# Patient Record
Sex: MALE | Race: WHITE | NOT HISPANIC OR LATINO | Employment: FULL TIME | URBAN - METROPOLITAN AREA
[De-identification: names, ages, dates, MRNs, and addresses within clinical notes are randomized per-mention and may not be internally consistent; named-entity substitution may affect disease eponyms.]

---

## 2017-01-11 RX ORDER — OMEPRAZOLE 40 MG/1
40 CAPSULE, DELAYED RELEASE ORAL DAILY
Qty: 90 CAP | Refills: 1 | Status: SHIPPED | OUTPATIENT
Start: 2017-01-11 | End: 2017-08-07

## 2017-01-15 ENCOUNTER — APPOINTMENT (OUTPATIENT)
Dept: RADIOLOGY | Facility: MEDICAL CENTER | Age: 47
DRG: 580 | End: 2017-01-15
Attending: EMERGENCY MEDICINE
Payer: MEDICAID

## 2017-01-15 ENCOUNTER — RESOLUTE PROFESSIONAL BILLING HOSPITAL PROF FEE (OUTPATIENT)
Dept: MEDSURG UNIT | Facility: MEDICAL CENTER | Age: 47
End: 2017-01-15
Payer: MEDICAID

## 2017-01-15 ENCOUNTER — HOSPITAL ENCOUNTER (INPATIENT)
Facility: MEDICAL CENTER | Age: 47
LOS: 5 days | DRG: 580 | End: 2017-01-20
Attending: EMERGENCY MEDICINE | Admitting: INTERNAL MEDICINE
Payer: MEDICAID

## 2017-01-15 DIAGNOSIS — L03.114 CELLULITIS OF HAND, LEFT: ICD-10-CM
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
## 2017-01-15 PROBLEM — S61.459A CAT BITE OF HAND: Status: ACTIVE | Noted: 2017-01-15

## 2017-01-15 LAB
ANION GAP SERPL CALC-SCNC: 7 MMOL/L (ref 0–11.9)
BASOPHILS # BLD AUTO: 0.8 % (ref 0–1.8)
BASOPHILS # BLD: 0.05 K/UL (ref 0–0.12)
BNP SERPL-MCNC: 13 PG/ML (ref 0–100)
BUN SERPL-MCNC: 21 MG/DL (ref 8–22)
CALCIUM SERPL-MCNC: 9.2 MG/DL (ref 8.5–10.5)
CHLORIDE SERPL-SCNC: 107 MMOL/L (ref 96–112)
CO2 SERPL-SCNC: 23 MMOL/L (ref 20–33)
CREAT SERPL-MCNC: 1.06 MG/DL (ref 0.5–1.4)
EOSINOPHIL # BLD AUTO: 0.22 K/UL (ref 0–0.51)
EOSINOPHIL NFR BLD: 3.5 % (ref 0–6.9)
ERYTHROCYTE [DISTWIDTH] IN BLOOD BY AUTOMATED COUNT: 38.7 FL (ref 35.9–50)
GFR SERPL CREATININE-BSD FRML MDRD: >60 ML/MIN/1.73 M 2
GLUCOSE SERPL-MCNC: 100 MG/DL (ref 65–99)
HCT VFR BLD AUTO: 41.1 % (ref 42–52)
HGB BLD-MCNC: 14.3 G/DL (ref 14–18)
IMM GRANULOCYTES # BLD AUTO: 0.02 K/UL (ref 0–0.11)
IMM GRANULOCYTES NFR BLD AUTO: 0.3 % (ref 0–0.9)
LACTATE BLD-SCNC: 1.1 MMOL/L (ref 0.5–2)
LYMPHOCYTES # BLD AUTO: 1.74 K/UL (ref 1–4.8)
LYMPHOCYTES NFR BLD: 27.4 % (ref 22–41)
MCH RBC QN AUTO: 30.4 PG (ref 27–33)
MCHC RBC AUTO-ENTMCNC: 34.8 G/DL (ref 33.7–35.3)
MCV RBC AUTO: 87.3 FL (ref 81.4–97.8)
MONOCYTES # BLD AUTO: 0.57 K/UL (ref 0–0.85)
MONOCYTES NFR BLD AUTO: 9 % (ref 0–13.4)
NEUTROPHILS # BLD AUTO: 3.75 K/UL (ref 1.82–7.42)
NEUTROPHILS NFR BLD: 59 % (ref 44–72)
NRBC # BLD AUTO: 0 K/UL
NRBC BLD AUTO-RTO: 0 /100 WBC
PLATELET # BLD AUTO: 216 K/UL (ref 164–446)
PMV BLD AUTO: 9.3 FL (ref 9–12.9)
POTASSIUM SERPL-SCNC: 4 MMOL/L (ref 3.6–5.5)
RBC # BLD AUTO: 4.71 M/UL (ref 4.7–6.1)
SODIUM SERPL-SCNC: 137 MMOL/L (ref 135–145)
TROPONIN I SERPL-MCNC: <0.01 NG/ML (ref 0–0.04)
WBC # BLD AUTO: 6.4 K/UL (ref 4.8–10.8)

## 2017-01-15 PROCEDURE — 73130 X-RAY EXAM OF HAND: CPT | Mod: LT

## 2017-01-15 PROCEDURE — 770006 HCHG ROOM/CARE - MED/SURG/GYN SEMI*

## 2017-01-15 PROCEDURE — 93010 ELECTROCARDIOGRAM REPORT: CPT | Performed by: INTERNAL MEDICINE

## 2017-01-15 PROCEDURE — 99285 EMERGENCY DEPT VISIT HI MDM: CPT

## 2017-01-15 PROCEDURE — 85025 COMPLETE CBC W/AUTO DIFF WBC: CPT

## 2017-01-15 PROCEDURE — 83605 ASSAY OF LACTIC ACID: CPT

## 2017-01-15 PROCEDURE — 80048 BASIC METABOLIC PNL TOTAL CA: CPT

## 2017-01-15 PROCEDURE — 96365 THER/PROPH/DIAG IV INF INIT: CPT

## 2017-01-15 PROCEDURE — 83880 ASSAY OF NATRIURETIC PEPTIDE: CPT

## 2017-01-15 PROCEDURE — 84484 ASSAY OF TROPONIN QUANT: CPT

## 2017-01-15 PROCEDURE — 700111 HCHG RX REV CODE 636 W/ 250 OVERRIDE (IP): Performed by: EMERGENCY MEDICINE

## 2017-01-15 PROCEDURE — 93005 ELECTROCARDIOGRAM TRACING: CPT | Performed by: STUDENT IN AN ORGANIZED HEALTH CARE EDUCATION/TRAINING PROGRAM

## 2017-01-15 RX ORDER — LEVOTHYROXINE SODIUM 0.12 MG/1
125 TABLET ORAL DAILY
Status: DISCONTINUED | OUTPATIENT
Start: 2017-01-16 | End: 2017-01-20 | Stop reason: HOSPADM

## 2017-01-15 RX ORDER — ACETAMINOPHEN 325 MG/1
650 TABLET ORAL EVERY 6 HOURS PRN
Status: DISCONTINUED | OUTPATIENT
Start: 2017-01-15 | End: 2017-01-20 | Stop reason: HOSPADM

## 2017-01-15 RX ORDER — OMEPRAZOLE 20 MG/1
40 CAPSULE, DELAYED RELEASE ORAL DAILY
Status: DISCONTINUED | OUTPATIENT
Start: 2017-01-16 | End: 2017-01-20 | Stop reason: HOSPADM

## 2017-01-15 RX ORDER — BISOPROLOL FUMARATE 5 MG/1
10 TABLET, FILM COATED ORAL 2 TIMES DAILY
Status: DISCONTINUED | OUTPATIENT
Start: 2017-01-16 | End: 2017-01-20 | Stop reason: HOSPADM

## 2017-01-15 RX ORDER — NICOTINE 21 MG/24HR
14 PATCH, TRANSDERMAL 24 HOURS TRANSDERMAL
Status: DISCONTINUED | OUTPATIENT
Start: 2017-01-16 | End: 2017-01-17 | Stop reason: SINTOL

## 2017-01-15 RX ORDER — VERAPAMIL HYDROCHLORIDE 240 MG/1
240 TABLET, FILM COATED, EXTENDED RELEASE ORAL DAILY
Status: DISCONTINUED | OUTPATIENT
Start: 2017-01-16 | End: 2017-01-20 | Stop reason: HOSPADM

## 2017-01-15 RX ORDER — ATORVASTATIN CALCIUM 20 MG/1
20 TABLET, FILM COATED ORAL DAILY
Status: DISCONTINUED | OUTPATIENT
Start: 2017-01-16 | End: 2017-01-20 | Stop reason: HOSPADM

## 2017-01-15 RX ORDER — GABAPENTIN 100 MG/1
100 CAPSULE ORAL 3 TIMES DAILY
Status: DISCONTINUED | OUTPATIENT
Start: 2017-01-16 | End: 2017-01-20 | Stop reason: HOSPADM

## 2017-01-15 RX ORDER — POLYETHYLENE GLYCOL 3350 17 G/17G
1 POWDER, FOR SOLUTION ORAL DAILY
Status: DISCONTINUED | OUTPATIENT
Start: 2017-01-16 | End: 2017-01-20 | Stop reason: HOSPADM

## 2017-01-15 RX ORDER — SODIUM CHLORIDE 9 MG/ML
INJECTION, SOLUTION INTRAVENOUS CONTINUOUS
Status: DISCONTINUED | OUTPATIENT
Start: 2017-01-16 | End: 2017-01-20

## 2017-01-15 RX ORDER — LABETALOL HYDROCHLORIDE 5 MG/ML
10 INJECTION, SOLUTION INTRAVENOUS EVERY 4 HOURS PRN
Status: DISCONTINUED | OUTPATIENT
Start: 2017-01-15 | End: 2017-01-20 | Stop reason: HOSPADM

## 2017-01-15 RX ORDER — AMPICILLIN AND SULBACTAM 2; 1 G/1; G/1
3 INJECTION, POWDER, FOR SOLUTION INTRAMUSCULAR; INTRAVENOUS ONCE
Status: COMPLETED | OUTPATIENT
Start: 2017-01-15 | End: 2017-01-15

## 2017-01-15 RX ADMIN — AMPICILLIN SODIUM AND SULBACTAM SODIUM 3 G: 2; 1 INJECTION, POWDER, FOR SOLUTION INTRAMUSCULAR; INTRAVENOUS at 20:11

## 2017-01-15 ASSESSMENT — PAIN SCALES - GENERAL
PAINLEVEL_OUTOF10: 2
PAINLEVEL_OUTOF10: 6

## 2017-01-15 ASSESSMENT — ENCOUNTER SYMPTOMS
ABDOMINAL PAIN: 0
FEVER: 0
SPUTUM PRODUCTION: 0
VOMITING: 0
PALPITATIONS: 0
BLURRED VISION: 0
CHILLS: 0
NAUSEA: 0
SHORTNESS OF BREATH: 0
FOCAL WEAKNESS: 0
DIARRHEA: 0
SORE THROAT: 0
CONSTIPATION: 1
HEADACHES: 0
COUGH: 0
WHEEZING: 0
SENSORY CHANGE: 0
MYALGIAS: 0

## 2017-01-15 ASSESSMENT — LIFESTYLE VARIABLES
EVER_SMOKED: YES
DO YOU DRINK ALCOHOL: NO

## 2017-01-15 NOTE — IP AVS SNAPSHOT
Shiny Ads Access Code: Activation code not generated  Current Shiny Ads Status: Patient Declined    Your email address is not on file at [facility].  Email Addresses are required for you to sign up for Shiny Ads, please contact [phone] to verify your personal information and to provide your email address prior to attempting to register for Shiny Ads.    Trevor Gillis Jr.  [address] [place] [address]    Shiny Ads  A secure, online tool to manage your health information     [facility]’s Shiny Ads® is a secure, online tool that connects you to your personalized health information from the privacy of your home -- day or night - making it very easy for you to manage your healthcare. Once the activation process is completed, you can even access your medical information using the Shiny Ads jarvis, which is available for free in the Apple Jarvis store or Google Play store.     To learn more about Shiny Ads, visit [email or web address]    There are two levels of access available (as shown below):   My Chart Features  [facility] Doctor [facility]  Specialists [facility]  Urgent  Care Non-[facility] Primary Care Doctor   Email your healthcare team securely and privately 24/7 X X X    Manage appointments: schedule your next appointment; view details of past/upcoming appointments X      Request prescription refills. X      View recent personal medical records, including lab and immunizations X X X X   View health record, including health history, allergies, medications X X X X   Read reports about your outpatient visits, procedures, consult and ER notes X X X X   See your discharge summary, which is a recap of your hospital and/or ER visit that includes your diagnosis, lab results, and care plan X X  X     How to register for [employer]:  Once your e-mail address has been verified, follow the following steps to sign up for [employer].     1. Go to  [email or web address]  2. Click on the Sign Up Now box, which takes you to the  New Member Sign Up page. You will need to provide the following information:  a. Enter your Medstory Access Code exactly as it appears at the top of this page. (You will not need to use this code after you’ve completed the sign-up process. If you do not sign up before the expiration date, you must request a new code.)   b. Enter your date of birth.   c. Enter your home email address.   d. Click Submit, and follow the next screen’s instructions.  3. Create a Medstory ID. This will be your Medstory login ID and cannot be changed, so think of one that is secure and easy to remember.  4. Create a Medstory password. You can change your password at any time.  5. Enter your Password Reset Question and Answer. This can be used at a later time if you forget your password.   6. Enter your e-mail address. This allows you to receive e-mail notifications when new information is available in Medstory.  7. Click Sign Up. You can now view your health information.    For assistance activating your Medstory account, call [phone]

## 2017-01-15 NOTE — IP AVS SNAPSHOT
" <p align=\"LEFT\"><IMG SRC=\"//[email or web address]$/[email or web address]\" alt=\"Image\" WIDTH=\"50%\" HEIGHT=\"200\" BORDER=\"\"></p>                   Name:Trevor Gillis Jr.  Medical Record Number:[id number]  CSN: [id number]    YOB: 1970   Age: 46 y.o.  Sex: male  HT:1.905 m (6' 3\") WT: 119.1 kg (262 lb 9.1 oz)          Admit Date: 1/15/2017     Discharge Date:   Today's Date: 1/20/2017  Attending Doctor:  [facility]                  Allergies:  Tomato          Your appointments     Feb 01, 2017 10:00 AM   FOLLOW UP with Jeffrey Gillis M.D.   [facility] (--)    [address] [place] [address] [address]  [facility] [address] [phone]            Feb 13, 2017  9:30 AM   Established Patient with Blake Oseguera M.D.   [facility] (--)    [address] [place] [address] [address]  [facility] [address] [phone]           You will be receiving a confirmation call a few days before your appointment from our automated call confirmation system.              Follow-up Information     1. Follow up with Blake Oseguera M.D. In 2 weeks.    Specialty:  Internal Medicine    Contact information    [address] [place] [address]  [facility] [address] [phone]          2. Follow up with Kia Aldridge M.D. In 10 days.    Specialty:  Plastic Surgery    Contact information    [address] [facility] [address]  [facility] [address] [phone]          3. Follow up with Blake Oseguera M.D. In 2 weeks.    Specialty:  Internal Medicine    Contact information    [address] [facility] [address]  [facility] [address] [phone]           Medication List      Take these Medications        Instructions    albuterol 108 (90 BASE) MCG/ACT Aers inhalation aerosol    Inhale 2 Puffs by mouth every 6 hours as needed for Shortness of Breath.   Dose:  2 Puff       atorvastatin 20 MG Tabs   Commonly known as:  LIPITOR    Doctor's comments:  PT NEEDS APPT   Take 1 Tab by mouth every day.   Dose:  20 mg      " bisoprolol 10 MG tablet   Commonly known as:  ZEBETA    Take 1 Tab by mouth 2 Times a Day.   Dose:  10 mg       gabapentin 100 MG Caps   Commonly known as:  NEURONTIN    Take 100 mg by mouth 3 times a day.   Dose:  100 mg       levofloxacin 750 MG tablet   Commonly known as:  LEVAQUIN    Take 1 Tab by mouth every day.   Dose:  750 mg       levothyroxine 125 MCG Tabs   Commonly known as:  SYNTHROID    Doctor's comments:  Have PCP refill and follow thyroid after this   Take 1 Tab by mouth every day.   Dose:  125 mcg       metronidazole 500 MG Tabs   Commonly known as:  FLAGYL    Take 1 Tab by mouth 3 times a day.   Dose:  500 mg       omeprazole 40 MG delayed-release capsule   Commonly known as:  PRILOSEC    Doctor's comments:  Take one capsule 30 minutes before breakfast   Take 1 Cap by mouth every day.   Dose:  40 mg       verapamil  MG Tbcr   Commonly known as:  CALAN-SR    Take 1 Tab by mouth every day.   Dose:  240 mg

## 2017-01-15 NOTE — IP AVS SNAPSHOT
" After Visit Summary                                                                                                                  Name:Trevor Gillis Jr.  Medical Record Number:[id number]  CSN: [id number]    YOB: 1970   Age: 46 y.o.  Sex: male  HT:1.905 m (6' 3\") WT: 119.1 kg (262 lb 9.1 oz)          Admit Date: 1/15/2017     Discharge Date:   Today's Date: 1/20/2017  Attending Doctor:  [facility] [employer] [facility]                  Allergies:  Tomato            Discharge Instructions       Discharge Instructions    Discharged to home by car with friend. Discharged via walking, hospital escort: Refused.  Special equipment needed: Not Applicable    Be sure to schedule a follow-up appointment with your primary care doctor or any specialists as instructed.     Discharge Plan:   Diet Plan: Discussed  Activity Level: Discussed  Confirmed Follow up Appointment: Appointment Scheduled  Confirmed Symptoms Management: Discussed  Medication Reconciliation Updated: Yes  Influenza Vaccine Indication: Not indicated: Previously immunized this influenza season and > 8 years of age    I understand that a diet low in cholesterol, fat, and sodium is recommended for good health. Unless I have been given specific instructions below for another diet, I accept this instruction as my diet prescription.   Other diet: Regular    Special Instructions: None    · Is patient discharged on Warfarin / Coumadin?   No     · Is patient Post Blood Transfusion?  No    Depression / Suicide Risk    As you are discharged from this [facility] facility, it is important to learn how to keep safe from harming yourself.    Recognize the warning signs:  · Abrupt changes in personality, positive or negative- including increase in energy   · Giving away possessions  · Change in eating patterns- significant weight changes-  positive or negative  · Change in sleeping patterns- unable to sleep or sleeping all the time   · Unwillingness or inability to " communicate  · Depression  · Unusual sadness, discouragement and loneliness  · Talk of wanting to die  · Neglect of personal appearance   · Rebelliousness- reckless behavior  · Withdrawal from people/activities they love  · Confusion- inability to concentrate     If you or a loved one observes any of these behaviors or has concerns about self-harm, here's what you can do:  · Talk about it- your feelings and reasons for harming yourself  · Remove any means that you might use to hurt yourself (examples: pills, rope, extension cords, firearm)  · Get professional help from the community (Mental Health, Substance Abuse, psychological counseling)  · Do not be alone:Call your Safe Contact- someone whom you trust who will be there for you.  · Call your local CRISIS HOTLINE 693-9391 or 831-591-4354  · Call your local Children's Mobile Crisis Response Team [place] (626) 004-0582 or www.Riskthinktank  · Call the toll free National Suicide Prevention Hotlines   · National Suicide Prevention Lifeline 748-449-PVIH (1074)  · [employer] Hope Line Network 800-SUICIDE (010-8732)    Cellulitis  Cellulitis is an infection of the skin and the tissue beneath it. The infected area is usually red and tender. Cellulitis occurs most often in the arms and lower legs.   CAUSES   Cellulitis is caused by bacteria that enter the skin through cracks or cuts in the skin. The most common types of bacteria that cause cellulitis are staphylococci and streptococci.  SIGNS AND SYMPTOMS   · Redness and warmth.  · Swelling.  · Tenderness or pain.  · Fever.  DIAGNOSIS   Your health care provider can usually determine what is wrong based on a physical exam. Blood tests may also be done.  TREATMENT   Treatment usually involves taking an antibiotic medicine.  HOME CARE INSTRUCTIONS   · Take your antibiotic medicine as directed by your health care provider. Finish the antibiotic even if you start to feel better.  · Keep the infected arm or leg elevated  to reduce swelling.  · Apply a warm cloth to the affected area up to 4 times per day to relieve pain.  · Take medicines only as directed by your health care provider.  · Keep all follow-up visits as directed by your health care provider.  SEEK MEDICAL CARE IF:   2. You notice red streaks coming from the infected area.  3. Your red area gets larger or turns dark in color.  4. Your bone or joint underneath the infected area becomes painful after the skin has healed.  5. Your infection returns in the same area or another area.  6. You notice a swollen bump in the infected area.  7. You develop new symptoms.  8. You have a fever.  SEEK IMMEDIATE MEDICAL CARE IF:   2. You feel very sleepy.  3. You develop vomiting or diarrhea.  4. You have a general ill feeling (malaise) with muscle aches and pains.  MAKE SURE YOU:   2. Understand these instructions.  3. Will watch your condition.  4. Will get help right away if you are not doing well or get worse.     This information is not intended to replace advice given to you by your health care provider. Make sure you discuss any questions you have with your health care provider.     Document Released: 09/27/2006 Document Revised: 01/08/2016 Document Reviewed: 03/04/2013  [employer] Interactive Patient Education ©2016 [employer]    Wound Packing  Wound packing involves placing a moistened packing material into your wound and then covering it with an outer bandage (dressing). This helps promote proper healing of deep tissue and tissue under the skin. It also helps prevent bleeding, infection, and further injury.   Wounds are packed until deep tissue has had time to heal. The time it takes for this to occur is different for everyone. Your health care provider will show you how to pack and dress your wound. Using gloves and a sterile technique is important in order to avoid spreading germs into your wound.  RISKS AND COMPLICATIONS  · Infection.  · Delayed or abnormal healing.  HOW  TO PACK YOUR WOUND  Follow your health care provider's instructions on how often you need to change dressings and pack your wound. You will likely be asked to change dressings 1-2 times a day.  Supplies Needed  · Gloves.  · Wetting solution.  · Clean bowl.  · Packing material (gauze or gauze sponges).  · Clean towels.  · Outer dressing.  · Tape.  · Cotton balls or cotton-tipped swabs.  · Small plastic bag.  Preparing Your New Packing Material  9. Make sure your work surface or countertop is clean and disinfected.  10. Wash your hands well with soap and water.  11. Put a clean towel on the counter.  12. Put a clean bowl on the towel. Be sure to only touch the outside of the bowl when handling it.  13. Pour wetting solution into the bowl.  14. Cut your packing material (gauze or sponges) to the right size for your wound. Drop it into the bowl.  15. Cut four tape strips that you will use to seal the outer dressing.  16. Put cotton balls or cotton-tipped swabs on the clean towel.  Removing the Old Packing and Dressing  5. Gently remove the old dressing and packing material.  6. Put the removed items into the plastic bag to throw away later.  7. Wash your hands well with soap and water again.  Applying New Packing Material and Dressing  5. Put on your gloves.  6. Squeeze the packing material in the bowl to release excess liquid. The packing material should be moist, but not dripping wet.  7. Gently place the packing material into the wound. Use a cotton ball or cotton-tipped swab to guide it into place, filling all of the space.  8. Dry your fingertips on the towel.  9. Open up your outer dressing supplies and put them on a dry part of the towel. Keep them from getting wet.  10. Place the dressing over the packed wound.  11. Tape the four outer edges of the dressing in place.  12. Remove your gloves.  13. Wash your hands again with soap and water.  General Tips  · Follow your health care provider's instructions on how  tightly to pack the wound. At first, the wound will be packed tightly to help stop bleeding. As the wound begins to heal inside, you will use less packing material and pack the wound loosely to allow tissue to heal slowly from the inside out.  · Keep the dressing clean and dry.  · Follow any other instructions given by your health care provider on how to aid healing. This may include applying warm or cold compresses, elevating the affected area, or wearing a compression dressing.  · Ask your health care provider about sun exposure and sunscreen when the dressings are no longer needed.  · Keep all follow-up visits with your health care provider. This is important.  SEEK MEDICAL CARE IF:  · You have drainage, redness, swelling, or pain at your wound site.  · You notice a bad smell coming from the wound site.  · Your pain is not controlled with pain medicine.  · Tissue inside your wound changes color from pink to white, yellow, or black.  · Your wound changes in size or depth.  · You have a fever.  · You have shaking chills.  · You are having trouble packing your wound.     This information is not intended to replace advice given to you by your health care provider. Make sure you discuss any questions you have with your health care provider.     Document Released: 07/15/2015 Document Reviewed: 07/15/2015  Wantr Interactive Patient Education ©2016 [employer] Inc.      Your appointments     Feb 01, 2017 10:00 AM   FOLLOW UP with Jeffrey Gillis M.D.   [facility] (--)    [address] [place] [address] [address]  [facility] [address]   [phone]            Feb 13, 2017  9:30 AM   Established Patient with Blake Oseguera M.D.   [facility] - Internal Medicine (--)    [address] [place] [address]  [address]  [facility] [address]   [phone]           You will be receiving a confirmation call a few days before your appointment from our automated call confirmation system.                 Follow-up Information     1. Follow up with Blake Oseguera M.D. In 2 weeks.    Specialty:  Internal Medicine    Contact information    [address]  [address]  [place] [address] [phone]          2. Follow up with Kia Aldridge M.D. In 10 days.    Specialty:  Plastic Surgery    Contact information    [address] [facility] [address]  [place] [address]  [phone]          3. Follow up with Blake Oseguera M.D. In 2 weeks.    Specialty:  Internal Medicine    Contact information    [address]  [address]  [place] [id number] [phone]           Discharge Medication Instructions:    Below are the medications your physician expects you to take upon discharge:    Review all your home medications and newly ordered medications with your doctor and/or pharmacist. Follow medication instructions as directed by your doctor and/or pharmacist.    Please keep your medication list with you and share with your physician.               Medication List      START taking these medications        Instructions    levofloxacin 750 MG tablet   Last time this was given:  750 mg on 1/20/2017  9:39 AM   Commonly known as:  LEVAQUIN    Take 1 Tab by mouth every day.   Dose:  750 mg       metronidazole 500 MG Tabs   Last time this was given:  500 mg on 1/20/2017  5:26 AM   Commonly known as:  FLAGYL    Take 1 Tab by mouth 3 times a day.   Dose:  500 mg         CONTINUE taking these medications        Instructions    albuterol 108 (90 BASE) MCG/ACT Aers inhalation aerosol    Inhale 2 Puffs by mouth every 6 hours as needed for Shortness of Breath.   Dose:  2 Puff       atorvastatin 20 MG Tabs   Last time this was given:  20 mg on 1/19/2017  8:56 PM   Commonly known as:  LIPITOR    Doctor's comments:  PT NEEDS APPT   Take 1 Tab by mouth every day.   Dose:  20 mg       bisoprolol 10 MG tablet   Last time this was given:  10 mg on 1/20/2017  9:39 AM   Commonly known as:  ZEBETA    Take 1 Tab by mouth 2 Times a Day.   Dose:  10 mg       gabapentin  100 MG Caps   Last time this was given:  100 mg on 1/20/2017  9:40 AM   Commonly known as:  NEURONTIN    Take 100 mg by mouth 3 times a day.   Dose:  100 mg       levothyroxine 125 MCG Tabs   Last time this was given:  125 mcg on 1/20/2017  5:26 AM   Commonly known as:  SYNTHROID    Doctor's comments:  Have PCP refill and follow thyroid after this   Take 1 Tab by mouth every day.   Dose:  125 mcg       omeprazole 40 MG delayed-release capsule   Last time this was given:  40 mg on 1/20/2017  9:40 AM   Commonly known as:  PRILOSEC    Doctor's comments:  Take one capsule 30 minutes before breakfast   Take 1 Cap by mouth every day.   Dose:  40 mg       verapamil  MG Tbcr   Last time this was given:  240 mg on 1/20/2017  9:39 AM   Commonly known as:  CALAN-SR    Take 1 Tab by mouth every day.   Dose:  240 mg               Instructions           Diet / Nutrition:    Follow any diet instructions given to you by your doctor or the dietician, including how much salt (sodium) you are allowed each day.    If you are overweight, talk to your doctor about a weight reduction plan.    Activity:    Remain physically active following your doctor's instructions about exercise and activity.    Rest often.     Any time you become even a little tired or short of breath, SIT DOWN and rest.    Worsening Symptoms:    Report any of the following signs and symptoms to the doctor's office immediately:    *Pain of jaw, arm, or neck  *Chest pain not relieved by medication                               *Dizziness or loss of consciousness  *Difficulty breathing even when at rest   *More tired than usual                                       *Bleeding drainage or swelling of surgical site  *Swelling of feet, ankles, legs or stomach                 *Fever (>100ºF)  *Pink or blood tinged sputum  *Weight gain (3lbs/day or 5lbs /week)           *Shock from internal defibrillator (if applicable)  *Palpitations or irregular heartbeats                 *Cool and/or numb extremities    Stroke Awareness    Common Risk Factors for Stroke include:    Age  Atrial Fibrillation  Carotid Artery Stenosis  Diabetes Mellitus  Excessive alcohol consumption  High blood pressure  Overweight   Physical inactivity  Smoking    Warning signs and symptoms of a stroke include:    *Sudden numbness or weakness of the face, arm or leg (especially on one side of the body).  *Sudden confusion, trouble speaking or understanding.  *Sudden trouble seeing in one or both eyes.  *Sudden trouble walking, dizziness, loss of balance or coordination.Sudden severe headache with no known cause.    It is very important to get treatment quickly when a stroke occurs. If you experience any of the above warning signs, call 911 immediately.                   Disclaimer         Quit Smoking / Tobacco Use:    I understand the use of any tobacco products increases my chance of suffering from future heart disease or stroke and could cause other illnesses which may shorten my life. Quitting the use of tobacco products is the single most important thing I can do to improve my health. For further information on smoking / tobacco cessation call a Toll Free Quit Line at 1-176.496.5997 (*National Cancer [place]) or 1-995.123.3132 (American Lung Association) or you can access the web based program at www.lungPoke'n Call.org.    [place] Tobacco Users Help Line:  (116) 546-9482       Toll Free: 1-505.822.9322  Quit Tobacco Program [facility] (402)338-4202    Crisis Hotline:    [place] Crisis Hotline:  4-956-UTDNYKP or 1-982.948.7457    [place] Crisis Hotline:    1-491.468.6982 or 625-881-8779    Discharge Survey:   Thank you for choosing [facility]. We hope we did everything we could to make your hospital stay a pleasant one. You may be receiving a phone survey and we would appreciate your time and participation in answering the questions. Your input is very valuable to us in our efforts to improve  our service to our patients and their families.        My signature on this form indicates that:    1. I have reviewed and understand the above information.  2. My questions regarding this information have been answered to my satisfaction.  3. I have formulated a plan with my discharge nurse to obtain my prescribed medications for home.                  Disclaimer         __________________________________                     __________       ________                       Patient Signature                                                 Date                    Time

## 2017-01-15 NOTE — IP AVS SNAPSHOT
1/20/2017          Trevor Gillis Jr.  [address] [place] [address]    Dear Trevor:    [facility] wants to ensure your discharge home is safe and you or your loved ones have had all your questions answered regarding your care after you leave the hospital.    You may receive a telephone call within two days of your discharge.  This call is to make certain you understand your discharge instructions as well as ensure we provided you with the best care possible during your stay with us.     The call will only last approximately 3-5 minutes and will be done by a nurse.    Once again, we want to ensure your discharge home is safe and that you have a clear understanding of any next steps in your care.  If you have any questions or concerns, please do not hesitate to contact us, we are here for you.  Thank you for choosing [facility] for your healthcare needs.    Sincerely,    Lee Campbell    [facility]

## 2017-01-16 ENCOUNTER — APPOINTMENT (OUTPATIENT)
Dept: RADIOLOGY | Facility: MEDICAL CENTER | Age: 47
DRG: 580 | End: 2017-01-16
Attending: STUDENT IN AN ORGANIZED HEALTH CARE EDUCATION/TRAINING PROGRAM
Payer: MEDICAID

## 2017-01-16 PROBLEM — I10 HYPERTENSION: Status: ACTIVE | Noted: 2017-01-16

## 2017-01-16 LAB
ALBUMIN SERPL BCP-MCNC: 3.8 G/DL (ref 3.2–4.9)
ALBUMIN/GLOB SERPL: 1.2 G/DL
ALP SERPL-CCNC: 89 U/L (ref 30–99)
ALT SERPL-CCNC: 21 U/L (ref 2–50)
ANION GAP SERPL CALC-SCNC: 9 MMOL/L (ref 0–11.9)
AST SERPL-CCNC: 17 U/L (ref 12–45)
BASOPHILS # BLD AUTO: 0.8 % (ref 0–1.8)
BASOPHILS # BLD: 0.05 K/UL (ref 0–0.12)
BILIRUB SERPL-MCNC: 1.2 MG/DL (ref 0.1–1.5)
BUN SERPL-MCNC: 18 MG/DL (ref 8–22)
CALCIUM SERPL-MCNC: 9.2 MG/DL (ref 8.5–10.5)
CHLORIDE SERPL-SCNC: 105 MMOL/L (ref 96–112)
CO2 SERPL-SCNC: 23 MMOL/L (ref 20–33)
CREAT SERPL-MCNC: 0.99 MG/DL (ref 0.5–1.4)
EKG IMPRESSION: NORMAL
EOSINOPHIL # BLD AUTO: 0.23 K/UL (ref 0–0.51)
EOSINOPHIL NFR BLD: 3.8 % (ref 0–6.9)
ERYTHROCYTE [DISTWIDTH] IN BLOOD BY AUTOMATED COUNT: 40.2 FL (ref 35.9–50)
GFR SERPL CREATININE-BSD FRML MDRD: >60 ML/MIN/1.73 M 2
GLOBULIN SER CALC-MCNC: 3.1 G/DL (ref 1.9–3.5)
GLUCOSE SERPL-MCNC: 95 MG/DL (ref 65–99)
HCT VFR BLD AUTO: 42.5 % (ref 42–52)
HGB BLD-MCNC: 15 G/DL (ref 14–18)
IMM GRANULOCYTES # BLD AUTO: 0.02 K/UL (ref 0–0.11)
IMM GRANULOCYTES NFR BLD AUTO: 0.3 % (ref 0–0.9)
LYMPHOCYTES # BLD AUTO: 1.83 K/UL (ref 1–4.8)
LYMPHOCYTES NFR BLD: 30.2 % (ref 22–41)
MAGNESIUM SERPL-MCNC: 1.6 MG/DL (ref 1.5–2.5)
MCH RBC QN AUTO: 31.2 PG (ref 27–33)
MCHC RBC AUTO-ENTMCNC: 35.3 G/DL (ref 33.7–35.3)
MCV RBC AUTO: 88.4 FL (ref 81.4–97.8)
MONOCYTES # BLD AUTO: 0.65 K/UL (ref 0–0.85)
MONOCYTES NFR BLD AUTO: 10.7 % (ref 0–13.4)
NEUTROPHILS # BLD AUTO: 3.28 K/UL (ref 1.82–7.42)
NEUTROPHILS NFR BLD: 54.2 % (ref 44–72)
NRBC # BLD AUTO: 0 K/UL
NRBC BLD AUTO-RTO: 0 /100 WBC
PLATELET # BLD AUTO: 211 K/UL (ref 164–446)
PMV BLD AUTO: 9.3 FL (ref 9–12.9)
POTASSIUM SERPL-SCNC: 3.8 MMOL/L (ref 3.6–5.5)
PROT SERPL-MCNC: 6.9 G/DL (ref 6–8.2)
RBC # BLD AUTO: 4.81 M/UL (ref 4.7–6.1)
SODIUM SERPL-SCNC: 137 MMOL/L (ref 135–145)
WBC # BLD AUTO: 6.1 K/UL (ref 4.8–10.8)

## 2017-01-16 PROCEDURE — 501330 HCHG SET, CYSTO IRRIG TUBING: Performed by: SURGERY

## 2017-01-16 PROCEDURE — 36415 COLL VENOUS BLD VENIPUNCTURE: CPT

## 2017-01-16 PROCEDURE — 501631 HCHG TUBE, NG FEEDING 8FR 15: Performed by: SURGERY

## 2017-01-16 PROCEDURE — A6407 PACKING STRIPS, NON-IMPREG: HCPCS | Performed by: SURGERY

## 2017-01-16 PROCEDURE — 0JBK0ZZ EXCISION OF LEFT HAND SUBCUTANEOUS TISSUE AND FASCIA, OPEN APPROACH: ICD-10-PCS | Performed by: SURGERY

## 2017-01-16 PROCEDURE — 76882 US LMTD JT/FCL EVL NVASC XTR: CPT | Mod: LT

## 2017-01-16 PROCEDURE — 110382 HCHG SHELL REV 271: Performed by: SURGERY

## 2017-01-16 PROCEDURE — 80053 COMPREHEN METABOLIC PANEL: CPT

## 2017-01-16 PROCEDURE — 700105 HCHG RX REV CODE 258: Performed by: STUDENT IN AN ORGANIZED HEALTH CARE EDUCATION/TRAINING PROGRAM

## 2017-01-16 PROCEDURE — 700102 HCHG RX REV CODE 250 W/ 637 OVERRIDE(OP): Performed by: SURGERY

## 2017-01-16 PROCEDURE — 99153 MOD SED SAME PHYS/QHP EA: CPT | Performed by: SURGERY

## 2017-01-16 PROCEDURE — 160036 HCHG PACU - EA ADDL 30 MINS PHASE I: Performed by: SURGERY

## 2017-01-16 PROCEDURE — 87077 CULTURE AEROBIC IDENTIFY: CPT

## 2017-01-16 PROCEDURE — 99223 1ST HOSP IP/OBS HIGH 75: CPT | Performed by: INTERNAL MEDICINE

## 2017-01-16 PROCEDURE — A4606 OXYGEN PROBE USED W OXIMETER: HCPCS | Performed by: SURGERY

## 2017-01-16 PROCEDURE — 160021 HCHG LOCAL: Performed by: SURGERY

## 2017-01-16 PROCEDURE — 87186 SC STD MICRODIL/AGAR DIL: CPT

## 2017-01-16 PROCEDURE — 87205 SMEAR GRAM STAIN: CPT

## 2017-01-16 PROCEDURE — 700111 HCHG RX REV CODE 636 W/ 250 OVERRIDE (IP)

## 2017-01-16 PROCEDURE — 160002 HCHG RECOVERY MINUTES (STAT): Performed by: SURGERY

## 2017-01-16 PROCEDURE — 700111 HCHG RX REV CODE 636 W/ 250 OVERRIDE (IP): Performed by: STUDENT IN AN ORGANIZED HEALTH CARE EDUCATION/TRAINING PROGRAM

## 2017-01-16 PROCEDURE — 700101 HCHG RX REV CODE 250

## 2017-01-16 PROCEDURE — 160035 HCHG PACU - 1ST 60 MINS PHASE I: Performed by: SURGERY

## 2017-01-16 PROCEDURE — 83735 ASSAY OF MAGNESIUM: CPT

## 2017-01-16 PROCEDURE — 500881 HCHG PACK, EXTREMITY: Performed by: SURGERY

## 2017-01-16 PROCEDURE — A9270 NON-COVERED ITEM OR SERVICE: HCPCS | Performed by: STUDENT IN AN ORGANIZED HEALTH CARE EDUCATION/TRAINING PROGRAM

## 2017-01-16 PROCEDURE — 87070 CULTURE OTHR SPECIMN AEROBIC: CPT

## 2017-01-16 PROCEDURE — 700111 HCHG RX REV CODE 636 W/ 250 OVERRIDE (IP): Performed by: SURGERY

## 2017-01-16 PROCEDURE — A9270 NON-COVERED ITEM OR SERVICE: HCPCS | Performed by: SURGERY

## 2017-01-16 PROCEDURE — 502240 HCHG MISC OR SUPPLY RC 0272: Performed by: SURGERY

## 2017-01-16 PROCEDURE — 85025 COMPLETE CBC W/AUTO DIFF WBC: CPT

## 2017-01-16 PROCEDURE — 160048 HCHG OR STATISTICAL LEVEL 1-5: Performed by: SURGERY

## 2017-01-16 PROCEDURE — 770006 HCHG ROOM/CARE - MED/SURG/GYN SEMI*

## 2017-01-16 PROCEDURE — 160028 HCHG SURGERY MINUTES - 1ST 30 MINS LEVEL 3: Performed by: SURGERY

## 2017-01-16 PROCEDURE — 500049 HCHG BANDAGE, ACE ELASTIC 2: Performed by: SURGERY

## 2017-01-16 PROCEDURE — 501838 HCHG SUTURE GENERAL: Performed by: SURGERY

## 2017-01-16 PROCEDURE — 500126 HCHG BOVIE, NEEDLE TIP: Performed by: SURGERY

## 2017-01-16 PROCEDURE — 99152 MOD SED SAME PHYS/QHP 5/>YRS: CPT | Performed by: SURGERY

## 2017-01-16 PROCEDURE — 700102 HCHG RX REV CODE 250 W/ 637 OVERRIDE(OP): Performed by: STUDENT IN AN ORGANIZED HEALTH CARE EDUCATION/TRAINING PROGRAM

## 2017-01-16 PROCEDURE — 90471 IMMUNIZATION ADMIN: CPT

## 2017-01-16 PROCEDURE — 160039 HCHG SURGERY MINUTES - EA ADDL 1 MIN LEVEL 3: Performed by: SURGERY

## 2017-01-16 PROCEDURE — 90715 TDAP VACCINE 7 YRS/> IM: CPT | Performed by: STUDENT IN AN ORGANIZED HEALTH CARE EDUCATION/TRAINING PROGRAM

## 2017-01-16 PROCEDURE — 87075 CULTR BACTERIA EXCEPT BLOOD: CPT

## 2017-01-16 RX ORDER — BACITRACIN 50000 [IU]/1
INJECTION, POWDER, FOR SOLUTION INTRAMUSCULAR
Status: DISCONTINUED | OUTPATIENT
Start: 2017-01-16 | End: 2017-01-16 | Stop reason: HOSPADM

## 2017-01-16 RX ORDER — MAGNESIUM SULFATE HEPTAHYDRATE 40 MG/ML
4 INJECTION, SOLUTION INTRAVENOUS ONCE
Status: COMPLETED | OUTPATIENT
Start: 2017-01-16 | End: 2017-01-16

## 2017-01-16 RX ORDER — MIDAZOLAM HYDROCHLORIDE 1 MG/ML
INJECTION INTRAMUSCULAR; INTRAVENOUS
Status: DISPENSED
Start: 2017-01-16 | End: 2017-01-17

## 2017-01-16 RX ORDER — OXYCODONE HYDROCHLORIDE AND ACETAMINOPHEN 5; 325 MG/1; MG/1
1-2 TABLET ORAL EVERY 4 HOURS PRN
Status: DISCONTINUED | OUTPATIENT
Start: 2017-01-16 | End: 2017-01-20 | Stop reason: HOSPADM

## 2017-01-16 RX ORDER — MORPHINE SULFATE 4 MG/ML
1 INJECTION, SOLUTION INTRAMUSCULAR; INTRAVENOUS
Status: DISCONTINUED | OUTPATIENT
Start: 2017-01-16 | End: 2017-01-20 | Stop reason: HOSPADM

## 2017-01-16 RX ORDER — LIDOCAINE HYDROCHLORIDE AND EPINEPHRINE 10; 10 MG/ML; UG/ML
INJECTION, SOLUTION INFILTRATION; PERINEURAL
Status: DISCONTINUED | OUTPATIENT
Start: 2017-01-16 | End: 2017-01-16 | Stop reason: HOSPADM

## 2017-01-16 RX ADMIN — POLYETHYLENE GLYCOL 3350 1 PACKET: 17 POWDER, FOR SOLUTION ORAL at 08:38

## 2017-01-16 RX ADMIN — OMEPRAZOLE 40 MG: 20 CAPSULE, DELAYED RELEASE ORAL at 08:38

## 2017-01-16 RX ADMIN — AMPICILLIN SODIUM AND SULBACTAM SODIUM 3 G: 2; 1 INJECTION, POWDER, FOR SOLUTION INTRAMUSCULAR; INTRAVENOUS at 18:14

## 2017-01-16 RX ADMIN — OXYCODONE HYDROCHLORIDE AND ACETAMINOPHEN 2 TABLET: 5; 325 TABLET ORAL at 22:11

## 2017-01-16 RX ADMIN — MORPHINE SULFATE 1 MG: 4 INJECTION INTRAVENOUS at 22:21

## 2017-01-16 RX ADMIN — VERAPAMIL HYDROCHLORIDE 240 MG: 240 TABLET, FILM COATED, EXTENDED RELEASE ORAL at 08:38

## 2017-01-16 RX ADMIN — LEVOTHYROXINE SODIUM 125 MCG: 125 TABLET ORAL at 05:47

## 2017-01-16 RX ADMIN — GABAPENTIN 100 MG: 100 CAPSULE ORAL at 22:11

## 2017-01-16 RX ADMIN — GABAPENTIN 100 MG: 100 CAPSULE ORAL at 08:38

## 2017-01-16 RX ADMIN — ATORVASTATIN CALCIUM 20 MG: 20 TABLET, FILM COATED ORAL at 02:05

## 2017-01-16 RX ADMIN — SODIUM CHLORIDE: 9 INJECTION, SOLUTION INTRAVENOUS at 22:25

## 2017-01-16 RX ADMIN — MAGNESIUM SULFATE IN WATER 4 G: 40 INJECTION, SOLUTION INTRAVENOUS at 03:01

## 2017-01-16 RX ADMIN — BISOPROLOL FUMARATE 10 MG: 5 TABLET, COATED ORAL at 12:21

## 2017-01-16 RX ADMIN — GABAPENTIN 100 MG: 100 CAPSULE ORAL at 15:04

## 2017-01-16 RX ADMIN — CLOSTRIDIUM TETANI TOXOID ANTIGEN (FORMALDEHYDE INACTIVATED), CORYNEBACTERIUM DIPHTHERIAE TOXOID ANTIGEN (FORMALDEHYDE INACTIVATED), BORDETELLA PERTUSSIS TOXOID ANTIGEN (GLUTARALDEHYDE INACTIVATED), BORDETELLA PERTUSSIS FILAMENTOUS HEMAGGLUTININ ANTIGEN (FORMALDEHYDE INACTIVATED), BORDETELLA PERTUSSIS PERTACTIN ANTIGEN, AND BORDETELLA PERTUSSIS FIMBRIAE 2/3 ANTIGEN 0.5 ML: 5; 2; 2.5; 5; 3; 5 INJECTION, SUSPENSION INTRAMUSCULAR at 00:55

## 2017-01-16 RX ADMIN — AMPICILLIN SODIUM AND SULBACTAM SODIUM 3 G: 2; 1 INJECTION, POWDER, FOR SOLUTION INTRAMUSCULAR; INTRAVENOUS at 00:54

## 2017-01-16 RX ADMIN — AMPICILLIN SODIUM AND SULBACTAM SODIUM 3 G: 2; 1 INJECTION, POWDER, FOR SOLUTION INTRAMUSCULAR; INTRAVENOUS at 12:15

## 2017-01-16 RX ADMIN — ATORVASTATIN CALCIUM 20 MG: 20 TABLET, FILM COATED ORAL at 22:11

## 2017-01-16 RX ADMIN — SODIUM CHLORIDE: 9 INJECTION, SOLUTION INTRAVENOUS at 00:04

## 2017-01-16 RX ADMIN — AMPICILLIN SODIUM AND SULBACTAM SODIUM 3 G: 2; 1 INJECTION, POWDER, FOR SOLUTION INTRAMUSCULAR; INTRAVENOUS at 08:39

## 2017-01-16 RX ADMIN — ACETAMINOPHEN 650 MG: 325 TABLET, FILM COATED ORAL at 18:13

## 2017-01-16 ASSESSMENT — PAIN SCALES - GENERAL
PAINLEVEL_OUTOF10: 0
PAINLEVEL_OUTOF10: 10
PAINLEVEL_OUTOF10: 7
PAINLEVEL_OUTOF10: 0

## 2017-01-16 NOTE — PROGRESS NOTES
Patient alert/orientedx4,room air,ambulatory,left hand pinkish red,tender and swollen,on iv antibiotic,call light and personal belongings within reach and bed in low position,hourly rounding in placed,dpt shot given with information and consent signed,magnesium 1x dose given,head to toe assessment checked with another RN,skin intact except 2 bite marks left hand and 2 scratch right hand cdi.

## 2017-01-16 NOTE — ED PROVIDER NOTES
CHIEF COMPLAINT  Chief Complaint   Patient presents with   • Cat Bite       HPI  Trevor Gillis Jr. is a 46 y.o. male who presents to the emergency room today with complaints of redness and swelling to his left hand after a Bite. Patient states he was bitten by a cat 2 days ago at his friend's house. The cat is up-to-date on its shots. He has a puncture wound over the left hand which is redness and swelling has developed her last 24 hours. He has a history of cardiomyopathy and has a ACID device in. Swelling is increased with redness is some slight pain to the area no nausea vomiting some chills. Pain is worse with movement or palpation.    REVIEW OF SYSTEMS  See HPI for further details. All other systems are negative.     PAST MEDICAL HISTORY  Past Medical History   Diagnosis Date   • Hernia    • Syncope 11/19/2012   • Pacemaker 2014   • Fracture of left clavicle    • HTN (hypertension)    • Hypertrophic cardiomyopathy (CMS-HCC)    • Dyslipidemia    • JEREMIAH (obstructive sleep apnea)        FAMILY HISTORY  [unfilled]    SOCIAL HISTORY  Social History     Social History   • Marital Status: Single     Spouse Name: N/A   • Number of Children: N/A   • Years of Education: N/A     Social History Main Topics   • Smoking status: Current Some Day Smoker -- 1.00 packs/day for 25 years     Types: Cigarettes   • Smokeless tobacco: Never Used      Comment: Now smoke one cigarettes 2-3 times / week.    • Alcohol Use: 0.0 oz/week     0 Standard drinks or equivalent per week      Comment: used to be heavy alcoholism (18 years ago), now rarely drinks   • Drug Use: No   • Sexual Activity: Not on file     Other Topics Concern   • Not on file     Social History Narrative       SURGICAL HISTORY  Past Surgical History   Procedure Laterality Date   • Tonsillectomy     • Other cardiac surgery  7/25/14     AICD   • Recovery  7/25/2014     Performed by Cath-Recovery Surgery at SURGERY SAME DAY [facility] ORS   • Recovery  7/30/2014      "Performed by Cath-Recovery Surgery at SURGERY SAME DAY [facility] ORS   • Inguinal hernia repair  9/10/2014     Performed by Christie Ott M.D. at SURGERY SAME DAY [facility] ORS   • Appendectomy         CURRENT MEDICATIONS  Home Medications     Reviewed by Emy Landis R.N. (Registered Nurse) on 01/15/17 at 2326  Med List Status: Complete    Medication Last Dose Status    albuterol 108 (90 BASE) MCG/ACT Aero Soln inhalation aerosol ~1 month Active    atorvastatin (LIPITOR) 20 MG Tab 1/14/2017 Active    bisoprolol (ZEBETA) 10 MG tablet 1/14/2017 Active    gabapentin (NEURONTIN) 100 MG Cap 1/14/2017 Active    levothyroxine (SYNTHROID) 125 MCG Tab 1/14/2017 Active    omeprazole (PRILOSEC) 40 MG delayed-release capsule 1/14/2017 Active    verapamil ER (CALAN-SR) 240 MG Tab CR 1/14/2017 Active                ALLERGIES  Allergies   Allergen Reactions   • Tomato Hives       PHYSICAL EXAM  VITAL SIGNS: /83 mmHg  Pulse 96  Temp(Src) 36.7 °C (98 °F) (Oral)  Resp 18  Ht 1.905 m (6' 3\")  Wt 119.1 kg (262 lb 9.1 oz)  BMI 32.82 kg/m2  SpO2 97%      Constitutional: Well developed, Well nourished, No acute distress, Non-toxic appearance.   HENT: Normocephalic, Atraumatic, Bilateral external ears normal, Oropharynx moist, No oral exudates, Nose normal.   Eyes: PERRLA, EOMI, Conjunctiva normal, No discharge.   Neck: Normal range of motion, No tenderness, Supple, No stridor.   Lymphatic: No lymphadenopathy noted.   Cardiovascular: Normal heart rate, Normal rhythm, No murmurs, No rubs, No gallops.   Thorax & Lungs: Normal breath sounds, No respiratory distress, No wheezing, No chest tenderness.   Abdomen: Bowel sounds normal, Soft, No tenderness, No masses, No pulsatile masses.   Skin: Warm, Dry, left hand erythema 2nd MCP extending to the dorsum, No rash. Edema is noted there is some abrasions also noted hot to the touch secondary consistent with cellulitis.  Back: No tenderness, No CVA tenderness.  Extremities: Intact " distal pulses, No edema, No tenderness, No cyanosis, No clubbing.   Musculoskeletal: No evidence of tenosynovitis at this time   Neurologic: Alert & oriented x 3, Normal motor function, Normal sensory function, No focal deficits noted.   Psychiatric: Affect normal, Judgment normal, Mood normal.     RADIOLOGY/PROCEDURES  DX-HAND 3+ LEFT   Final Result      1.  Ossific fragment dorsal to the tuft of the fourth distal phalanx, possibly related to old injury.      2.  Soft tissue edema in the dorsum of the hand.      US-EXTREMITY NON VASCULAR UNILATERAL LEFT    (Results Pending)         COURSE & MEDICAL DECISION MAKING  Pertinent Labs & Imaging studies reviewed. (See chart for details)  Patient has evidence of cellulitis will be admitted to the hospitalist given IV antibiotics of Unasyn here in the emergency room. I am concerned because his advanced degree of cardiomyopathy and that is already developed cellulitis to his hand that he needs to be admitted to the hospital for observation and continued IV and back treatment discussed case with hospitalist.    FINAL IMPRESSION  1. Acute cellulitis left hand secondary to Light  2. History of cardiomyopathy  3.         Electronically signed by: Trevor Pierce, 1/16/2017 2:02 AM

## 2017-01-16 NOTE — PROGRESS NOTES
[facility] [place] [facility] [place]    Name Trevor Gillis     1970   Age/Sex 46 y.o. male   MRN [id number]   Code Status Full     After 5PM or if no immediate response to page, please call for cross-coverage  Attending/Team: Lori/Raffy Call [phone] to page   1st Call - Day Intern (R1):   Clare 2nd Call - Day Sr. Resident (R2/R3):   Maryuri         Chief complaint/ reason for interval visit (Primary Diagnosis)   Cat bite left hand    Interval Problem Daily Status Update    Active Hospital Problems    Diagnosis   • Cellulitis of left hand [L03.114]   • Cat bite of hand [S61.459A, W55.01XA]   • Dyslipidemia [E78.5]   • Hypothyroidism [E03.9]   • Hypertension [I10]   • Tobacco abuse [Z72.0]   • Hypertrophic cardiomyopathy (CMS-HCC) [I42.2]     ID: 46 year old male with history of HCM, NSVT s/p dual ICD , intermittent near-syncope (unclear etio), HTN, DLD, GERD who presented with swelling and painful left hand following cat bite 2 days prior to admission, on Unasyn.    Patient was seen and examined today. No new complaints. Cat bite clearly infected. Patient had limited range of motion with fingers due to pain and edema-> possible MCP involvement. Denied chest pain, fever/chills, nausea, vomiting, diarrhea.     Flexertenosynovitis: on Unasyn, Dr. Rivas (Hand Surgery) consulted-> will perform I&D later in the evening, patient made NPO and Lovenox held for the procedure.    HOCM with AICD/Pacemaker: continue bisoprolol and verapamil, will continue to avoid preload reducing agents, OR called and instructed to make sure magnet devices are available for I&D    ROS    Constitutional: Negative for fever and chills.   HENT: Negative for sore throat and tinnitus.    Eyes: Negative for blurred vision.   Respiratory: Negative for cough, sputum production, shortness of breath and wheezing.    Cardiovascular: Positive for chest pain. Negative for palpitations.          Gastrointestinal: Positive for constipation. Negative for nausea, vomiting, abdominal pain and diarrhea.   Genitourinary: Positive for frequency. Negative for dysuria.   Musculoskeletal: Negative for myalgias.   Skin: Negative for itching and rash.   Neurological: Negative for sensory change, focal weakness and headaches.    Reports intermittent lightheadedness or dizziness and sense of near fall but no LOC     Consultants/Specialty  Hand Surgery    Disposition  Inpatient    Quality Measures  Medications reviewed, Labs reviewed, Radiology images reviewed and EKG reviewed  Calvin catheter: No Calvin        DVT prophylaxis - mechanical: Contra-indicated                Physical Exam       Filed Vitals:    01/15/17 2133 01/15/17 2300 01/16/17 0400 01/16/17 0804   BP: 138/87 148/83 130/75 137/81   Pulse: 90 96 99 90   Temp:  36.7 °C (98 °F) 36.9 °C (98.4 °F) 36.4 °C (97.5 °F)   TempSrc:       Resp: 18 18 18 20   Height:       Weight:  119.1 kg (262 lb 9.1 oz)     SpO2: 100% 97% 92% 95%     Body mass index is 32.82 kg/(m^2). Weight: 119.1 kg (262 lb 9.1 oz)  Oxygen Therapy:  Pulse Oximetry: 95 %    Physical Exam  Constitutional: He is well-developed, well-nourished, and in no distress.   HENT:    Head: Normocephalic and atraumatic.   Eyes: EOM are normal. Pupils are equal, round, and reactive to light.   Cardiovascular: Normal rate and regular rhythm.  Exam reveals no gallop and no friction rub.     No murmur heard.  Pulmonary/Chest: Effort normal and breath sounds normal.   Abdominal: Soft. Bowel sounds are normal. He exhibits no distension. There is no tenderness.   Musculoskeletal:   Left hand - erythematous, swollen up to wrist, 2 clean wound noted close to thumb and index finger on dorsal surface. Restricted ROM of left index and middle fingers, heat felt from hand hovering over injury   Nursing note and vitals reviewed.    Lab Data Review:      1/16/2017  3:27 PM    Recent Labs      01/15/17   1901  01/16/17   0032    SODIUM  137  137   POTASSIUM  4.0  3.8   CHLORIDE  107  105   CO2  23  23   BUN  21  18   CREATININE  1.06  0.99   MAGNESIUM   --   1.6   CALCIUM  9.2  9.2       Recent Labs      01/15/17   1901  01/16/17   0032   ALTSGPT   --   21   ASTSGOT   --   17   ALKPHOSPHAT   --   89   TBILIRUBIN   --   1.2   GLUCOSE  100*  95       Recent Labs      01/15/17   1901  01/16/17   0032   RBC  4.71  4.81   HEMOGLOBIN  14.3  15.0   HEMATOCRIT  41.1*  42.5   PLATELETCT  216  211       Recent Labs      01/15/17   1901  01/16/17   0032   WBC  6.4  6.1   NEUTSPOLYS  59.00  54.20   LYMPHOCYTES  27.40  30.20   MONOCYTES  9.00  10.70   EOSINOPHILS  3.50  3.80   BASOPHILS  0.80  0.80   ASTSGOT   --   17   ALTSGPT   --   21   ALKPHOSPHAT   --   89   TBILIRUBIN   --   1.2           Assessment/Plan     Cellulitis of left hand  Assessment & Plan  - secondary to cat bite and self needle-punctured of the bite wound.    - swollen erythematous left hand, no signs of infection, restricted ROM of left index and middle finger.   - blood culture obtained and unasyn one dose given in ED.  - vitals stable, white cells normal, SIRS 0/4.  - XR left hand - no fracture, soft tissue swelling of dorsal hand.   - US negative for abscess  - Consulted with hand surgery (Dr. Rivas)-> will perform I&D this pm, patient made NPO, Lovenox discontinued for procedure, called OR to make sure protective magnets for pacemaker are in stock   - Occupational Therapy to evaluate patient  PLAN  - continue unasyn for now.   - Tdap given (unknown vaccination against Tdap).   - maintenance IVF at low rate.       Cat bite of hand  Assessment & Plan  - please refer to cellulitis section.     Hypertrophic cardiomyopathy (CMS-HCC)  Assessment & Plan  - initially HCM without obstruction s/p dual ICD placement (2014). Now ? HOCM.    - reports intermittent near syncope and chest pain. [ trop negative ]   - frequent hospitalization for unexplained intermittent exertional dyspnea and  near syncope; possible LVOT obstruction with exertion. Stress echo (10/2016) near mid cavity obstruction every other beat though no siginificant gradient. Repeat stress echo as outpatient - pending.   - home meds - Bisoprolol 10 mg bid, Verapamil 240 mg qd.   PLAN  - continue Bisoprolol and Verapamil.   - even though patient has no near syncope at this point, will give maintenance IVF at low rate to optimize preload.  - Avoid preload reducing agent.  Fall precaution.   - follow up with Dr. Gillis outpatient.     Tobacco abuse  Assessment & Plan  - 25 pack years. Now smokes 1 cigarette 2-3 times /week.  - nicotine patch ordered.     Hypertension  Assessment & Plan  - takes Bisoprolol and Verapamil at home.  - continue Bisoprolol 10 mg bid and Verapamil 240 mg qd.  - inj labetalol prn for SBP > 170 and/or DBP > 100.     Hypothyroidism  Assessment & Plan  - TSH 1.76 (10/2016).  - takes levothyroxine 125 mcg qam at home.  - continue same treatment.     Dyslipidemia  Assessment & Plan  - , (H), LDL 60, HDL 30 (10/2016).   - takes lipitor 20 mg qhs.  - continue same treatment.

## 2017-01-16 NOTE — ED NOTES
47 y/o male ambulatory to triage with c/o cat bite to the left hand. + hand swelling and redness, +cms.

## 2017-01-16 NOTE — ASSESSMENT & PLAN NOTE
- takes Bisoprolol and Verapamil at home.  - continue Bisoprolol 10 mg bid and Verapamil 240 mg qd.  - inj labetalol prn for SBP > 170 and/or DBP > 100.

## 2017-01-16 NOTE — H&P
[facility] Admitting History and Physical    Name Trevor Gillis     1970   Age/Sex 46 y.o. male   MRN [id number]   Code Status Full     After 5PM or if no immediate response to page, please call for cross-coverage  Attending/Team: Dr. Sandoval / Raffy Call [phone] to page   1st Call - Day Intern (R1):   Dr. Davis 2nd Call - Day Sr. Resident (R2/R3):   Dr. Wahl       Chief Complaint:  Cat bite left hand    HPI:  Mr. Gillis is a 46 year old male with history of HCM, NSVT s/p dual ICD , intermittent near-syncope (unclear etio), HTN, DLD, GERD who presented with swelling and painful left hand following cat bite 2 days ago.    Patient reports 2 days ago, he got bit 4 time in his left hand and scratched on right hand by his friend's cat. The left hand started to swollen gradually; so he used needles and punctured the bite area hoping to drain something out. He states he heated the needle first then puncture, but nothing came out of it. When he woke up this morning, the swelling got worsen and it was so painful that he cannot flex his left second and third fingers.    He also reports non-specified chest pain, all over his chest, no radiation. He states he has intermittent chest pain whenever he is stressed, has been admitted for a few times and work up has been negative for ACS or CAD so fur.     Of note, patient was admitted multiple times in 2016 for exertional dyspnea and near syncope of unclear etiology. He follows cardiologist Dr. Gillis. Recent stress echo (10/2016) showed near mid cavity obstruction every other beat though no siginificant gradient. PFT (2016) wnl. Recent work up - pending repeat stress echo (ordered by Dr. Gillis), pending sleep study for JEREMIAH, pending referral to neurology.        Review of Systems   Constitutional: Negative for fever and chills.   HENT: Negative for sore throat and tinnitus.    Eyes: Negative for blurred vision.   Respiratory:  Negative for cough, sputum production, shortness of breath and wheezing.    Cardiovascular: Positive for chest pain. Negative for palpitations.        Reports intermittent leg swelling.    Gastrointestinal: Positive for constipation. Negative for nausea, vomiting, abdominal pain and diarrhea.   Genitourinary: Positive for frequency. Negative for dysuria.   Musculoskeletal: Negative for myalgias.   Skin: Negative for itching and rash.   Neurological: Negative for sensory change, focal weakness and headaches.        Reports intermittent lightheadedness or dizziness and sense of near fall but never fall or loss conscious in actual.              Past Medical History:   Past Medical History   Diagnosis Date   • Hernia    • Syncope 11/19/2012   • Pacemaker 2014   • Fracture of left clavicle    • HTN (hypertension)    • Hypertrophic cardiomyopathy (CMS-HCC)    • Dyslipidemia    • JEREMIAH (obstructive sleep apnea)        Past Surgical History:  Past Surgical History   Procedure Laterality Date   • Tonsillectomy     • Other cardiac surgery  7/25/14     AICD   • Recovery  7/25/2014     Performed by Cath-Recovery Surgery at SURGERY [facility]   • Recovery  7/30/2014     Performed by Cath-Recovery Surgery at SURGERY SAME DAY [facility] ORS   • Inguinal hernia repair  9/10/2014     Performed by Christie Ott M.D. at SURGERY SAME DAY [facility] ORS   • Appendectomy         Current Outpatient Medications:  Home Medications     Reviewed by Emy Landis R.N. (Registered Nurse) on 01/15/17 at 2326  Med List Status: Complete    Medication Last Dose Status    albuterol 108 (90 BASE) MCG/ACT Aero Soln inhalation aerosol ~1 month Active    atorvastatin (LIPITOR) 20 MG Tab 1/14/2017 Active    bisoprolol (ZEBETA) 10 MG tablet 1/14/2017 Active    gabapentin (NEURONTIN) 100 MG Cap 1/14/2017 Active    levothyroxine (SYNTHROID) 125 MCG Tab 1/14/2017 Active    omeprazole (PRILOSEC) 40 MG delayed-release capsule 1/14/2017 Active     "verapamil ER (CALAN-SR) 240 MG Tab CR 1/14/2017 Active                Medication Allergy/Sensitivities:  Allergies   Allergen Reactions   • Tomato Hives         Family History:  Family History   Problem Relation Age of Onset   • Other Mother      Accident   • Stroke Father    • Heart Attack Father      MI at age 24?   • Heart Disease Paternal Grandfather    • Hypertension Paternal Grandmother    • Diabetes Paternal Grandmother        Social History:  Social History     Social History   • Marital Status: Single     Spouse Name: N/A   • Number of Children: N/A   • Years of Education: N/A     Occupational History   • Not on file.     Social History Main Topics   • Smoking status: Current Some Day Smoker -- 1.00 packs/day for 25 years     Types: Cigarettes   • Smokeless tobacco: Never Used      Comment: Now smoke one cigarettes 2-3 times / week.    • Alcohol Use: 0.0 oz/week     0 Standard drinks or equivalent per week      Comment: used to be heavy alcoholism (18 years ago), now rarely drinks   • Drug Use: No   • Sexual Activity: Not on file     Other Topics Concern   • Not on file     Social History Narrative     Living situation: lives in [place] on his own.  PCP : Blake Oseguera M.D.      Physical Exam     Filed Vitals:    01/15/17 1714 01/15/17 1718 01/15/17 2133 01/15/17 2300   BP: 141/96  138/87 148/83   Pulse: 94  90 96   Temp: 36.4 °C (97.6 °F)   36.7 °C (98 °F)   TempSrc: Oral      Resp: 18  18 18   Height: 1.905 m (6' 3\")      Weight:  122.4 kg (269 lb 13.5 oz)  119.1 kg (262 lb 9.1 oz)   SpO2: 98%  100% 97%     Body mass index is 32.82 kg/(m^2).  /83 mmHg  Pulse 96  Temp(Src) 36.7 °C (98 °F) (Oral)  Resp 18  Ht 1.905 m (6' 3\")  Wt 119.1 kg (262 lb 9.1 oz)  BMI 32.82 kg/m2  SpO2 97%  O2 therapy: Pulse Oximetry: 97 %    Physical Exam   Constitutional: He is well-developed, well-nourished, and in no distress.   HENT:   Head: Normocephalic and atraumatic.   Eyes: EOM are normal. Pupils are equal, " round, and reactive to light.   Cardiovascular: Normal rate and regular rhythm.  Exam reveals no gallop and no friction rub.    No murmur heard.  Pulmonary/Chest: Effort normal and breath sounds normal.   Abdominal: Soft. Bowel sounds are normal. He exhibits no distension. There is no tenderness.   Musculoskeletal:   Left hand - erythematous, swollen up to wrist, 2 clean wound noted close to thumb and index finger on dorsal surface. Restricted ROM of left index and middle fingers.    Nursing note and vitals reviewed.            Data Review       Old Records Request:   Completed  Current Records review and summary: Completed    Lab Data Review:  Recent Results (from the past 24 hour(s))   CBC WITH DIFFERENTIAL    Collection Time: 01/15/17  7:01 PM   Result Value Ref Range    WBC 6.4 4.8 - 10.8 K/uL    RBC 4.71 4.70 - 6.10 M/uL    Hemoglobin 14.3 14.0 - 18.0 g/dL    Hematocrit 41.1 (L) 42.0 - 52.0 %    MCV 87.3 81.4 - 97.8 fL    MCH 30.4 27.0 - 33.0 pg    MCHC 34.8 33.7 - 35.3 g/dL    RDW 38.7 35.9 - 50.0 fL    Platelet Count 216 164 - 446 K/uL    MPV 9.3 9.0 - 12.9 fL    Neutrophils-Polys 59.00 44.00 - 72.00 %    Lymphocytes 27.40 22.00 - 41.00 %    Monocytes 9.00 0.00 - 13.40 %    Eosinophils 3.50 0.00 - 6.90 %    Basophils 0.80 0.00 - 1.80 %    Immature Granulocytes 0.30 0.00 - 0.90 %    Nucleated RBC 0.00 /100 WBC    Neutrophils (Absolute) 3.75 1.82 - 7.42 K/uL    Lymphs (Absolute) 1.74 1.00 - 4.80 K/uL    Monos (Absolute) 0.57 0.00 - 0.85 K/uL    Eos (Absolute) 0.22 0.00 - 0.51 K/uL    Baso (Absolute) 0.05 0.00 - 0.12 K/uL    Immature Granulocytes (abs) 0.02 0.00 - 0.11 K/uL    NRBC (Absolute) 0.00 K/uL   BASIC METABOLIC PANEL    Collection Time: 01/15/17  7:01 PM   Result Value Ref Range    Sodium 137 135 - 145 mmol/L    Potassium 4.0 3.6 - 5.5 mmol/L    Chloride 107 96 - 112 mmol/L    Co2 23 20 - 33 mmol/L    Glucose 100 (H) 65 - 99 mg/dL    Bun 21 8 - 22 mg/dL    Creatinine 1.06 0.50 - 1.40 mg/dL    Calcium  9.2 8.5 - 10.5 mg/dL    Anion Gap 7.0 0.0 - 11.9   LACTIC ACID    Collection Time: 01/15/17  7:01 PM   Result Value Ref Range    Lactic Acid 1.1 0.5 - 2.0 mmol/L   BTYPE NATRIURETIC PEPTIDE    Collection Time: 01/15/17  7:01 PM   Result Value Ref Range    B Natriuretic Peptide 13 0 - 100 pg/mL   ESTIMATED GFR    Collection Time: 01/15/17  7:01 PM   Result Value Ref Range    GFR If African American >60 >60 mL/min/1.73 m 2    GFR If Non African American >60 >60 mL/min/1.73 m 2   TROPONIN    Collection Time: 01/15/17  9:19 PM   Result Value Ref Range    Troponin I <0.01 0.00 - 0.04 ng/mL   EKG    Collection Time: 01/15/17 11:44 PM   Result Value Ref Range    Report       [facility]    Test Date:  2017-01-15  Pt Name:    ROCK MELISSA                Department: ER  MRN:        [id number]                      Room:       [facility]  Gender:     M                            Technician: ISHMAEL  :        1970                   Requested By:JARAD CACERES  Order #:    [id number]                    Reading MD:    Measurements  Intervals                                Axis  Rate:       87                           P:          47  MN:         156                          QRS:        -83  QRSD:       112                          T:          97  QT:         424  QTc:        510    Interpretive Statements  ATRIAL-SENSED VENTRICULAR-PACED RHYTHM  NO FURTHER ANALYSIS ATTEMPTED DUE TO PACED RHYTHM  Compared to ECG 10/30/2016 11:31:08  No significant changes     CBC with Differential    Collection Time: 17 12:32 AM   Result Value Ref Range    WBC 6.1 4.8 - 10.8 K/uL    RBC 4.81 4.70 - 6.10 M/uL    Hemoglobin 15.0 14.0 - 18.0 g/dL    Hematocrit 42.5 42.0 - 52.0 %    MCV 88.4 81.4 - 97.8 fL    MCH 31.2 27.0 - 33.0 pg    MCHC 35.3 33.7 - 35.3 g/dL    RDW 40.2 35.9 - 50.0 fL    Platelet Count 211 164 - 446 K/uL    MPV 9.3 9.0 - 12.9 fL    Neutrophils-Polys 54.20 44.00 - 72.00 %    Lymphocytes 30.20 22.00 - 41.00 %    Monocytes 10.70  0.00 - 13.40 %    Eosinophils 3.80 0.00 - 6.90 %    Basophils 0.80 0.00 - 1.80 %    Immature Granulocytes 0.30 0.00 - 0.90 %    Nucleated RBC 0.00 /100 WBC    Neutrophils (Absolute) 3.28 1.82 - 7.42 K/uL    Lymphs (Absolute) 1.83 1.00 - 4.80 K/uL    Monos (Absolute) 0.65 0.00 - 0.85 K/uL    Eos (Absolute) 0.23 0.00 - 0.51 K/uL    Baso (Absolute) 0.05 0.00 - 0.12 K/uL    Immature Granulocytes (abs) 0.02 0.00 - 0.11 K/uL    NRBC (Absolute) 0.00 K/uL   Comp Metabolic Panel (CMP)    Collection Time: 01/16/17 12:32 AM   Result Value Ref Range    Sodium 137 135 - 145 mmol/L    Potassium 3.8 3.6 - 5.5 mmol/L    Chloride 105 96 - 112 mmol/L    Co2 23 20 - 33 mmol/L    Anion Gap 9.0 0.0 - 11.9    Glucose 95 65 - 99 mg/dL    Bun 18 8 - 22 mg/dL    Creatinine 0.99 0.50 - 1.40 mg/dL    Calcium 9.2 8.5 - 10.5 mg/dL    AST(SGOT) 17 12 - 45 U/L    ALT(SGPT) 21 2 - 50 U/L    Alkaline Phosphatase 89 30 - 99 U/L    Total Bilirubin 1.2 0.1 - 1.5 mg/dL    Albumin 3.8 3.2 - 4.9 g/dL    Total Protein 6.9 6.0 - 8.2 g/dL    Globulin 3.1 1.9 - 3.5 g/dL    A-G Ratio 1.2 g/dL   Magnesium    Collection Time: 01/16/17 12:32 AM   Result Value Ref Range    Magnesium 1.6 1.5 - 2.5 mg/dL   ESTIMATED GFR    Collection Time: 01/16/17 12:32 AM   Result Value Ref Range    GFR If African American >60 >60 mL/min/1.73 m 2    GFR If Non African American >60 >60 mL/min/1.73 m 2       Imaging/Procedures Review:    ndependant Imaging Review: Completed  DX-HAND 3+ LEFT   Final Result      1.  Ossific fragment dorsal to the tuft of the fourth distal phalanx, possibly related to old injury.      2.  Soft tissue edema in the dorsum of the hand.      US-EXTREMITY NON VASCULAR UNILATERAL LEFT    (Results Pending)        EKG:   EKG Independant Review: Completed  QTc:510, HR: 87, atrial-sensed ventricular paced rhythm.          Assessment/Plan     Cellulitis of left hand  Assessment & Plan  - secondary to cat bite and self needle-punctured of the bite wound.    -  swollen erythematous left hand, no signs of infection, restricted ROM of left index and middle finger.   - blood culture obtained and unasyn one dose given in ED.  - vitals stable, white cells normal, SIRS 0/4.  - XR left hand - no fracture, soft tissue swelling of dorsal hand.   PLAN  - continue unasyn for now.   - Tdap given (unknown vaccination against Tdap).   - maintenance IVF at low rate.   - OT for left hand movement.   - USG left hand to check any abscess.     Cat bite of hand  Assessment & Plan  - please refer to cellulitis section.     Hypertrophic cardiomyopathy (CMS-HCC)  Assessment & Plan  - initially HCM without obstruction s/p dual ICD placement (2014). Now ? HOCM.    - reports intermittent near syncope and chest pain. [ trop negative ]   - frequent hospitalization for unexplained intermittent exertional dyspnea and near syncope; possible LVOT obstruction with exertion. Stress echo (10/2016) near mid cavity obstruction every other beat though no siginificant gradient. Repeat stress echo as outpatient - pending.   - home meds - Bisoprolol 10 mg bid, Verapamil 240 mg qd.   PLAN  - continue Bisoprolol and Verapamil.   - even though patient has no near syncope at this point, will give maintenance IVF at low rate to optimize preload.  - Avoid preload reducing agent.  Fall precaution.   - follow up with Dr. Gillis outpatient.     Tobacco abuse  Assessment & Plan  - 25 pack years. Now smokes 1 cigarette 2-3 times /week.  - nicotine patch ordered.     Hypertension  Assessment & Plan  - takes Bisoprolol and Verapamil at home.  - continue Bisoprolol 10 mg bid and Verapamil 240 mg qd.  - inj labetalol prn for SBP > 170 and/or DBP > 100.     Hypothyroidism  Assessment & Plan  - TSH 1.76 (10/2016).  - takes levothyroxine 125 mcg qam at home.  - continue same treatment.     Dyslipidemia  Assessment & Plan  - , (H), LDL 60, HDL 30 (10/2016).   - takes lipitor 20 mg qhs.  - continue same treatment.          Anticipated Hospital stay:  >2 midnights        Quality Measures  EKG reviewed, Labs reviewed, Medications reviewed and Radiology images reviewed        DVT Prophylaxis: Enoxaparin (Lovenox)    Ulcer prophylaxis: Yes  Antibiotics: Treating active infection/contamination beyond 24 hours perioperative coverage

## 2017-01-16 NOTE — SENIOR ADMIT NOTE
"Senior Admit Note    HPI:    Trevor Gillis Jr. 46 y.o. Male with PMHx of HOCM s/p AICD, HTN and JEREMIAH presented to the ED with complaint of left hand cat bite that happened 2 days ago. Patient was at his friend house and he didn't realize that cat was in room and he had cat bite. Patient has been waititng to get it better for the last 2 days but it didn't get better yesterday he took warm shower and swelling got better little bit but this morning again his swelling increased. Patient has HOCM and it was diagnosed 3 years ago and he received AICD at that time his grandfather  at the age of 51 years. He expressed some chest pain that is associated with pain on his left hand. Patient also tried to put needle to drain the swelling but nothing came out. Patient has history of black out that he described that he black out but he does not loose his consciousness he often get these episod and for that he has an appointment with neurology. He denies shortness of breath, fever, nausea, vomiting and diarrhea. He does not remember when was the last time he got tetanus vaccine.       Physical Exam:    Filed Vitals:    01/15/17 1714 01/15/17 1718   BP: 141/96    Pulse: 94    Temp: 36.4 °C (97.6 °F)    TempSrc: Oral    Resp: 18    Height: 1.905 m (6' 3\")    Weight:  122.4 kg (269 lb 13.5 oz)   SpO2: 98%      General: Not in acute distress  HEENT: NCAT  Resp: Clear to auscultation B/L with no wheeze  CVS: Regular rate and rhythm   Abdomen: Soft non tender +BS  Neuro: No focal deficit, CN II-XII grossly intact  Ext: Left hand has erythema and wound bite patient can move her hand without any problems.    Assessment and plan:    Cellulitis on left hand:    Patient has history of HOCM s/P ACID placement and he didn't get any shock from the device. Patient has wound on his left arm and erythema around the bite area. Lactic acid, WBC, chem panel and troponin didn't show any acute abnormality. Will treat him with Unasyn 3g xQ6H. " Patient does not remember about his tetanus vaccination will provide him tetanus vaccine. Gentle IVF 83ml/hour and OT consult.  Will continue his home medication for chronic diseases.    Code: Full    DVT Prophylaxis: : Lovenox    For details please see intern's H&P note.

## 2017-01-16 NOTE — ASSESSMENT & PLAN NOTE
- secondary to cat bite and self needle-punctured of the bite wound.    - swollen erythematous left hand, no signs of infection, restricted ROM of left index and middle finger.   - blood culture obtained and unasyn one dose given in ED.  - vitals stable, white cells normal, SIRS 0/4.  - XR left hand - no fracture, soft tissue swelling of dorsal hand.   - US negative for abscess  - Consulted with hand surgery (Dr. Rivas)-> s/p I&D, tolerated procedure well  PLAN  - s/p I & D  - Tdap given (unknown vaccination against Tdap).   - maintenance IVF at low rate.   - Wound cultures positive for staph epidermidis, and proteus  - Daily packings with irrigation performed  - Unasyn has been discontinued in favor for oral cipro,metro  - likely discharge tomorrow after drain is pulled, patient's family needs to be properly educated on the proper technique to perform daily iodoform packing, nursing will educate patient prior to discharge

## 2017-01-16 NOTE — ED NOTES
Med rec updated and complete per patient  NKDA  Patient states his friend gave him something OTC for hand inflammation last night

## 2017-01-16 NOTE — PROGRESS NOTES
Attending Note:  I have personally evaluated and examined this patient and agree with the findings as documented in the resident note except as documented in this attending note.  I am actively involved in the patient's care.      Additional attending comments:   45 yo male with HOCM s/p ICD/pacer.  He had a domestic cat bite 2 days prior to admission on the left hand which progressed to increased pain, swelling and redness.      On my exam he has a few obvious penetrating cat bites over the dorsum of the hand just over the 2nd MCP.  The MCP and first and second fingers are swollen as well as the dorsum of the hand.  Passive ROM is limited but I can extend and flex the finger about 30 degrees.  He can only actively flex the finger a few degrees due to pain.  There is warmth and redness over the dorsum of the hand.  Heart exam reveals only a 1/6 systolic murmur over the aortic area.    -Cat bite cellulitis with possible tendon or MCP involvement.  - Given the possibility of tendon or joint involvement we have contacted a hand surgeon.  Agree with use of Unasyn which is the preferred antibiotic in this situation.  Follow closely.  Discussed with patient.  Blood cultures pending.

## 2017-01-16 NOTE — CARE PLAN
Problem: Safety  Goal: Will remain free from falls  Intervention: Implement fall precautions  Call light and personal belongings within reach. Pt instructed to call for assistance, pt verbalizes understanding. Non skid socks on. Bed in lowest position.       Problem: Infection  Goal: Will remain free from infection  Intervention: Assess signs and symptoms of infection  Patient is on antibiotic for cellulitis  Without adverse affect.      Problem: Venous Thromboembolism (VTW)/Deep Vein Thrombosis (DVT) Prevention:  Goal: Patient will participate in Venous Thrombosis (VTE)/Deep Vein Thrombosis (DVT)Prevention Measures  Intervention: Assess and monitor for anticoagulation complications  Patient is on lovenox for dvt prophylaxis.

## 2017-01-16 NOTE — ASSESSMENT & PLAN NOTE
- initially HCM without obstruction s/p dual ICD placement (2014). Now ? HOCM.    - reports intermittent near syncope and chest pain. [ trop negative ]   - frequent hospitalization for unexplained intermittent exertional dyspnea and near syncope; possible LVOT obstruction with exertion. Stress echo (10/2016) near mid cavity obstruction every other beat though no siginificant gradient. Repeat stress echo as outpatient - pending.   - home meds - Bisoprolol 10 mg bid, Verapamil 240 mg qd.   PLAN  - continue Bisoprolol and Verapamil.   - even though patient has no near syncope at this point, will give maintenance IVF at low rate to optimize preload.  - Avoid preload reducing agent.  Fall precaution.   - follow up with Dr. Gillis outpatient.

## 2017-01-17 LAB
ALBUMIN SERPL BCP-MCNC: 3.5 G/DL (ref 3.2–4.9)
ALBUMIN/GLOB SERPL: 1.3 G/DL
ALP SERPL-CCNC: 88 U/L (ref 30–99)
ALT SERPL-CCNC: 28 U/L (ref 2–50)
ANION GAP SERPL CALC-SCNC: 7 MMOL/L (ref 0–11.9)
AST SERPL-CCNC: 20 U/L (ref 12–45)
BASOPHILS # BLD AUTO: 0.7 % (ref 0–1.8)
BASOPHILS # BLD: 0.05 K/UL (ref 0–0.12)
BILIRUB SERPL-MCNC: 0.8 MG/DL (ref 0.1–1.5)
BUN SERPL-MCNC: 15 MG/DL (ref 8–22)
CALCIUM SERPL-MCNC: 8.7 MG/DL (ref 8.5–10.5)
CHLORIDE SERPL-SCNC: 108 MMOL/L (ref 96–112)
CO2 SERPL-SCNC: 23 MMOL/L (ref 20–33)
CREAT SERPL-MCNC: 1.15 MG/DL (ref 0.5–1.4)
EOSINOPHIL # BLD AUTO: 0.26 K/UL (ref 0–0.51)
EOSINOPHIL NFR BLD: 3.9 % (ref 0–6.9)
ERYTHROCYTE [DISTWIDTH] IN BLOOD BY AUTOMATED COUNT: 40 FL (ref 35.9–50)
GFR SERPL CREATININE-BSD FRML MDRD: >60 ML/MIN/1.73 M 2
GLOBULIN SER CALC-MCNC: 2.7 G/DL (ref 1.9–3.5)
GLUCOSE SERPL-MCNC: 112 MG/DL (ref 65–99)
GRAM STN SPEC: NORMAL
HCT VFR BLD AUTO: 38.9 % (ref 42–52)
HGB BLD-MCNC: 13.8 G/DL (ref 14–18)
IMM GRANULOCYTES # BLD AUTO: 0.03 K/UL (ref 0–0.11)
IMM GRANULOCYTES NFR BLD AUTO: 0.4 % (ref 0–0.9)
LYMPHOCYTES # BLD AUTO: 1.92 K/UL (ref 1–4.8)
LYMPHOCYTES NFR BLD: 28.8 % (ref 22–41)
MCH RBC QN AUTO: 31.2 PG (ref 27–33)
MCHC RBC AUTO-ENTMCNC: 35.5 G/DL (ref 33.7–35.3)
MCV RBC AUTO: 87.8 FL (ref 81.4–97.8)
MONOCYTES # BLD AUTO: 0.65 K/UL (ref 0–0.85)
MONOCYTES NFR BLD AUTO: 9.7 % (ref 0–13.4)
NEUTROPHILS # BLD AUTO: 3.76 K/UL (ref 1.82–7.42)
NEUTROPHILS NFR BLD: 56.5 % (ref 44–72)
NRBC # BLD AUTO: 0 K/UL
NRBC BLD AUTO-RTO: 0 /100 WBC
PLATELET # BLD AUTO: 216 K/UL (ref 164–446)
PMV BLD AUTO: 9.3 FL (ref 9–12.9)
POTASSIUM SERPL-SCNC: 4 MMOL/L (ref 3.6–5.5)
PROT SERPL-MCNC: 6.2 G/DL (ref 6–8.2)
RBC # BLD AUTO: 4.43 M/UL (ref 4.7–6.1)
SIGNIFICANT IND 70042: NORMAL
SITE SITE: NORMAL
SODIUM SERPL-SCNC: 138 MMOL/L (ref 135–145)
SOURCE SOURCE: NORMAL
WBC # BLD AUTO: 6.7 K/UL (ref 4.8–10.8)

## 2017-01-17 PROCEDURE — 85025 COMPLETE CBC W/AUTO DIFF WBC: CPT

## 2017-01-17 PROCEDURE — 36415 COLL VENOUS BLD VENIPUNCTURE: CPT

## 2017-01-17 PROCEDURE — 99232 SBSQ HOSP IP/OBS MODERATE 35: CPT | Mod: GC | Performed by: INTERNAL MEDICINE

## 2017-01-17 PROCEDURE — 700105 HCHG RX REV CODE 258: Performed by: STUDENT IN AN ORGANIZED HEALTH CARE EDUCATION/TRAINING PROGRAM

## 2017-01-17 PROCEDURE — 700111 HCHG RX REV CODE 636 W/ 250 OVERRIDE (IP): Performed by: STUDENT IN AN ORGANIZED HEALTH CARE EDUCATION/TRAINING PROGRAM

## 2017-01-17 PROCEDURE — G8988 SELF CARE GOAL STATUS: HCPCS | Mod: CI

## 2017-01-17 PROCEDURE — 80053 COMPREHEN METABOLIC PANEL: CPT

## 2017-01-17 PROCEDURE — 3E0234Z INTRODUCTION OF SERUM, TOXOID AND VACCINE INTO MUSCLE, PERCUTANEOUS APPROACH: ICD-10-PCS | Performed by: STUDENT IN AN ORGANIZED HEALTH CARE EDUCATION/TRAINING PROGRAM

## 2017-01-17 PROCEDURE — 97165 OT EVAL LOW COMPLEX 30 MIN: CPT

## 2017-01-17 PROCEDURE — A9270 NON-COVERED ITEM OR SERVICE: HCPCS | Performed by: STUDENT IN AN ORGANIZED HEALTH CARE EDUCATION/TRAINING PROGRAM

## 2017-01-17 PROCEDURE — G8989 SELF CARE D/C STATUS: HCPCS | Mod: CI

## 2017-01-17 PROCEDURE — 770006 HCHG ROOM/CARE - MED/SURG/GYN SEMI*

## 2017-01-17 PROCEDURE — G8987 SELF CARE CURRENT STATUS: HCPCS | Mod: CI

## 2017-01-17 PROCEDURE — A9270 NON-COVERED ITEM OR SERVICE: HCPCS | Performed by: SURGERY

## 2017-01-17 PROCEDURE — 700102 HCHG RX REV CODE 250 W/ 637 OVERRIDE(OP): Performed by: STUDENT IN AN ORGANIZED HEALTH CARE EDUCATION/TRAINING PROGRAM

## 2017-01-17 PROCEDURE — 700102 HCHG RX REV CODE 250 W/ 637 OVERRIDE(OP): Performed by: SURGERY

## 2017-01-17 RX ADMIN — GABAPENTIN 100 MG: 100 CAPSULE ORAL at 20:00

## 2017-01-17 RX ADMIN — GABAPENTIN 100 MG: 100 CAPSULE ORAL at 08:17

## 2017-01-17 RX ADMIN — AMPICILLIN SODIUM AND SULBACTAM SODIUM 3 G: 2; 1 INJECTION, POWDER, FOR SOLUTION INTRAMUSCULAR; INTRAVENOUS at 05:21

## 2017-01-17 RX ADMIN — AMPICILLIN SODIUM AND SULBACTAM SODIUM 3 G: 2; 1 INJECTION, POWDER, FOR SOLUTION INTRAMUSCULAR; INTRAVENOUS at 17:15

## 2017-01-17 RX ADMIN — OMEPRAZOLE 40 MG: 20 CAPSULE, DELAYED RELEASE ORAL at 08:17

## 2017-01-17 RX ADMIN — BISOPROLOL FUMARATE 10 MG: 5 TABLET, COATED ORAL at 08:17

## 2017-01-17 RX ADMIN — OXYCODONE HYDROCHLORIDE AND ACETAMINOPHEN 1 TABLET: 5; 325 TABLET ORAL at 13:48

## 2017-01-17 RX ADMIN — AMPICILLIN SODIUM AND SULBACTAM SODIUM 3 G: 2; 1 INJECTION, POWDER, FOR SOLUTION INTRAMUSCULAR; INTRAVENOUS at 00:20

## 2017-01-17 RX ADMIN — AMPICILLIN SODIUM AND SULBACTAM SODIUM 3 G: 2; 1 INJECTION, POWDER, FOR SOLUTION INTRAMUSCULAR; INTRAVENOUS at 13:55

## 2017-01-17 RX ADMIN — OXYCODONE HYDROCHLORIDE AND ACETAMINOPHEN 2 TABLET: 5; 325 TABLET ORAL at 13:49

## 2017-01-17 RX ADMIN — ATORVASTATIN CALCIUM 20 MG: 20 TABLET, FILM COATED ORAL at 20:00

## 2017-01-17 RX ADMIN — LEVOTHYROXINE SODIUM 125 MCG: 125 TABLET ORAL at 05:21

## 2017-01-17 RX ADMIN — GABAPENTIN 100 MG: 100 CAPSULE ORAL at 13:48

## 2017-01-17 RX ADMIN — OXYCODONE HYDROCHLORIDE AND ACETAMINOPHEN 2 TABLET: 5; 325 TABLET ORAL at 17:15

## 2017-01-17 RX ADMIN — BISOPROLOL FUMARATE 10 MG: 5 TABLET, COATED ORAL at 20:00

## 2017-01-17 RX ADMIN — VERAPAMIL HYDROCHLORIDE 240 MG: 240 TABLET, FILM COATED, EXTENDED RELEASE ORAL at 08:18

## 2017-01-17 RX ADMIN — OXYCODONE HYDROCHLORIDE AND ACETAMINOPHEN 2 TABLET: 5; 325 TABLET ORAL at 05:21

## 2017-01-17 RX ADMIN — BISOPROLOL FUMARATE 10 MG: 5 TABLET, COATED ORAL at 00:19

## 2017-01-17 ASSESSMENT — ACTIVITIES OF DAILY LIVING (ADL): TOILETING: INDEPENDENT

## 2017-01-17 ASSESSMENT — PAIN SCALES - GENERAL
PAINLEVEL_OUTOF10: 5
PAINLEVEL_OUTOF10: 0

## 2017-01-17 NOTE — OR SURGEON
Immediate Post-Operative Note      PreOp Diagnosis:   1.  Left index tenosynovitis  2.  Cat bite injury    PostOp Diagnosis: same    Procedure(s) (LRB):  IRRIGATION & DEBRIDEMENT GENERAL-INDEX FINGER (Left)    Surgeon(s):  Kia Aldridge M.D.    Anesthesiologist/Type of Anesthesia:  Anesthesiologist: Goran Cunningham M.D./General    Surgical Staff:  Circulator: Jacklyn Kinney R.N.  Scrub Person: Bekah White Jr.    Specimen:  Cultures sent    Estimated Blood Loss: min    Findings:      Complications: none        1/16/2017 8:41 PM Kia Aldridge  [id number]

## 2017-01-17 NOTE — CONSULTS
DATE OF SERVICE:  01/16/2017    REASON FOR CONSULTATION:  Cat bite injury, left hand.    CONSULTANT:  Kia Aldridge MD    BRIEF HISTORY OF PRESENT ILLNESS:  The patient is a 46-year-old gentleman with   a history of NSVT status post dual ICD 2014 with also a history of   hypertension who presented with swelling and pain of his left hand after a cat   bite injury he sustained approximately 3 days ago.  One day after the injury,   he developed significant swelling of the area along with pain and presented   to [facility] ER where he was admitted and placed on IV antibiotics.  With minimal   improvement over the past day, I have been consulted for evaluation and   management.    Patient is currently followed by a cardiologist, Dr. Gillis with the recent   stress echo in October of 2016.    PAST MEDICAL HISTORY:  1.  Hypertension.  2.  GERD.  3.  Arrhythmia with dual ICD.    MEDICATIONS:  (inpatient) Unasyn, Lipitor, Zebeta, Lovenox, Neurontin,   labetalol p.r.n., Synthroid, Nicoderm patch, Nicorette gum, Prilosec,   verapamil.    ALLERGIES:  No known drug allergies.    SOCIAL HISTORY:  He has a tobacco history.    PHYSICAL EXAMINATION:  There is fusiform swelling of the left hand with a cat   bite injury on the left index finger on the dorsal and lateral aspect of the   hand.  There is swelling around the MCP joint where the bite marks are along   with significant pain on passive range of motion of the left index finger.    There is very faint erythema, but he has significant pain on palpation of the   MCP joint.    X-ray only shows soft tissue edema; however, there is no foreign body on exam.    ASSESSMENT:  Cellulitis, left hand concerning for tenosynovitis.    PLAN:  1.  I have recommended operative drainage of the area which was discussed with   the patient.  All risks, benefits and alternatives were given.  He will   likely need wound care upon discharge as an outpatient as he will have open   wound after  debridement.  2.  Nicotine effects wound healing and would recommend decrease in his   nicotine patch amount if possible.  3.  We will hold Lovenox.  4.  Appreciate consult.       ____________________________________     TAJ BRAY MD LMT / TRU    DD:  01/16/2017 14:41:51  DT:  01/16/2017 19:38:28    D#:  [id number]  Job#:  [id number]

## 2017-01-17 NOTE — PROGRESS NOTES
Received report and assumed care of the patient. Patient is alert and oriented x4. Groggy from surgery at this time. Bed in low and locked position. Call light and belongings within reach. Hourly rounding in place.

## 2017-01-17 NOTE — PROGRESS NOTES
[facility] Interval Note    Name Trevor Gillis     1970   Age/Sex 46 y.o. male   MRN [id number]   Code Status Full     After 5PM or if no immediate response to page, please call for cross-coverage  Attending/Team: Lori/Raffy Call [phone] to page   1st Call - Day Intern (R1):   Clare 2nd Call - Day Sr. Resident (R2/R3):   Maryuri         Chief complaint/ reason for interval visit (Primary Diagnosis)   Cat bite left hand    Interval Problem Daily Status Update    Active Hospital Problems    Diagnosis   • Cellulitis of left hand [L03.114]   • Cat bite of hand [S61.459A, W55.01XA]   • Dyslipidemia [E78.5]   • Hypothyroidism [E03.9]   • Hypertension [I10]   • Tobacco abuse [Z72.0]   • Hypertrophic cardiomyopathy (CMS-HCC) [I42.2]     ID: 46 year old male with history of HCM, NSVT s/p dual ICD , intermittent near-syncope (unclear etio), HTN, DLD, GERD who presented with swelling and painful left hand following cat bite 2 days prior to admission, on Unasyn, S/P I&D.    Patient was seen and examined today. No new complaints. S/P I&D. Patient tolerated procedure well, drain placed with saline irrigation BID, Patient still has limited range of motion with fingers due to pain and edema, but it is improving. Denied chest pain, fever/chills, nausea, vomiting, diarrhea.     Flexertenosynovitis: on Unasyn, Dr. Rivas (Hand Surgery) following, S/P I&D, Patient tolerated procedure well, drain placed with saline irrigation BID, continue IV abx    HOCM with AICD/Pacemaker: continue bisoprolol and verapamil, will continue to avoid preload reducing agents    ROS    Constitutional: Negative for fever and chills.   HENT: Negative for sore throat and tinnitus.    Eyes: Negative for blurred vision.   Respiratory: Negative for cough, sputum production, shortness of breath and wheezing.    Cardiovascular: Negative for chest pain. Negative for palpitations.          Gastrointestinal: Positive for constipation. Negative for nausea, vomiting, abdominal pain and diarrhea.   Genitourinary: Negative for frequency. Negative for dysuria.   Musculoskeletal: Negative for myalgias.   Skin: Negative for itching and rash.   Neurological: Negative for sensory change, focal weakness and headaches.    Reports intermittent lightheadedness or dizziness and sense of near fall but no LOC     Consultants/Specialty  Hand Surgery    Disposition  Inpatient    Quality Measures  Medications reviewed, Labs reviewed, Radiology images reviewed and EKG reviewed  Calvin catheter: No Calvin        DVT prophylaxis - mechanical: Contra-indicated                Physical Exam       Filed Vitals:    01/17/17 0115 01/17/17 0400 01/17/17 0736 01/17/17 1551   BP: 119/75 116/63 110/70 104/61   Pulse: 70 71 68 72   Temp: 36.7 °C (98.1 °F) 36.3 °C (97.3 °F) 36.8 °C (98.3 °F) 37 °C (98.6 °F)   TempSrc:       Resp: 18 18 14 18   Height:       Weight:       SpO2: 99% 97% 92% 95%     Body mass index is 32.82 kg/(m^2).    Oxygen Therapy:  Pulse Oximetry: 95 %, O2 (LPM): 0, O2 Delivery: None (Room Air)    Physical Exam  Constitutional: He is well-developed, well-nourished, and in no distress.   HENT:    Head: Normocephalic and atraumatic.   Eyes: EOM are normal. Pupils are equal, round, and reactive to light.   Cardiovascular: Normal rate and regular rhythm.  Exam reveals no gallop and no friction rub.     No murmur heard.  Pulmonary/Chest: Effort normal and breath sounds normal.   Abdominal: Soft. Bowel sounds are normal. He exhibits no distension. There is no tenderness.   Musculoskeletal:   Left hand -bandaged with small drain in place, Range of motion limited due to pain, and swelling, wound on dorsum of hand looks clean under bandage  Nursing note and vitals reviewed.    Lab Data Review:      1/16/2017  3:27 PM    Recent Labs      01/15/17   1901  01/16/17   0032  01/17/17   0011   SODIUM  137  137  138   POTASSIUM  4.0   3.8  4.0   CHLORIDE  107  105  108   CO2  23  23  23   BUN  21  18  15   CREATININE  1.06  0.99  1.15   MAGNESIUM   --   1.6   --    CALCIUM  9.2  9.2  8.7       Recent Labs      01/15/17   1901  01/16/17   0032  01/17/17   0011   ALTSGPT   --   21  28   ASTSGOT   --   17  20   ALKPHOSPHAT   --   89  88   TBILIRUBIN   --   1.2  0.8   GLUCOSE  100*  95  112*       Recent Labs      01/15/17   1901 01/16/17   0032  01/17/17   0011   RBC  4.71  4.81  4.43*   HEMOGLOBIN  14.3  15.0  13.8*   HEMATOCRIT  41.1*  42.5  38.9*   PLATELETCT  216  211  216       Recent Labs      01/15/17   1901  01/16/17   0032  01/17/17   0011   WBC  6.4  6.1  6.7   NEUTSPOLYS  59.00  54.20  56.50   LYMPHOCYTES  27.40  30.20  28.80   MONOCYTES  9.00  10.70  9.70   EOSINOPHILS  3.50  3.80  3.90   BASOPHILS  0.80  0.80  0.70   ASTSGOT   --   17  20   ALTSGPT   --   21  28   ALKPHOSPHAT   --   89  88   TBILIRUBIN   --   1.2  0.8           Assessment/Plan     Cellulitis of left hand  Assessment & Plan  - secondary to cat bite and self needle-punctured of the bite wound.    - swollen erythematous left hand, no signs of infection, restricted ROM of left index and middle finger.   - blood culture obtained and unasyn one dose given in ED.  - vitals stable, white cells normal, SIRS 0/4.  - XR left hand - no fracture, soft tissue swelling of dorsal hand.   - US negative for abscess  - Consulted with hand surgery (Dr. Rivas)-> s/p I&D, tolerated procedure well  - Occupational Therapy to evaluate patient  PLAN  - continue unasyn for now.   - s/p I & D  - Tdap given (unknown vaccination against Tdap).   - maintenance IVF at low rate.   - cultures pending    Cat bite of hand  Assessment & Plan  - please refer to cellulitis section.     Hypertrophic cardiomyopathy (CMS-HCC)  Assessment & Plan  - initially HCM without obstruction s/p dual ICD placement (2014). Now ? HOCM.    - reports intermittent near syncope and chest pain. [ trop negative ]   - frequent  hospitalization for unexplained intermittent exertional dyspnea and near syncope; possible LVOT obstruction with exertion. Stress echo (10/2016) near mid cavity obstruction every other beat though no siginificant gradient. Repeat stress echo as outpatient - pending.   - home meds - Bisoprolol 10 mg bid, Verapamil 240 mg qd.   PLAN  - continue Bisoprolol and Verapamil.   - even though patient has no near syncope at this point, will give maintenance IVF at low rate to optimize preload.  - Avoid preload reducing agent.  Fall precaution.   - follow up with Dr. Gillis outpatient.     Tobacco abuse  Assessment & Plan  - 25 pack years. Now smokes 1 cigarette 2-3 times /week.  - nicotine patch ordered.     Hypertension  Assessment & Plan  - takes Bisoprolol and Verapamil at home.  - continue Bisoprolol 10 mg bid and Verapamil 240 mg qd.  - inj labetalol prn for SBP > 170 and/or DBP > 100.     Hypothyroidism  Assessment & Plan  - TSH 1.76 (10/2016).  - takes levothyroxine 125 mcg qam at home.  - continue same treatment.     Dyslipidemia  Assessment & Plan  - , (H), LDL 60, HDL 30 (10/2016).   - takes lipitor 20 mg qhs.  - continue same treatment.

## 2017-01-17 NOTE — CARE PLAN
Problem: Infection  Goal: Will remain free from infection  Outcome: PROGRESSING AS EXPECTED  No s/s of new or worsening infection. Vital signs stable. Abx administered as ordered. I&D scheduled for 2000 tonight.    Problem: Knowledge Deficit  Goal: Knowledge of disease process/condition, treatment plan, diagnostic tests, and medications will improve  Outcome: PROGRESSING AS EXPECTED  Pt encouraged to verbalize anxieties, fears, concerns. Cares and procedures explained. Pt questions addressed.

## 2017-01-17 NOTE — OP REPORT
DATE OF SERVICE:  01/16/2017    PREOPERATIVE DIAGNOSES:  1.  Left index tenosynovitis.  2.  Cat bite injury.    POSTOPERATIVE DIAGNOSES:    1.  Left index tenosynovitis.  2.  Cat bite injury.    PROCEDURES:  1.  Irrigation and debridement of the left index finger (2 areas).  2.  Placement of irrigation catheter (1).    SURGEON:  Kia Aldridge MD.    ASSISTANT:  None.    ANESTHESIA:  Local with sedation.    ANESTHESIOLOGIST:  Dr. Goran Cunningham.    SPECIMEN:  None.    CULTURES:  Anaerobic, aerobic, Gram stain of fluid within the left hand.    ESTIMATED BLOOD LOSS:  Minimal.    DRAINS:  A 9-Liberian irrigation catheter.    COMPLICATIONS:  None.    PROCEDURE IN DETAIL:  After satisfactory level of MAC anesthesia, the   patient's hand was first injected with 1% lidocaine with epinephrine in order   to obtain a digital block.  This was performed just proximal to the MCP joint.    After the area was localized, the hand was then prepped and draped in a   sterile manner.  Patient had been marked preoperatively for incision and   drainage of tenosynovitis of his left hand, which appeared to involve the   volar and the dorsal aspect of the index finger just at the level of the MCP   joint where the bite marks were.  An incision was made just along the bite   marks, which was mainly on the dorsal aspect of the left index finger at the   MCP joint and laterally on the radial aspect of the left index finger near the   level of the MCP joint.  Both areas were explored.  There was some purulent   fluid, which was immediately obtained and sent for culture using a swab.  As   both areas were explored, the lateral incision tracted into the palmar surface   in the deep flexor space.  This area was irrigated using antibiotic saline   solution via Cysto Tubing.  A 9-Liberian pediatric feeding tube was used as the   irrigation tube and placed deep within this pocket.  The drain was sutured to   the skin near the lateral incision using  3-0 nylon suture.  The dorsal   incision was explored and it was found to track up the extensor surface of the   index finger towards the proximal phalanx.  This was irrigated using   antibiotic saline solution via Cysto Tubing.  This area was then packed with   iodoform.  Both areas were covered with the dry dressing overlying Kerlix and   an Ace wrap.  Patient tolerated the procedure well.  He will be transported to   the recovery where he will be followed as an inpatient.  Cultures will need   to be followed and his irrigation tubes will need irrigation twice a day.  He   will likely need to be discharged with the outpatient wound care setup.       ____________________________________     TAJ BRAY MD LMT / TRU    DD:  01/16/2017 20:48:00  DT:  01/16/2017 22:45:29    D#:  [id number]  Job#:  [id number]

## 2017-01-17 NOTE — PROGRESS NOTES
Patient returned from surgery and is refusing a bed alarm. Educated regarding the use of an alarm for patient safety from falls and he is still refusing. All other safety precautions are in place.

## 2017-01-18 LAB
ANION GAP SERPL CALC-SCNC: 7 MMOL/L (ref 0–11.9)
BASOPHILS # BLD AUTO: 0.7 % (ref 0–1.8)
BASOPHILS # BLD: 0.05 K/UL (ref 0–0.12)
BUN SERPL-MCNC: 15 MG/DL (ref 8–22)
CALCIUM SERPL-MCNC: 8.6 MG/DL (ref 8.5–10.5)
CHLORIDE SERPL-SCNC: 108 MMOL/L (ref 96–112)
CO2 SERPL-SCNC: 22 MMOL/L (ref 20–33)
CREAT SERPL-MCNC: 1.14 MG/DL (ref 0.5–1.4)
EOSINOPHIL # BLD AUTO: 0.3 K/UL (ref 0–0.51)
EOSINOPHIL NFR BLD: 4.1 % (ref 0–6.9)
ERYTHROCYTE [DISTWIDTH] IN BLOOD BY AUTOMATED COUNT: 38.7 FL (ref 35.9–50)
GFR SERPL CREATININE-BSD FRML MDRD: >60 ML/MIN/1.73 M 2
GLUCOSE SERPL-MCNC: 97 MG/DL (ref 65–99)
HCT VFR BLD AUTO: 39 % (ref 42–52)
HGB BLD-MCNC: 13.2 G/DL (ref 14–18)
IMM GRANULOCYTES # BLD AUTO: 0.03 K/UL (ref 0–0.11)
IMM GRANULOCYTES NFR BLD AUTO: 0.4 % (ref 0–0.9)
LYMPHOCYTES # BLD AUTO: 1.67 K/UL (ref 1–4.8)
LYMPHOCYTES NFR BLD: 23.1 % (ref 22–41)
MCH RBC QN AUTO: 29.7 PG (ref 27–33)
MCHC RBC AUTO-ENTMCNC: 33.8 G/DL (ref 33.7–35.3)
MCV RBC AUTO: 87.8 FL (ref 81.4–97.8)
MONOCYTES # BLD AUTO: 0.74 K/UL (ref 0–0.85)
MONOCYTES NFR BLD AUTO: 10.2 % (ref 0–13.4)
NEUTROPHILS # BLD AUTO: 4.44 K/UL (ref 1.82–7.42)
NEUTROPHILS NFR BLD: 61.5 % (ref 44–72)
NRBC # BLD AUTO: 0 K/UL
NRBC BLD AUTO-RTO: 0 /100 WBC
PLATELET # BLD AUTO: 228 K/UL (ref 164–446)
PMV BLD AUTO: 9.6 FL (ref 9–12.9)
POTASSIUM SERPL-SCNC: 4.2 MMOL/L (ref 3.6–5.5)
RBC # BLD AUTO: 4.44 M/UL (ref 4.7–6.1)
SODIUM SERPL-SCNC: 137 MMOL/L (ref 135–145)
WBC # BLD AUTO: 7.2 K/UL (ref 4.8–10.8)

## 2017-01-18 PROCEDURE — 770006 HCHG ROOM/CARE - MED/SURG/GYN SEMI*

## 2017-01-18 PROCEDURE — A9270 NON-COVERED ITEM OR SERVICE: HCPCS | Performed by: SURGERY

## 2017-01-18 PROCEDURE — 700102 HCHG RX REV CODE 250 W/ 637 OVERRIDE(OP): Performed by: SURGERY

## 2017-01-18 PROCEDURE — 700111 HCHG RX REV CODE 636 W/ 250 OVERRIDE (IP): Performed by: SURGERY

## 2017-01-18 PROCEDURE — 700102 HCHG RX REV CODE 250 W/ 637 OVERRIDE(OP): Performed by: STUDENT IN AN ORGANIZED HEALTH CARE EDUCATION/TRAINING PROGRAM

## 2017-01-18 PROCEDURE — 85025 COMPLETE CBC W/AUTO DIFF WBC: CPT

## 2017-01-18 PROCEDURE — 700105 HCHG RX REV CODE 258: Performed by: STUDENT IN AN ORGANIZED HEALTH CARE EDUCATION/TRAINING PROGRAM

## 2017-01-18 PROCEDURE — 99232 SBSQ HOSP IP/OBS MODERATE 35: CPT | Mod: GC | Performed by: INTERNAL MEDICINE

## 2017-01-18 PROCEDURE — 700111 HCHG RX REV CODE 636 W/ 250 OVERRIDE (IP): Performed by: STUDENT IN AN ORGANIZED HEALTH CARE EDUCATION/TRAINING PROGRAM

## 2017-01-18 PROCEDURE — A9270 NON-COVERED ITEM OR SERVICE: HCPCS | Performed by: STUDENT IN AN ORGANIZED HEALTH CARE EDUCATION/TRAINING PROGRAM

## 2017-01-18 PROCEDURE — 36415 COLL VENOUS BLD VENIPUNCTURE: CPT

## 2017-01-18 PROCEDURE — 80048 BASIC METABOLIC PNL TOTAL CA: CPT

## 2017-01-18 RX ADMIN — AMPICILLIN SODIUM AND SULBACTAM SODIUM 3 G: 2; 1 INJECTION, POWDER, FOR SOLUTION INTRAMUSCULAR; INTRAVENOUS at 23:35

## 2017-01-18 RX ADMIN — AMPICILLIN SODIUM AND SULBACTAM SODIUM 3 G: 2; 1 INJECTION, POWDER, FOR SOLUTION INTRAMUSCULAR; INTRAVENOUS at 17:34

## 2017-01-18 RX ADMIN — GABAPENTIN 100 MG: 100 CAPSULE ORAL at 09:40

## 2017-01-18 RX ADMIN — AMPICILLIN SODIUM AND SULBACTAM SODIUM 3 G: 2; 1 INJECTION, POWDER, FOR SOLUTION INTRAMUSCULAR; INTRAVENOUS at 00:00

## 2017-01-18 RX ADMIN — OMEPRAZOLE 40 MG: 20 CAPSULE, DELAYED RELEASE ORAL at 09:41

## 2017-01-18 RX ADMIN — GABAPENTIN 100 MG: 100 CAPSULE ORAL at 13:19

## 2017-01-18 RX ADMIN — GABAPENTIN 100 MG: 100 CAPSULE ORAL at 19:47

## 2017-01-18 RX ADMIN — BISOPROLOL FUMARATE 10 MG: 5 TABLET, COATED ORAL at 09:39

## 2017-01-18 RX ADMIN — ATORVASTATIN CALCIUM 20 MG: 20 TABLET, FILM COATED ORAL at 19:47

## 2017-01-18 RX ADMIN — AMPICILLIN SODIUM AND SULBACTAM SODIUM 3 G: 2; 1 INJECTION, POWDER, FOR SOLUTION INTRAMUSCULAR; INTRAVENOUS at 11:03

## 2017-01-18 RX ADMIN — MORPHINE SULFATE 1 MG: 4 INJECTION INTRAVENOUS at 13:18

## 2017-01-18 RX ADMIN — AMPICILLIN SODIUM AND SULBACTAM SODIUM 3 G: 2; 1 INJECTION, POWDER, FOR SOLUTION INTRAMUSCULAR; INTRAVENOUS at 05:41

## 2017-01-18 RX ADMIN — VERAPAMIL HYDROCHLORIDE 240 MG: 240 TABLET, FILM COATED, EXTENDED RELEASE ORAL at 09:42

## 2017-01-18 RX ADMIN — LEVOTHYROXINE SODIUM 125 MCG: 125 TABLET ORAL at 05:40

## 2017-01-18 RX ADMIN — BISOPROLOL FUMARATE 10 MG: 5 TABLET, COATED ORAL at 19:48

## 2017-01-18 ASSESSMENT — PAIN SCALES - GENERAL
PAINLEVEL_OUTOF10: 0
PAINLEVEL_OUTOF10: 0

## 2017-01-18 NOTE — DISCHARGE PLANNING
Received return call, Dr. Araiza , they will place HH order. They are waiting on ID to change pt to oral antibiotics for cat bite.

## 2017-01-18 NOTE — CARE PLAN
Problem: Safety  Goal: Will remain free from falls  Outcome: PROGRESSING AS EXPECTED  Safety precautions in place. A&Ox4. Up self, steady. Refusing to wear treaded socks. Call light within reach, upper bed rails up, bed placed in low position. Hourly rounding done.     Problem: Infection  Goal: Will remain free from infection  Outcome: PROGRESSING AS EXPECTED  Receiving IV abx    Problem: Pain Management  Goal: Pain level will decrease to patient’s comfort goal  Outcome: PROGRESSING AS EXPECTED  Does not complain of pain at this time

## 2017-01-18 NOTE — PROGRESS NOTES
Progress Note               Author: Kia Aldridge Date & Time created: 2017  1:07 PM     Interval History:  Doing well.  Hand still swollen but tolerating irrigation.    Review of Systems:  ROS    Physical Exam:  Physical Exam   Hand swollen but clear drainage    Labs:        Invalid input(s): [id number]  Recent Labs      01/15/17   1901  01/15/17   2119   TROPONINI   --   <0.01   BNPBTYPENAT  13   --      Recent Labs      17   0032  17   0011  178   SODIUM  137  138  137   POTASSIUM  3.8  4.0  4.2   CHLORIDE  105  108  108   CO2  23  23  22   BUN  18  15  15   CREATININE  0.99  1.15  1.14   MAGNESIUM  1.6   --    --    CALCIUM  9.2  8.7  8.6     Recent Labs      17   0032  17   0011  17   ALTSGPT  21  28   --    ASTSGOT  17  20   --    ALKPHOSPHAT  89  88   --    TBILIRUBIN  1.2  0.8   --    GLUCOSE  95  112*  97     Recent Labs      17   0032  17   0011  17   RBC  4.81  4.43*  4.44*   HEMOGLOBIN  15.0  13.8*  13.2*   HEMATOCRIT  42.5  38.9*  39.0*   PLATELETCT  211  216  228     Recent Labs      17   0032  17   0011  17   WBC  6.1  6.7  7.2   NEUTSPOLYS  54.20  56.50  61.50   LYMPHOCYTES  30.20  28.80  23.10   MONOCYTES  10.70  9.70  10.20   EOSINOPHILS  3.80  3.90  4.10   BASOPHILS  0.80  0.70  0.70   ASTSGOT  17  20   --    ALTSGPT  21  28   --    ALKPHOSPHAT  89  88   --    TBILIRUBIN  1.2  0.8   --      Hemodynamics:  Temp (24hrs), Av.7 °C (98.1 °F), Min:36.3 °C (97.4 °F), Max:37 °C (98.6 °F)  Temperature: 37 °C (98.6 °F)  Pulse  Av.6  Min: 63  Max: 99   Blood Pressure: 114/60 mmHg     Respiratory:    Respiration: 17, Pulse Oximetry: 95 %           Fluids:    Intake/Output Summary (Last 24 hours) at 17 1307  Last data filed at 17 1230   Gross per 24 hour   Intake   2386 ml   Output      0 ml   Net   2386 ml        GI/Nutrition:  Orders Placed This Encounter   Procedures   • DIET  ORDER     Standing Status: Standing      Number of Occurrences: 1      Standing Expiration Date:      Order Specific Question:  Diet:     Answer:  Regular [1]     Medical Decision Making, by Problem:  Active Hospital Problems    Diagnosis   • Cellulitis of left hand [L03.114]   • Cat bite of hand [S61.459A, W55.01XA]   • Dyslipidemia [E78.5]   • Hypothyroidism [E03.9]   • Hypertension [I10]   • Tobacco abuse [Z72.0]   • Hypertrophic cardiomyopathy (CMS-HCC) [I42.2]       Plan:  Continue packing and irrigation.  Will start daily tomorrow.  Foolow cultures, cover for pasturella.    Core Measures

## 2017-01-18 NOTE — PROGRESS NOTES
[facility] Medicine Interval Note    Name Trevor Gillis     1970   Age/Sex 46 y.o. male   MRN [id number]   Code Status Full     After 5PM or if no immediate response to page, please call for cross-coverage  Attending/Team: Lori/Raffy Call [phone] to page   1st Call - Day Intern (R1):   Clare 2nd Call - Day Sr. Resident (R2/R3):   Maryuri         Chief complaint/ reason for interval visit (Primary Diagnosis)   Cat bite left hand    Interval Problem Daily Status Update    Active Hospital Problems    Diagnosis   • Cellulitis of left hand [L03.114]   • Cat bite of hand [S61.459A, W55.01XA]   • Dyslipidemia [E78.5]   • Hypothyroidism [E03.9]   • Hypertension [I10]   • Tobacco abuse [Z72.0]   • Hypertrophic cardiomyopathy (CMS-HCC) [I42.2]     ID: 46 year old male with history of HCM, NSVT s/p dual ICD , intermittent near-syncope (unclear etio), HTN, DLD, GERD who presented with swelling and painful left hand following cat bite 2 days prior to admission, on Unasyn, S/P I&D.    Patient was seen and examined today. No new complaints. S/P I&D. Patient tolerated procedure well, drain placed with saline irrigation BID, Patient still has limited range of motion with fingers due to pain and edema, but still is improving. Denied chest pain, fever/chills, nausea, vomiting, diarrhea. Wound cultures positive for staph epidermidis, and G - rods (likely pasteurella).    Flexertenosynovitis: on Unasyn, Dr. Rivas (Hand Surgery) following, S/P I&D, Patient tolerated procedure well, drain placed with saline irrigation BID, Wound cultures positive for staph epidermidis, and G - rods (likely pasteurella). Continue IV abx, we would like a little more mobility in the MCP joint prior to switching to oral abx.    HOCM with AICD/Pacemaker: continue bisoprolol and verapamil, will continue to avoid preload reducing agents    ROS    Constitutional: Negative for fever and chills.    HENT: Negative for sore throat and tinnitus.    Eyes: Negative for blurred vision.   Respiratory: Negative for cough, sputum production, shortness of breath and wheezing.    Cardiovascular: Negative for chest pain. Negative for palpitations.         Gastrointestinal: Positive for constipation. Negative for nausea, vomiting, abdominal pain and diarrhea.   Genitourinary: Negative for frequency. Negative for dysuria.   Musculoskeletal: Negative for myalgias.   Skin: Negative for itching and rash.   Neurological: Negative for sensory change, focal weakness and headaches.    Reports intermittent lightheadedness or dizziness and sense of near fall but no LOC     Consultants/Specialty  Hand Surgery    Disposition  Inpatient    Quality Measures  Medications reviewed, Labs reviewed, Radiology images reviewed and EKG reviewed  Calvin catheter: No Calvin        DVT prophylaxis - mechanical: Contra-indicated                Physical Exam       Filed Vitals:    01/17/17 1551 01/17/17 2000 01/18/17 0400 01/18/17 0734   BP: 104/61 114/68 109/70 114/60   Pulse: 72 68 72 65   Temp: 37 °C (98.6 °F) 36.3 °C (97.4 °F) 36.4 °C (97.6 °F) 37 °C (98.6 °F)   TempSrc:       Resp: 18 17 18 17   Height:       Weight:       SpO2: 95% 95% 96% 95%     Body mass index is 32.82 kg/(m^2).    Oxygen Therapy:  Pulse Oximetry: 95 %, O2 (LPM): 0, O2 Delivery: None (Room Air)    Physical Exam  Constitutional: He is well-developed, well-nourished, and in no distress.   HENT:    Head: Normocephalic and atraumatic.   Eyes: EOM are normal. Pupils are equal, round, and reactive to light.   Cardiovascular: Normal rate and regular rhythm.  Exam reveals no gallop and no friction rub.     No murmur heard.  Pulmonary/Chest: Effort normal and breath sounds normal.   Abdominal: Soft. Bowel sounds are normal. He exhibits no distension. There is no tenderness.   Musculoskeletal:   Left hand -bandaged with small drain in place, Range of motion limited due to pain, and  swelling, wound on dorsum of hand looks clean, now packed, under bandage  Nursing note and vitals reviewed.    Lab Data Review:      1/16/2017  3:27 PM    Recent Labs      01/16/17 0032 01/17/17 0011 01/18/17 0218   SODIUM  137  138  137   POTASSIUM  3.8  4.0  4.2   CHLORIDE  105  108  108   CO2  23  23  22   BUN  18  15  15   CREATININE  0.99  1.15  1.14   MAGNESIUM  1.6   --    --    CALCIUM  9.2  8.7  8.6       Recent Labs      01/16/17 0032 01/17/17 0011 01/18/17 0218   ALTSGPT  21  28   --    ASTSGOT  17  20   --    ALKPHOSPHAT  89  88   --    TBILIRUBIN  1.2  0.8   --    GLUCOSE  95  112*  97       Recent Labs      01/16/17 0032 01/17/17 0011 01/18/17 0218   RBC  4.81  4.43*  4.44*   HEMOGLOBIN  15.0  13.8*  13.2*   HEMATOCRIT  42.5  38.9*  39.0*   PLATELETCT  211  216  228       Recent Labs      01/16/17 0032 01/17/17 0011 01/18/17 0218   WBC  6.1  6.7  7.2   NEUTSPOLYS  54.20  56.50  61.50   LYMPHOCYTES  30.20  28.80  23.10   MONOCYTES  10.70  9.70  10.20   EOSINOPHILS  3.80  3.90  4.10   BASOPHILS  0.80  0.70  0.70   ASTSGOT  17  20   --    ALTSGPT  21  28   --    ALKPHOSPHAT  89  88   --    TBILIRUBIN  1.2  0.8   --            Assessment/Plan     Cellulitis of left hand  Assessment & Plan  - secondary to cat bite and self needle-punctured of the bite wound.    - swollen erythematous left hand, no signs of infection, restricted ROM of left index and middle finger.   - blood culture obtained and unasyn one dose given in ED.  - vitals stable, white cells normal, SIRS 0/4.  - XR left hand - no fracture, soft tissue swelling of dorsal hand.   - US negative for abscess  - Consulted with hand surgery (Dr. Rivas)-> s/p I&D, tolerated procedure well  - Occupational Therapy to evaluate patient  PLAN  - s/p I & D  - Tdap given (unknown vaccination against Tdap).   - maintenance IVF at low rate.   - Wound cultures positive for staph epidermidis, and G - rods (likely pasteurella)  -  Continue with Unasyn,  we would like a little more mobility in the MCP joint prior to switching to oral abx.    Cat bite of hand  Assessment & Plan  - please refer to cellulitis section.     Hypertrophic cardiomyopathy (CMS-HCC)  Assessment & Plan  - initially HCM without obstruction s/p dual ICD placement (2014). Now ? HOCM.    - reports intermittent near syncope and chest pain. [ trop negative ]   - frequent hospitalization for unexplained intermittent exertional dyspnea and near syncope; possible LVOT obstruction with exertion. Stress echo (10/2016) near mid cavity obstruction every other beat though no siginificant gradient. Repeat stress echo as outpatient - pending.   - home meds - Bisoprolol 10 mg bid, Verapamil 240 mg qd.   PLAN  - continue Bisoprolol and Verapamil.   - even though patient has no near syncope at this point, will give maintenance IVF at low rate to optimize preload.  - Avoid preload reducing agent.  Fall precaution.   - follow up with Dr. Gillis outpatient.     Tobacco abuse  Assessment & Plan  - 25 pack years. Now smokes 1 cigarette 2-3 times /week.  - nicotine patch ordered.     Hypertension  Assessment & Plan  - takes Bisoprolol and Verapamil at home.  - continue Bisoprolol 10 mg bid and Verapamil 240 mg qd.  - inj labetalol prn for SBP > 170 and/or DBP > 100.     Hypothyroidism  Assessment & Plan  - TSH 1.76 (10/2016).  - takes levothyroxine 125 mcg qam at home.  - continue same treatment.     Dyslipidemia  Assessment & Plan  - , (H), LDL 60, HDL 30 (10/2016).   - takes lipitor 20 mg qhs.  - continue same treatment.

## 2017-01-18 NOTE — PROGRESS NOTES
Received report from day shift RN. Discussed POC with pt., verbalized understanding, assumed care @1915. A&Ox4. On room air. Denies pain at this time. Has dressing on left hand. +2 swelling. Dressing intact, CDI. Receiving IV abx. Running NS 83ml/hr. Up self, steady. Refusing treaded socks. Provided pt education, continues to refuse. Safety precautions in place; treaded socks on, call light within reach, personal belongings within reach, upper bed rails up.

## 2017-01-19 LAB
BACTERIA SPEC ANAEROBE CULT: NORMAL
BACTERIA WND AEROBE CULT: ABNORMAL
BACTERIA WND AEROBE CULT: ABNORMAL
BASOPHILS # BLD AUTO: 0.9 % (ref 0–1.8)
BASOPHILS # BLD: 0.05 K/UL (ref 0–0.12)
EOSINOPHIL # BLD AUTO: 0.26 K/UL (ref 0–0.51)
EOSINOPHIL NFR BLD: 4.5 % (ref 0–6.9)
ERYTHROCYTE [DISTWIDTH] IN BLOOD BY AUTOMATED COUNT: 37.5 FL (ref 35.9–50)
GRAM STN SPEC: ABNORMAL
HCT VFR BLD AUTO: 40.4 % (ref 42–52)
HGB BLD-MCNC: 14 G/DL (ref 14–18)
IMM GRANULOCYTES # BLD AUTO: 0.03 K/UL (ref 0–0.11)
IMM GRANULOCYTES NFR BLD AUTO: 0.5 % (ref 0–0.9)
LYMPHOCYTES # BLD AUTO: 1.92 K/UL (ref 1–4.8)
LYMPHOCYTES NFR BLD: 32.9 % (ref 22–41)
MCH RBC QN AUTO: 29.9 PG (ref 27–33)
MCHC RBC AUTO-ENTMCNC: 34.7 G/DL (ref 33.7–35.3)
MCV RBC AUTO: 86.3 FL (ref 81.4–97.8)
MONOCYTES # BLD AUTO: 0.58 K/UL (ref 0–0.85)
MONOCYTES NFR BLD AUTO: 9.9 % (ref 0–13.4)
NEUTROPHILS # BLD AUTO: 2.99 K/UL (ref 1.82–7.42)
NEUTROPHILS NFR BLD: 51.3 % (ref 44–72)
NRBC # BLD AUTO: 0 K/UL
NRBC BLD AUTO-RTO: 0 /100 WBC
PLATELET # BLD AUTO: 248 K/UL (ref 164–446)
PMV BLD AUTO: 9.3 FL (ref 9–12.9)
RBC # BLD AUTO: 4.68 M/UL (ref 4.7–6.1)
SIGNIFICANT IND 70042: ABNORMAL
SIGNIFICANT IND 70042: NORMAL
SITE SITE: ABNORMAL
SITE SITE: NORMAL
SOURCE SOURCE: ABNORMAL
SOURCE SOURCE: NORMAL
WBC # BLD AUTO: 5.8 K/UL (ref 4.8–10.8)

## 2017-01-19 PROCEDURE — A9270 NON-COVERED ITEM OR SERVICE: HCPCS | Performed by: STUDENT IN AN ORGANIZED HEALTH CARE EDUCATION/TRAINING PROGRAM

## 2017-01-19 PROCEDURE — 700105 HCHG RX REV CODE 258: Performed by: STUDENT IN AN ORGANIZED HEALTH CARE EDUCATION/TRAINING PROGRAM

## 2017-01-19 PROCEDURE — 85025 COMPLETE CBC W/AUTO DIFF WBC: CPT

## 2017-01-19 PROCEDURE — 700102 HCHG RX REV CODE 250 W/ 637 OVERRIDE(OP): Performed by: STUDENT IN AN ORGANIZED HEALTH CARE EDUCATION/TRAINING PROGRAM

## 2017-01-19 PROCEDURE — 36415 COLL VENOUS BLD VENIPUNCTURE: CPT

## 2017-01-19 PROCEDURE — 770006 HCHG ROOM/CARE - MED/SURG/GYN SEMI*

## 2017-01-19 PROCEDURE — 700111 HCHG RX REV CODE 636 W/ 250 OVERRIDE (IP): Performed by: STUDENT IN AN ORGANIZED HEALTH CARE EDUCATION/TRAINING PROGRAM

## 2017-01-19 PROCEDURE — 99232 SBSQ HOSP IP/OBS MODERATE 35: CPT | Mod: GC | Performed by: INTERNAL MEDICINE

## 2017-01-19 RX ORDER — METRONIDAZOLE 500 MG/1
500 TABLET ORAL EVERY 8 HOURS
Status: DISCONTINUED | OUTPATIENT
Start: 2017-01-19 | End: 2017-01-20 | Stop reason: HOSPADM

## 2017-01-19 RX ORDER — LEVOFLOXACIN 750 MG/1
750 TABLET, FILM COATED ORAL DAILY
Status: DISCONTINUED | OUTPATIENT
Start: 2017-01-19 | End: 2017-01-20 | Stop reason: HOSPADM

## 2017-01-19 RX ADMIN — GABAPENTIN 100 MG: 100 CAPSULE ORAL at 09:21

## 2017-01-19 RX ADMIN — AMPICILLIN SODIUM AND SULBACTAM SODIUM 3 G: 2; 1 INJECTION, POWDER, FOR SOLUTION INTRAMUSCULAR; INTRAVENOUS at 05:04

## 2017-01-19 RX ADMIN — METRONIDAZOLE 500 MG: 500 TABLET ORAL at 15:42

## 2017-01-19 RX ADMIN — AMPICILLIN SODIUM AND SULBACTAM SODIUM 3 G: 2; 1 INJECTION, POWDER, FOR SOLUTION INTRAMUSCULAR; INTRAVENOUS at 12:00

## 2017-01-19 RX ADMIN — BISOPROLOL FUMARATE 10 MG: 5 TABLET, COATED ORAL at 20:55

## 2017-01-19 RX ADMIN — LEVOTHYROXINE SODIUM 125 MCG: 125 TABLET ORAL at 05:03

## 2017-01-19 RX ADMIN — GABAPENTIN 100 MG: 100 CAPSULE ORAL at 15:42

## 2017-01-19 RX ADMIN — BISOPROLOL FUMARATE 10 MG: 5 TABLET, COATED ORAL at 09:21

## 2017-01-19 RX ADMIN — LEVOFLOXACIN 750 MG: 750 TABLET, FILM COATED ORAL at 15:42

## 2017-01-19 RX ADMIN — GABAPENTIN 100 MG: 100 CAPSULE ORAL at 20:55

## 2017-01-19 RX ADMIN — ATORVASTATIN CALCIUM 20 MG: 20 TABLET, FILM COATED ORAL at 20:56

## 2017-01-19 RX ADMIN — OMEPRAZOLE 40 MG: 20 CAPSULE, DELAYED RELEASE ORAL at 09:21

## 2017-01-19 RX ADMIN — SODIUM CHLORIDE: 9 INJECTION, SOLUTION INTRAVENOUS at 05:04

## 2017-01-19 RX ADMIN — VERAPAMIL HYDROCHLORIDE 240 MG: 240 TABLET, FILM COATED, EXTENDED RELEASE ORAL at 09:22

## 2017-01-19 RX ADMIN — METRONIDAZOLE 500 MG: 500 TABLET ORAL at 20:55

## 2017-01-19 ASSESSMENT — PAIN SCALES - GENERAL
PAINLEVEL_OUTOF10: 0
PAINLEVEL_OUTOF10: 0

## 2017-01-19 NOTE — DISCHARGE SUMMARY
[facility] Internal Medicine Discharge Summary      Admit Date:  1/15/2017       Discharge Date:  1/20/2017    Service:   [facility] Internal Medicine Purple Team  Attending Physician(s):     Senior Resident(s):    Kyaw Resident(s):         Primary Diagnosis:   Cellulitis of Left hand due to Cat bite  Flexor Tenosynovitis S/P I&D    Secondary Diagnoses:            1. Hypertrophic Cardiomyopathy  2. Tobacco abuse  3. Hypertension  4. Hypothyroidism  5. Dyslipidemia    Hospital Summary (Brief Narrative):       46 year old male admitted with Cat-bite induced Left hand Cellulitis after being bitten by his janey's cat two days before admission. He has a PMH of HOCM, NSVT s/p dual ICD 2014, intermittent near-syncope (unclear etio), HTN, DLD, GERD . Hand Surgeon  on board  S/P I&D, Wound Cultures positive for Proteus and Staph Epidermidis. Initially treated with IV unasyn later switched to PO Levofloxacin and Metronidazole and discharged home on PO Abx. Pt and family were trained the dressing of the wound. He will be following  in 10 days as outpatient and also his PCP Dr.Mahboob [facility] in 2 weeks.    Patient /Hospital Summary (Details -- Problem Oriented) :          1. Cellulitis of left hand due to Cat bite -  swollen erythematous left hand, no signs of infection, restricted ROM of left index and middle finger.Wound Cx positive for  Proteus and Staph Epidermidis . USG was negative for abscess.  hand surgeon on board s/p I&D.He was treated with IV Unasyn from 1/16 - 1/19 and later transitioned to PO Levofloxacin and Flagyl and discharged on the same after discussing with . Pain well controlled after I&D . He did not require any pain medication in last 2 days.    2. Hypertrophic cardiomyopathy  s/p dual ICD placement (2014). Positive for Hx of Sx of  intermittent near syncope and chest pain. But asymptomatic this admission. On Bisoprolol 10 mg bid,  Verapamil 240 mg qd.    3. Tobacco abuse - 25 pack years. On Nicotine patch.    4. Hypertension - Well controlled on home Bisoprolol and Verapamil .    5. Hypothyroidism - TSH 1.76 (10/2016). On Levothyroxine 125 mcg qam at home.    6. Dyslipidemia - , (H), LDL 60, HDL 30. On Atorvastatin 20mg QD.      Consultants:     Hand Surgery Consult -     Procedures:        Incision and Drainage of Left Cellulitis of Hand.    Imaging/ Testing:      USG of left hand - Small irregular cystic area in the dorsal aspect of the left hand at the site of trauma measuring 15 x 7.8 x 3.5 mm.    Discharge Medications:         Medication Reconciliation: Completed     Medication List      START taking these medications       Instructions    levofloxacin 750 MG tablet   Last time this was given:  750 mg on 1/20/2017  9:39 AM   Commonly known as:  LEVAQUIN    Take 1 Tab by mouth every day.   Dose:  750 mg       metronidazole 500 MG Tabs   Last time this was given:  500 mg on 1/20/2017  5:26 AM   Commonly known as:  FLAGYL    Take 1 Tab by mouth 3 times a day.   Dose:  500 mg         CONTINUE taking these medications       Instructions    albuterol 108 (90 BASE) MCG/ACT Aers inhalation aerosol    Inhale 2 Puffs by mouth every 6 hours as needed for Shortness of Breath.   Dose:  2 Puff       atorvastatin 20 MG Tabs   Last time this was given:  20 mg on 1/19/2017  8:56 PM   Commonly known as:  LIPITOR    Doctor's comments:  PT NEEDS APPT   Take 1 Tab by mouth every day.   Dose:  20 mg       bisoprolol 10 MG tablet   Last time this was given:  10 mg on 1/20/2017  9:39 AM   Commonly known as:  ZEBETA    Take 1 Tab by mouth 2 Times a Day.   Dose:  10 mg       gabapentin 100 MG Caps   Last time this was given:  100 mg on 1/20/2017  9:40 AM   Commonly known as:  NEURONTIN    Take 100 mg by mouth 3 times a day.   Dose:  100 mg       levothyroxine 125 MCG Tabs   Last time this was given:  125 mcg on 1/20/2017  5:26 AM   Commonly  known as:  SYNTHROID    Doctor's comments:  Have PCP refill and follow thyroid after this   Take 1 Tab by mouth every day.   Dose:  125 mcg       omeprazole 40 MG delayed-release capsule   Last time this was given:  40 mg on 1/20/2017  9:40 AM   Commonly known as:  PRILOSEC    Doctor's comments:  Take one capsule 30 minutes before breakfast   Take 1 Cap by mouth every day.   Dose:  40 mg       verapamil  MG Tbcr   Last time this was given:  240 mg on 1/20/2017  9:39 AM   Commonly known as:  CALAN-SR    Take 1 Tab by mouth every day.   Dose:  240 mg               Disposition:   To Home    Diet:   Cardiac diet    Activity:   As tolerated    Instructions:      Please do the daily dressing of the hand as instructed by RN.  F/U with  as outpatient in 10 days.  F/U with PCP  / [facility] on 02/13/2017 at 9.30 AM  Take the Abx course of Levofloxacin and Flagyl prescribed for 1 week.    The patient was instructed to return to the ER in the event of worsening symptoms. I have counseled the patient on the importance of compliance and the patient has agreed to proceed with all medical recommendations and follow up plan indicated above.   The patient understands that all medications come with benefits and risks. Risks may include permanent injury or death and these risks can be minimized with close reassessment and monitoring.        Primary Care Provider:      Discharge summary faxed to primary care provider:  Completed  Copy of discharge summary given to the patient: Completed    Follow up appointment details :    .  F/U with PCP  / [facility] on 02/13/2017 at 9.30 AM  F/U with  as outpatient in 10 days    Pending Studies:        None    Time spent on discharge day patient visit, preparing discharge paperwork and arranging for patient follow up.    Discharge Time (Minutes) :    40 minutes      Condition on Discharge     ______________________________________________________________________    Interval history/exam for day of discharge:     Pt is stable and pain free. He did not require any pain medication.    Filed Vitals:    01/19/17 2000 01/19/17 2300 01/20/17 0400 01/20/17 0800   BP: 121/77 115/66 121/70 103/72   Pulse: 69 71 69 63   Temp: 36.3 °C (97.4 °F) 36.7 °C (98 °F) 36.3 °C (97.4 °F) 36.4 °C (97.6 °F)   TempSrc:       Resp: 18 18 18 18   Height:       Weight:       SpO2: 93% 95% 92% 96%     Weight/BMI: Body mass index is 32.82 kg/(m^2).  Pulse Oximetry: 96 %, O2 (LPM): 0, O2 Delivery: None (Room Air)    General: No acute distress  CVS: S1, S2 regular.No m/r/g   PULM: CTAB  Left hand , surgical wound healthy with dressing dry.    Most Recent Labs:    Lab Results   Component Value Date/Time    WBC 6.3 01/20/2017 02:03 AM    RBC 4.88 01/20/2017 02:03 AM    HEMOGLOBIN 15.1 01/20/2017 02:03 AM    HEMATOCRIT 41.8* 01/20/2017 02:03 AM    MCV 85.7 01/20/2017 02:03 AM    MCH 30.9 01/20/2017 02:03 AM    MCHC 36.1* 01/20/2017 02:03 AM    MPV 9.5 01/20/2017 02:03 AM    NEUTROPHILS-POLYS 53.50 01/20/2017 02:03 AM    LYMPHOCYTES 30.80 01/20/2017 02:03 AM    MONOCYTES 10.40 01/20/2017 02:03 AM    EOSINOPHILS 4.00 01/20/2017 02:03 AM    BASOPHILS 1.00 01/20/2017 02:03 AM      Lab Results   Component Value Date/Time    SODIUM 138 01/20/2017 02:03 AM    POTASSIUM 4.0 01/20/2017 02:03 AM    CHLORIDE 106 01/20/2017 02:03 AM    CO2 27 01/20/2017 02:03 AM    GLUCOSE 92 01/20/2017 02:03 AM    BUN 16 01/20/2017 02:03 AM    CREATININE 1.32 01/20/2017 02:03 AM      Lab Results   Component Value Date/Time    ALT(SGPT) 28 01/17/2017 12:11 AM    AST(SGOT) 20 01/17/2017 12:11 AM    ALKALINE PHOSPHATASE 88 01/17/2017 12:11 AM    TOTAL BILIRUBIN 0.8 01/17/2017 12:11 AM    DIRECT BILIRUBIN 0.2 11/17/2015 11:22 AM    LIPASE 30 10/30/2016 12:25 PM    ALBUMIN 3.5 01/17/2017 12:11 AM    GLOBULIN 2.7 01/17/2017 12:11 AM    INR 1.01 10/24/2016 08:27 AM      Lab Results   Component Value Date/Time    PT 13.6 10/24/2016 08:27 AM    INR 1.01 10/24/2016 08:27 AM

## 2017-01-19 NOTE — PROGRESS NOTES
Received report from day shift RN. Discussed POC with pt., verbalized understanding, assumed care @1915. A&Ox4. On room air at this time. Denies pain. Dressing in place on left hand, changed today per dayshift RN report, clean, dry and intact. Receiving IV abx. Running NS 83 ml/hr. Up self, steady. Refusing to wear treaded socks. Provided education regarding safety but pt continues to refuse.Safety precautions in place; call light within reach, personal belongings within reach, upper bed rails up.

## 2017-01-19 NOTE — DISCHARGE PLANNING
Pt NOT homebound, will not qualify for home health.     Reported pt did not d/c as pt is not on oral antibiotics yet.

## 2017-01-19 NOTE — CARE PLAN
Problem: Safety  Goal: Will remain free from falls  Outcome: PROGRESSING AS EXPECTED  Low fall risk. Safety precautions in place. Bed placed in low position, call light within reach. Hourly rounding done.     Problem: Infection  Goal: Will remain free from infection  Outcome: PROGRESSING AS EXPECTED  Receiving IV abx. Wound on left hand. Dressing in place, CDI.

## 2017-01-19 NOTE — DISCHARGE PLANNING
Per Dr. Sparks pt changed to oral antibiotics. Reportedly pts family will be coming in tomorrow morning to learn how to properly dress pts wound.     Placed call to [facility] [phone], ASIF regarding how their pts receive outpatient wound care when not home bound.

## 2017-01-20 VITALS
WEIGHT: 262.57 LBS | OXYGEN SATURATION: 96 % | HEART RATE: 63 BPM | RESPIRATION RATE: 18 BRPM | SYSTOLIC BLOOD PRESSURE: 103 MMHG | DIASTOLIC BLOOD PRESSURE: 72 MMHG | BODY MASS INDEX: 32.65 KG/M2 | TEMPERATURE: 97.6 F | HEIGHT: 75 IN

## 2017-01-20 LAB
ANION GAP SERPL CALC-SCNC: 5 MMOL/L (ref 0–11.9)
BASOPHILS # BLD AUTO: 1 % (ref 0–1.8)
BASOPHILS # BLD: 0.06 K/UL (ref 0–0.12)
BUN SERPL-MCNC: 16 MG/DL (ref 8–22)
CALCIUM SERPL-MCNC: 9.6 MG/DL (ref 8.5–10.5)
CHLORIDE SERPL-SCNC: 106 MMOL/L (ref 96–112)
CO2 SERPL-SCNC: 27 MMOL/L (ref 20–33)
CREAT SERPL-MCNC: 1.32 MG/DL (ref 0.5–1.4)
EOSINOPHIL # BLD AUTO: 0.25 K/UL (ref 0–0.51)
EOSINOPHIL NFR BLD: 4 % (ref 0–6.9)
ERYTHROCYTE [DISTWIDTH] IN BLOOD BY AUTOMATED COUNT: 37.8 FL (ref 35.9–50)
GFR SERPL CREATININE-BSD FRML MDRD: 59 ML/MIN/1.73 M 2
GLUCOSE BLD-MCNC: 139 MG/DL (ref 65–99)
GLUCOSE SERPL-MCNC: 92 MG/DL (ref 65–99)
HCT VFR BLD AUTO: 41.8 % (ref 42–52)
HGB BLD-MCNC: 15.1 G/DL (ref 14–18)
IMM GRANULOCYTES # BLD AUTO: 0.02 K/UL (ref 0–0.11)
IMM GRANULOCYTES NFR BLD AUTO: 0.3 % (ref 0–0.9)
LYMPHOCYTES # BLD AUTO: 1.93 K/UL (ref 1–4.8)
LYMPHOCYTES NFR BLD: 30.8 % (ref 22–41)
MCH RBC QN AUTO: 30.9 PG (ref 27–33)
MCHC RBC AUTO-ENTMCNC: 36.1 G/DL (ref 33.7–35.3)
MCV RBC AUTO: 85.7 FL (ref 81.4–97.8)
MONOCYTES # BLD AUTO: 0.65 K/UL (ref 0–0.85)
MONOCYTES NFR BLD AUTO: 10.4 % (ref 0–13.4)
NEUTROPHILS # BLD AUTO: 3.35 K/UL (ref 1.82–7.42)
NEUTROPHILS NFR BLD: 53.5 % (ref 44–72)
NRBC # BLD AUTO: 0 K/UL
NRBC BLD AUTO-RTO: 0 /100 WBC
PLATELET # BLD AUTO: 257 K/UL (ref 164–446)
PMV BLD AUTO: 9.5 FL (ref 9–12.9)
POTASSIUM SERPL-SCNC: 4 MMOL/L (ref 3.6–5.5)
RBC # BLD AUTO: 4.88 M/UL (ref 4.7–6.1)
SODIUM SERPL-SCNC: 138 MMOL/L (ref 135–145)
WBC # BLD AUTO: 6.3 K/UL (ref 4.8–10.8)

## 2017-01-20 PROCEDURE — 80048 BASIC METABOLIC PNL TOTAL CA: CPT

## 2017-01-20 PROCEDURE — 36415 COLL VENOUS BLD VENIPUNCTURE: CPT

## 2017-01-20 PROCEDURE — A9270 NON-COVERED ITEM OR SERVICE: HCPCS | Performed by: STUDENT IN AN ORGANIZED HEALTH CARE EDUCATION/TRAINING PROGRAM

## 2017-01-20 PROCEDURE — 99239 HOSP IP/OBS DSCHRG MGMT >30: CPT | Mod: GC | Performed by: INTERNAL MEDICINE

## 2017-01-20 PROCEDURE — 700102 HCHG RX REV CODE 250 W/ 637 OVERRIDE(OP): Performed by: STUDENT IN AN ORGANIZED HEALTH CARE EDUCATION/TRAINING PROGRAM

## 2017-01-20 PROCEDURE — 85025 COMPLETE CBC W/AUTO DIFF WBC: CPT

## 2017-01-20 PROCEDURE — 82962 GLUCOSE BLOOD TEST: CPT

## 2017-01-20 RX ORDER — METRONIDAZOLE 500 MG/1
500 TABLET ORAL 3 TIMES DAILY
Qty: 21 TAB | Refills: 0 | Status: SHIPPED | OUTPATIENT
Start: 2017-01-20 | End: 2017-02-13

## 2017-01-20 RX ORDER — LEVOFLOXACIN 750 MG/1
750 TABLET, FILM COATED ORAL DAILY
Qty: 7 TAB | Refills: 0 | Status: SHIPPED | OUTPATIENT
Start: 2017-01-20 | End: 2017-02-13

## 2017-01-20 RX ADMIN — LEVOTHYROXINE SODIUM 125 MCG: 125 TABLET ORAL at 05:26

## 2017-01-20 RX ADMIN — LEVOFLOXACIN 750 MG: 750 TABLET, FILM COATED ORAL at 09:39

## 2017-01-20 RX ADMIN — METRONIDAZOLE 500 MG: 500 TABLET ORAL at 05:26

## 2017-01-20 RX ADMIN — VERAPAMIL HYDROCHLORIDE 240 MG: 240 TABLET, FILM COATED, EXTENDED RELEASE ORAL at 09:39

## 2017-01-20 RX ADMIN — GABAPENTIN 100 MG: 100 CAPSULE ORAL at 09:40

## 2017-01-20 RX ADMIN — OMEPRAZOLE 40 MG: 20 CAPSULE, DELAYED RELEASE ORAL at 09:40

## 2017-01-20 RX ADMIN — BISOPROLOL FUMARATE 10 MG: 5 TABLET, COATED ORAL at 09:39

## 2017-01-20 ASSESSMENT — PAIN SCALES - GENERAL: PAINLEVEL_OUTOF10: 0

## 2017-01-20 NOTE — DISCHARGE INSTRUCTIONS
Discharge Instructions    Discharged to home by car with friend. Discharged via walking, hospital escort: Refused.  Special equipment needed: Not Applicable    Be sure to schedule a follow-up appointment with your primary care doctor or any specialists as instructed.     Discharge Plan:   Diet Plan: Discussed  Activity Level: Discussed  Confirmed Follow up Appointment: Appointment Scheduled  Confirmed Symptoms Management: Discussed  Medication Reconciliation Updated: Yes  Influenza Vaccine Indication: Not indicated: Previously immunized this influenza season and > 8 years of age    I understand that a diet low in cholesterol, fat, and sodium is recommended for good health. Unless I have been given specific instructions below for another diet, I accept this instruction as my diet prescription.   Other diet: Regular    Special Instructions: None    · Is patient discharged on Warfarin / Coumadin?   No     · Is patient Post Blood Transfusion?  No    Depression / Suicide Risk    As [facility] are [facility], it is important to learn how to keep safe from harming yourself.    Recognize the warning signs:  · Abrupt changes in personality, positive or negative- including increase in energy   · Giving away possessions  · Change in eating patterns- significant weight changes-  positive or negative  · Change in sleeping patterns- unable to sleep or sleeping all the time   · Unwillingness or inability to communicate  · Depression  · Unusual sadness, discouragement and loneliness  · Talk of wanting to die  · Neglect of personal appearance   · Rebelliousness- reckless behavior  · Withdrawal from people/activities they love  · Confusion- inability to concentrate     If you or a loved one observes any of these behaviors or has concerns about self-harm, here's what you can do:  · Talk about it- your feelings and reasons for harming yourself  · Remove any means that you might use to hurt yourself (examples:  pills, rope, extension cords, firearm)  · Get professional help from the community (Mental Health, Substance Abuse, psychological counseling)  · Do not be alone:Call your Safe Contact- someone whom you trust who will be there for you.  · Call your local CRISIS HOTLINE 137-9944 or 257-457-6480  · Call your local Children's Mobile Crisis Response Team [place] (184) 824-3085 or www.[employer]  · Call the toll free National Suicide Prevention Hotlines   · National Suicide Prevention Lifeline 571-175-JLHU (7708)  · [employer] 800-SUICIDE (408-1388)    Cellulitis  Cellulitis is an infection of the skin and the tissue beneath it. The infected area is usually red and tender. Cellulitis occurs most often in the arms and lower legs.   CAUSES   Cellulitis is caused by bacteria that enter the skin through cracks or cuts in the skin. The most common types of bacteria that cause cellulitis are staphylococci and streptococci.  SIGNS AND SYMPTOMS   · Redness and warmth.  · Swelling.  · Tenderness or pain.  · Fever.  DIAGNOSIS   Your health care provider can usually determine what is wrong based on a physical exam. Blood tests may also be done.  TREATMENT   Treatment usually involves taking an antibiotic medicine.  HOME CARE INSTRUCTIONS   · Take your antibiotic medicine as directed by your health care provider. Finish the antibiotic even if you start to feel better.  · Keep the infected arm or leg elevated to reduce swelling.  · Apply a warm cloth to the affected area up to 4 times per day to relieve pain.  · Take medicines only as directed by your health care provider.  · Keep all follow-up visits as directed by your health care provider.  SEEK MEDICAL CARE IF:   2. You notice red streaks coming from the infected area.  3. Your red area gets larger or turns dark in color.  4. Your bone or joint underneath the infected area becomes painful after the skin has healed.  5. Your infection returns in the same  area or another area.  6. You notice a swollen bump in the infected area.  7. You develop new symptoms.  8. You have a fever.  SEEK IMMEDIATE MEDICAL CARE IF:   2. You feel very sleepy.  3. You develop vomiting or diarrhea.  4. You have a general ill feeling (malaise) with muscle aches and pains.  MAKE SURE YOU:   2. Understand these instructions.  3. Will watch your condition.  4. Will get help right away if you are not doing well or get worse.     This information is not intended to replace advice given to you by your health care provider. Make sure you discuss any questions you have with your health care provider.     Document Released: 09/27/2006 Document Revised: 01/08/2016 Document Reviewed: 03/04/2013  [employer] Interactive Patient Education ©2016 [employer]    Wound Packing  Wound packing involves placing a moistened packing material into your wound and then covering it with an outer bandage (dressing). This helps promote proper healing of deep tissue and tissue under the skin. It also helps prevent bleeding, infection, and further injury.   Wounds are packed until deep tissue has had time to heal. The time it takes for this to occur is different for everyone. Your health care provider will show you how to pack and dress your wound. Using gloves and a sterile technique is important in order to avoid spreading germs into your wound.  RISKS AND COMPLICATIONS  · Infection.  · Delayed or abnormal healing.  HOW TO PACK YOUR WOUND  Follow your health care provider's instructions on how often you need to change dressings and pack your wound. You will likely be asked to change dressings 1-2 times a day.  Supplies Needed  · Gloves.  · Wetting solution.  · Clean bowl.  · Packing material (gauze or gauze sponges).  · Clean towels.  · Outer dressing.  · Tape.  · Cotton balls or cotton-tipped swabs.  · Small plastic bag.  Preparing Your New Packing Material  9. Make sure your work surface or countertop is clean and  disinfected.  10. Wash your hands well with soap and water.  11. Put a clean towel on the counter.  12. Put a clean bowl on the towel. Be sure to only touch the outside of the bowl when handling it.  13. Pour wetting solution into the bowl.  14. Cut your packing material (gauze or sponges) to the right size for your wound. Drop it into the bowl.  15. Cut four tape strips that you will use to seal the outer dressing.  16. Put cotton balls or cotton-tipped swabs on the clean towel.  Removing the Old Packing and Dressing  5. Gently remove the old dressing and packing material.  6. Put the removed items into the plastic bag to throw away later.  7. Wash your hands well with soap and water again.  Applying New Packing Material and Dressing  5. Put on your gloves.  6. Squeeze the packing material in the bowl to release excess liquid. The packing material should be moist, but not dripping wet.  7. Gently place the packing material into the wound. Use a cotton ball or cotton-tipped swab to guide it into place, filling all of the space.  8. Dry your fingertips on the towel.  9. Open up your outer dressing supplies and put them on a dry part of the towel. Keep them from getting wet.  10. Place the dressing over the packed wound.  11. Tape the four outer edges of the dressing in place.  12. Remove your gloves.  13. Wash your hands again with soap and water.  General Tips  · Follow your health care provider's instructions on how tightly to pack the wound. At first, the wound will be packed tightly to help stop bleeding. As the wound begins to heal inside, you will use less packing material and pack the wound loosely to allow tissue to heal slowly from the inside out.  · Keep the dressing clean and dry.  · Follow any other instructions given by your health care provider on how to aid healing. This may include applying warm or cold compresses, elevating the affected area, or wearing a compression dressing.  · Ask your health care  provider about sun exposure and sunscreen when the dressings are no longer needed.  · Keep all follow-up visits with your health care provider. This is important.  SEEK MEDICAL CARE IF:  · You have drainage, redness, swelling, or pain at your wound site.  · You notice a bad smell coming from the wound site.  · Your pain is not controlled with pain medicine.  · Tissue inside your wound changes color from pink to white, yellow, or black.  · Your wound changes in size or depth.  · You have a fever.  · You have shaking chills.  · You are having trouble packing your wound.     This information is not intended to replace advice given to you by your health care provider. Make sure you discuss any questions you have with your health care provider.     Document Released: 07/15/2015 Document Reviewed: 07/15/2015  Elsevier Interactive Patient Education ©2016 [employer]

## 2017-01-20 NOTE — PROGRESS NOTES
Received report from day shift RN, discussed POC with pt., pt. Verbalized understanding, assumed care.  Pt. A&Ox4, No complaints of pain or discomfort at this time.  Up self, tolerating well.  Pt. Had cat bite to L hand, has drsg in place with drain, receiving IV Abx.  Pt. Most likely to discharge home when medically clear. Safety precautions in place: call light and belongings in reach, IV pole on same side as bathroom, side rail closest to bathroom down, non-skid socks on, hourly rounding in place.

## 2017-01-20 NOTE — PROGRESS NOTES
Attending Note:  I have personally evaluated and examined this patient and agree with the findings as documented in the resident note except as documented in this attending note.  I am actively involved in the patient's care.      Additional attending comments:   45 yo male with cat bite induced cellulitis and tenosynovitis.  S/P debridement.  Doing well with daily improvement.    Intern discussed with Dr. Rivas who will remove catheter in AM.  Working on wound care with sister who can pack wound as one option.  Switched to PO Levofloxacin and flagyl per ID recommendation.  Plan to discharge in AM

## 2017-01-20 NOTE — CARE PLAN
Problem: Infection  Goal: Will remain free from infection  Intervention: Assess signs and symptoms of infection  Pt. Has cat bite to L hand, has drsg in place with drain, receiving IV Abx.       Problem: Discharge Barriers/Planning  Goal: Patient’s continuum of care needs will be met  Intervention: Assess potential discharge barriers on admission and throughout hospital stay  Pt. Most likely to be discharged home when medically clear.

## 2017-01-20 NOTE — PROGRESS NOTES
Discharge orders received. Pt discharged home, education provided. Pt understands to take all antibiotics, is aware of f/u appointments, and understands dressing changes. IV taken out. Pt dressed. All belongings gathered. Pt refused escort. Pt walked downstairs by self, friend driving pt home.

## 2017-01-20 NOTE — PROGRESS NOTES
Spoke with Dr. Aldridge via phone. Dr. Aldridge states an RN can remove the drain before discharge. Verbal order read back and RN Emelia notified.

## 2017-01-20 NOTE — PROGRESS NOTES
Pt. Refusing BA placement.  A&Ox4.  Educated regarding need for safety equipment in the prevention of unnecessary falls and injury.  Pt. Verbalizes understanding, continues to refuse.  Agreeable to call for assistance prior attempts at ambulating independently.  Demonstrates proper use of call light, verbalizes understanding of communication board and staff phone extensions.

## 2017-01-20 NOTE — CARE PLAN
Problem: Bowel/Gastric:  Goal: Will not experience complications related to bowel motility  Outcome: PROGRESSING AS EXPECTED  Pt having regular BMs. Refused stool softener this AM but understands importance of preventing constipation.     Problem: Pain Management  Goal: Pain level will decrease to patient’s comfort goal  Outcome: PROGRESSING AS EXPECTED  Pt pain well controlled. Pain medication offered, pt declines. Pt resting arm and elevating.

## 2017-01-20 NOTE — PROGRESS NOTES
Pt A&Ox4. Pt refusing bed alarm and treaded socks. Pt is up self with steady gait. Educated importance for safety and fall prevention. Pt verbalizes understanding but continues to refuse. No complaints of pain at this time.  Plan is to remove drainage tube from hand today and possible discharge to home with son. Per pt, son has been educated and understands how to pack the wound and provide dressing changes. Pt verbalizes understanding of plan of care.

## 2017-01-20 NOTE — PROGRESS NOTES
"Pt. Complained of chest pain, stated \"this happens due to my heart condition, I just need to drink milk and it will pass, I think my sugar may be high.\"  Pt. Has cardiomyopathy and pacemaker.  This RN notified charge RN and RRT RN Ani.  This RN gave milk, took vitals and BS.  Pt. VSS, .  Reassessed pt. Multiple times.  Pt. Stated \"I feel better, the pain has subsided.\"  Will continue to assess and intervene appropriately.   "

## 2017-01-20 NOTE — PROGRESS NOTES
[facility] [place] Internal Medicine Interval Note    Name Trevor Gillis     1970   Age/Sex 46 y.o. male   MRN [id number]   Code Status Full     After 5PM or if no immediate response to page, please call for cross-coverage  Attending/Team: Lori/Raffy Call [phone] to page   1st Call - Day Intern (R1):   Clare 2nd Call - Day Sr. Resident (R2/R3):   Maryuri         Chief complaint/ reason for interval visit (Primary Diagnosis)   Cat bite left hand    Interval Problem Daily Status Update    Active Hospital Problems    Diagnosis   • Cellulitis of left hand [L03.114]   • Cat bite of hand [S61.459A, W55.01XA]   • Dyslipidemia [E78.5]   • Hypothyroidism [E03.9]   • Hypertension [I10]   • Tobacco abuse [Z72.0]   • Hypertrophic cardiomyopathy (CMS-HCC) [I42.2]     ID: 46 year old male with history of HCM, NSVT s/p dual ICD , intermittent near-syncope (unclear etio), HTN, DLD, GERD who presented with swelling and painful left hand following cat bite 2 days prior to admission, S/P I&D, initially treated with IV unasyn but switched to PO metro, and cipro.    Patient was seen and examined today. No new complaints. S/P I&D. Patient tolerated procedure well, drain placed with saline irrigation BID, Patient still has limited range of motion with fingers due to pain and edema, but greatly improving. Denied chest pain, fever/chills, nausea, vomiting, diarrhea. Wound cultures positive for staph epidermidis, and proteus.    Flexertenosynovitis: Unasyn has been discontinued in favor of PO metro, and cipro, Dr. Rivas (Hand Surgery) following, S/P I&D, Patient tolerated procedure well, drain placed with saline irrigation BID, Wound cultures positive for staph epidermidis, and proteus. Patient's family will be educated on how to properly pack wound, and will perform daily packing's with iodoform prior to his follow up with Dr. Rivas in two weeks. Drain will be pulled prior to  discharge    HOCM with AICD/Pacemaker: continue bisoprolol and verapamil, will continue to avoid preload reducing agents    ROS    Constitutional: Negative for fever and chills.   HENT: Negative for sore throat and tinnitus.    Eyes: Negative for blurred vision.   Respiratory: Negative for cough, sputum production, shortness of breath and wheezing.    Cardiovascular: Negative for chest pain. Negative for palpitations.         Gastrointestinal: Positive for constipation. Negative for nausea, vomiting, abdominal pain and diarrhea.   Genitourinary: Negative for frequency. Negative for dysuria.   Musculoskeletal: Negative for myalgias.   Skin: Negative for itching and rash.   Neurological: Negative for sensory change, focal weakness and headaches.    Reports intermittent lightheadedness or dizziness and sense of near fall but no LOC     Consultants/Specialty  Hand Surgery    Disposition  Inpatient    Quality Measures  Medications reviewed, Labs reviewed, Radiology images reviewed and EKG reviewed  Calvin catheter: No Calvin        DVT prophylaxis - mechanical: Contra-indicated                Physical Exam       Filed Vitals:    01/18/17 2000 01/19/17 0400 01/19/17 0740 01/19/17 1608   BP: 118/62 116/70 115/71 114/68   Pulse: 70 62 65 72   Temp: 36.9 °C (98.4 °F) 36 °C (96.8 °F) 36.3 °C (97.4 °F) 36.6 °C (97.8 °F)   TempSrc:       Resp: 18 18 18 16   Height:       Weight:       SpO2: 92% 94% 96% 94%     Body mass index is 32.82 kg/(m^2).    Oxygen Therapy:  Pulse Oximetry: 94 %, O2 (LPM): 0, O2 Delivery: None (Room Air)    Physical Exam  Constitutional: He is well-developed, well-nourished, and in no distress.   HENT:    Head: Normocephalic and atraumatic.   Eyes: EOM are normal. Pupils are equal, round, and reactive to light.   Cardiovascular: Normal rate and regular rhythm.  Exam reveals no gallop and no friction rub.     No murmur heard.  Pulmonary/Chest: Effort normal and breath sounds normal.   Abdominal: Soft. Bowel  sounds are normal. He exhibits no distension. There is no tenderness.   Musculoskeletal:   Left hand -bandaged with small drain in place, Range of motion limited due to pain, and swelling, wound on dorsum of hand looks clean, now packed, under bandage  Nursing note and vitals reviewed.    Lab Data Review:      1/16/2017  3:27 PM    Recent Labs      01/17/17 0011 01/18/17 0218   SODIUM  138  137   POTASSIUM  4.0  4.2   CHLORIDE  108  108   CO2  23  22   BUN  15  15   CREATININE  1.15  1.14   CALCIUM  8.7  8.6       Recent Labs      01/17/17 0011 01/18/17 0218   ALTSGPT  28   --    ASTSGOT  20   --    ALKPHOSPHAT  88   --    TBILIRUBIN  0.8   --    GLUCOSE  112*  97       Recent Labs      01/17/17 0011 01/18/17 0218 01/19/17 0149   RBC  4.43*  4.44*  4.68*   HEMOGLOBIN  13.8*  13.2*  14.0   HEMATOCRIT  38.9*  39.0*  40.4*   PLATELETCT  216  228  248       Recent Labs      01/17/17 0011 01/18/17 0218 01/19/17 0149   WBC  6.7  7.2  5.8   NEUTSPOLYS  56.50  61.50  51.30   LYMPHOCYTES  28.80  23.10  32.90   MONOCYTES  9.70  10.20  9.90   EOSINOPHILS  3.90  4.10  4.50   BASOPHILS  0.70  0.70  0.90   ASTSGOT  20   --    --    ALTSGPT  28   --    --    ALKPHOSPHAT  88   --    --    TBILIRUBIN  0.8   --    --            Assessment/Plan     Cellulitis of left hand  Assessment & Plan  - secondary to cat bite and self needle-punctured of the bite wound.    - swollen erythematous left hand, no signs of infection, restricted ROM of left index and middle finger.   - blood culture obtained and unasyn one dose given in ED.  - vitals stable, white cells normal, SIRS 0/4.  - XR left hand - no fracture, soft tissue swelling of dorsal hand.   - US negative for abscess  - Consulted with hand surgery (Dr. Rivas)-> s/p I&D, tolerated procedure well  PLAN  - s/p I & D  - Tdap given (unknown vaccination against Tdap).   - maintenance IVF at low rate.   - Wound cultures positive for staph epidermidis, and proteus  -  Daily packings with irrigation performed  - Unasyn has been discontinued in favor for oral cipro,metro  - likely discharge tomorrow after drain is pulled, patient's family needs to be properly educated on the proper technique to perform daily iodoform packing, nursing will educate patient prior to discharge    Cat bite of hand  Assessment & Plan  - please refer to cellulitis section.     Hypertrophic cardiomyopathy (CMS-HCC)  Assessment & Plan  - initially HCM without obstruction s/p dual ICD placement (2014). Now ? HOCM.    - reports intermittent near syncope and chest pain. [ trop negative ]   - frequent hospitalization for unexplained intermittent exertional dyspnea and near syncope; possible LVOT obstruction with exertion. Stress echo (10/2016) near mid cavity obstruction every other beat though no siginificant gradient. Repeat stress echo as outpatient - pending.   - home meds - Bisoprolol 10 mg bid, Verapamil 240 mg qd.   PLAN  - continue Bisoprolol and Verapamil.   - even though patient has no near syncope at this point, will give maintenance IVF at low rate to optimize preload.  - Avoid preload reducing agent.  Fall precaution.   - follow up with Dr. Gillis outpatient.     Tobacco abuse  Assessment & Plan  - 25 pack years. Now smokes 1 cigarette 2-3 times /week.  - nicotine patch ordered.     Hypertension  Assessment & Plan  - takes Bisoprolol and Verapamil at home.  - continue Bisoprolol 10 mg bid and Verapamil 240 mg qd.  - inj labetalol prn for SBP > 170 and/or DBP > 100.     Hypothyroidism  Assessment & Plan  - TSH 1.76 (10/2016).  - takes levothyroxine 125 mcg qam at home.  - continue same treatment.     Dyslipidemia  Assessment & Plan  - , (H), LDL 60, HDL 30 (10/2016).   - takes lipitor 20 mg qhs.  - continue same treatment.

## 2017-02-01 ENCOUNTER — OFFICE VISIT (OUTPATIENT)
Dept: CARDIOLOGY | Facility: MEDICAL CENTER | Age: 47
End: 2017-02-01
Payer: MEDICAID

## 2017-02-01 VITALS
HEART RATE: 90 BPM | HEIGHT: 73 IN | WEIGHT: 265 LBS | BODY MASS INDEX: 35.12 KG/M2 | SYSTOLIC BLOOD PRESSURE: 128 MMHG | DIASTOLIC BLOOD PRESSURE: 74 MMHG | OXYGEN SATURATION: 96 %

## 2017-02-01 DIAGNOSIS — R06.02 SOB (SHORTNESS OF BREATH): ICD-10-CM

## 2017-02-01 DIAGNOSIS — Z72.0 TOBACCO ABUSE: ICD-10-CM

## 2017-02-01 DIAGNOSIS — I42.2 HYPERTROPHIC CARDIOMYOPATHY (HCC): ICD-10-CM

## 2017-02-01 DIAGNOSIS — I47.29 NSVT (NONSUSTAINED VENTRICULAR TACHYCARDIA) (HCC): ICD-10-CM

## 2017-02-01 PROCEDURE — 93283 PRGRMG EVAL IMPLANTABLE DFB: CPT | Performed by: INTERNAL MEDICINE

## 2017-02-01 PROCEDURE — 99214 OFFICE O/P EST MOD 30 MIN: CPT | Mod: 25 | Performed by: INTERNAL MEDICINE

## 2017-02-01 NOTE — MR AVS SNAPSHOT
"        Trevor Kendall Carlin Lares   2017 10:00 AM   Office Visit   MRN: [id number]    Department:  [facility]   Dept Phone:  [phone]    Description:  Male : 1970   Provider:  Jeffrey Gillis M.D.           Reason for Visit     Follow-Up           Allergies as of 2017     Allergen Noted Reactions    Tomato 09/10/2014   Hives      Vital Signs     Blood Pressure Pulse Height Weight Body Mass Index Oxygen Saturation    128/74 mmHg 90 1.854 m (6' 0.99\") 120.203 kg (265 lb) 34.97 kg/m2 96%    Smoking Status                   Current Some Day Smoker           Basic Information     Date Of Birth Sex Race Ethnicity Preferred Language    1970 Male White Non- English      Your appointments     2017  9:30 AM   Established Patient with Blake Oseguera M.D.   [facility] - Internal Medicine (--)    [address] [place] [id number] [phone]           You will be receiving a confirmation call a few days before your appointment from our automated call confirmation system.              Problem List              ICD-10-CM Priority Class Noted - Resolved    Syncope R55   2012 - Present    FH: sudden cardiac death (SCD) Z82.41   2014 - Present    NSVT (nonsustained ventricular tachycardia) (CMS-[facility]) I47.2   2014 - Present    Dizzy spells R42   2014 - Present    Dual ICD (implantable cardioverter-defibrillator) in place Z95.810   2014 - Present    Inguinal hernia K40.90   9/10/2014 - Present    Shoulder pain M25.519   2015 - Present    Numbness in both hands R20.0   2015 - Present    Near syncope R55 Medium  2015 - Present    JEREMIAH (obstructive sleep apnea) G47.33   2016 - Present    Hypothyroidism E03.9 Low  2016 - Present    Anemia D64.9   2016 - Present    Hyperglycemia R73.9   2016 - Present    Numbness of left hand R20.8   2016 - Present    Prediabetes R73.03   2016 - Present    SOB " (shortness of breath) R06.02 High  10/24/2016 - Present    Bilateral hand numbness R20.0 Medium  10/24/2016 - Present    Dyslipidemia E78.5 Low  10/24/2016 - Present    Hypertrophic cardiomyopathy (CMS-HCC) I42.2   10/30/2016 - Present    Tobacco abuse Z72.0   11/3/2016 - Present    H/O wheezing Z87.898   11/3/2016 - Present    Cellulitis of left hand L03.114 High  1/15/2017 - Present    Cat bite of hand S61.459A, W55.01XA High  1/15/2017 - Present    Hypertension I10   1/16/2017 - Present      Health Maintenance        Date Due Completion Dates    IMM DTaP/Tdap/Td Vaccine (2 - Td) 1/16/2027 1/16/2017            Current Immunizations     Influenza Vaccine Quad Inj (Pf) 10/24/2016  3:41 PM    Pneumococcal polysaccharide vaccine (PPSV-23) 7/25/2011    Tdap Vaccine 1/16/2017 12:55 AM      Below and/or attached are the medications your provider expects you to take. Review all of your home medications and newly ordered medications with your provider and/or pharmacist. Follow medication instructions as directed by your provider and/or pharmacist. Please keep your medication list with you and share with your provider. Update the information when medications are discontinued, doses are changed, or new medications (including over-the-counter products) are added; and carry medication information at all times in the event of emergency situations     Allergies:  TOMATO - Hives               Medications  Valid as of: February 01, 2017 - 10:12 AM    Generic Name Brand Name Tablet Size Instructions for use    Albuterol Sulfate (Aero Soln) albuterol 108 (90 BASE) MCG/ACT Inhale 2 Puffs by mouth every 6 hours as needed for Shortness of Breath.        Atorvastatin Calcium (Tab) LIPITOR 20 MG Take 1 Tab by mouth every day.        Bisoprolol Fumarate (Tab) ZEBETA 10 MG Take 1 Tab by mouth 2 Times a Day.        Gabapentin (Cap) NEURONTIN 100 MG Take 100 mg by mouth 3 times a day.        LevoFLOXacin (Tab) LEVAQUIN 750 MG Take 1 Tab by  mouth every day.        Levothyroxine Sodium (Tab) SYNTHROID 125 MCG Take 1 Tab by mouth every day.        MetroNIDAZOLE (Tab) FLAGYL 500 MG Take 1 Tab by mouth 3 times a day.        Omeprazole (CAPSULE DELAYED RELEASE) PRILOSEC 40 MG Take 1 Cap by mouth every day.        Verapamil HCl (Tab CR) CALAN- MG Take 1 Tab by mouth every day.        .                 Medicines prescribed today were sent to:     [facility] #[id number] - [facility] [place] - [address]    [address] [address]    Phone: [phone] Fax: [phone]    Open 24 Hours?: No      Medication refill instructions:       If your prescription bottle indicates you have medication refills left, it is not necessary to call your provider’s office. Please contact your pharmacy and they will refill your medication.    If your prescription bottle indicates you do not have any refills left, you may request refills at any time through one of the following ways: The online [facility] system (except Urgent Care), by calling your provider’s office, or by asking your pharmacy to contact your provider’s office with a refill request. Medication refills are processed only during regular business hours and may not be available until the next business day. Your provider may request additional information or to have a follow-up visit with you prior to refilling your medication.   *Please Note: Medication refills are assigned a new Rx number when refilled electronically. Your pharmacy may indicate that no refills were authorized even though a new prescription for the same medication is available at the pharmacy. Please request the medicine by name with the pharmacy before contacting your provider for a refill.           Conference [place] Status: Patient Declined

## 2017-02-01 NOTE — Clinical Note
[facility]   [address]  [place] [address]  Phone: [phone]  Fax: [phone]              Trevor Gillis Jr.  1970    Encounter Date: 2/1/2017    Jeffrey Gillis M.D.          PROGRESS NOTE:  Subjective:   Trevor Gillis Jr. is a 46 y.o. male who presents today with HCM and AICD. Recent hand infection post cat bite. Infection improved. Not septic. AICD unaffected.  Feels better on verapamil.    Past Medical History   Diagnosis Date   • Hernia    • Syncope 11/19/2012   • Pacemaker 2014   • Fracture of left clavicle    • HTN (hypertension)    • Hypertrophic cardiomyopathy (CMS-HCC)    • Dyslipidemia    • JEREMIAH (obstructive sleep apnea)      Past Surgical History   Procedure Laterality Date   • Tonsillectomy     • Other cardiac surgery  7/25/14     AICD   • Recovery  7/25/2014     Performed by Cath-Recovery Surgery at SURGERY SAME DAY [facility] ORS   • Recovery  7/30/2014     Performed by Cath-Recovery Surgery at SURGERY SAME DAY [facility] ORS   • Inguinal hernia repair  9/10/2014     Performed by Christie Ott M.D. at SURGERY SAME DAY [facility] ORS   • Appendectomy     • Irrigation & debridement general Left 1/16/2017     Procedure: IRRIGATION & DEBRIDEMENT GENERAL-INDEX FINGER;  Surgeon: Kia Aldridge M.D.;  Location: SURGERY [facility];  Service:      Family History   Problem Relation Age of Onset   • Other Mother      Accident   • Stroke Father    • Heart Attack Father      MI at age 24?   • Heart Disease Paternal Grandfather    • Hypertension Paternal Grandmother    • Diabetes Paternal Grandmother      History   Smoking status   • Current Some Day Smoker -- 1.00 packs/day for 25 years   • Types: Cigarettes   Smokeless tobacco   • Never Used     Comment: Now smoke one cigarettes 2-3 times / week.      Allergies   Allergen Reactions   • Tomato Hives     Outpatient Encounter Prescriptions as of 2/1/2017   Medication Sig Dispense  "Refill   • metronidazole (FLAGYL) 500 MG Tab Take 1 Tab by mouth 3 times a day. 21 Tab 0   • levofloxacin (LEVAQUIN) 750 MG tablet Take 1 Tab by mouth every day. 7 Tab 0   • omeprazole (PRILOSEC) 40 MG delayed-release capsule Take 1 Cap by mouth every day. 90 Cap 1   • verapamil ER (CALAN-SR) 240 MG Tab CR Take 1 Tab by mouth every day. 90 Tab 3   • bisoprolol (ZEBETA) 10 MG tablet Take 1 Tab by mouth 2 Times a Day. 30 Tab 3   • gabapentin (NEURONTIN) 100 MG Cap Take 100 mg by mouth 3 times a day.     • atorvastatin (LIPITOR) 20 MG Tab Take 1 Tab by mouth every day. 90 Tab 4   • levothyroxine (SYNTHROID) 125 MCG Tab Take 1 Tab by mouth every day. 90 Tab 5   • albuterol 108 (90 BASE) MCG/ACT Aero Soln inhalation aerosol Inhale 2 Puffs by mouth every 6 hours as needed for Shortness of Breath. 1 Inhaler 3     No facility-administered encounter medications on file as of 2/1/2017.     ROS     Objective:   /74 mmHg  Pulse 90  Ht 1.854 m (6' 0.99\")  Wt 120.203 kg (265 lb)  BMI 34.97 kg/m2  SpO2 96%    Physical Exam   Constitutional: He is oriented to person, place, and time. He appears well-developed and well-nourished.   HENT:   Mouth/Throat: Oropharynx is clear and moist.   Eyes: Conjunctivae and EOM are normal.   Neck: No JVD present. No thyroid mass present.   Cardiovascular: Normal rate, regular rhythm, S1 normal, S2 normal and normal pulses.  PMI is not displaced.  Exam reveals no gallop.    No murmur heard.  Pulses:       Carotid pulses are 2+ on the right side, and 2+ on the left side.       Radial pulses are 2+ on the right side, and 2+ on the left side.        Femoral pulses are 2+ on the right side, and 2+ on the left side.       Dorsalis pedis pulses are 2+ on the right side, and 2+ on the left side.   No peripheral edema.   Pulmonary/Chest: Effort normal and breath sounds normal.   Abdominal: Soft. Normal appearance. He exhibits no abdominal bruit. There is no hepatosplenomegaly. There is no " tenderness.   Musculoskeletal: Normal range of motion. He exhibits no edema.        Thoracic back: He exhibits no tenderness and no spasm.   Neurological: He is alert and oriented to person, place, and time.   Skin: No rash noted. No cyanosis. Nails show no clubbing.   Psychiatric: He has a normal mood and affect.       Assessment:     1. Tobacco abuse     2. NSVT (nonsustained ventricular tachycardia) (CMS-HCC)     3. Hypertrophic cardiomyopathy (CMS-HCC)     4. SOB (shortness of breath)         Medical Decision Making:  Today's Assessment / Status / Plan:     1. AICD nl function.  2. NSVT one.  3. Genetic testing encourage him to get.  4. Hand infection improved.  5. F/U in 6 months.      Blake Oseguera M.D.  [address] [address]  VIA In Basket

## 2017-02-01 NOTE — PROGRESS NOTES
Subjective:   Trevor Gillis Jr. is a 46 y.o. male who presents today with HCM and AICD. Recent hand infection post cat bite. Infection improved. Not septic. AICD unaffected.  Feels better on verapamil.    Past Medical History   Diagnosis Date   • Hernia    • Syncope 11/19/2012   • Pacemaker 2014   • Fracture of left clavicle    • HTN (hypertension)    • Hypertrophic cardiomyopathy (CMS-HCC)    • Dyslipidemia    • JEREMIAH (obstructive sleep apnea)      Past Surgical History   Procedure Laterality Date   • Tonsillectomy     • Other cardiac surgery  7/25/14     AICD   • Recovery  7/25/2014     Performed by Cath-Recovery Surgery at SURGERY SAME DAY [facility] ORS   • Recovery  7/30/2014     Performed by Cath-Recovery Surgery at SURGERY SAME DAY [facility] ORS   • Inguinal hernia repair  9/10/2014     Performed by Christie Ott M.D. at SURGERY SAME DAY [facility] ORS   • Appendectomy     • Irrigation & debridement general Left 1/16/2017     Procedure: IRRIGATION & DEBRIDEMENT GENERAL-INDEX FINGER;  Surgeon: Kia Aldridge M.D.;  Location: SURGERY [facility];  Service:      Family History   Problem Relation Age of Onset   • Other Mother      Accident   • Stroke Father    • Heart Attack Father      MI at age 24?   • Heart Disease Paternal Grandfather    • Hypertension Paternal Grandmother    • Diabetes Paternal Grandmother      History   Smoking status   • Current Some Day Smoker -- 1.00 packs/day for 25 years   • Types: Cigarettes   Smokeless tobacco   • Never Used     Comment: Now smoke one cigarettes 2-3 times / week.      Allergies   Allergen Reactions   • Tomato Hives     Outpatient Encounter Prescriptions as of 2/1/2017   Medication Sig Dispense Refill   • metronidazole (FLAGYL) 500 MG Tab Take 1 Tab by mouth 3 times a day. 21 Tab 0   • levofloxacin (LEVAQUIN) 750 MG tablet Take 1 Tab by mouth every day. 7 Tab 0   • omeprazole (PRILOSEC) 40 MG delayed-release capsule Take 1 Cap by mouth every day. 90 Cap 1   •  "verapamil ER (CALAN-SR) 240 MG Tab CR Take 1 Tab by mouth every day. 90 Tab 3   • bisoprolol (ZEBETA) 10 MG tablet Take 1 Tab by mouth 2 Times a Day. 30 Tab 3   • gabapentin (NEURONTIN) 100 MG Cap Take 100 mg by mouth 3 times a day.     • atorvastatin (LIPITOR) 20 MG Tab Take 1 Tab by mouth every day. 90 Tab 4   • levothyroxine (SYNTHROID) 125 MCG Tab Take 1 Tab by mouth every day. 90 Tab 5   • albuterol 108 (90 BASE) MCG/ACT Aero Soln inhalation aerosol Inhale 2 Puffs by mouth every 6 hours as needed for Shortness of Breath. 1 Inhaler 3     No facility-administered encounter medications on file as of 2/1/2017.     ROS     Objective:   /74 mmHg  Pulse 90  Ht 1.854 m (6' 0.99\")  Wt 120.203 kg (265 lb)  BMI 34.97 kg/m2  SpO2 96%    Physical Exam   Constitutional: He is oriented to person, place, and time. He appears well-developed and well-nourished.   HENT:   Mouth/Throat: Oropharynx is clear and moist.   Eyes: Conjunctivae and EOM are normal.   Neck: No JVD present. No thyroid mass present.   Cardiovascular: Normal rate, regular rhythm, S1 normal, S2 normal and normal pulses.  PMI is not displaced.  Exam reveals no gallop.    No murmur heard.  Pulses:       Carotid pulses are 2+ on the right side, and 2+ on the left side.       Radial pulses are 2+ on the right side, and 2+ on the left side.        Femoral pulses are 2+ on the right side, and 2+ on the left side.       Dorsalis pedis pulses are 2+ on the right side, and 2+ on the left side.   No peripheral edema.   Pulmonary/Chest: Effort normal and breath sounds normal.   Abdominal: Soft. Normal appearance. He exhibits no abdominal bruit. There is no hepatosplenomegaly. There is no tenderness.   Musculoskeletal: Normal range of motion. He exhibits no edema.        Thoracic back: He exhibits no tenderness and no spasm.   Neurological: He is alert and oriented to person, place, and time.   Skin: No rash noted. No cyanosis. Nails show no clubbing. "   Psychiatric: He has a normal mood and affect.       Assessment:     1. Tobacco abuse     2. NSVT (nonsustained ventricular tachycardia) (CMS-HCC)     3. Hypertrophic cardiomyopathy (CMS-HCC)     4. SOB (shortness of breath)         Medical Decision Making:  Today's Assessment / Status / Plan:     1. AICD nl function.  2. NSVT one.  3. Genetic testing encourage him to get.  4. Hand infection improved.  5. F/U in 6 months.

## 2017-02-13 ENCOUNTER — OFFICE VISIT (OUTPATIENT)
Dept: INTERNAL MEDICINE | Facility: MEDICAL CENTER | Age: 47
End: 2017-02-13
Payer: MEDICAID

## 2017-02-13 VITALS
HEIGHT: 72 IN | BODY MASS INDEX: 37.87 KG/M2 | WEIGHT: 279.6 LBS | OXYGEN SATURATION: 94 % | TEMPERATURE: 97.9 F | HEART RATE: 92 BPM | DIASTOLIC BLOOD PRESSURE: 92 MMHG | SYSTOLIC BLOOD PRESSURE: 132 MMHG

## 2017-02-13 DIAGNOSIS — I47.29 NSVT (NONSUSTAINED VENTRICULAR TACHYCARDIA) (HCC): ICD-10-CM

## 2017-02-13 DIAGNOSIS — Z82.41 FH: SUDDEN CARDIAC DEATH (SCD): ICD-10-CM

## 2017-02-13 DIAGNOSIS — I10 ESSENTIAL HYPERTENSION: ICD-10-CM

## 2017-02-13 DIAGNOSIS — E66.9 NON MORBID OBESITY, UNSPECIFIED OBESITY TYPE: ICD-10-CM

## 2017-02-13 DIAGNOSIS — R42 DIZZY SPELLS: ICD-10-CM

## 2017-02-13 DIAGNOSIS — L03.114 CELLULITIS OF LEFT HAND: ICD-10-CM

## 2017-02-13 DIAGNOSIS — R55 NEAR SYNCOPE: ICD-10-CM

## 2017-02-13 DIAGNOSIS — Z95.810 DUAL ICD (IMPLANTABLE CARDIOVERTER-DEFIBRILLATOR) IN PLACE: ICD-10-CM

## 2017-02-13 DIAGNOSIS — E03.9 HYPOTHYROIDISM, UNSPECIFIED TYPE: ICD-10-CM

## 2017-02-13 DIAGNOSIS — S61.452D CAT BITE OF HAND, LEFT, SUBSEQUENT ENCOUNTER: ICD-10-CM

## 2017-02-13 DIAGNOSIS — G47.33 OSA (OBSTRUCTIVE SLEEP APNEA): ICD-10-CM
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
## 2017-02-13 DIAGNOSIS — I42.2 HYPERTROPHIC CARDIOMYOPATHY (HCC): ICD-10-CM

## 2017-02-13 DIAGNOSIS — E78.5 DYSLIPIDEMIA: ICD-10-CM

## 2017-02-13 DIAGNOSIS — M65.9 FLEXOR TENOSYNOVITIS OF THUMB: ICD-10-CM

## 2017-02-13 PROCEDURE — 99214 OFFICE O/P EST MOD 30 MIN: CPT | Mod: GC | Performed by: INTERNAL MEDICINE

## 2017-02-13 RX ORDER — DILTIAZEM HYDROCHLORIDE 120 MG/1
120 CAPSULE, COATED, EXTENDED RELEASE ORAL
Refills: 5 | COMMUNITY
Start: 2017-01-09 | End: 2017-04-07

## 2017-02-13 NOTE — MR AVS SNAPSHOT
Trevor Gillis Jr.   2017 9:30 AM   Office Visit   MRN: [id number]    Department:  [facility] Med - Internal Med   Dept Phone:  [phone]    Description:  Male : 1970   Provider:  Blake Oseguera M.D.           Reason for Visit     Follow-Up follow up on hand bite      Allergies as of 2017     Allergen Noted Reactions    Tomato 09/10/2014   Hives      You were diagnosed with     Dyslipidemia   [585307]       Hypothyroidism, unspecified type   [3460310]       Essential hypertension   [4946269]       JEREMIAH (obstructive sleep apnea)   [953618]       Dual ICD (implantable cardioverter-defibrillator) in place   [0054829]       Cellulitis of left hand   [075876]       NSVT (nonsustained ventricular tachycardia) (CMS-HCC)   [502296]       Hypertrophic cardiomyopathy (CMS-HCC)   [574435]       Cat bite of hand, left, subsequent encounter   [333000]       Syncope, unspecified syncope type   [2436023]       FH: sudden cardiac death (SCD)   [793777]       Dizzy spells   [557037]         Vital Signs     Blood Pressure Pulse Temperature Height Weight Body Mass Index    132/92 mmHg 92 36.6 °C (97.9 °F) 1.829 m (6') 126.826 kg (279 lb 9.6 oz) 37.91 kg/m2    Oxygen Saturation Smoking Status                94% Light Tobacco Smoker          Basic Information     Date Of Birth Sex Race Ethnicity Preferred Language    1970 Male White Non- English      Your appointments     May 30, 2017  9:30 AM   Established Patient with Blake Oseguera M.D.   [facility] - Internal Medicine (--)    [address] [place] [address]  [facility] [address] [phone]           You will be receiving a confirmation call a few days before your appointment from our automated call confirmation system.            2017 11:00 AM   FOLLOW UP with Jeffrey Gillis M.D.   [facility] (--)    [address] [place] [address]  [facility] [address] [phone]              Problem  List              ICD-10-CM Priority Class Noted - Resolved    Syncope R55   11/19/2012 - Present    FH: sudden cardiac death (SCD) Z82.41   7/16/2014 - Present    NSVT (nonsustained ventricular tachycardia) (CMS-HCC) I47.2   7/16/2014 - Present    Dizzy spells R42   7/16/2014 - Present    Dual ICD (implantable cardioverter-defibrillator) in place Z95.810   7/26/2014 - Present    Inguinal hernia K40.90   9/10/2014 - Present    Shoulder pain M25.519   6/23/2015 - Present    Numbness in both hands R20.0   6/23/2015 - Present    Near syncope R55 Medium  6/23/2015 - Present    JEREMIAH (obstructive sleep apnea) G47.33   5/5/2016 - Present    Hypothyroidism E03.9 Low  5/5/2016 - Present    Anemia D64.9   5/6/2016 - Present    Hyperglycemia R73.9   5/6/2016 - Present    Numbness of left hand R20.8   7/12/2016 - Present    Prediabetes R73.03   7/12/2016 - Present    SOB (shortness of breath) R06.02 High  10/24/2016 - Present    Bilateral hand numbness R20.0 Medium  10/24/2016 - Present    Dyslipidemia E78.5 Low  10/24/2016 - Present    Hypertrophic cardiomyopathy (CMS-HCC) I42.2   10/30/2016 - Present    Tobacco abuse Z72.0   11/3/2016 - Present    H/O wheezing Z87.898   11/3/2016 - Present    Cellulitis of left hand L03.114 High  1/15/2017 - Present    Cat bite of hand S61.459A, W55.01XA High  1/15/2017 - Present    Hypertension I10   1/16/2017 - Present      Health Maintenance        Date Due Completion Dates    IMM DTaP/Tdap/Td Vaccine (2 - Td) 1/16/2027 1/16/2017            Current Immunizations     Influenza Vaccine Quad Inj (Pf) 10/24/2016  3:41 PM    Pneumococcal polysaccharide vaccine (PPSV-23) 7/25/2011    Tdap Vaccine 1/16/2017 12:55 AM      Below and/or attached are the medications your provider expects you to take. Review all of your home medications and newly ordered medications with your provider and/or pharmacist. Follow medication instructions as directed by your provider and/or pharmacist. Please keep your  medication list with you and share with your provider. Update the information when medications are discontinued, doses are changed, or new medications (including over-the-counter products) are added; and carry medication information at all times in the event of emergency situations     Allergies:  TOMATO - Hives               Medications  Valid as of: February 13, 2017 - 10:34 AM    Generic Name Brand Name Tablet Size Instructions for use    Albuterol Sulfate (Aero Soln) albuterol 108 (90 BASE) MCG/ACT Inhale 2 Puffs by mouth every 6 hours as needed for Shortness of Breath.        Atorvastatin Calcium (Tab) LIPITOR 20 MG Take 1 Tab by mouth every day.        Bisoprolol Fumarate (Tab) ZEBETA 10 MG Take 1 Tab by mouth 2 Times a Day.        DiltiaZEM HCl Coated Beads (CAPSULE SR 24 HR) CARDIZEM  MG Take 120 mg by mouth every day.        Gabapentin (Cap) NEURONTIN 100 MG Take 100 mg by mouth 3 times a day.        Levothyroxine Sodium (Tab) SYNTHROID 125 MCG Take 1 Tab by mouth every day.        Omeprazole (CAPSULE DELAYED RELEASE) PRILOSEC 40 MG Take 1 Cap by mouth every day.        Verapamil HCl (Tab CR) CALAN- MG Take 1 Tab by mouth every day.        .                 Medicines prescribed today were sent to:     [id number] - [place], [place] - [address]    [address] [address]    Phone: [phone] Fax: [phone]    Open 24 Hours?: No      Medication refill instructions:       If your prescription bottle indicates you have medication refills left, it is not necessary to call your provider’s office. Please contact your pharmacy and they will refill your medication.    If your prescription bottle indicates you do not have any refills left, you may request refills at any time through one of the following ways: The online [employer] system (except Urgent Care), by calling your provider’s office, or by asking your pharmacy to contact your provider’s office with a refill  request. Medication refills are processed only during regular business hours and may not be available until the next business day. Your provider may request additional information or to have a follow-up visit with you prior to refilling your medication.   *Please Note: Medication refills are assigned a new Rx number when refilled electronically. Your pharmacy may indicate that no refills were authorized even though a new prescription for the same medication is available at the pharmacy. Please request the medicine by name with the pharmacy before contacting your provider for a refill.        Instructions    F/u with Dr. Oseguera in 3 months  F/u with Neurology, cardiology  Please get genetic testing done  Follow with hand surgen  Please get sleep studies done          MyChart Status: Patient Declined

## 2017-02-13 NOTE — PATIENT INSTRUCTIONS
F/u with Dr. Oseguera in 3 months  F/u with Neurology, cardiology  Please get genetic testing done  Follow with hand surgen  Please get sleep studies done

## 2017-02-13 NOTE — PROGRESS NOTES
Established Patient    Trevor presents today with the following:    CC:  Post- hospital follow-up      HPI:     ID: 46-year-old gentleman with past medical history of nonobstructive hypertrophic cardiomyopathy, obstructive sleep apnea, currently not on CPAP, dyslipidemia, hypertension, anemia, gastroesophageal reflux disease,history of traumatic knees and head trauma in [facility] aldolesent, history of occasional/intermittent numbness of both hands and feet, Recent hospitalization due to left hand cellulitis, and flexor tenosynovitis after a cat bite, status post I&D and IV and then oral antibiotics Levo and Flagyl ,came for follow-up.    Cellulitis of left hand  Cat bite of hand, left, subsequent encounter  Flexor tenosynovitis of left hand   Admitted in the hospital on 01/15 and then discharged on 01/20  Left hand Cellulitis after being bitten by his frejustin's cat two days before admission in hospital.   Hand Surgeon  was on board  S/P I&D for left hand cellulitis and flexor tenosynovitis,   Wound Cultures positive for Proteus and Staph Epidermidis.  Initially treated with IV unasyn later switched to PO Levofloxacin and Metronidazole and discharged home on PO Abx.   Completed ABx, Mild redness on examination but no significant tenderness, range of motion is full and preserved  No night sweats, Fever, pus, discharge, chills  Following with hand surgeon in 3 days days     Near syncope  Dizzy spells  History of intermittent lightheadedness with near syncope.  Recently discharged from the hospital after complete workup and cardiology evaluation  Still complains of INTERMITTENT PRESYNCOPE BUT IMPROVED SINCE last visit    Patient has warning sign and he stabilizes himself by supporting himself by the wall  Patient never passed out, never fall  patient did not have any episode while driving    Denies palpitation, any, seizures, dehydration, chest pain, blurry vision, tinnitus, vertigo or hearing deficit or  speech problems,    Today he endorses some intermittent memory problems but he is alert and oriented ×4  Already has appointment with neurology in April 2017      Hypertrophic nonobstructive cardiomyopathy (HCC)  Hypertrophic cardiomyopathy (HCC)  Shortness of breath  Family history of sudden cardiac death due to hypertrophic cardiomyopathy.  Head AICD in 2014 because of ventricular tachycardia  Has been having intermittent shortness of breath for last 6-8 months which is often on exertion and intermittent disturbance which could be from 30 feet to a mile--- today he feels his shortness of breath is much better compared to last visit 5 weeks ago  Denies orthopnea/PND, chest pain  Device was checked on 11/28 with intermittent  V. tach  Recently had stress echo after optimal beta-blockade with bisoprolol  Follows Dr. Gillis, last visit earlier this month  Getting genetic testing, only child is her daughter who just established with Dr. Gillis  Currently on verapamil 240, diltiazem 120 and bisoprolol 10 mg twice daily      Tobacco abuse   H/O wheezing  SOB (shortness of breath)  JEREMIAH (obstructive sleep apnea)  Has been having intermittent shortness of breath for last 6-8 months which is often on exertion and intermittent disturbance which could be from 30 feet to a mile-today's shortness of breath is significantly improved-  Was diagnosed with obstructive sleep apnea and had sleep studies one and half years ago and was prescribed CPAP but at that time he did not get CPAP because of insurance declined  He has been intermittently quitting smoking and then relapsing, currently quit smoking for last two month  He uses albuterol as needed and his breathing is better, not wheezing any more  PFT done recently which were unremarkable  Sleep study still pending      Numbness in both hands  History of numbness of left arm and left side of face  History of numbness of bilateral feet  He endorses numbness in both hands which is  intermittent  He was admitted in the hospital recently for left upper extremity and left side of face numbness 2 months ago  CAT scan was done which was unremarkable---MRI was not done because of device  He was started on gabapentin and numbness is better   already has appointment with neurology in April       Hypothyroidism, unspecified type  Compliant with medication   denies heat intolerance, cold intolerance, weight loss or weight gain      Anemia    currently on iron replacement  Denies black-colored stool, bloody diarrhea  History of esophagitis and is currently on omeprazole, denies heartburns, intermittent chest pain, odynophagia or dysphagia  Hemoglobin is stable  He had EGD, following gastroenterologist    Obesity  counseled about diet and weight loss, verbalized understanding    Health Care Maintenenace  Per patient he received pneumonia shot and flu shot in the hospital    Patient Active Problem List    Diagnosis Date Noted   • Cellulitis of left hand 01/15/2017     Priority: High   • Cat bite of hand 01/15/2017     Priority: High   • SOB (shortness of breath) 10/24/2016     Priority: High   • Bilateral hand numbness 10/24/2016     Priority: Medium   • Near syncope 06/23/2015     Priority: Medium   • Dyslipidemia 10/24/2016     Priority: Low   • Hypothyroidism 05/05/2016     Priority: Low   • Hypertension 01/16/2017   • Tobacco abuse 11/03/2016   • H/O wheezing 11/03/2016   • Hypertrophic cardiomyopathy (CMS-HCC) 10/30/2016   • Numbness of left hand 07/12/2016   • Prediabetes 07/12/2016   • Anemia 05/06/2016   • Hyperglycemia 05/06/2016   • JEREMIAH (obstructive sleep apnea) 05/05/2016   • Shoulder pain 06/23/2015   • Numbness in both hands 06/23/2015   • Inguinal hernia 09/10/2014   • Dual ICD (implantable cardioverter-defibrillator) in place 07/26/2014   • FH: sudden cardiac death (SCD) 07/16/2014   • NSVT (nonsustained ventricular tachycardia) (CMS-HCC) 07/16/2014   • Dizzy spells 07/16/2014   • Syncope  11/19/2012       Current Outpatient Prescriptions   Medication Sig Dispense Refill   • diltiazem CD (CARDIZEM CD) 120 MG CAPSULE SR 24 HR Take 120 mg by mouth every day.  5   • omeprazole (PRILOSEC) 40 MG delayed-release capsule Take 1 Cap by mouth every day. 90 Cap 1   • verapamil ER (CALAN-SR) 240 MG Tab CR Take 1 Tab by mouth every day. 90 Tab 3   • albuterol 108 (90 BASE) MCG/ACT Aero Soln inhalation aerosol Inhale 2 Puffs by mouth every 6 hours as needed for Shortness of Breath. 1 Inhaler 3   • bisoprolol (ZEBETA) 10 MG tablet Take 1 Tab by mouth 2 Times a Day. 30 Tab 3   • gabapentin (NEURONTIN) 100 MG Cap Take 100 mg by mouth 3 times a day.     • atorvastatin (LIPITOR) 20 MG Tab Take 1 Tab by mouth every day. 90 Tab 4   • levothyroxine (SYNTHROID) 125 MCG Tab Take 1 Tab by mouth every day. 90 Tab 5     No current facility-administered medications for this visit.       ROS: As per HPI. Additional pertinent symptoms as noted below.    GI: Denies nausea vomiting, abdominal pain, black stools  Respiratory: Denies fever, chills  Heart: Denies chest pain palpitations orthopnea PND  Genitourinary: Denies pain, dysuria, frequency, hematuria  Musculoskeletal: Denies arthritis, swelling, back pain  Neuro:     positive for numbness and tingling in bilateral feet and sometimes hands--improved since last visit  No focal deficits        /92 mmHg  Pulse 92  Temp(Src) 36.6 °C (97.9 °F)  Ht 1.829 m (6')  Wt 126.826 kg (279 lb 9.6 oz)  BMI 37.91 kg/m2  SpO2 94%    Physical Exam   Constitutional:  oriented to person, place, and time. No distress.    Eyes: Pupils are equal, round, and reactive to light. No scleral icterus.  Neck: Neck supple. No thyromegaly present.    Cardiovascular: Normal rate, regular rhythm and normal heart sounds. Ejection systolic murmur in aortic area    Pulmonary/Chest: Breath sounds normal. Chest wall is not dull to percussion. No wheeze at time of my examination  Musculoskeletal:   no  edema.    Mild redness on left hand at site of cat bite, no tenderness, no discharge, no pus  Range of motion is full and preserved and nonpainful movements    Lymphadenopathy: no cervical adenopathy  Neurological: alert and oriented to person, place, and time.    Skin: No cyanosis. Nails show no clubbing.    Assessment and Plan    Cellulitis of left hand  Cat bite of hand, left, subsequent encounter  Flexor tenosynovitis of left hand   Admitted in the hospital on 01/15 and then discharged on 01/20  Left hand Cellulitis after being bitten by his frejustin's cat two days before admission in hospital.   Hand Surgeon  was on board  S/P I&D for left hand cellulitis and flexor tenosynovitis,   Wound Cultures positive for Proteus and Staph Epidermidis.  Initially treated with IV unasyn later switched to PO Levofloxacin and Metronidazole and discharged home on PO Abx.   Completed ABx, Mild redness on examination but no significant tenderness, range of motion is full and preserved  No night sweats, Fever, pus, discharge, chills  Following with hand surgeon in 3 days days   Plan;  Follow up with hand surgeon, appointment in 3 days  No NSAIDs, Tylenol as needed      Near syncope  Dizzy spells  History of intermittent lightheadedness with near syncope.  Recently discharged from the hospital after complete workup and cardiology evaluation  Still complains of INTERMITTENT PRESYNCOPE BUT IMPROVED SINCE last visit    Patient has warning sign and he stabilizes himself by supporting himself by the wall  Patient never passed out, never fall  patient did not have any episode while driving    Denies palpitation, any, seizures, dehydration, chest pain, blurry vision, tinnitus, vertigo or hearing deficit or speech problems,    Today he endorses some intermittent memory problems but he is alert and oriented ×4  Already has appointment with neurology in April 2017  Plan  Enough hydration  Follow-up with neurology and  cardiology  Patient never passed out.--Patient did not have any presyncopal episodes while driving and he does not drive alone.  He has enough time and warning signs and he can pull over.--If things changes, will recommend him to stop driving until work up is complete----He verbalized understanding    Hypertrophic nonobstructive cardiomyopathy (HCC)  Hypertrophic cardiomyopathy (HCC)  Shortness of breath  Family history of sudden cardiac death due to hypertrophic cardiomyopathy.  Head AICD in 2014 because of ventricular tachycardia  Has been having intermittent shortness of breath for last 6-8 months which is often on exertion and intermittent disturbance which could be from 30 feet to a mile--- today he feels his shortness of breath is much better compared to last visit 5 weeks ago  Denies orthopnea/PND, chest pain  Device was checked on 11/28 with intermittent  V. tach  Recently had stress echo after optimal beta-blockade with bisoprolol  Follows Dr. Gillis, last visit earlier this month  Getting genetic testing, only child is her daughter who just established with Dr. Gillis  Currently on verapamil 240, diltiazem 120 and bisoprolol 10 mg twice daily  Plan  Enough hydration  Follow-up with neurology and cardiology  Continue  verapamil 240, diltiazem 120 and bisoprolol 10 mg twice daily    Tobacco abuse   H/O wheezing  SOB (shortness of breath)  JEREMIAH (obstructive sleep apnea)  Has been having intermittent shortness of breath for last 6-8 months which is often on exertion and intermittent disturbance which could be from 30 feet to a mile-today's shortness of breath is significantly improved-  Was diagnosed with obstructive sleep apnea and had sleep studies one and half years ago and was prescribed CPAP but at that time he did not get CPAP because of insurance declined  He has been intermittently quitting smoking and then relapsing, currently quit smoking for last two month  He uses albuterol as needed and his breathing  is better, not wheezing any more  PFT done recently which were unremarkable  Sleep study still pending  Plan;  Get sleep studies      Numbness in both hands  History of numbness of left arm and left side of face  History of numbness of bilateral feet  He endorses numbness in both hands which is intermittent  He was admitted in the hospital recently for left upper extremity and left side of face numbness 2 months ago  CAT scan was done which was unremarkable---MRI was not done because of device  He was started on gabapentin and numbness is better   already has appointment with neurology in April  Plan  Continue gabapentin  Follow with neurology        Hypothyroidism, unspecified type  Compliant with medication  denies heat intolerance, cold intolerance, weight loss or weight gain  Plan;  Continue same dose of levothyroxine      Anemia    currently on iron replacement  Denies black-colored stool, bloody diarrhea  History of esophagitis and is currently on omeprazole, denies heartburns, intermittent chest pain, odynophagia or dysphagia  Hemoglobin is stable  He had EGD, following gastroenterologist  Plan;  Continue PPI  Follow with Gi    Obesity  counseled about diet and weight loss, verbalized understanding  Plan;  counseled about weight loss, diet    Health Care Maintenenace  Per patient he received pneumonia shot and flu shot in the hospital      Followup: Return in about 3 months (around 5/13/2017).      Signed by: Blake Oseguera M.D.

## 2017-03-28 ENCOUNTER — TELEPHONE (OUTPATIENT)
Dept: CARDIOLOGY | Facility: MEDICAL CENTER | Age: 47
End: 2017-03-28

## 2017-03-28 NOTE — TELEPHONE ENCOUNTER
"Celestino Sibley,   Pt l/m that he had a \"weird sensation\" and would like a cb.   Has AICD. Last ov w/DS 2-2017.   Aydee, darius              S/w pt, states that he has been getting a sharp pain that goes through nipple area right next to AICD. Explains that this has been going on for a while now, pain gets about 5/10 but is resolved on its own. Pt states BP \"good\", HR \"good\", and pacer site looks \"good\". Appointment scheduled for 4/5 with Genevieve JEREZ.   "

## 2017-04-05 ENCOUNTER — HOSPITAL ENCOUNTER (OUTPATIENT)
Dept: RADIOLOGY | Facility: MEDICAL CENTER | Age: 47
End: 2017-04-05
Attending: NURSE PRACTITIONER
Payer: MEDICAID

## 2017-04-05 ENCOUNTER — OFFICE VISIT (OUTPATIENT)
Dept: CARDIOLOGY | Facility: MEDICAL CENTER | Age: 47
End: 2017-04-05
Payer: MEDICAID

## 2017-04-05 VITALS
BODY MASS INDEX: 38.6 KG/M2 | OXYGEN SATURATION: 95 % | HEART RATE: 85 BPM | DIASTOLIC BLOOD PRESSURE: 84 MMHG | HEIGHT: 72 IN | WEIGHT: 285 LBS | SYSTOLIC BLOOD PRESSURE: 128 MMHG

## 2017-04-05 DIAGNOSIS — Z95.810 DUAL ICD (IMPLANTABLE CARDIOVERTER-DEFIBRILLATOR) IN PLACE: ICD-10-CM

## 2017-04-05 DIAGNOSIS — I42.2 HYPERTROPHIC CARDIOMYOPATHY (HCC): ICD-10-CM

## 2017-04-05 DIAGNOSIS — M79.89 SWELLING OF EXTREMITY, LEFT: ICD-10-CM

## 2017-04-05 DIAGNOSIS — I47.29 NSVT (NONSUSTAINED VENTRICULAR TACHYCARDIA) (HCC): ICD-10-CM

## 2017-04-05 DIAGNOSIS — Z82.41 FH: SUDDEN CARDIAC DEATH (SCD): ICD-10-CM

## 2017-04-05 PROCEDURE — 93971 EXTREMITY STUDY: CPT | Mod: 26 | Performed by: SURGERY

## 2017-04-05 PROCEDURE — 93971 EXTREMITY STUDY: CPT

## 2017-04-05 PROCEDURE — 99214 OFFICE O/P EST MOD 30 MIN: CPT | Performed by: NURSE PRACTITIONER

## 2017-04-05 ASSESSMENT — ENCOUNTER SYMPTOMS
CLAUDICATION: 0
WHEEZING: 0
DOUBLE VISION: 0
ORTHOPNEA: 0
FOCAL WEAKNESS: 0
PALPITATIONS: 0
BLURRED VISION: 0
PSYCHIATRIC NEGATIVE: 1
LOSS OF CONSCIOUSNESS: 0
HEADACHES: 0
VOMITING: 0
FEVER: 0
FLANK PAIN: 0
COUGH: 0
HEARTBURN: 0
MUSCULOSKELETAL NEGATIVE: 1
CHILLS: 0
WEIGHT LOSS: 0
NAUSEA: 0
SEIZURES: 0
SPEECH CHANGE: 0
SHORTNESS OF BREATH: 0
ABDOMINAL PAIN: 0
PND: 0
SORE THROAT: 0
SPUTUM PRODUCTION: 0
DIZZINESS: 0

## 2017-04-05 NOTE — Clinical Note
[facility]   [address], [facility], [place] [address]  [place] [address]  Phone: [phone]  Fax: [phone]              Trevor Gillis Jr.  1970    Encounter Date: 4/5/2017    SURJIT Johansen          PROGRESS NOTE:  Subjective:   Trevor Gillis Jr. is a 46 y.o. male who presents today for review of his left leg. He states he has had increased swelling that won't do down over the last 2 weeks The leg is taut and uncomfortable. The patient has a history of FH-SCD, HOCM and has an ICD in place which is a Medtronic device.  He has had 1 NSVT episode for 9 seconds in his monitoring zone. He feels bumps and pains in his heart region that do not seem to coorelate with arrhythmia.  He is on two Calcium channel agents that seem to work better for him, this may be in part related to the swelling in his extremities. Because it is so much more prevalent on the left versus right will need to study the leg(s). He does describe an accident in the past involving the knee on the left leg as well in the past.  With his HOCM I am reluctant to treat with diuretics. First, need to understand etiology and rule out DVT. He is currently not on an AC.  Device function appears intact.  Past Medical History   Diagnosis Date   • Hernia    • Syncope 11/19/2012   • Pacemaker 2014   • Fracture of left clavicle    • HTN (hypertension)    • Hypertrophic cardiomyopathy (CMS-HCC)    • Dyslipidemia    • JEREMIAH (obstructive sleep apnea)      Past Surgical History   Procedure Laterality Date   • Tonsillectomy     • Other cardiac surgery  7/25/14     AICD   • Recovery  7/25/2014     Performed by Cath-Recovery Surgery at SURGERY SAME DAY [facility] ORS   • Recovery  7/30/2014     Performed by Cath-Recovery Surgery at SURGERY SAME DAY [facility]   • Inguinal hernia repair  9/10/2014     Performed by Christie Ott M.D. at SURGERY SAME DAY [facility]   • Appendectomy     • Irrigation & debridement  general Left 1/16/2017     Procedure: IRRIGATION & DEBRIDEMENT GENERAL-INDEX FINGER;  Surgeon: Kia Aldridge M.D.;  Location: SURGERY [facility];  Service:      Family History   Problem Relation Age of Onset   • Other Mother      Accident   • Stroke Father    • Heart Attack Father      MI at age 24?   • Heart Disease Paternal Grandfather    • Hypertension Paternal Grandmother    • Diabetes Paternal Grandmother      History   Smoking status   • Light Tobacco Smoker -- 1.00 packs/day for 25 years   • Types: Cigarettes   Smokeless tobacco   • Never Used     Comment: Now smoke one cigarettes 2-3 times / week.      Allergies   Allergen Reactions   • Tomato Hives     Outpatient Encounter Prescriptions as of 4/5/2017   Medication Sig Dispense Refill   • diltiazem CD (CARDIZEM CD) 120 MG CAPSULE SR 24 HR Take 120 mg by mouth every day.  5   • omeprazole (PRILOSEC) 40 MG delayed-release capsule Take 1 Cap by mouth every day. 90 Cap 1   • verapamil ER (CALAN-SR) 240 MG Tab CR Take 1 Tab by mouth every day. 90 Tab 3   • albuterol 108 (90 BASE) MCG/ACT Aero Soln inhalation aerosol Inhale 2 Puffs by mouth every 6 hours as needed for Shortness of Breath. 1 Inhaler 3   • bisoprolol (ZEBETA) 10 MG tablet Take 1 Tab by mouth 2 Times a Day. 30 Tab 3   • atorvastatin (LIPITOR) 20 MG Tab Take 1 Tab by mouth every day. 90 Tab 4   • gabapentin (NEURONTIN) 100 MG Cap Take 100 mg by mouth 3 times a day.     • levothyroxine (SYNTHROID) 125 MCG Tab Take 1 Tab by mouth every day. 90 Tab 5     No facility-administered encounter medications on file as of 4/5/2017.     Review of Systems   Constitutional: Negative for fever, chills, weight loss and malaise/fatigue.   HENT: Negative for congestion and sore throat.    Eyes: Negative for blurred vision and double vision.   Respiratory: Negative for cough, sputum production, shortness of breath and wheezing.    Cardiovascular: Positive for leg swelling (Left lower extremity swelling worse  over the last 2 weeks.). Negative for chest pain, palpitations, orthopnea, claudication and PND.   Gastrointestinal: Negative for heartburn, nausea, vomiting and abdominal pain.   Genitourinary: Negative for dysuria, frequency and flank pain.   Musculoskeletal: Negative.    Skin: Negative.    Neurological: Negative for dizziness, speech change, focal weakness, seizures, loss of consciousness and headaches.   Endo/Heme/Allergies: Negative.    Psychiatric/Behavioral: Negative.         Objective:   /84 mmHg  Pulse 85  Ht 1.829 m (6')  Wt 129.275 kg (285 lb)  BMI 38.64 kg/m2  SpO2 95%    Physical Exam   Constitutional: He is oriented to person, place, and time. He appears well-developed and well-nourished.   HENT:   Head: Normocephalic and atraumatic.   Eyes: Pupils are equal, round, and reactive to light.   Neck: Normal range of motion. Neck supple. No thyromegaly present.   Cardiovascular: Normal rate and regular rhythm.  Exam reveals no gallop and no friction rub.    No murmur heard.  Pulmonary/Chest: Effort normal and breath sounds normal. No respiratory distress. He has no wheezes. He has no rales.   Device function intact.   Abdominal: Soft. Bowel sounds are normal. He exhibits no distension. There is no tenderness. There is no guarding.   Musculoskeletal: Normal range of motion. He exhibits edema (Left leg up to knee taut with fluid.).   Neurological: He is alert and oriented to person, place, and time.   Skin: Skin is warm and dry.   Psychiatric: He has a normal mood and affect.   Device function intact see flow sheet.    Assessment:     1. Swelling of extremity, left  US-EXTREMITY VENOUS BILATERAL UPPER   2. Hypertrophic cardiomyopathy (CMS-HCC)     3. Dual ICD (implantable cardioverter-defibrillator) in place     4. FH: sudden cardiac death (SCD)     5. NSVT (nonsustained ventricular tachycardia) (CMS-[facility])         Medical Decision Making:  Today's Assessment / Status / Plan:     1. Swelling in the  left lower leg etiology unclear. US of lower extremities today to rule out DVT in left leg.  On CA Channel drugs and mild JEREMIAH contributing factors.  2. HOCM no syncopal events and no sustained chest pain episodes.  3. Dual chamber ICD with intact function.   4. FH-SCD.  5. NSVT in monitoring zone 9 seconds.  F/U will be dependent upon results of US today.    Collaborating MD Carlin Oseguera M.D.  [address]  [address]  [place] [id number]  VIA In Basket     Jeffrey Gillis M.D.  [address]  [address]  [place] [address]  VIA In Basket

## 2017-04-05 NOTE — PROGRESS NOTES
Subjective:   Trevor Gillis Jr. is a 46 y.o. male who presents today for review of his left leg. He states he has had increased swelling that won't do down over the last 2 weeks The leg is taut and uncomfortable. The patient has a history of FH-SCD, HOCM and has an ICD in place which is a Medtronic device.  He has had 1 NSVT episode for 9 seconds in his monitoring zone. He feels bumps and pains in his heart region that do not seem to coorelate with arrhythmia.  He is on two Calcium channel agents that seem to work better for him, this may be in part related to the swelling in his extremities. Because it is so much more prevalent on the left versus right will need to study the leg(s). He does describe an accident in the past involving the knee on the left leg as well in the past.  With his HOCM I am reluctant to treat with diuretics. First, need to understand etiology and rule out DVT. He is currently not on an AC.  Device function appears intact.  Past Medical History   Diagnosis Date   • Hernia    • Syncope 11/19/2012   • Pacemaker 2014   • Fracture of left clavicle    • HTN (hypertension)    • Hypertrophic cardiomyopathy (CMS-HCC)    • Dyslipidemia    • JEREMIAH (obstructive sleep apnea)      Past Surgical History   Procedure Laterality Date   • Tonsillectomy     • Other cardiac surgery  7/25/14     AICD   • Recovery  7/25/2014     Performed by Cath-Recovery Surgery at SURGERY SAME DAY [facility] ORS   • Recovery  7/30/2014     Performed by Cath-Recovery Surgery at SURGERY SAME DAY [facility]   • Inguinal hernia repair  9/10/2014     Performed by Christie Ott M.D. at SURGERY SAME DAY [facility]   • Appendectomy     • Irrigation & debridement general Left 1/16/2017     Procedure: IRRIGATION & DEBRIDEMENT GENERAL-INDEX FINGER;  Surgeon: Kia Aldridge M.D.;  Location: SURGERY [facility];  Service:      Family History   Problem Relation Age of Onset   • Other Mother      Accident   • Stroke Father    •  Heart Attack Father      MI at age 24?   • Heart Disease Paternal Grandfather    • Hypertension Paternal Grandmother    • Diabetes Paternal Grandmother      History   Smoking status   • Light Tobacco Smoker -- 1.00 packs/day for 25 years   • Types: Cigarettes   Smokeless tobacco   • Never Used     Comment: Now smoke one cigarettes 2-3 times / week.      Allergies   Allergen Reactions   • Tomato Hives     Outpatient Encounter Prescriptions as of 4/5/2017   Medication Sig Dispense Refill   • diltiazem CD (CARDIZEM CD) 120 MG CAPSULE SR 24 HR Take 120 mg by mouth every day.  5   • omeprazole (PRILOSEC) 40 MG delayed-release capsule Take 1 Cap by mouth every day. 90 Cap 1   • verapamil ER (CALAN-SR) 240 MG Tab CR Take 1 Tab by mouth every day. 90 Tab 3   • albuterol 108 (90 BASE) MCG/ACT Aero Soln inhalation aerosol Inhale 2 Puffs by mouth every 6 hours as needed for Shortness of Breath. 1 Inhaler 3   • bisoprolol (ZEBETA) 10 MG tablet Take 1 Tab by mouth 2 Times a Day. 30 Tab 3   • atorvastatin (LIPITOR) 20 MG Tab Take 1 Tab by mouth every day. 90 Tab 4   • gabapentin (NEURONTIN) 100 MG Cap Take 100 mg by mouth 3 times a day.     • levothyroxine (SYNTHROID) 125 MCG Tab Take 1 Tab by mouth every day. 90 Tab 5     No facility-administered encounter medications on file as of 4/5/2017.     Review of Systems   Constitutional: Negative for fever, chills, weight loss and malaise/fatigue.   HENT: Negative for congestion and sore throat.    Eyes: Negative for blurred vision and double vision.   Respiratory: Negative for cough, sputum production, shortness of breath and wheezing.    Cardiovascular: Positive for leg swelling (Left lower extremity swelling worse over the last 2 weeks.). Negative for chest pain, palpitations, orthopnea, claudication and PND.   Gastrointestinal: Negative for heartburn, nausea, vomiting and abdominal pain.   Genitourinary: Negative for dysuria, frequency and flank pain.   Musculoskeletal: Negative.     Skin: Negative.    Neurological: Negative for dizziness, speech change, focal weakness, seizures, loss of consciousness and headaches.   Endo/Heme/Allergies: Negative.    Psychiatric/Behavioral: Negative.         Objective:   /84 mmHg  Pulse 85  Ht 1.829 m (6')  Wt 129.275 kg (285 lb)  BMI 38.64 kg/m2  SpO2 95%    Physical Exam   Constitutional: He is oriented to person, place, and time. He appears well-developed and well-nourished.   HENT:   Head: Normocephalic and atraumatic.   Eyes: Pupils are equal, round, and reactive to light.   Neck: Normal range of motion. Neck supple. No thyromegaly present.   Cardiovascular: Normal rate and regular rhythm.  Exam reveals no gallop and no friction rub.    No murmur heard.  Pulmonary/Chest: Effort normal and breath sounds normal. No respiratory distress. He has no wheezes. He has no rales.   Device function intact.   Abdominal: Soft. Bowel sounds are normal. He exhibits no distension. There is no tenderness. There is no guarding.   Musculoskeletal: Normal range of motion. He exhibits edema (Left leg up to knee taut with fluid.).   Neurological: He is alert and oriented to person, place, and time.   Skin: Skin is warm and dry.   Psychiatric: He has a normal mood and affect.   Device function intact see flow sheet.    Assessment:     1. Swelling of extremity, left  US-EXTREMITY VENOUS BILATERAL UPPER   2. Hypertrophic cardiomyopathy (CMS-HCC)     3. Dual ICD (implantable cardioverter-defibrillator) in place     4. FH: sudden cardiac death (SCD)     5. NSVT (nonsustained ventricular tachycardia) (CMS-[facility])         Medical Decision Making:  Today's Assessment / Status / Plan:     1. Swelling in the left lower leg etiology unclear. US of lower extremities today to rule out DVT in left leg.  On CA Channel drugs and mild JEREMIAH contributing factors.  2. HOCM no syncopal events and no sustained chest pain episodes.  3. Dual chamber ICD with intact function.   4. FH-SCD.  5.  NSVT in monitoring zone 9 seconds.  F/U will be dependent upon results of US today.    Collaborating MD Gillis

## 2017-04-05 NOTE — MR AVS SNAPSHOT
Trevor Gillis Jr.   2017 1:20 PM   Office Visit   MRN: [id number]    Department:  [facility]   Dept Phone:  [phone]    Description:  Male : 1970   Provider:  SURJIT Johansen           Reason for Visit     Follow-Up           Allergies as of 2017     Allergen Noted Reactions    Tomato 09/10/2014   Hives      You were diagnosed with     Swelling of extremity, left   [118239]       Hypertrophic cardiomyopathy (CMS-HCC)   [923682]       Dual ICD (implantable cardioverter-defibrillator) in place   [4552964]       FH: sudden cardiac death (SCD)   [336167]       NSVT (nonsustained ventricular tachycardia) (CMS-HCC)   [733911]         Vital Signs     Blood Pressure Pulse Height Weight Body Mass Index Oxygen Saturation    128/84 mmHg 85 1.829 m (6') 129.275 kg (285 lb) 38.64 kg/m2 95%    Smoking Status                   Light Tobacco Smoker           Basic Information     Date Of Birth Sex Race Ethnicity Preferred Language    1970 Male White Non- English      Your appointments     May 30, 2017  9:30 AM   [facility] (--)    [address] [place] [address] [address]  [facility] [address] [phone]           You will be receiving a confirmation call a few days before your appointment from our automated call confirmation system.            2017 11:00 AM   FOLLOW UP with Jeffrey Gillis M.D.   [facility] (--)    [address] [place] [address] [address]  [facility] [address] [phone]              Problem List              ICD-10-CM Priority Class Noted - Resolved    Syncope R55   2012 - Present    FH: sudden cardiac death (SCD) Z82.41   2014 - Present    NSVT (nonsustained ventricular tachycardia) (CMS-HCC) I47.2   2014 - Present    Dizzy spells R42   2014 - Present    Dual ICD (implantable cardioverter-defibrillator) in place Z95.810    7/26/2014 - Present    Inguinal hernia K40.90   9/10/2014 - Present    Shoulder pain M25.519   6/23/2015 - Present    Numbness in both hands R20.0   6/23/2015 - Present    Near syncope R55 Medium  6/23/2015 - Present    JEREMIAH (obstructive sleep apnea) G47.33   5/5/2016 - Present    Hypothyroidism E03.9 Low  5/5/2016 - Present    Anemia D64.9   5/6/2016 - Present    Hyperglycemia R73.9   5/6/2016 - Present    Numbness of left hand R20.8   7/12/2016 - Present    Prediabetes R73.03   7/12/2016 - Present    SOB (shortness of breath) R06.02 High  10/24/2016 - Present    Bilateral hand numbness R20.0 Medium  10/24/2016 - Present    Dyslipidemia E78.5 Low  10/24/2016 - Present    Hypertrophic cardiomyopathy (CMS-HCC) I42.2   10/30/2016 - Present    Tobacco abuse Z72.0   11/3/2016 - Present    H/O wheezing Z87.898   11/3/2016 - Present    Cellulitis of left hand L03.114 High  1/15/2017 - Present    Cat bite of hand S61.459A, W55.01XA High  1/15/2017 - Present    Hypertension I10   1/16/2017 - Present      Health Maintenance        Date Due Completion Dates    IMM DTaP/Tdap/Td Vaccine (2 - Td) 1/16/2027 1/16/2017            Current Immunizations     Influenza Vaccine Quad Inj (Pf) 10/24/2016  3:41 PM    Pneumococcal polysaccharide vaccine (PPSV-23) 7/25/2011    Tdap Vaccine 1/16/2017 12:55 AM      Below and/or attached are the medications your provider expects you to take. Review all of your home medications and newly ordered medications with your provider and/or pharmacist. Follow medication instructions as directed by your provider and/or pharmacist. Please keep your medication list with you and share with your provider. Update the information when medications are discontinued, doses are changed, or new medications (including over-the-counter products) are added; and carry medication information at all times in the event of emergency situations     Allergies:  TOMATO - Hives               Medications  Valid as of: April 05,  2017 -  2:09 PM    Generic Name Brand Name Tablet Size Instructions for use    Albuterol Sulfate (Aero Soln) albuterol 108 (90 BASE) MCG/ACT Inhale 2 Puffs by mouth every 6 hours as needed for Shortness of Breath.        Atorvastatin Calcium (Tab) LIPITOR 20 MG Take 1 Tab by mouth every day.        Bisoprolol Fumarate (Tab) ZEBETA 10 MG Take 1 Tab by mouth 2 Times a Day.        DiltiaZEM HCl Coated Beads (CAPSULE SR 24 HR) CARDIZEM  MG Take 120 mg by mouth every day.        Gabapentin (Cap) NEURONTIN 100 MG Take 100 mg by mouth 3 times a day.        Levothyroxine Sodium (Tab) SYNTHROID 125 MCG Take 1 Tab by mouth every day.        Omeprazole (CAPSULE DELAYED RELEASE) PRILOSEC 40 MG Take 1 Cap by mouth every day.        Verapamil HCl (Tab CR) CALAN- MG Take 1 Tab by mouth every day.        .                 Medicines prescribed today were sent to:     [facility]Y #[id number] - [facility] [id number] [facility] [address]    Phone: [phone] Fax: [phone]    Open 24 Hours?: No      Medication refill instructions:       If your prescription bottle indicates you have medication refills left, it is not necessary to call your provider’s office. Please contact your pharmacy and they will refill your medication.    If your prescription bottle indicates you do not have any refills left, you may request refills at any time through one of the following ways: The online [employer] system (except Urgent Care), by calling your provider’s office, or by asking your pharmacy to contact your provider’s office with a refill request. Medication refills are processed only during regular business hours and may not be available until the next business day. Your provider may request additional information or to have a follow-up visit with you prior to refilling your medication.   *Please Note: Medication refills are assigned a new Rx number when refilled electronically. Your pharmacy may  indicate that no refills were authorized even though a new prescription for the same medication is available at the pharmacy. Please request the medicine by name with the pharmacy before contacting your provider for a refill.        Your To Do List     Future Labs/Procedures Complete By Expires    US-EXTREMITY VENOUS BILATERAL UPPER  As directed 4/5/2018    Scheduling Instructions:    STAT call me [phone]         MyChart Status: Patient Declined        Quit Tobacco Information     Do you want to quit using tobacco?    Quitting tobacco decreases risks of cancer, heart and lung disease, increases life expectancy, improves sense of taste and smell, and increases spending money, among other benefits.    If you are thinking about quitting, we can help.  • [facility] Quit Tobacco Program: [phone]  o Program occurs weekly for four weeks and includes pharmacist consultation on products to support quitting smoking or chewing tobacco. A provider referral is needed for pharmacist consultation.  • Tobacco Users Help Hotline: 2-267-QUIT-NOW (593-9404) or https://[place].quitlogix.org/  o Free, confidential telephone and online coaching for [place] residents. Sessions are designed on a schedule that is convenient for you. Eligible clients receive free nicotine replacement therapy.  • Nationally: www.smokefree.gov  o Information and professional assistance to support both immediate and long-term needs as you become, and remain, a non-smoker. Smokefree.gov allows you to choose the help that best fits your needs.

## 2017-04-06 ENCOUNTER — TELEPHONE (OUTPATIENT)
Dept: CARDIOLOGY | Facility: MEDICAL CENTER | Age: 47
End: 2017-04-06

## 2017-04-06 NOTE — TELEPHONE ENCOUNTER
----- Message from SURJIT Johansen sent at 4/6/2017 12:57 PM PDT -----  Let him know no DVT. He has HOCM and diuretics we have to be careful with. He needs volume to fill ventricle. Will try lasix 20 mgs and KCL 10 mEq daily for a few days . No salt needs to look at everything he is eating. Limit fluids somewhat . If lightheaded with diuretic will need to stop both lasix and Kcl. Use as needed if no dizziness or lightheadedness.

## 2017-04-07 DIAGNOSIS — R60.9 EDEMA, UNSPECIFIED TYPE: ICD-10-CM

## 2017-04-07 DIAGNOSIS — E03.2 HYPOTHYROIDISM DUE TO MEDICATION: ICD-10-CM

## 2017-04-07 RX ORDER — FUROSEMIDE 20 MG/1
TABLET ORAL
Qty: 30 TAB | Refills: 1 | OUTPATIENT
Start: 2017-04-07 | End: 2017-06-28 | Stop reason: SDUPTHER

## 2017-04-07 RX ORDER — LEVOTHYROXINE SODIUM 0.12 MG/1
125 TABLET ORAL DAILY
Qty: 30 TAB | Refills: 1 | OUTPATIENT
Start: 2017-04-07 | End: 2017-11-20

## 2017-04-07 RX ORDER — POTASSIUM CHLORIDE 750 MG/1
TABLET, FILM COATED, EXTENDED RELEASE ORAL
Qty: 30 TAB | Refills: 1 | OUTPATIENT
Start: 2017-04-07 | End: 2017-06-28 | Stop reason: SDUPTHER

## 2017-04-07 NOTE — TELEPHONE ENCOUNTER
Contacted pt. He will stop Diltiazem. He will take Lasix and Potassium as directed for 2-3 days; if he experiences lightheadedness he will stop. He will call next week with update on dependent edema.         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 His edema is probably is a result of two Calcium channel blockers. Please have him stop Diltiazem and continue Verapamil. We can go up on the Verapamil if we need to but try just stopping the Dilt for now. Reviewed with Dr Gillis

## 2017-04-24 RX ORDER — DILTIAZEM HYDROCHLORIDE 120 MG/1
CAPSULE, COATED, EXTENDED RELEASE ORAL
Refills: 5 | OUTPATIENT
Start: 2017-04-24

## 2017-05-12 ENCOUNTER — TELEPHONE (OUTPATIENT)
Dept: CARDIOLOGY | Facility: MEDICAL CENTER | Age: 47
End: 2017-05-12

## 2017-05-12 ENCOUNTER — HOSPITAL ENCOUNTER (EMERGENCY)
Facility: MEDICAL CENTER | Age: 47
End: 2017-05-13
Attending: EMERGENCY MEDICINE
Payer: MEDICAID

## 2017-05-12 DIAGNOSIS — R20.0 THIGH NUMBNESS: ICD-10-CM

## 2017-05-12 LAB
BASOPHILS # BLD AUTO: 1 % (ref 0–1.8)
BASOPHILS # BLD: 0.05 K/UL (ref 0–0.12)
EOSINOPHIL # BLD AUTO: 0.2 K/UL (ref 0–0.51)
EOSINOPHIL NFR BLD: 4.2 % (ref 0–6.9)
ERYTHROCYTE [DISTWIDTH] IN BLOOD BY AUTOMATED COUNT: 40.9 FL (ref 35.9–50)
HCT VFR BLD AUTO: 40.3 % (ref 42–52)
HGB BLD-MCNC: 13.9 G/DL (ref 14–18)
IMM GRANULOCYTES # BLD AUTO: 0.02 K/UL (ref 0–0.11)
IMM GRANULOCYTES NFR BLD AUTO: 0.4 % (ref 0–0.9)
LYMPHOCYTES # BLD AUTO: 2.03 K/UL (ref 1–4.8)
LYMPHOCYTES NFR BLD: 42.3 % (ref 22–41)
MCH RBC QN AUTO: 29.3 PG (ref 27–33)
MCHC RBC AUTO-ENTMCNC: 34.5 G/DL (ref 33.7–35.3)
MCV RBC AUTO: 85 FL (ref 81.4–97.8)
MONOCYTES # BLD AUTO: 0.5 K/UL (ref 0–0.85)
MONOCYTES NFR BLD AUTO: 10.4 % (ref 0–13.4)
NEUTROPHILS # BLD AUTO: 2 K/UL (ref 1.82–7.42)
NEUTROPHILS NFR BLD: 41.7 % (ref 44–72)
NRBC # BLD AUTO: 0 K/UL
NRBC BLD AUTO-RTO: 0 /100 WBC
PLATELET # BLD AUTO: 261 K/UL (ref 164–446)
PMV BLD AUTO: 9.6 FL (ref 9–12.9)
RBC # BLD AUTO: 4.74 M/UL (ref 4.7–6.1)
WBC # BLD AUTO: 4.8 K/UL (ref 4.8–10.8)

## 2017-05-12 PROCEDURE — 85025 COMPLETE CBC W/AUTO DIFF WBC: CPT

## 2017-05-12 PROCEDURE — 80053 COMPREHEN METABOLIC PANEL: CPT

## 2017-05-12 PROCEDURE — 99283 EMERGENCY DEPT VISIT LOW MDM: CPT

## 2017-05-12 ASSESSMENT — ENCOUNTER SYMPTOMS
SHORTNESS OF BREATH: 0
ABDOMINAL PAIN: 0
WEAKNESS: 0
NAUSEA: 0
VOMITING: 0
FOCAL WEAKNESS: 0
FEVER: 0
SENSORY CHANGE: 1
BACK PAIN: 0
CHILLS: 0
DIARRHEA: 0

## 2017-05-12 NOTE — ED AVS SNAPSHOT
5/13/2017    Trevor Gillis Jr.  [address] [place] [address]    Dear Trevor:    [facility] wants to ensure your discharge home is safe and you or your loved ones have had all of your questions answered regarding your care after you leave the hospital.    Below is a list of resources and contact information should you have any questions regarding your hospital stay, follow-up instructions, or active medical symptoms.    Questions or Concerns Regarding… Contact   Medical Questions Related to Your Discharge  (7 days a week, 8am-5pm) Contact a Nurse Care Coordinator   [phone]   Medical Questions Not Related to Your Discharge  (24 hours a day / 7 days a week)  Contact the Nurse Health Line   [phone]    Medications or Discharge Instructions Refer to your discharge packet   or contact your [facility] Primary Care Provider   [phone]   Follow-up Appointment(s) Schedule your appointment via [employer]   or contact Scheduling [phone]   Billing Review your statement via [employer]  or contact Billing [phone]   Medical Records Review your records via [employer]   or contact Medical Records [phone]     You may receive a telephone call within two days of discharge. This call is to make certain you understand your discharge instructions and have the opportunity to have any questions answered. You can also easily access your medical information, test results and upcoming appointments via the [employer] free online health management tool. You can learn more and sign up at [employer]. For assistance setting up your [employer] account, please call [phone].    Once again, we want to ensure your discharge home is safe and that you have a clear understanding of any next steps in your care. If you have any questions or concerns, please do not hesitate to contact us, we are here for you. Thank you for choosing [facility] for your healthcare needs.    Sincerely,    Your [facility] Healthcare Team

## 2017-05-12 NOTE — ED AVS SNAPSHOT
Soft Tissue Regeneration Access Code: Activation code not generated  Current Soft Tissue Regeneration Status: Patient Declined    Your email address is not on file at [email or web address].  Email Addresses are required for you to sign up for Soft Tissue Regeneration, please contact [phone] to verify your personal information and to provide your email address prior to attempting to register for Soft Tissue Regeneration.    Trevor Gillis Jr.  [address] [place] [address]    Soft Tissue Regeneration  A secure, online tool to manage your health information     [facility]’s Soft Tissue Regeneration® is a secure, online tool that connects you to your personalized health information from the privacy of your home -- day or night - making it very easy for you to manage your healthcare. Once the activation process is completed, you can even access your medical information using the Soft Tissue Regeneration jarvis, which is available for free in the Apple Jarvis store or Google Play store.     To learn more about Soft Tissue Regeneration, visit [email or web address]    There are two levels of access available (as shown below):   My Chart Features  [facility] Doctor [facility]  Specialists [facility]  Urgent  Care Non-[facility] Primary Care Doctor   Email your healthcare team securely and privately 24/7 X X X    Manage appointments: schedule your next appointment; view details of past/upcoming appointments X      Request prescription refills. X      View recent personal medical records, including lab and immunizations X X X X   View health record, including health history, allergies, medications X X X X   Read reports about your outpatient visits, procedures, consult and ER notes X X X X   See your discharge summary, which is a recap of your hospital and/or ER visit that includes your diagnosis, lab results, and care plan X X  X     How to register for English TVt:  Once your e-mail address has been verified, follow the following steps to sign up for English TVt.     1. Go to  [email or web address]  2. Click on the Sign Up Now box, which takes you to the  New Member Sign Up page. You will need to provide the following information:  a. Enter your [employer] Access Code exactly as it appears at the top of this page. (You will not need to use this code after you’ve completed the sign-up process. If you do not sign up before the expiration date, you must request a new code.)   b. Enter your date of birth.   c. Enter your home email address.   d. Click Submit, and follow the next screen’s instructions.  3. Create a [employer] ID. This will be your [employer] login ID and cannot be changed, so think of one that is secure and easy to remember.  4. Create a [employer] password. You can change your password at any time.  5. Enter your Password Reset Question and Answer. This can be used at a later time if you forget your password.   6. Enter your e-mail address. This allows you to receive e-mail notifications when new information is available in [employer].  7. Click Sign Up. You can now view your health information.    For assistance activating your [employer] account, call [phone]

## 2017-05-12 NOTE — TELEPHONE ENCOUNTER
----- Message from Camilla Kemp sent at 5/11/2017  1:59 PM PDT -----  Regarding: patient needs letter to return to work  [employer]      Patient said he was denied Disability and he needs a letter stating he can return to work so he can seek employment. He currently doesn't have a phone, he said he will call back this afternoon.

## 2017-05-12 NOTE — ED AVS SNAPSHOT
Home Care Instructions                                                                                                                Trevor Gillis Jr.   MRN: [id number]    Department:  [facility], Emergency Dept   Date of Visit:  5/12/2017            [facility], Emergency Dept    [address]    [facility] [address]    Phone:  [phone]      You were seen by     Noam Bautista M.D.      Your Diagnosis Was     Thigh numbness     R20.0 left lateral      Follow-up Information     1. Follow up with Blake Oseguera M.D.. Schedule an appointment as soon as possible for a visit in 3 days.    Specialty:  Internal Medicine    Contact information    [address]  [address]  [facility] [address] [phone]        Medication Information     Review all of your home medications and newly ordered medications with your primary doctor and/or pharmacist as soon as possible. Follow medication instructions as directed by your doctor and/or pharmacist.     Please keep your complete medication list with you and share with your physician. Update the information when medications are discontinued, doses are changed, or new medications (including over-the-counter products) are added; and carry medication information at all times in the event of emergency situations.               Medication List      ASK your doctor about these medications        Instructions    Morning Afternoon Evening Bedtime    albuterol 108 (90 BASE) MCG/ACT Aers inhalation aerosol        Inhale 2 Puffs by mouth every 6 hours as needed for Shortness of Breath.   Dose:  2 Puff                        atorvastatin 20 MG Tabs   Commonly known as:  LIPITOR        Doctor's comments:  PT NEEDS APPT   Take 1 Tab by mouth every day.   Dose:  20 mg                        bisoprolol 10 MG tablet   Commonly known as:  ZEBETA        Take 1 Tab by mouth 2 Times a Day.   Dose:  10 mg                        furosemide 20 MG Tabs    Commonly known as:  LASIX        Take 1 tablet daily as needed for edema                        gabapentin 100 MG Caps   Commonly known as:  NEURONTIN        Take 100 mg by mouth 3 times a day.   Dose:  100 mg                        levothyroxine 125 MCG Tabs   Commonly known as:  SYNTHROID        Doctor's comments:  Have PCP refill and follow thyroid after this   Take 1 Tab by mouth every day.   Dose:  125 mcg                        omeprazole 40 MG delayed-release capsule   Commonly known as:  PRILOSEC        Doctor's comments:  Take one capsule 30 minutes before breakfast   Take 1 Cap by mouth every day.   Dose:  40 mg                        potassium chloride ER 10 MEQ tablet   Commonly known as:  KLOR-CON        Take 1 tablet daily with Furosemide as needed for edema                        verapamil  MG Tbcr   Commonly known as:  CALAN-SR        Take 1 Tab by mouth every day.   Dose:  240 mg                                Procedures and tests performed during your visit     CBC WITH DIFFERENTIAL    COMP METABOLIC PANEL    ESTIMATED GFR        Discharge Instructions       If you keep having this numbness you will need to get a CT scan of your head and possibly an EMG study to check the peripheral nerve.           Patient Information     Patient Information    Following emergency treatment: all patient requiring follow-up care must return either to a private physician or a clinic if your condition worsens before you are able to obtain further medical attention, please return to the emergency room.     Billing Information    At [facility], we work to make the billing process streamlined for our patients.  Our Representatives are here to answer any questions you may have regarding your hospital bill.  If you have insurance coverage and have supplied your insurance information to us, we will submit a claim to your insurer on your behalf.  Should you have any questions regarding your bill, we can be  reached online or by phone as follows:  Online: You are able pay your bills online or live chat with our representatives about any billing questions you may have. We are here to help Monday - Friday from 8:00am to 7:30pm and 9:00am - 12:00pm on Saturdays.  Please visit [email or web address]  for more information.   Phone:  [phone] or [phone]    Please note that your emergency physician, surgeon, pathologist, radiologist, anesthesiologist, and other specialists are not employed by [facility] and will therefore bill separately for their services.  Please contact them directly for any questions concerning their bills at the numbers below:     Emergency Physician Services:  [phone]  [facility]:  [phone]  [facility]:  [phone]  [facility]:  [phone]    1. Your final bill may vary from the amount quoted upon discharge if all procedures are not complete at that time, or if your doctor has additional procedures of which we are not aware. You will receive an additional bill if you return to the Emergency Department at [facility] for suture removal regardless of the facility of which the sutures were placed.     2. Please arrange for settlement of this account at the emergency registration.    3. All self-pay accounts are due in full at the time of treatment.  If you are unable to meet this obligation then payment is expected within 4-5 days.     4. If you have had radiology studies (CT, X-ray, Ultrasound, MRI), you have received a preliminary result during your emergency department visit. Please contact the radiology department [phone] to receive a copy of your final result. Please discuss the Final result with your primary physician or with the follow up physician provided.     Crisis Hotline:  [place] Crisis Hotline:  9-443-JZZTKJR or 1-298.871.6880  [place] Crisis Hotline:    1-186.873.1736 or  728.984.6432         ED Discharge Follow Up Questions    1. In order to provide you with very good care, we would like to follow up with a phone call in the next few days.  May we have your permission to contact you?     YES /  NO    2. What is the best phone number to call you? (       )_____-__________    3. What is the best time to call you?      Morning  /  Afternoon  /  Evening                   Patient Signature:  ____________________________________________________________    Date:  ____________________________________________________________      Your appointments     May 30, 2017  9:30 AM   Established Patient with [facility] - Internal Medicine (--)    [address] [place] [address]  [facility] [id number] [phone]           You will be receiving a confirmation call a few days before your appointment from our automated call confirmation system.            Jul 26, 2017 11:00 AM   FOLLOW UP with Jeffrey Gillis M.D.   [facility] (--)    [address] [place] [address]  [facility] [id number] [phone]

## 2017-05-13 VITALS
RESPIRATION RATE: 18 BRPM | OXYGEN SATURATION: 96 % | DIASTOLIC BLOOD PRESSURE: 47 MMHG | BODY MASS INDEX: 34.9 KG/M2 | WEIGHT: 286.6 LBS | SYSTOLIC BLOOD PRESSURE: 151 MMHG | HEART RATE: 82 BPM | TEMPERATURE: 97.8 F | HEIGHT: 76 IN

## 2017-05-13 LAB
ALBUMIN SERPL BCP-MCNC: 4 G/DL (ref 3.2–4.9)
ALBUMIN/GLOB SERPL: 1.4 G/DL
ALP SERPL-CCNC: 93 U/L (ref 30–99)
ALT SERPL-CCNC: 17 U/L (ref 2–50)
ANION GAP SERPL CALC-SCNC: 7 MMOL/L (ref 0–11.9)
AST SERPL-CCNC: 15 U/L (ref 12–45)
BILIRUB SERPL-MCNC: 0.5 MG/DL (ref 0.1–1.5)
BUN SERPL-MCNC: 17 MG/DL (ref 8–22)
CALCIUM SERPL-MCNC: 9.5 MG/DL (ref 8.5–10.5)
CHLORIDE SERPL-SCNC: 108 MMOL/L (ref 96–112)
CO2 SERPL-SCNC: 23 MMOL/L (ref 20–33)
CREAT SERPL-MCNC: 1.13 MG/DL (ref 0.5–1.4)
GFR SERPL CREATININE-BSD FRML MDRD: >60 ML/MIN/1.73 M 2
GLOBULIN SER CALC-MCNC: 2.8 G/DL (ref 1.9–3.5)
GLUCOSE SERPL-MCNC: 104 MG/DL (ref 65–99)
POTASSIUM SERPL-SCNC: 4.1 MMOL/L (ref 3.6–5.5)
PROT SERPL-MCNC: 6.8 G/DL (ref 6–8.2)
SODIUM SERPL-SCNC: 138 MMOL/L (ref 135–145)

## 2017-05-13 NOTE — ED PROVIDER NOTES
ED Provider Note    Scribed for Noam Bautista M.D. by Gayle Villareal. 5/12/2017, 10:54 PM.    Primary care provider: Blake Oseguera M.D.  Means of arrival: Walk-in   History obtained from: Patient   History limited by: None     CHIEF COMPLAINT  Chief Complaint   Patient presents with   • Numbness     L thigh       HPI  Trevor Gillis Jr. is a 46 y.o. male who presents to the Emergency Department for constant numbness to his lateral left thigh onset about a month ago. He reports that his leg is painful and numb at the same time. This numbness/pain does not radiate. He denies having any difficulty moving his leg or weakness to his leg. He denies back pain. Per patient, he did not have an injury to his leg or back at the onset of these symptoms. He did have an F-150 fall on his leg when he was much younger. Patient denies fevers, chills, nausea, vomiting, diarrhea, abdominal pain, dysuria, chest pain, shortness of breath.     REVIEW OF SYSTEMS  Review of Systems   Constitutional: Negative for fever and chills.   Respiratory: Negative for shortness of breath.    Cardiovascular: Negative for chest pain.   Gastrointestinal: Negative for nausea, vomiting, abdominal pain and diarrhea.   Genitourinary: Negative for dysuria.   Musculoskeletal: Negative for back pain.        Positive for left thigh pain   Neurological: Positive for sensory change (Positive for left thigh numbness). Negative for focal weakness and weakness.   All other systems reviewed and are negative.      PAST MEDICAL HISTORY   has a past medical history of Hernia; Syncope (11/19/2012); Pacemaker (2014); Fracture of left clavicle; HTN (hypertension); Hypertrophic cardiomyopathy (CMS-HCC); Dyslipidemia; and JEREMIAH (obstructive sleep apnea).    SURGICAL HISTORY   has past surgical history that includes tonsillectomy; other cardiac surgery (7/25/14); recovery (7/25/2014); recovery (7/30/2014); inguinal hernia repair (9/10/2014); appendectomy; and  irrigation & debridement general (Left, 1/16/2017).    SOCIAL HISTORY  Social History   Substance Use Topics   • Smoking status: Light Tobacco Smoker -- 1.00 packs/day for 25 years     Types: Cigarettes   • Smokeless tobacco: Never Used      Comment: Now smoke one cigarettes 2-3 times / week.    • Alcohol Use: 0.0 oz/week     0 Standard drinks or equivalent per week      Comment: used to be heavy alcoholism (18 years ago), now rarely drinks      History   Drug Use No       FAMILY HISTORY  Family History   Problem Relation Age of Onset   • Other Mother      Accident   • Stroke Father    • Heart Attack Father      MI at age 24?   • Heart Disease Paternal Grandfather    • Hypertension Paternal Grandmother    • Diabetes Paternal Grandmother        CURRENT MEDICATIONS  Home Medications     No current facility-administered medications on file prior to encounter.     Current Outpatient Prescriptions on File Prior to Encounter   Medication Sig Dispense Refill   • levothyroxine (SYNTHROID) 125 MCG Tab Take 1 Tab by mouth every day. 30 Tab 1   • furosemide (LASIX) 20 MG Tab Take 1 tablet daily as needed for edema 30 Tab 1   • potassium chloride ER (KLOR-CON) 10 MEQ tablet Take 1 tablet daily with Furosemide as needed for edema 30 Tab 1   • omeprazole (PRILOSEC) 40 MG delayed-release capsule Take 1 Cap by mouth every day. 90 Cap 1   • verapamil ER (CALAN-SR) 240 MG Tab CR Take 1 Tab by mouth every day. 90 Tab 3   • albuterol 108 (90 BASE) MCG/ACT Aero Soln inhalation aerosol Inhale 2 Puffs by mouth every 6 hours as needed for Shortness of Breath. 1 Inhaler 3   • bisoprolol (ZEBETA) 10 MG tablet Take 1 Tab by mouth 2 Times a Day. 30 Tab 3   • gabapentin (NEURONTIN) 100 MG Cap Take 100 mg by mouth 3 times a day.     • atorvastatin (LIPITOR) 20 MG Tab Take 1 Tab by mouth every day. 90 Tab 4              ALLERGIES  Allergies   Allergen Reactions   • Tomato Hives       PHYSICAL EXAM  VITAL SIGNS: /84 mmHg  Pulse 97   "Temp(Src) 36.6 °C (97.8 °F)  Resp 18  Ht 1.93 m (6' 4\")  Wt 130 kg (286 lb 9.6 oz)  BMI 34.90 kg/m2  SpO2 99%    Constitutional: Well developed, Well nourished, Mild distress.   HENT: Normocephalic, Atraumatic, Oropharynx moist.   Eyes: Conjunctiva normal, No discharge.   Neck: Supple, No stridor  Cardiovascular: Normal heart rate, Normal rhythm, No murmurs, equal pulses.   Pulmonary: Normal breath sounds, No respiratory distress, No wheezing, No rales, No rhonchi.  Chest: No chest wall tenderness or deformity.   Abdomen:Soft, No tenderness, No masses, no rebound, no guarding.   Back: No CVA tenderness. No vertebral point tenderness. Negative straight leg raise to 90 degrees.   Musculoskeletal: No major deformities noted, No tenderness. No pain or tenderness in the left hip, or knee. He has full range of motion both joints.. Patient only reports decreased sensation to gross touch to the lateral aspect of the thigh.  Skin: Warm, Dry, No erythema, No rash.   Neurologic: Alert & oriented x 3, Normal motor function, No focal deficits noted. Normal cranial nerves II-XII, Equal strength in upper and lower extremities bilaterally. Reports decreased sensation to the lateral left thigh only, normal sensation medially, below the knee and in the buttocks. Patient has 5 out of 5 strength in the thigh. As well as lower leg.  Psychiatric: Affect normal, Judgment normal, Mood normal.     LABS  Results for orders placed or performed during the hospital encounter of 05/12/17   CBC WITH DIFFERENTIAL   Result Value Ref Range    WBC 4.8 4.8 - 10.8 K/uL    RBC 4.74 4.70 - 6.10 M/uL    Hemoglobin 13.9 (L) 14.0 - 18.0 g/dL    Hematocrit 40.3 (L) 42.0 - 52.0 %    MCV 85.0 81.4 - 97.8 fL    MCH 29.3 27.0 - 33.0 pg    MCHC 34.5 33.7 - 35.3 g/dL    RDW 40.9 35.9 - 50.0 fL    Platelet Count 261 164 - 446 K/uL    MPV 9.6 9.0 - 12.9 fL    Neutrophils-Polys 41.70 (L) 44.00 - 72.00 %    Lymphocytes 42.30 (H) 22.00 - 41.00 %    Monocytes 10.40 " 0.00 - 13.40 %    Eosinophils 4.20 0.00 - 6.90 %    Basophils 1.00 0.00 - 1.80 %    Immature Granulocytes 0.40 0.00 - 0.90 %    Nucleated RBC 0.00 /100 WBC    Neutrophils (Absolute) 2.00 1.82 - 7.42 K/uL    Lymphs (Absolute) 2.03 1.00 - 4.80 K/uL    Monos (Absolute) 0.50 0.00 - 0.85 K/uL    Eos (Absolute) 0.20 0.00 - 0.51 K/uL    Baso (Absolute) 0.05 0.00 - 0.12 K/uL    Immature Granulocytes (abs) 0.02 0.00 - 0.11 K/uL    NRBC (Absolute) 0.00 K/uL   COMP METABOLIC PANEL   Result Value Ref Range    Sodium 138 135 - 145 mmol/L    Potassium 4.1 3.6 - 5.5 mmol/L    Chloride 108 96 - 112 mmol/L    Co2 23 20 - 33 mmol/L    Anion Gap 7.0 0.0 - 11.9    Glucose 104 (H) 65 - 99 mg/dL    Bun 17 8 - 22 mg/dL    Creatinine 1.13 0.50 - 1.40 mg/dL    Calcium 9.5 8.5 - 10.5 mg/dL    AST(SGOT) 15 12 - 45 U/L    ALT(SGPT) 17 2 - 50 U/L    Alkaline Phosphatase 93 30 - 99 U/L    Total Bilirubin 0.5 0.1 - 1.5 mg/dL    Albumin 4.0 3.2 - 4.9 g/dL    Total Protein 6.8 6.0 - 8.2 g/dL    Globulin 2.8 1.9 - 3.5 g/dL    A-G Ratio 1.4 g/dL   ESTIMATED GFR   Result Value Ref Range    GFR If African American >60 >60 mL/min/1.73 m 2    GFR If Non African American >60 >60 mL/min/1.73 m 2   All labs reviewed by me.    COURSE & MEDICAL DECISION MAKING  Pertinent Labs & Imaging studies reviewed. (See chart for details)    Review of past medical records shows the patient has a significant past medical history including pacemaker, hypertrophic cardiomyopathy, non-sustained ventricular tachycardia.     10:54 PM - Patient seen and examined at bedside. Ordered for labs to evaluate his symptoms. I discussed that outpatient tests like an EMG may be necessary. The differential diagnoses include but are not limited to: electrolyte abnormality, possible peripheral nerve disorder. The plan of care was discussed with the patient and I answered all of his questions at this time. The patient understands and is agreeable with this plan of care.       12:20 AM  Patient reevaluated at bedside. Discussed normal labs. The patient should follow up with his PCP for further evaluation. Discussed plan for discharge; I advised the patient to return to the [facility] ED with any new or worsening symptoms. Patient was given the opportunity for questions. I addressed all questions or concerns at this time and they verbalize agreement to the plan of care.     Medical Decision Making: This point time patient does not have any electrolyte abnormalities. My thought is this numbness is likely from a peripheral nerve possibly from the patient's back although he does not have any vertebral point tenderness. I do not find any neurologic deficit as far as weakness he's not had any loss of bowel or bladder function do not think he has cauda equina. The symptoms of been going on for months he has no other neurologic deficits except for the lateral thigh which is not quite fitted dermatome. Therefore at this point time do not think CT is necessary. Patient may need EMG to see if he has a peripheral nerve neuropathy secondary fact he cannot have an MRI. The patient is referred to a primary physician for blood pressure management, diabetic screening, and for all other preventative health concerns.    DISPOSITION:  Patient will be discharged home in stable condition.    FOLLOW UP:  Blake Oseguera M.D.  [address] [address]  [phone]    Schedule an appointment as soon as possible for a visit in 3 days      FINAL IMPRESSION  1. Thigh numbness       Gayle LACY (Dayanna), am scribing for, and in the presence of, Noam Bautista M.D.    Electronically signed by: Gayle Villareal (Dayanna), 5/12/2017    Noam LACY M.D. personally performed the services described in this documentation, as scribed by Gayle Villareal in my presence, and it is both accurate and complete.    The note accurately reflects work and decisions made by me.  Noam PATE  Michele  5/13/2017  3:13 AM

## 2017-05-13 NOTE — DISCHARGE INSTRUCTIONS
If you keep having this numbness you will need to get a CT scan of your head and possibly an EMG study to check the peripheral nerve.

## 2017-05-13 NOTE — ED NOTES
"Chief Complaint   Patient presents with   • Numbness     L thigh     Pt ambulatory to room and placed on monitors. Pt states the lateral left thigh is numb, has been going on for \"about a month\". Denies CP/SOB. Family at bedside. Chart up for ERP.   "

## 2017-05-13 NOTE — ED NOTES
"Chief Complaint   Patient presents with   • Numbness     L thigh     Able to move toes, no radiating pain/numbness.  Pulses intact distally.  Pt states he has \"multiple medical issues\" and thinks this is related.        /84 mmHg  Pulse 97  Temp(Src) 36.6 °C (97.8 °F)  Resp 18  Ht 1.93 m (6' 4\")  Wt 130 kg (286 lb 9.6 oz)  BMI 34.90 kg/m2  SpO2 99%      Pt Informed regarding triage process and verbalized understanding to inform triage tech or RN for any changes in condition.  Placed in lobby.    "

## 2017-05-16 NOTE — TELEPHONE ENCOUNTER
status of return to work letter  Received: Today       Doreen Mitchell R.N.       Phone Number: [phone]                     [employer]     Pt calling for status of his return to work letter that he spoke with Taty about on 5/12, please call pt , friend Nadeem's #.

## 2017-06-05 ENCOUNTER — HOSPITAL ENCOUNTER (EMERGENCY)
Facility: MEDICAL CENTER | Age: 47
End: 2017-06-05
Payer: MEDICAID

## 2017-06-05 VITALS
HEART RATE: 105 BPM | HEIGHT: 75 IN | RESPIRATION RATE: 16 BRPM | DIASTOLIC BLOOD PRESSURE: 97 MMHG | WEIGHT: 282.19 LBS | OXYGEN SATURATION: 96 % | SYSTOLIC BLOOD PRESSURE: 134 MMHG | TEMPERATURE: 99.3 F | BODY MASS INDEX: 35.09 KG/M2

## 2017-06-05 LAB — EKG IMPRESSION: NORMAL

## 2017-06-05 PROCEDURE — 93005 ELECTROCARDIOGRAM TRACING: CPT

## 2017-06-05 PROCEDURE — 302449 STATCHG TRIAGE ONLY (STATISTIC)

## 2017-06-05 ASSESSMENT — PAIN SCALES - GENERAL: PAINLEVEL_OUTOF10: 0

## 2017-06-05 NOTE — ED NOTES
"Pt amb to triage.  Chief Complaint   Patient presents with   • Head Ache   • Tired     Symptoms x1d.  Pt states he has a pacemaker r/t \"I only have half a heart. I'm used to not feeling well.\"  EKG called to triage.  "

## 2017-06-06 NOTE — ED NOTES
Pt came to desk requesting AMA. Pt advised of risk. Pt acknowledges and understands risk. Pt signed out AMA.

## 2017-06-28 DIAGNOSIS — R60.9 EDEMA, UNSPECIFIED TYPE: ICD-10-CM

## 2017-06-28 RX ORDER — POTASSIUM CHLORIDE 750 MG/1
TABLET, FILM COATED, EXTENDED RELEASE ORAL
Qty: 30 TAB | Refills: 6 | Status: SHIPPED | OUTPATIENT
Start: 2017-06-28 | End: 2017-11-20

## 2017-06-28 RX ORDER — FUROSEMIDE 20 MG/1
TABLET ORAL
Qty: 30 TAB | Refills: 6 | Status: SHIPPED | OUTPATIENT
Start: 2017-06-28 | End: 2018-04-09 | Stop reason: SDUPTHER

## 2017-07-05 ENCOUNTER — OFFICE VISIT (OUTPATIENT)
Dept: CARDIOLOGY | Facility: MEDICAL CENTER | Age: 47
End: 2017-07-05
Payer: MEDICAID

## 2017-07-05 VITALS
SYSTOLIC BLOOD PRESSURE: 152 MMHG | DIASTOLIC BLOOD PRESSURE: 84 MMHG | OXYGEN SATURATION: 95 % | WEIGHT: 287 LBS | HEIGHT: 72 IN | HEART RATE: 104 BPM | BODY MASS INDEX: 38.87 KG/M2

## 2017-07-05 DIAGNOSIS — Z72.0 TOBACCO ABUSE: ICD-10-CM

## 2017-07-05 DIAGNOSIS — R06.02 SOB (SHORTNESS OF BREATH): ICD-10-CM

## 2017-07-05 DIAGNOSIS — Z95.810 DUAL ICD (IMPLANTABLE CARDIOVERTER-DEFIBRILLATOR) IN PLACE: ICD-10-CM

## 2017-07-05 DIAGNOSIS — R55 SYNCOPE, UNSPECIFIED SYNCOPE TYPE: ICD-10-CM

## 2017-07-05 DIAGNOSIS — Z82.41 FH: SUDDEN CARDIAC DEATH (SCD): ICD-10-CM

## 2017-07-05 DIAGNOSIS — I42.2 HYPERTROPHIC CARDIOMYOPATHY (HCC): ICD-10-CM

## 2017-07-05 DIAGNOSIS — R42 DIZZY SPELLS: ICD-10-CM
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
## 2017-07-05 PROBLEM — S61.459A CAT BITE OF HAND: Status: RESOLVED | Noted: 2017-01-15 | Resolved: 2017-07-05

## 2017-07-05 PROCEDURE — 93283 PRGRMG EVAL IMPLANTABLE DFB: CPT | Performed by: INTERNAL MEDICINE

## 2017-07-05 PROCEDURE — 99214 OFFICE O/P EST MOD 30 MIN: CPT | Mod: 25 | Performed by: INTERNAL MEDICINE

## 2017-07-05 RX ORDER — POTASSIUM CHLORIDE 20 MEQ/1
TABLET, EXTENDED RELEASE ORAL
Refills: 1 | COMMUNITY
Start: 2017-06-01 | End: 2018-04-09 | Stop reason: SDUPTHER

## 2017-07-05 RX ORDER — VERAPAMIL HYDROCHLORIDE 300 MG/1
300 CAPSULE, EXTENDED RELEASE ORAL DAILY
Qty: 90 CAP | Refills: 3 | Status: SHIPPED | OUTPATIENT
Start: 2017-07-05 | End: 2018-04-09 | Stop reason: SDUPTHER

## 2017-07-05 RX ORDER — NICOTINE POLACRILEX 4 MG/1
GUM, CHEWING ORAL
Refills: 11 | COMMUNITY
Start: 2017-04-08 | End: 2018-04-09 | Stop reason: SDUPTHER

## 2017-07-05 NOTE — Clinical Note
[facility]   [address]  [place] [address]  Phone: [phone]  Fax: [phone]              Trevor Gillis Jr.  1970    Encounter Date: 7/5/2017    Jeffrey Gillis M.D.          PROGRESS NOTE:  Subjective:   Trevor Gillis Jr. is a 46 y.o. male who presents today with HCM with mild RENO. On verapamil 240 mg. Has not gotten genetic testing. Does have a daughter. Still out or work and did not get disability.    Past Medical History   Diagnosis Date   • Hernia    • Syncope 11/19/2012   • Pacemaker 2014   • Fracture of left clavicle    • HTN (hypertension)    • Hypertrophic cardiomyopathy (CMS-HCC)    • Dyslipidemia    • JEREMIAH (obstructive sleep apnea)      Past Surgical History   Procedure Laterality Date   • Tonsillectomy     • Other cardiac surgery  7/25/14     AICD   • Recovery  7/25/2014     Performed by Cath-Recovery Surgery at SURGERY SAME DAY [facility] ORS   • Recovery  7/30/2014     Performed by Cath-Recovery Surgery at SURGERY SAME DAY [facility] ORS   • Inguinal hernia repair  9/10/2014     Performed by Christie Ott M.D. at SURGERY SAME DAY [facility] ORS   • Appendectomy     • Irrigation & debridement general Left 1/16/2017     Procedure: IRRIGATION & DEBRIDEMENT GENERAL-INDEX FINGER;  Surgeon: Kia Aldridge M.D.;  Location: SURGERY [facility];  Service:      Family History   Problem Relation Age of Onset   • Other Mother      Accident   • Stroke Father    • Heart Attack Father      MI at age 24?   • Heart Disease Paternal Grandfather    • Hypertension Paternal Grandmother    • Diabetes Paternal Grandmother      History   Smoking status   • Light Tobacco Smoker -- 1.00 packs/day for 25 years   • Types: Cigarettes   Smokeless tobacco   • Never Used     Comment: Now smoke one cigarettes 2-3 times / week.      Allergies   Allergen Reactions   • Tomato Hives     Outpatient Encounter Prescriptions as of 7/5/2017   Medication Sig  "Dispense Refill   • aspirin 81 MG tablet Take 81 mg by mouth every day.     • Verapamil HCl  MG CAPSULE SR 24 HR Take 1 Cap by mouth every day. 90 Cap 3   • furosemide (LASIX) 20 MG Tab Take 1 tablet daily as needed for edema 30 Tab 6   • levothyroxine (SYNTHROID) 125 MCG Tab Take 1 Tab by mouth every day. 30 Tab 1   • omeprazole (PRILOSEC) 40 MG delayed-release capsule Take 1 Cap by mouth every day. 90 Cap 1   • albuterol 108 (90 BASE) MCG/ACT Aero Soln inhalation aerosol Inhale 2 Puffs by mouth every 6 hours as needed for Shortness of Breath. 1 Inhaler 3   • atorvastatin (LIPITOR) 20 MG Tab Take 1 Tab by mouth every day. 90 Tab 4   • omeprazole (PRILOSEC) 20 MG Tablet Delayed Response delayed-release tablet TAKE 1 TABLET BY MOUTH 30 MIN BEFORE FIRST MEAL  11   • potassium chloride SA (KDUR) 20 MEQ Tab CR TAKE 1 TABLET BY MOUTH EVERY DAY TAKE WITH LASIX AS NEEDED FOR SWELLING  1   • potassium chloride ER (KLOR-CON) 10 MEQ tablet Take 1 tablet daily with Furosemide as needed for edema 30 Tab 6   • [DISCONTINUED] verapamil ER (CALAN-SR) 240 MG Tab CR Take 1 Tab by mouth every day. 90 Tab 3   • [DISCONTINUED] bisoprolol (ZEBETA) 10 MG tablet Take 1 Tab by mouth 2 Times a Day. 30 Tab 3   • gabapentin (NEURONTIN) 100 MG Cap Take 100 mg by mouth 3 times a day.       No facility-administered encounter medications on file as of 7/5/2017.     ROS     Objective:   /84 mmHg  Pulse 104  Ht 1.829 m (6' 0.01\")  Wt 130.182 kg (287 lb)  BMI 38.92 kg/m2  SpO2 95%    Physical Exam   Constitutional: He is oriented to person, place, and time. He appears well-developed and well-nourished.   HENT:   Mouth/Throat: Oropharynx is clear and moist.   Eyes: Conjunctivae and EOM are normal.   Neck: No JVD present. No thyroid mass present.   Cardiovascular: Normal rate, regular rhythm, S1 normal, S2 normal and normal pulses.  PMI is not displaced.  Exam reveals no gallop.    No murmur heard.  Pulses:       Carotid pulses are 2+ " on the right side, and 2+ on the left side.       Radial pulses are 2+ on the right side, and 2+ on the left side.        Femoral pulses are 2+ on the right side, and 2+ on the left side.       Dorsalis pedis pulses are 2+ on the right side, and 2+ on the left side.   No peripheral edema.   Pulmonary/Chest: Effort normal and breath sounds normal.   Abdominal: Soft. Normal appearance. He exhibits no abdominal bruit. There is no hepatosplenomegaly. There is no tenderness.   Musculoskeletal: Normal range of motion. He exhibits no edema.        Thoracic back: He exhibits no tenderness and no spasm.   Neurological: He is alert and oriented to person, place, and time.   Skin: No rash noted. No cyanosis. Nails show no clubbing.   Psychiatric: He has a normal mood and affect.       Assessment:     1. Dual ICD (implantable cardioverter-defibrillator) in place  Verapamil HCl  MG CAPSULE SR 24 HR   2. Hypertrophic cardiomyopathy (CMS-HCC)     3. Tobacco abuse     4. SOB (shortness of breath)     5. Syncope, unspecified syncope type     6. FH: sudden cardiac death (SCD)     7. Dizzy spells         Medical Decision Making:  Today's Assessment / Status / Plan:     1. HCM discussed genetic testing and referral to HCM clinic. He deferred.  2. Increase verapamil to 300 mg.  3. Smoking still doing.  4. F/U in 4 months.      Blake Oseguera M.D.  [address] [address]  VIA In Basket

## 2017-07-05 NOTE — MR AVS SNAPSHOT
"        Trevor Kendall Carlin Lares   2017 2:40 PM   Office Visit   MRN: [id number]    Department:  [facility]   Dept Phone:  [phone]    Description:  Male : 1970   Provider:  Jeffrey Gillis M.D.           Reason for Visit     Follow-Up           Allergies as of 2017     Allergen Noted Reactions    Tomato 09/10/2014   Hives      You were diagnosed with     Dual ICD (implantable cardioverter-defibrillator) in place   [2198020]         Vital Signs     Blood Pressure Pulse Height Weight Body Mass Index Oxygen Saturation    152/84 mmHg 104 1.829 m (6' 0.01\") 130.182 kg (287 lb) 38.92 kg/m2 95%    Smoking Status                   Light Tobacco Smoker           Basic Information     Date Of Birth Sex Race Ethnicity Preferred Language    1970 Male White Non- English      Your appointments     2017  2:00 PM   FOLLOW UP with Jeffrey Gillis M.D.   [facility] (--)    [address]  [facility] [address] [phone]              Problem List              ICD-10-CM Priority Class Noted - Resolved    Syncope R55   2012 - Present    FH: sudden cardiac death (SCD) Z82.41   2014 - Present    NSVT (nonsustained ventricular tachycardia) (CMS-[facility]) I47.2   2014 - Present    Dizzy spells R42   2014 - Present    Dual ICD (implantable cardioverter-defibrillator) in place Z95.810   2014 - Present    Inguinal hernia K40.90   9/10/2014 - Present    Shoulder pain M25.519   2015 - Present    Numbness in both hands R20.0   2015 - Present    Near syncope R55 Medium  2015 - Present    JEREMIAH (obstructive sleep apnea) G47.33   2016 - Present    Hypothyroidism E03.9 Low  2016 - Present    Anemia D64.9   2016 - Present    Hyperglycemia R73.9   2016 - Present    Numbness of left hand R20.8   2016 - Present    Prediabetes R73.03   2016 - Present    SOB (shortness of breath) R06.02 High  10/24/2016 - " Present    Bilateral hand numbness R20.0 Medium  10/24/2016 - Present    Dyslipidemia E78.5 Low  10/24/2016 - Present    Hypertrophic cardiomyopathy (CMS-HCC) I42.2   10/30/2016 - Present    Tobacco abuse Z72.0   11/3/2016 - Present    H/O wheezing Z87.898   11/3/2016 - Present    Cellulitis of left hand L03.114 High  1/15/2017 - Present    Cat bite of hand S61.459A, W55.01XA High  1/15/2017 - Present    Hypertension I10   1/16/2017 - Present      Health Maintenance        Date Due Completion Dates    IMM INFLUENZA (1) 9/1/2017 10/24/2016    IMM DTaP/Tdap/Td Vaccine (2 - Td) 1/16/2027 1/16/2017            Current Immunizations     Influenza Vaccine Quad Inj (Pf) 10/24/2016  3:41 PM    Pneumococcal polysaccharide vaccine (PPSV-23) 7/25/2011    Tdap Vaccine 1/16/2017 12:55 AM      Below and/or attached are the medications your provider expects you to take. Review all of your home medications and newly ordered medications with your provider and/or pharmacist. Follow medication instructions as directed by your provider and/or pharmacist. Please keep your medication list with you and share with your provider. Update the information when medications are discontinued, doses are changed, or new medications (including over-the-counter products) are added; and carry medication information at all times in the event of emergency situations     Allergies:  TOMATO - Hives               Medications  Valid as of: July 05, 2017 -  2:54 PM    Generic Name Brand Name Tablet Size Instructions for use    Albuterol Sulfate (Aero Soln) albuterol 108 (90 BASE) MCG/ACT Inhale 2 Puffs by mouth every 6 hours as needed for Shortness of Breath.        Aspirin (Tab) aspirin 81 MG Take 81 mg by mouth every day.        Atorvastatin Calcium (Tab) LIPITOR 20 MG Take 1 Tab by mouth every day.        Furosemide (Tab) LASIX 20 MG Take 1 tablet daily as needed for edema        Gabapentin (Cap) NEURONTIN 100 MG Take 100 mg by mouth 3 times a day.         Levothyroxine Sodium (Tab) SYNTHROID 125 MCG Take 1 Tab by mouth every day.        Omeprazole (CAPSULE DELAYED RELEASE) PRILOSEC 40 MG Take 1 Cap by mouth every day.        Omeprazole (Tablet Delayed Response) PRILOSEC 20 MG TAKE 1 TABLET BY MOUTH 30 MIN BEFORE FIRST MEAL        Potassium Chloride (Tab CR) KLOR-CON 10 MEQ Take 1 tablet daily with Furosemide as needed for edema        Potassium Chloride Emma CR (Tab CR) Kdur 20 MEQ TAKE 1 TABLET BY MOUTH EVERY DAY TAKE WITH LASIX AS NEEDED FOR SWELLING        Verapamil HCl (CAPSULE SR 24 HR) Verapamil HCl  MG Take 1 Cap by mouth every day.        .                 Medicines prescribed today were sent to:     [id number] - [place], [place] - [address]    [address] [address]    Phone: [phone] Fax: [phone]    Open 24 Hours?: No      Medication refill instructions:       If your prescription bottle indicates you have medication refills left, it is not necessary to call your provider’s office. Please contact your pharmacy and they will refill your medication.    If your prescription bottle indicates you do not have any refills left, you may request refills at any time through one of the following ways: The online [employer] system (except Urgent Care), by calling your provider’s office, or by asking your pharmacy to contact your provider’s office with a refill request. Medication refills are processed only during regular business hours and may not be available until the next business day. Your provider may request additional information or to have a follow-up visit with you prior to refilling your medication.   *Please Note: Medication refills are assigned a new Rx number when refilled electronically. Your pharmacy may indicate that no refills were authorized even though a new prescription for the same medication is available at the pharmacy. Please request the medicine by name with the pharmacy before contacting your  provider for a refill.           Celestinot Status: Patient Declined        Quit Tobacco Information     Do you want to quit using tobacco?    Quitting tobacco decreases risks of cancer, heart and lung disease, increases life expectancy, improves sense of taste and smell, and increases spending money, among other benefits.    If you are thinking about quitting, we can help.  • [facility] Quit Tobacco Program: [phone]  o Program occurs weekly for four weeks and includes pharmacist consultation on products to support quitting smoking or chewing tobacco. A provider referral is needed for pharmacist consultation.  • Tobacco Users Help Hotline: 1-559-QUIT-NOW (184-8702) or https://[place].quitlogix.org/  o Free, confidential telephone and online coaching for [place] residents. Sessions are designed on a schedule that is convenient for you. Eligible clients receive free nicotine replacement therapy.  • Nationally: www.smokefree.gov  o Information and professional assistance to support both immediate and long-term needs as you become, and remain, a non-smoker. Smokefree.gov allows you to choose the help that best fits your needs.

## 2017-07-05 NOTE — PROGRESS NOTES
Subjective:   Trevor Gillis Jr. is a 46 y.o. male who presents today with HCM with mild RENO. On verapamil 240 mg. Has not gotten genetic testing. Does have a daughter. Still out or work and did not get disability.    Past Medical History   Diagnosis Date   • Hernia    • Syncope 11/19/2012   • Pacemaker 2014   • Fracture of left clavicle    • HTN (hypertension)    • Hypertrophic cardiomyopathy (CMS-HCC)    • Dyslipidemia    • JEREMIAH (obstructive sleep apnea)      Past Surgical History   Procedure Laterality Date   • Tonsillectomy     • Other cardiac surgery  7/25/14     AICD   • Recovery  7/25/2014     Performed by Cath-Recovery Surgery at SURGERY SAME DAY [facility] ORS   • Recovery  7/30/2014     Performed by Cath-Recovery Surgery at SURGERY SAME DAY [facility] ORS   • Inguinal hernia repair  9/10/2014     Performed by Christie Ott M.D. at SURGERY SAME DAY [facility] ORS   • Appendectomy     • Irrigation & debridement general Left 1/16/2017     Procedure: IRRIGATION & DEBRIDEMENT GENERAL-INDEX FINGER;  Surgeon: Kia Aldridge M.D.;  Location: SURGERY [facility];  Service:      Family History   Problem Relation Age of Onset   • Other Mother      Accident   • Stroke Father    • Heart Attack Father      MI at age 24?   • Heart Disease Paternal Grandfather    • Hypertension Paternal Grandmother    • Diabetes Paternal Grandmother      History   Smoking status   • Light Tobacco Smoker -- 1.00 packs/day for 25 years   • Types: Cigarettes   Smokeless tobacco   • Never Used     Comment: Now smoke one cigarettes 2-3 times / week.      Allergies   Allergen Reactions   • Tomato Hives     Outpatient Encounter Prescriptions as of 7/5/2017   Medication Sig Dispense Refill   • aspirin 81 MG tablet Take 81 mg by mouth every day.     • Verapamil HCl  MG CAPSULE SR 24 HR Take 1 Cap by mouth every day. 90 Cap 3   • furosemide (LASIX) 20 MG Tab Take 1 tablet daily as needed for edema 30 Tab 6   • levothyroxine (SYNTHROID)  "125 MCG Tab Take 1 Tab by mouth every day. 30 Tab 1   • omeprazole (PRILOSEC) 40 MG delayed-release capsule Take 1 Cap by mouth every day. 90 Cap 1   • albuterol 108 (90 BASE) MCG/ACT Aero Soln inhalation aerosol Inhale 2 Puffs by mouth every 6 hours as needed for Shortness of Breath. 1 Inhaler 3   • atorvastatin (LIPITOR) 20 MG Tab Take 1 Tab by mouth every day. 90 Tab 4   • omeprazole (PRILOSEC) 20 MG Tablet Delayed Response delayed-release tablet TAKE 1 TABLET BY MOUTH 30 MIN BEFORE FIRST MEAL  11   • potassium chloride SA (KDUR) 20 MEQ Tab CR TAKE 1 TABLET BY MOUTH EVERY DAY TAKE WITH LASIX AS NEEDED FOR SWELLING  1   • potassium chloride ER (KLOR-CON) 10 MEQ tablet Take 1 tablet daily with Furosemide as needed for edema 30 Tab 6   • [DISCONTINUED] verapamil ER (CALAN-SR) 240 MG Tab CR Take 1 Tab by mouth every day. 90 Tab 3   • [DISCONTINUED] bisoprolol (ZEBETA) 10 MG tablet Take 1 Tab by mouth 2 Times a Day. 30 Tab 3   • gabapentin (NEURONTIN) 100 MG Cap Take 100 mg by mouth 3 times a day.       No facility-administered encounter medications on file as of 7/5/2017.     ROS     Objective:   /84 mmHg  Pulse 104  Ht 1.829 m (6' 0.01\")  Wt 130.182 kg (287 lb)  BMI 38.92 kg/m2  SpO2 95%    Physical Exam   Constitutional: He is oriented to person, place, and time. He appears well-developed and well-nourished.   HENT:   Mouth/Throat: Oropharynx is clear and moist.   Eyes: Conjunctivae and EOM are normal.   Neck: No JVD present. No thyroid mass present.   Cardiovascular: Normal rate, regular rhythm, S1 normal, S2 normal and normal pulses.  PMI is not displaced.  Exam reveals no gallop.    No murmur heard.  Pulses:       Carotid pulses are 2+ on the right side, and 2+ on the left side.       Radial pulses are 2+ on the right side, and 2+ on the left side.        Femoral pulses are 2+ on the right side, and 2+ on the left side.       Dorsalis pedis pulses are 2+ on the right side, and 2+ on the left side. "   No peripheral edema.   Pulmonary/Chest: Effort normal and breath sounds normal.   Abdominal: Soft. Normal appearance. He exhibits no abdominal bruit. There is no hepatosplenomegaly. There is no tenderness.   Musculoskeletal: Normal range of motion. He exhibits no edema.        Thoracic back: He exhibits no tenderness and no spasm.   Neurological: He is alert and oriented to person, place, and time.   Skin: No rash noted. No cyanosis. Nails show no clubbing.   Psychiatric: He has a normal mood and affect.       Assessment:     1. Dual ICD (implantable cardioverter-defibrillator) in place  Verapamil HCl  MG CAPSULE SR 24 HR   2. Hypertrophic cardiomyopathy (CMS-HCC)     3. Tobacco abuse     4. SOB (shortness of breath)     5. Syncope, unspecified syncope type     6. FH: sudden cardiac death (SCD)     7. Dizzy spells         Medical Decision Making:  Today's Assessment / Status / Plan:     1. HCM discussed genetic testing and referral to HCM clinic. He deferred.  2. Increase verapamil to 300 mg.  3. Smoking still doing.  4. F/U in 4 months.

## 2017-08-07 ENCOUNTER — OFFICE VISIT (OUTPATIENT)
Dept: INTERNAL MEDICINE | Facility: MEDICAL CENTER | Age: 47
End: 2017-08-07
Payer: MEDICAID

## 2017-08-07 VITALS
HEART RATE: 94 BPM | TEMPERATURE: 97.2 F | DIASTOLIC BLOOD PRESSURE: 80 MMHG | BODY MASS INDEX: 38.31 KG/M2 | WEIGHT: 282.8 LBS | SYSTOLIC BLOOD PRESSURE: 130 MMHG | OXYGEN SATURATION: 92 % | HEIGHT: 72 IN

## 2017-08-07 DIAGNOSIS — G47.33 OSA (OBSTRUCTIVE SLEEP APNEA): ICD-10-CM

## 2017-08-07 DIAGNOSIS — I47.29 NSVT (NONSUSTAINED VENTRICULAR TACHYCARDIA) (HCC): ICD-10-CM

## 2017-08-07 DIAGNOSIS — R73.9 HYPERGLYCEMIA: ICD-10-CM

## 2017-08-07 DIAGNOSIS — R20.0 NUMBNESS OF LEFT HAND: ICD-10-CM

## 2017-08-07 DIAGNOSIS — Z95.810 DUAL ICD (IMPLANTABLE CARDIOVERTER-DEFIBRILLATOR) IN PLACE: ICD-10-CM

## 2017-08-07 DIAGNOSIS — R55 NEAR SYNCOPE: ICD-10-CM

## 2017-08-07 DIAGNOSIS — E03.9 HYPOTHYROIDISM, UNSPECIFIED TYPE: ICD-10-CM

## 2017-08-07 DIAGNOSIS — R42 DIZZY SPELLS: ICD-10-CM

## 2017-08-07 DIAGNOSIS — E78.5 HYPERLIPIDEMIA, UNSPECIFIED HYPERLIPIDEMIA TYPE: ICD-10-CM

## 2017-08-07 DIAGNOSIS — I10 ESSENTIAL HYPERTENSION: ICD-10-CM

## 2017-08-07 DIAGNOSIS — E66.9 ADIPOSITY: ICD-10-CM

## 2017-08-07 DIAGNOSIS — R20.0 NUMBNESS OF FACE: ICD-10-CM

## 2017-08-07 DIAGNOSIS — I42.2 HYPERTROPHIC CARDIOMYOPATHY (HCC): ICD-10-CM

## 2017-08-07 DIAGNOSIS — E66.9 OBESITY (BMI 30-39.9): ICD-10-CM

## 2017-08-07 DIAGNOSIS — L03.114 CELLULITIS OF LEFT HAND: ICD-10-CM

## 2017-08-07 PROCEDURE — 99213 OFFICE O/P EST LOW 20 MIN: CPT | Mod: GE | Performed by: HOSPITALIST

## 2017-08-07 RX ORDER — GABAPENTIN 100 MG/1
100 CAPSULE ORAL 3 TIMES DAILY
Qty: 90 CAP | Refills: 1 | Status: SHIPPED | OUTPATIENT
Start: 2017-08-07 | End: 2017-10-28 | Stop reason: SDUPTHER

## 2017-08-07 NOTE — MR AVS SNAPSHOT
Trevor Kendall Carlin Lares   2017 2:15 PM   Office Visit   MRN: [id number]    Department:  [facility] Med - Internal Med   Dept Phone:  [phone]    Description:  Male : 1970   Provider:  Blake Oseguera M.D.           Reason for Visit     Follow-Up 3 month follow up      Allergies as of 2017     Allergen Noted Reactions    Tomato 09/10/2014   Hives      You were diagnosed with     Adiposity   [432441]       Cellulitis of left hand   [188138]       Dizzy spells   [431499]       Dual ICD (implantable cardioverter-defibrillator) in place   [2739209]       Near syncope   [593907]       NSVT (nonsustained ventricular tachycardia) (CMS-HCC)   [215669]       Numbness of face   [391403]       Numbness of left hand   [2735516]       JEREMIAH (obstructive sleep apnea)   [259780]       Hyperglycemia   [503903]       Essential hypertension   [1350774]       Hypertrophic cardiomyopathy (CMS-HCC)   [303581]       Hypothyroidism, unspecified type   [7925785]         Vital Signs     Blood Pressure Pulse Temperature Height Weight Body Mass Index    130/80 mmHg 94 36.2 °C (97.2 °F) 1.829 m (6') 128.277 kg (282 lb 12.8 oz) 38.35 kg/m2    Oxygen Saturation Smoking Status                92% Former Smoker          Basic Information     Date Of Birth Sex Race Ethnicity Preferred Language    1970 Male White Non- English      Your appointments     2017  2:00 PM   FOLLOW UP with Jeffrey Gillis M.D.   [facility] (--)    [address] [place] [address]  [facility] [address] [phone]            2017  1:45 PM   Established Patient with Blake Oseguera M.D.   [facility] - Internal Medicine (--)    [address] [place] [address]  [facility] [address] [phone]           You will be receiving a confirmation call a few days before your appointment from our automated call confirmation system.              Problem List              ICD-10-CM Priority Class  Noted - Resolved    Syncope R55   11/19/2012 - Present    FH: sudden cardiac death (SCD) Z82.41   7/16/2014 - Present    NSVT (nonsustained ventricular tachycardia) (CMS-HCC) I47.2   7/16/2014 - Present    Dizzy spells R42   7/16/2014 - Present    Dual ICD (implantable cardioverter-defibrillator) in place Z95.810   7/26/2014 - Present    Inguinal hernia K40.90   9/10/2014 - Present    Shoulder pain M25.519   6/23/2015 - Present    Near syncope R55 Medium  6/23/2015 - Present    JEREMIAH (obstructive sleep apnea) G47.33   5/5/2016 - Present    Hypothyroidism E03.9 Low  5/5/2016 - Present    Hyperglycemia R73.9   5/6/2016 - Present    Numbness of left hand R20.8   7/12/2016 - Present    Prediabetes R73.03   7/12/2016 - Present    SOB (shortness of breath) R06.02 High  10/24/2016 - Present    Dyslipidemia E78.5 Low  10/24/2016 - Present    Hypertrophic cardiomyopathy (CMS-HCC) I42.2   10/30/2016 - Present    Tobacco abuse Z72.0   11/3/2016 - Present    Cellulitis of left hand L03.114 High  1/15/2017 - Present    Hypertension I10   1/16/2017 - Present    Adiposity E66.9   8/7/2017 - Present    Numbness of face R20.0   8/7/2017 - Present      Health Maintenance        Date Due Completion Dates    IMM INFLUENZA (1) 9/1/2017 10/24/2016    IMM DTaP/Tdap/Td Vaccine (2 - Td) 1/16/2027 1/16/2017            Current Immunizations     Influenza Vaccine Quad Inj (Pf) 10/24/2016  3:41 PM    Pneumococcal polysaccharide vaccine (PPSV-23) 7/25/2011    Tdap Vaccine 1/16/2017 12:55 AM      Below and/or attached are the medications your provider expects you to take. Review all of your home medications and newly ordered medications with your provider and/or pharmacist. Follow medication instructions as directed by your provider and/or pharmacist. Please keep your medication list with you and share with your provider. Update the information when medications are discontinued, doses are changed, or new medications (including over-the-counter  products) are added; and carry medication information at all times in the event of emergency situations     Allergies:  TOMATO - Hives               Medications  Valid as of: August 07, 2017 -  3:16 PM    Generic Name Brand Name Tablet Size Instructions for use    Albuterol Sulfate (Aero Soln) albuterol 108 (90 BASE) MCG/ACT Inhale 2 Puffs by mouth every 6 hours as needed for Shortness of Breath.        Aspirin (Tab) aspirin 81 MG Take 81 mg by mouth every day.        Atorvastatin Calcium (Tab) LIPITOR 20 MG Take 1 Tab by mouth every day.        Furosemide (Tab) LASIX 20 MG Take 1 tablet daily as needed for edema        Gabapentin (Cap) NEURONTIN 100 MG Take 1 Cap by mouth 3 times a day. Take 200 mg at bedtime and 100 mg in the morning        Levothyroxine Sodium (Tab) SYNTHROID 125 MCG Take 1 Tab by mouth every day.        Omeprazole (Tablet Delayed Response) PRILOSEC 20 MG TAKE 1 TABLET BY MOUTH 30 MIN BEFORE FIRST MEAL        Potassium Chloride (Tab CR) KLOR-CON 10 MEQ Take 1 tablet daily with Furosemide as needed for edema        Potassium Chloride Emma CR (Tab CR) Kdur 20 MEQ TAKE 1 TABLET BY MOUTH EVERY DAY TAKE WITH LASIX AS NEEDED FOR SWELLING        Verapamil HCl (CAPSULE SR 24 HR) Verapamil HCl  MG Take 1 Cap by mouth every day.        .                 Medicines prescribed today were sent to:     [id number] - [place], [place] - [address]    [address] [facility] [address] [address]    Phone: [phone] Fax: [phone]    Open 24 Hours?: No      Medication refill instructions:       If your prescription bottle indicates you have medication refills left, it is not necessary to call your provider’s office. Please contact your pharmacy and they will refill your medication.    If your prescription bottle indicates you do not have any refills left, you may request refills at any time through one of the following ways: The online [employer] system (except Urgent Care), by calling your  provider’s office, or by asking your pharmacy to contact your provider’s office with a refill request. Medication refills are processed only during regular business hours and may not be available until the next business day. Your provider may request additional information or to have a follow-up visit with you prior to refilling your medication.   *Please Note: Medication refills are assigned a new Rx number when refilled electronically. Your pharmacy may indicate that no refills were authorized even though a new prescription for the same medication is available at the pharmacy. Please request the medicine by name with the pharmacy before contacting your provider for a refill.           MyChart Status: Patient Declined

## 2017-08-08 PROBLEM — E66.9 OBESITY (BMI 30-39.9): Status: ACTIVE | Noted: 2017-08-08

## 2017-08-08 NOTE — PROGRESS NOTES
Established Patient    Trevor presents today with the following:    CC: Follow-up on chronic medical problems, headache, ongoing left-sided numbness    HPI:     ID: 46-year-old gentleman with past medical history of nonobstructive hypertrophic cardiomyopathy, obstructive sleep apnea, currently not on CPAP, dyslipidemia, hypertension, anemia, gastroesophageal reflux disease,history of traumatic knees and head trauma in elarly aldolesent, history of ongoing intermittent left arm/leg and left side of face numbness came for follow-up.      Near syncope  Dizzy spells and headaches  History of intermittent lightheadedness with near syncope.  Recently discharged from the hospital after complete workup and cardiology evaluation  Still complains of INTERMITTENT PRESYNCOPE BUT IMPROVED SINCE last visit    Patient has warning sign and he stabilizes himself by supporting himself by the wall  Patient never passed out, never fall  patient did not have any episode while driving: He is very careful and asks for help for driving because of recent headaches    Denies palpitation, any, seizures, dehydration, chest pain, blurry vision, tinnitus, vertigo or hearing deficit or speech problems,    Recently evaluated by neurology, couple of times  MRI cannot be done because of device, neurology and cardiology working To Change the Device Versus Referral to [place]      Hypertrophic nonobstructive cardiomyopathy (HCC)  Hypertrophic cardiomyopathy (HCC)  Shortness of breath  Family history of sudden cardiac death due to hypertrophic cardiomyopathy.  Head AICD in 2014 because of ventricular tachycardia  Has been having intermittent shortness of breath for last one jacy which is often on exertion and intermittent disturbance which could be from 30 feet to a mile---Denies orthopnea/PND, chest pain  Device was checked on 11/28 with intermittent  V. tach   had stress echo after optimal beta-blockade with bisoprolol  Follows Dr. Smith,  last visit earlier this month  Getting genetic testing, only child is her daughter who just established with Dr. Gillis  Currently on verapamil 300, Lasix 20 as needed  MRI cannot be done because of device, neurology and cardiology working To Change the Device Versus Referral to [place]      Tobacco abuse   H/O wheezing  SOB (shortness of breath)  JEREMIAH (obstructive sleep apnea)  Has been having intermittent shortness of breath for last 6-8 months which is often on exertion and intermittent disturbance which could be from 30 feet to a mile-today's shortness of breath is significantly improved-  Was diagnosed with obstructive sleep apnea and had sleep studies one and half years ago and was prescribed CPAP but at that time he did not get CPAP because of insurance declined  He has been intermittently quitting smoking and then relapsing, currently quit smoking for last few month  He uses albuterol as needed and his breathing is better, not wheezing any more  PFT done recently which were unremarkable  Sleep study still pending      Numbness in both hands  History of numbness of left arm and left side of face  History of numbness of bilateral feet  He endorses numbness in both hands which is intermittent  He was admitted in the hospital recently for left upper extremity and left side of face numbness few months ago  CAT scan was done which was unremarkable---MRI was not done because of device  He was started on gabapentin and numbness is better    Recently evaluated by neurology, couple of times  MRI cannot be done because of device, neurology and cardiology working To Change the Device Versus Referral to [place]       Hypothyroidism, unspecified type  Compliant with medication   denies heat intolerance, cold intolerance, weight loss or weight gain      Anemia    currently on iron replacement  Denies black-colored stool, bloody diarrhea  History of esophagitis and is currently on omeprazole, denies heartburns, intermittent  chest pain, odynophagia or dysphagia  Hemoglobin is stable  He had EGD, following gastroenterologist    Obesity  counseled about diet and weight loss, verbalized understanding    Health Care Maintenenace  Per patient he received pneumonia shot and flu shot in the hospital        Patient Active Problem List    Diagnosis Date Noted   • Cellulitis of left hand 01/15/2017     Priority: High   • SOB (shortness of breath) 10/24/2016     Priority: High   • Near syncope 06/23/2015     Priority: Medium   • Dyslipidemia 10/24/2016     Priority: Low   • Hypothyroidism 05/05/2016     Priority: Low   • Adiposity 08/07/2017   • Numbness of face 08/07/2017   • Hypertension 01/16/2017   • Tobacco abuse 11/03/2016   • Hypertrophic cardiomyopathy (CMS-HCC) 10/30/2016   • Numbness of left hand 07/12/2016   • Prediabetes 07/12/2016   • Hyperglycemia 05/06/2016   • JEREMIAH (obstructive sleep apnea) 05/05/2016   • Shoulder pain 06/23/2015   • Inguinal hernia 09/10/2014   • Dual ICD (implantable cardioverter-defibrillator) in place 07/26/2014   • FH: sudden cardiac death (SCD) 07/16/2014   • NSVT (nonsustained ventricular tachycardia) (CMS-HCC) 07/16/2014   • Dizzy spells 07/16/2014   • Syncope 11/19/2012       Current Outpatient Prescriptions   Medication Sig Dispense Refill   • gabapentin (NEURONTIN) 100 MG Cap Take 1 Cap by mouth 3 times a day. Take 200 mg at bedtime and 100 mg in the morning 90 Cap 1   • omeprazole (PRILOSEC) 20 MG Tablet Delayed Response delayed-release tablet TAKE 1 TABLET BY MOUTH 30 MIN BEFORE FIRST MEAL  11   • potassium chloride SA (KDUR) 20 MEQ Tab CR TAKE 1 TABLET BY MOUTH EVERY DAY TAKE WITH LASIX AS NEEDED FOR SWELLING  1   • aspirin 81 MG tablet Take 81 mg by mouth every day.     • Verapamil HCl  MG CAPSULE SR 24 HR Take 1 Cap by mouth every day. 90 Cap 3   • potassium chloride ER (KLOR-CON) 10 MEQ tablet Take 1 tablet daily with Furosemide as needed for edema 30 Tab 6   • furosemide (LASIX) 20 MG Tab  Take 1 tablet daily as needed for edema 30 Tab 6   • levothyroxine (SYNTHROID) 125 MCG Tab Take 1 Tab by mouth every day. 30 Tab 1   • albuterol 108 (90 BASE) MCG/ACT Aero Soln inhalation aerosol Inhale 2 Puffs by mouth every 6 hours as needed for Shortness of Breath. 1 Inhaler 3   • atorvastatin (LIPITOR) 20 MG Tab Take 1 Tab by mouth every day. 90 Tab 4     No current facility-administered medications for this visit.       ROS: As per HPI. Additional pertinent symptoms as noted below.    GI: Denies nausea vomiting and abdominal discomfort  Genitourinary: Denies dysuria, hematuria, suprapubic discomfort  Neurological: Denies any focal weakness   Musculoskeletal: Denies arthritis swelling  Psychiatric: Denies any suicidal or homicidal ideation      /80 mmHg  Pulse 94  Temp(Src) 36.2 °C (97.2 °F)  Ht 1.829 m (6')  Wt 128.277 kg (282 lb 12.8 oz)  BMI 38.35 kg/m2  SpO2 92%    Physical Exam   Constitutional:  oriented to person, place, and time. No distress.   Eyes: Pupils are equal, round, and reactive to light. No scleral icterus.  Neck: Neck supple. No thyromegaly present.   Cardiovascular: ESM, Normal rate, regular rhythm.  Exam reveals no gallop and no friction rub.  Pulmonary/Chest: Breath sounds normal. Chest wall is not dull to percussion.   Musculoskeletal:   no edema.   Lymphadenopathy: no cervical adenopathy  Neurological: alert and oriented to person, place, and time.   Skin: No cyanosis. Nails show no clubbing.      Note: I have reviewed all pertinent labs and diagnostic tests associated with this visit with specific comments listed under the assessment and plan below    Assessment and Plan    Near syncope/Dizzy spells and headaches  Hypertrophic nonobstructive cardiomyopathy (HCC)  Shortness of breath/Tobacco abuse/H/O wheezing  JEREMIAH (obstructive sleep apnea)  Numbness in both hands/History of numbness of left arm and left side of face/History of numbness of bilateral feet   Hypothyroidism,  unspecified type  Anemia    Obesity  Health Care Maintenenace      Patient headache is not well controlled, and he cannot get MRI because of device problem: Neurology and cardiology are working to change the device versus transferred to [place]  Will give refill on gabapentin for numbness/tingling  Rate control medication and Lasix per cardiology  Will get a lipid panel  Continue aspirin and atorvastatin  Refer for colonoscopy    Followup: Follow-up in 15 weeks    Signed by: Blake Oseguera M.D.

## 2017-09-19 DIAGNOSIS — I42.2 HYPERTROPHIC NONOBSTRUCTIVE CARDIOMYOPATHY (HCC): ICD-10-CM

## 2017-09-19 RX ORDER — ATORVASTATIN CALCIUM 20 MG/1
20 TABLET, FILM COATED ORAL DAILY
Qty: 90 TAB | Refills: 0 | Status: SHIPPED | OUTPATIENT
Start: 2017-09-19 | End: 2018-04-09 | Stop reason: SDUPTHER

## 2017-09-19 NOTE — TELEPHONE ENCOUNTER
Last seen: 08/07/17 by Dr. Oseguera  Next appt: 11/20/17 with Dr. Oseguera    Was the patient seen in the last year in this department? Yes   Does patient have an active prescription for medications requested? No   Received Request Via: Pharmacy

## 2017-11-06 ENCOUNTER — OFFICE VISIT (OUTPATIENT)
Dept: CARDIOLOGY | Facility: MEDICAL CENTER | Age: 47
End: 2017-11-06
Payer: MEDICAID

## 2017-11-06 VITALS
SYSTOLIC BLOOD PRESSURE: 128 MMHG | DIASTOLIC BLOOD PRESSURE: 90 MMHG | HEART RATE: 91 BPM | BODY MASS INDEX: 37.25 KG/M2 | OXYGEN SATURATION: 96 % | HEIGHT: 72 IN | WEIGHT: 275 LBS

## 2017-11-06 DIAGNOSIS — E78.00 PURE HYPERCHOLESTEROLEMIA: ICD-10-CM

## 2017-11-06 DIAGNOSIS — E66.9 OBESITY (BMI 30-39.9): ICD-10-CM

## 2017-11-06 DIAGNOSIS — I42.2 HYPERTROPHIC CARDIOMYOPATHY (HCC): ICD-10-CM

## 2017-11-06 DIAGNOSIS — Z72.0 TOBACCO ABUSE: ICD-10-CM

## 2017-11-06 DIAGNOSIS — Z95.810 DUAL ICD (IMPLANTABLE CARDIOVERTER-DEFIBRILLATOR) IN PLACE: ICD-10-CM

## 2017-11-06 DIAGNOSIS — I47.29 NSVT (NONSUSTAINED VENTRICULAR TACHYCARDIA) (HCC): ICD-10-CM

## 2017-11-06 DIAGNOSIS — R55 SYNCOPE, UNSPECIFIED SYNCOPE TYPE: ICD-10-CM

## 2017-11-06 DIAGNOSIS — Z82.41 FH: SUDDEN CARDIAC DEATH (SCD): ICD-10-CM

## 2017-11-06 PROBLEM — L03.114 CELLULITIS OF LEFT HAND: Status: RESOLVED | Noted: 2017-01-15 | Resolved: 2017-11-06

## 2017-11-06 PROCEDURE — 99214 OFFICE O/P EST MOD 30 MIN: CPT | Mod: 25 | Performed by: INTERNAL MEDICINE

## 2017-11-06 PROCEDURE — 93283 PRGRMG EVAL IMPLANTABLE DFB: CPT | Performed by: INTERNAL MEDICINE

## 2017-11-06 NOTE — LETTER
[facility]   [address] [facility]  [place] [address]  Phone: [phone]  Fax: [phone]              Trevor Gillis Jr.  1970    Encounter Date: 11/6/2017    Jeffrey Gillis M.D.          PROGRESS NOTE:  Subjective:   Trevor Gillis Jr. is a 47 y.o. male who presents today with HOCM with AICD. Some NSVT. No syncope. On verapamil. On statins and thyroid replacement. Mild dizzy spells on standing. No syncope.    Past Medical History:   Diagnosis Date   • Dyslipidemia    • Fracture of left clavicle    • Hernia    • HTN (hypertension)    • Hypertrophic cardiomyopathy (CMS-HCC)    • JEREMIAH (obstructive sleep apnea)    • Pacemaker 2014   • Syncope 11/19/2012     Past Surgical History:   Procedure Laterality Date   • IRRIGATION & DEBRIDEMENT GENERAL Left 1/16/2017    Procedure: IRRIGATION & DEBRIDEMENT GENERAL-INDEX FINGER;  Surgeon: Kia Aldridge M.D.;  Location: SURGERY [facility];  Service:    • INGUINAL HERNIA REPAIR  9/10/2014    Performed by Christie Ott M.D. at [facility]   • RECOVERY  7/30/2014    Performed by Cath-Recovery Surgery at [facility]   • OTHER CARDIAC SURGERY  7/25/14    AICD   • RECOVERY  7/25/2014    Performed by Cath-Recovery Surgery [facility]   • APPENDECTOMY     • TONSILLECTOMY       Family History   Problem Relation Age of Onset   • Other Mother      Accident   • Stroke Father    • Heart Attack Father      MI at age 24?   • Heart Disease Paternal Grandfather    • Hypertension Paternal Grandmother    • Diabetes Paternal Grandmother      History   Smoking Status   • Former Smoker   • Packs/day: 1.00   • Years: 25.00   • Types: Cigarettes   Smokeless Tobacco   • Never Used     Comment: 07/07/17 quit smoking     Allergies   Allergen Reactions   • Tomato Hives     Outpatient Encounter Prescriptions as of 11/6/2017   Medication Sig Dispense Refill   • gabapentin  "(NEURONTIN) 100 MG Cap TAKE 1 CAP BY MOUTH 3 TIMES A DAY. TAKE 200 MG AT BEDTIME  MG IN THE MORNING 90 Cap 0   • atorvastatin (LIPITOR) 20 MG Tab Take 1 Tab by mouth every day. 90 Tab 0   • omeprazole (PRILOSEC) 20 MG Tablet Delayed Response delayed-release tablet TAKE 1 TABLET BY MOUTH 30 MIN BEFORE FIRST MEAL  11   • Verapamil HCl  MG CAPSULE SR 24 HR Take 1 Cap by mouth every day. 90 Cap 3   • furosemide (LASIX) 20 MG Tab Take 1 tablet daily as needed for edema 30 Tab 6   • albuterol 108 (90 BASE) MCG/ACT Aero Soln inhalation aerosol Inhale 2 Puffs by mouth every 6 hours as needed for Shortness of Breath. 1 Inhaler 3   • potassium chloride SA (KDUR) 20 MEQ Tab CR TAKE 1 TABLET BY MOUTH EVERY DAY TAKE WITH LASIX AS NEEDED FOR SWELLING  1   • aspirin 81 MG tablet Take 81 mg by mouth every day.     • potassium chloride ER (KLOR-CON) 10 MEQ tablet Take 1 tablet daily with Furosemide as needed for edema 30 Tab 6   • levothyroxine (SYNTHROID) 125 MCG Tab Take 1 Tab by mouth every day. 30 Tab 1     No facility-administered encounter medications on file as of 11/6/2017.      ROS     Objective:   /90   Pulse 91   Ht 1.829 m (6' 0.01\")   Wt 124.7 kg (275 lb)   SpO2 96%   BMI 37.29 kg/m²      Physical Exam   Constitutional: He is oriented to person, place, and time. He appears well-developed. No distress.   HENT:   Mouth/Throat: Mucous membranes are normal.   Eyes: Conjunctivae and EOM are normal.   Neck: No JVD present. No tracheal deviation present. No thyroid mass and no thyromegaly present.   Cardiovascular: Normal rate, regular rhythm and intact distal pulses.    Murmur heard.  Pulmonary/Chest: Effort normal and breath sounds normal. No respiratory distress. He exhibits no tenderness.   Abdominal: Soft. There is no tenderness.   Musculoskeletal: Normal range of motion. He exhibits no edema.   Neurological: He is alert and oriented to person, place, and time. He has normal strength. He displays " no tremor.   Skin: Skin is warm and dry. He is not diaphoretic.   Psychiatric: He has a normal mood and affect. His behavior is normal.   Vitals reviewed.      Assessment:     1. Pure hypercholesterolemia  COMP METABOLIC PANEL    TSH WITH REFLEX TO FT4    LIPID PROFILE   2. NSVT (nonsustained ventricular tachycardia) (CMS-HCC)     3. Hypertrophic cardiomyopathy (CMS-HCC)     4. FH: sudden cardiac death (SCD)     5. Syncope, unspecified syncope type     6. Tobacco abuse     7. Obesity (BMI 30-39.9)     8. Dual ICD (implantable cardioverter-defibrillator) in place         Medical Decision Making:  Today's Assessment / Status / Plan:   1. HOCM continue verapamil.  2. HLD check labs on statins.  3. Hypothyroidism on replacement. Check labs.  4. Genetic testing he does not want.      Blake Oseguera M.D.  [address] [address]  VIA In Basket

## 2017-11-06 NOTE — PROGRESS NOTES
Subjective:   Trevor Gillis Jr. is a 47 y.o. male who presents today with HOCM with AICD. Some NSVT. No syncope. On verapamil. On statins and thyroid replacement. Mild dizzy spells on standing. No syncope.    Past Medical History:   Diagnosis Date   • Dyslipidemia    • Fracture of left clavicle    • Hernia    • HTN (hypertension)    • Hypertrophic cardiomyopathy (CMS-HCC)    • JEREMIAH (obstructive sleep apnea)    • Pacemaker 2014   • Syncope 11/19/2012     Past Surgical History:   Procedure Laterality Date   • IRRIGATION & DEBRIDEMENT GENERAL Left 1/16/2017    Procedure: IRRIGATION & DEBRIDEMENT GENERAL-INDEX FINGER;  Surgeon: Kia Aldridge M.D.;  Location: SURGERY [facility];  Service:    • INGUINAL HERNIA REPAIR  9/10/2014    Performed by Christie Ott M.D. at SURGERY SAME DAY [facility]   • RECOVERY  7/30/2014    Performed by Cath-Recovery Surgery at SURGERY SAME DAY [facility]   • OTHER CARDIAC SURGERY  7/25/14    AICD   • RECOVERY  7/25/2014    Performed by Cath-Recovery Surgery at SURGERY SAME DAY [facility]   • APPENDECTOMY     • TONSILLECTOMY       Family History   Problem Relation Age of Onset   • Other Mother      Accident   • Stroke Father    • Heart Attack Father      MI at age 24?   • Heart Disease Paternal Grandfather    • Hypertension Paternal Grandmother    • Diabetes Paternal Grandmother      History   Smoking Status   • Former Smoker   • Packs/day: 1.00   • Years: 25.00   • Types: Cigarettes   Smokeless Tobacco   • Never Used     Comment: 07/07/17 quit smoking     Allergies   Allergen Reactions   • Tomato Hives     Outpatient Encounter Prescriptions as of 11/6/2017   Medication Sig Dispense Refill   • gabapentin (NEURONTIN) 100 MG Cap TAKE 1 CAP BY MOUTH 3 TIMES A DAY. TAKE 200 MG AT BEDTIME  MG IN THE MORNING 90 Cap 0   • atorvastatin (LIPITOR) 20 MG Tab Take 1 Tab by mouth every day. 90 Tab 0   • omeprazole (PRILOSEC) 20 MG Tablet Delayed Response delayed-release tablet  "TAKE 1 TABLET BY MOUTH 30 MIN BEFORE FIRST MEAL  11   • Verapamil HCl  MG CAPSULE SR 24 HR Take 1 Cap by mouth every day. 90 Cap 3   • furosemide (LASIX) 20 MG Tab Take 1 tablet daily as needed for edema 30 Tab 6   • albuterol 108 (90 BASE) MCG/ACT Aero Soln inhalation aerosol Inhale 2 Puffs by mouth every 6 hours as needed for Shortness of Breath. 1 Inhaler 3   • potassium chloride SA (KDUR) 20 MEQ Tab CR TAKE 1 TABLET BY MOUTH EVERY DAY TAKE WITH LASIX AS NEEDED FOR SWELLING  1   • aspirin 81 MG tablet Take 81 mg by mouth every day.     • potassium chloride ER (KLOR-CON) 10 MEQ tablet Take 1 tablet daily with Furosemide as needed for edema 30 Tab 6   • levothyroxine (SYNTHROID) 125 MCG Tab Take 1 Tab by mouth every day. 30 Tab 1     No facility-administered encounter medications on file as of 11/6/2017.      ROS     Objective:   /90   Pulse 91   Ht 1.829 m (6' 0.01\")   Wt 124.7 kg (275 lb)   SpO2 96%   BMI 37.29 kg/m²     Physical Exam   Constitutional: He is oriented to person, place, and time. He appears well-developed. No distress.   HENT:   Mouth/Throat: Mucous membranes are normal.   Eyes: Conjunctivae and EOM are normal.   Neck: No JVD present. No tracheal deviation present. No thyroid mass and no thyromegaly present.   Cardiovascular: Normal rate, regular rhythm and intact distal pulses.    Murmur heard.  Pulmonary/Chest: Effort normal and breath sounds normal. No respiratory distress. He exhibits no tenderness.   Abdominal: Soft. There is no tenderness.   Musculoskeletal: Normal range of motion. He exhibits no edema.   Neurological: He is alert and oriented to person, place, and time. He has normal strength. He displays no tremor.   Skin: Skin is warm and dry. He is not diaphoretic.   Psychiatric: He has a normal mood and affect. His behavior is normal.   Vitals reviewed.      Assessment:     1. Pure hypercholesterolemia  COMP METABOLIC PANEL    TSH WITH REFLEX TO FT4    LIPID PROFILE   2. " NSVT (nonsustained ventricular tachycardia) (CMS-HCC)     3. Hypertrophic cardiomyopathy (CMS-HCC)     4. FH: sudden cardiac death (SCD)     5. Syncope, unspecified syncope type     6. Tobacco abuse     7. Obesity (BMI 30-39.9)     8. Dual ICD (implantable cardioverter-defibrillator) in place         Medical Decision Making:  Today's Assessment / Status / Plan:   1. HOCM continue verapamil.  2. HLD check labs on statins.  3. Hypothyroidism on replacement. Check labs.  4. Genetic testing he does not want.

## 2017-11-20 ENCOUNTER — HOSPITAL ENCOUNTER (OUTPATIENT)
Dept: LAB | Facility: MEDICAL CENTER | Age: 47
End: 2017-11-20
Attending: STUDENT IN AN ORGANIZED HEALTH CARE EDUCATION/TRAINING PROGRAM
Payer: MEDICAID

## 2017-11-20 ENCOUNTER — HOSPITAL ENCOUNTER (OUTPATIENT)
Dept: RADIOLOGY | Facility: MEDICAL CENTER | Age: 47
End: 2017-11-20
Attending: STUDENT IN AN ORGANIZED HEALTH CARE EDUCATION/TRAINING PROGRAM
Payer: MEDICAID

## 2017-11-20 ENCOUNTER — OFFICE VISIT (OUTPATIENT)
Dept: INTERNAL MEDICINE | Facility: MEDICAL CENTER | Age: 47
End: 2017-11-20
Payer: MEDICAID

## 2017-11-20 VITALS
OXYGEN SATURATION: 93 % | HEART RATE: 84 BPM | DIASTOLIC BLOOD PRESSURE: 92 MMHG | BODY MASS INDEX: 37.84 KG/M2 | SYSTOLIC BLOOD PRESSURE: 118 MMHG | HEIGHT: 72 IN | WEIGHT: 279.38 LBS | TEMPERATURE: 97 F

## 2017-11-20 DIAGNOSIS — Z95.810 DUAL ICD (IMPLANTABLE CARDIOVERTER-DEFIBRILLATOR) IN PLACE: ICD-10-CM

## 2017-11-20 DIAGNOSIS — E03.2 HYPOTHYROIDISM DUE TO MEDICATION: ICD-10-CM

## 2017-11-20 DIAGNOSIS — J02.9 SORE THROAT: ICD-10-CM

## 2017-11-20 DIAGNOSIS — E66.9 OBESITY (BMI 30-39.9): ICD-10-CM

## 2017-11-20 DIAGNOSIS — R55 NEAR SYNCOPE: ICD-10-CM

## 2017-11-20 DIAGNOSIS — I10 ESSENTIAL HYPERTENSION: ICD-10-CM

## 2017-11-20 DIAGNOSIS — I42.2 HYPERTROPHIC CARDIOMYOPATHY (HCC): ICD-10-CM

## 2017-11-20 DIAGNOSIS — J40 BRONCHITIS: ICD-10-CM

## 2017-11-20 DIAGNOSIS — G47.33 OSA (OBSTRUCTIVE SLEEP APNEA): ICD-10-CM

## 2017-11-20 LAB
ALBUMIN SERPL BCP-MCNC: 4 G/DL (ref 3.2–4.9)
ALBUMIN/GLOB SERPL: 1.3 G/DL
ALP SERPL-CCNC: 102 U/L (ref 30–99)
ALT SERPL-CCNC: 16 U/L (ref 2–50)
ANION GAP SERPL CALC-SCNC: 7 MMOL/L (ref 0–11.9)
AST SERPL-CCNC: 14 U/L (ref 12–45)
BASOPHILS # BLD AUTO: 0.8 % (ref 0–1.8)
BASOPHILS # BLD: 0.03 K/UL (ref 0–0.12)
BILIRUB SERPL-MCNC: 0.5 MG/DL (ref 0.1–1.5)
BUN SERPL-MCNC: 11 MG/DL (ref 8–22)
CALCIUM SERPL-MCNC: 9.4 MG/DL (ref 8.5–10.5)
CHLORIDE SERPL-SCNC: 104 MMOL/L (ref 96–112)
CO2 SERPL-SCNC: 27 MMOL/L (ref 20–33)
CREAT SERPL-MCNC: 1.08 MG/DL (ref 0.5–1.4)
EOSINOPHIL # BLD AUTO: 0.24 K/UL (ref 0–0.51)
EOSINOPHIL NFR BLD: 6.4 % (ref 0–6.9)
ERYTHROCYTE [DISTWIDTH] IN BLOOD BY AUTOMATED COUNT: 44.9 FL (ref 35.9–50)
GFR SERPL CREATININE-BSD FRML MDRD: >60 ML/MIN/1.73 M 2
GLOBULIN SER CALC-MCNC: 3.2 G/DL (ref 1.9–3.5)
GLUCOSE SERPL-MCNC: 86 MG/DL (ref 65–99)
HCT VFR BLD AUTO: 42.7 % (ref 42–52)
HGB BLD-MCNC: 14.2 G/DL (ref 14–18)
IMM GRANULOCYTES # BLD AUTO: 0.01 K/UL (ref 0–0.11)
IMM GRANULOCYTES NFR BLD AUTO: 0.3 % (ref 0–0.9)
INT CON NEG: NEGATIVE
INT CON POS: POSITIVE
LYMPHOCYTES # BLD AUTO: 1.72 K/UL (ref 1–4.8)
LYMPHOCYTES NFR BLD: 46 % (ref 22–41)
MCH RBC QN AUTO: 28.8 PG (ref 27–33)
MCHC RBC AUTO-ENTMCNC: 33.3 G/DL (ref 33.7–35.3)
MCV RBC AUTO: 86.6 FL (ref 81.4–97.8)
MONOCYTES # BLD AUTO: 0.37 K/UL (ref 0–0.85)
MONOCYTES NFR BLD AUTO: 9.9 % (ref 0–13.4)
NEUTROPHILS # BLD AUTO: 1.37 K/UL (ref 1.82–7.42)
NEUTROPHILS NFR BLD: 36.6 % (ref 44–72)
NRBC # BLD AUTO: 0 K/UL
NRBC BLD AUTO-RTO: 0 /100 WBC
PLATELET # BLD AUTO: 265 K/UL (ref 164–446)
PMV BLD AUTO: 10 FL (ref 9–12.9)
POTASSIUM SERPL-SCNC: 4.1 MMOL/L (ref 3.6–5.5)
PROT SERPL-MCNC: 7.2 G/DL (ref 6–8.2)
RBC # BLD AUTO: 4.93 M/UL (ref 4.7–6.1)
S PYO AG THROAT QL: NEGATIVE
SODIUM SERPL-SCNC: 138 MMOL/L (ref 135–145)
T4 FREE SERPL-MCNC: 0.78 NG/DL (ref 0.53–1.43)
TSH SERPL DL<=0.005 MIU/L-ACNC: 6.71 UIU/ML (ref 0.3–3.7)
WBC # BLD AUTO: 3.7 K/UL (ref 4.8–10.8)

## 2017-11-20 PROCEDURE — 80053 COMPREHEN METABOLIC PANEL: CPT

## 2017-11-20 PROCEDURE — 84439 ASSAY OF FREE THYROXINE: CPT

## 2017-11-20 PROCEDURE — 85025 COMPLETE CBC W/AUTO DIFF WBC: CPT

## 2017-11-20 PROCEDURE — 36415 COLL VENOUS BLD VENIPUNCTURE: CPT

## 2017-11-20 PROCEDURE — 87880 STREP A ASSAY W/OPTIC: CPT | Performed by: INTERNAL MEDICINE

## 2017-11-20 PROCEDURE — 99214 OFFICE O/P EST MOD 30 MIN: CPT | Mod: GC | Performed by: INTERNAL MEDICINE

## 2017-11-20 PROCEDURE — 84443 ASSAY THYROID STIM HORMONE: CPT

## 2017-11-20 PROCEDURE — 71020 DX-CHEST-2 VIEWS: CPT

## 2017-11-20 RX ORDER — DOXYCYCLINE HYCLATE 100 MG
100 TABLET ORAL 2 TIMES DAILY
Qty: 10 TAB | Refills: 0 | Status: SHIPPED | OUTPATIENT
Start: 2017-11-20 | End: 2017-11-21 | Stop reason: RX

## 2017-11-20 RX ORDER — LEVOTHYROXINE SODIUM 0.03 MG/1
25 TABLET ORAL
Qty: 30 TAB | Refills: 1 | Status: SHIPPED | OUTPATIENT
Start: 2017-11-20 | End: 2018-02-10 | Stop reason: SDUPTHER

## 2017-11-20 RX ORDER — GUAIFENESIN AND DEXTROMETHORPHAN HYDROBROMIDE 100; 10 MG/5ML; MG/5ML
10 SOLUTION ORAL EVERY 6 HOURS PRN
Qty: 840 ML | Refills: 0 | Status: SHIPPED | OUTPATIENT
Start: 2017-11-20 | End: 2017-12-26

## 2017-11-20 RX ORDER — LORATADINE 10 MG/1
10 TABLET ORAL DAILY
Qty: 15 TAB | Refills: 0 | Status: SHIPPED | OUTPATIENT
Start: 2017-11-20 | End: 2018-01-08 | Stop reason: SDUPTHER

## 2017-11-20 RX ORDER — LEVOTHYROXINE SODIUM 0.12 MG/1
125 TABLET ORAL DAILY
Qty: 90 TAB | Refills: 1 | Status: CANCELLED | OUTPATIENT
Start: 2017-11-20

## 2017-11-20 RX ORDER — AMOXICILLIN 500 MG/1
500 CAPSULE ORAL 2 TIMES DAILY
Qty: 14 CAP | Refills: 0 | Status: CANCELLED | OUTPATIENT
Start: 2017-11-20 | End: 2017-11-27

## 2017-11-20 RX ORDER — ALBUTEROL SULFATE 90 UG/1
2 AEROSOL, METERED RESPIRATORY (INHALATION) EVERY 6 HOURS PRN
Qty: 1 INHALER | Refills: 3 | Status: SHIPPED | OUTPATIENT
Start: 2017-11-20 | End: 2018-04-09 | Stop reason: SDUPTHER

## 2017-11-20 RX ORDER — FLUTICASONE PROPIONATE 50 MCG
1 SPRAY, SUSPENSION (ML) NASAL DAILY
Qty: 16 G | Refills: 1 | Status: SHIPPED | OUTPATIENT
Start: 2017-11-20 | End: 2018-04-09 | Stop reason: SDUPTHER

## 2017-11-20 ASSESSMENT — PATIENT HEALTH QUESTIONNAIRE - PHQ9: CLINICAL INTERPRETATION OF PHQ2 SCORE: 0

## 2017-11-20 NOTE — PROGRESS NOTES
Established Patient    Trevor presents today with the following:      CC: Cough, sinus/nasal congestion     HPI:      ID: 46-year-old gentleman with past medical history of nonobstructive hypertrophic cardiomyopathy, obstructive sleep apnea, currently not on CPAP, dyslipidemia, hypertension, anemia, gastroesophageal reflux disease,history of traumatic knees and head trauma in elarly aldolesent, history of ongoing intermittent left arm/leg and left side of face numbness came for follow-up.     Acute Bronchitis  Patient presented with one-week history of cough, now clear phlegm, nasal stuffiness  Denies sick contacts or travel  Denies fever but positive for headache and myalgias     Near syncope  Dizzy spells and headaches  History of intermittent lightheadedness with near syncope.   discharged from the hospital after complete workup and cardiology evaluation  Still complains of INTERMITTENT PRESYNCOPE BUT IMPROVED SINCE last visit    Patient has warning sign and he stabilizes himself by supporting himself by the wall  Patient never passed out, never fall  patient did not have any episode while driving: He is very careful and asks for help for driving because of recent headaches    Denies palpitation, any, seizures, dehydration, chest pain, blurry vision, tinnitus, vertigo or hearing deficit or speech problems,    Recently evaluated by neurology, couple of times  MRI cannot be done because of device, neurology and cardiology working To Change the Device Versus Referral to [place]        Hypertrophic nonobstructive cardiomyopathy (HCC)  Hypertrophic cardiomyopathy (HCC)  Shortness of breath  Family history of sudden cardiac death due to hypertrophic cardiomyopathy.  Head AICD in 2014 because of ventricular tachycardia  Has been having intermittent shortness of breath for last one jacy which is often on exertion and intermittent disturbance which could be from 30 feet to a mile---Denies orthopnea/PND, chest  pain  Device was checked on 11/28 with intermittent  V. tach   had stress echo after optimal beta-blockade with bisoprolol  Follows Dr. Gillis, last visit earlier this month  Getting genetic testing, only child is her daughter who just established with Dr. Gillis  Currently on verapamil 300, Lasix 20 as needed  MRI cannot be done because of device, neurology and cardiology working To Change the Device Versus Referral to [place]        Tobacco abuse   H/O wheezing  SOB (shortness of breath)  JEREMIAH (obstructive sleep apnea)  Has been having intermittent shortness of breath for last 10 months which is often on exertion and intermittent disturbance which could be from 30 feet to a mile-today's shortness of breath is significantly improved-  Was diagnosed with obstructive sleep apnea and had sleep studies one and half years ago and was prescribed CPAP but at that time he did not get CPAP because of insurance declined  He has been intermittently quitting smoking and then relapsing, currently quit smoking for last few month  He uses albuterol as needed and his breathing is better, not wheezing any more  PFT done recently which were unremarkable  Sleep study still pending        Numbness in both hands  History of numbness of left arm and left side of face  History of numbness of bilateral feet  He endorses numbness in both hands which is intermittent  He was admitted in the hospital  for left upper extremity and left side of face numbness few months ago  CAT scan was done which was unremarkable---MRI was not done because of device  He was started on gabapentin and numbness is better    Recently evaluated by neurology, couple of times  MRI cannot be done because of device, neurology and cardiology working To Change the Device Versus Referral to [place]        Hypothyroidism, unspecified type  he ran out of his medication for last 5 months   denies heat intolerance, cold intolerance, weight loss or weight gain        Anemia     currently not on iron replacement  Denies black-colored stool, bloody diarrhea  History of esophagitis and is currently on omeprazole, denies heartburns, intermittent chest pain, odynophagia or dysphagia  Hemoglobin is stable  He had EGD, following gastroenterologist     Obesity  counseled about diet and weight loss, verbalized understanding     Health Care Maintenenace  Require flu shot and pneumonia shot and colonoscopy: Will defer until next visit due to acute bronchitis    Patient Active Problem List    Diagnosis Date Noted   • SOB (shortness of breath) 10/24/2016     Priority: High   • Near syncope 06/23/2015     Priority: Medium   • Dyslipidemia 10/24/2016     Priority: Low   • Hypothyroidism 05/05/2016     Priority: Low   • Bronchitis 11/20/2017   • Obesity (BMI 30-39.9) 08/08/2017   • Numbness of face 08/07/2017   • Hypertension 01/16/2017   • Tobacco abuse 11/03/2016   • Hypertrophic cardiomyopathy (CMS-HCC) 10/30/2016   • Numbness of left hand 07/12/2016   • Prediabetes 07/12/2016   • Hyperglycemia 05/06/2016   • JEREMIAH (obstructive sleep apnea) 05/05/2016   • Shoulder pain 06/23/2015   • Inguinal hernia 09/10/2014   • Dual ICD (implantable cardioverter-defibrillator) in place 07/26/2014   • FH: sudden cardiac death (SCD) 07/16/2014   • NSVT (nonsustained ventricular tachycardia) (CMS-HCC) 07/16/2014   • Dizzy spells 07/16/2014   • Syncope 11/19/2012       Current Outpatient Prescriptions   Medication Sig Dispense Refill   • albuterol 108 (90 Base) MCG/ACT Aero Soln inhalation aerosol Inhale 2 Puffs by mouth every 6 hours as needed for Shortness of Breath. 1 Inhaler 3   • fluticasone (FLONASE) 50 MCG/ACT nasal spray Spray 1 Spray in nose every day. 16 g 1   • loratadine (CLARITIN) 10 MG Tab Take 1 Tab by mouth every day for 7 days. 15 Tab 0   • Dextromethorphan-Guaifenesin (TUSSIN DM)  MG/5ML Syrup Take 10 mL by mouth every 6 hours as needed. 840 mL 0   • doxycycline (VIBRAMYCIN) 100 MG Tab Take 1  Tab by mouth 2 times a day. 10 Tab 0   • levothyroxine (SYNTHROID) 25 MCG Tab Take 1 Tab by mouth Every morning on an empty stomach. 30 Tab 1   • gabapentin (NEURONTIN) 100 MG Cap TAKE 1 CAP BY MOUTH 3 TIMES A DAY. TAKE 200 MG AT BEDTIME  MG IN THE MORNING 90 Cap 0   • atorvastatin (LIPITOR) 20 MG Tab Take 1 Tab by mouth every day. 90 Tab 0   • omeprazole (PRILOSEC) 20 MG Tablet Delayed Response delayed-release tablet TAKE 1 TABLET BY MOUTH 30 MIN BEFORE FIRST MEAL  11   • potassium chloride SA (KDUR) 20 MEQ Tab CR TAKE 1 TABLET BY MOUTH EVERY DAY TAKE WITH LASIX AS NEEDED FOR SWELLING  1   • aspirin 81 MG tablet Take 81 mg by mouth every day.     • Verapamil HCl  MG CAPSULE SR 24 HR Take 1 Cap by mouth every day. 90 Cap 3   • furosemide (LASIX) 20 MG Tab Take 1 tablet daily as needed for edema 30 Tab 6     No current facility-administered medications for this visit.        ROS: As per HPI. Additional pertinent symptoms as noted below.      ROS: As per HPI. Additional pertinent symptoms as noted below.     GI: Denies nausea vomiting and abdominal discomfort  HEENT: Positive for cough, nasal congestion, no lymphadenopathy  Genitourinary: Denies dysuria, hematuria, suprapubic discomfort  Neurological: Denies any focal weakness , chronic intermittent numbness, chronic intermittent headache  Musculoskeletal: Denies arthritis swelling  Psychiatric: Denies any suicidal or homicidal ideation    /92   Pulse 84   Temp 36.1 °C (97 °F)   Ht 1.829 m (6')   Wt (!) 126.7 kg (279 lb 6 oz)   SpO2 93%   BMI 37.89 kg/m²       Physical Exam   Constitutional:  oriented to person, place, and time. No distress.   Eyes: Pupils are equal, round, and reactive to light. No scleral icterus.  Neck: Neck supple. No thyromegaly present.   Cardiovascular: ESM, Normal rate, regular rhythm.  Exam reveals no gallop and no friction rub.  Pulmonary/Chest: Breath sounds normal. Chest wall is not dull to percussion.    Musculoskeletal:   no edema.   Lymphadenopathy: no cervical adenopathy  Neurological: alert and oriented to person, place, and time.   Skin: No cyanosis. Nails show no clubbing.        Note: I have reviewed all pertinent labs and diagnostic tests associated with this visit with specific comments listed under the assessment and plan below     Assessment and Plan  Bronchitis  Near syncope/Dizzy spells and headaches  Hypertrophic nonobstructive cardiomyopathy (HCC)  Shortness of breath/Tobacco abuse/H/O wheezing  JEREMIAH (obstructive sleep apnea)  Numbness in both hands/History of numbness of left arm and left side of face/History of numbness of bilateral feet  Hypothyroidism, unspecified type  Anemia    Obesity  [facility]          Will treat his acute episode of bronchitis with robitussin, Claritin, doxycycline, Flonase  Negative rapid strep: Will get CBC, chem panel and two-view chest x-ray  Will repeat TSH and start low-dose levothyroxine at 25  Patient headache is not well controlled, and he cannot get MRI because of device problem: Neurology and cardiology are working to change the device versus transferred to [place]  Rate control medication and Lasix per cardiology, Continue aspirin and atorvastatin  Follow-up in 5 weeks  Flu shot on next visit once acute episode is resolved  Defer further event to workup until next appointment including colonoscopy      Followup: 5 weeks    Signed by: Blake Oseguera M.D.

## 2017-11-21 RX ORDER — AZITHROMYCIN 250 MG/1
250 TABLET, FILM COATED ORAL DAILY
Qty: 6 TAB | Refills: 0 | Status: SHIPPED | OUTPATIENT
Start: 2017-11-21 | End: 2017-11-26

## 2017-12-22 ENCOUNTER — TELEPHONE (OUTPATIENT)
Dept: CARDIOLOGY | Facility: MEDICAL CENTER | Age: 47
End: 2017-12-22

## 2017-12-23 NOTE — TELEPHONE ENCOUNTER
OK per Dr. 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   Paper work is on Carlypso's desk.    L/M asking pt to call.  Will need to ask questions regarding physical abilities to answer questions on forms.

## 2017-12-26 ENCOUNTER — OFFICE VISIT (OUTPATIENT)
Dept: INTERNAL MEDICINE | Facility: MEDICAL CENTER | Age: 47
End: 2017-12-26
Payer: MEDICAID

## 2017-12-26 VITALS
DIASTOLIC BLOOD PRESSURE: 90 MMHG | TEMPERATURE: 97 F | HEIGHT: 72 IN | BODY MASS INDEX: 37.43 KG/M2 | HEART RATE: 80 BPM | OXYGEN SATURATION: 99 % | SYSTOLIC BLOOD PRESSURE: 112 MMHG | WEIGHT: 276.38 LBS

## 2017-12-26 DIAGNOSIS — G47.33 OSA (OBSTRUCTIVE SLEEP APNEA): ICD-10-CM

## 2017-12-26 DIAGNOSIS — E66.9 OBESITY (BMI 30-39.9): ICD-10-CM

## 2017-12-26 DIAGNOSIS — I42.2 HYPERTROPHIC CARDIOMYOPATHY (HCC): ICD-10-CM

## 2017-12-26 DIAGNOSIS — E78.5 DYSLIPIDEMIA: ICD-10-CM

## 2017-12-26 DIAGNOSIS — Z95.810 DUAL ICD (IMPLANTABLE CARDIOVERTER-DEFIBRILLATOR) IN PLACE: ICD-10-CM

## 2017-12-26 DIAGNOSIS — I47.29 NSVT (NONSUSTAINED VENTRICULAR TACHYCARDIA) (HCC): ICD-10-CM

## 2017-12-26 DIAGNOSIS — I10 ESSENTIAL HYPERTENSION: ICD-10-CM

## 2017-12-26 DIAGNOSIS — R73.9 HYPERGLYCEMIA: ICD-10-CM

## 2017-12-26 DIAGNOSIS — E03.9 HYPOTHYROIDISM, UNSPECIFIED TYPE: ICD-10-CM

## 2017-12-26 DIAGNOSIS — Z23 NEED FOR INFLUENZA VACCINATION: ICD-10-CM

## 2017-12-26 PROCEDURE — 99214 OFFICE O/P EST MOD 30 MIN: CPT | Mod: 25,GC | Performed by: INTERNAL MEDICINE

## 2017-12-26 PROCEDURE — 90686 IIV4 VACC NO PRSV 0.5 ML IM: CPT | Performed by: INTERNAL MEDICINE

## 2017-12-26 PROCEDURE — 90471 IMMUNIZATION ADMIN: CPT | Performed by: INTERNAL MEDICINE

## 2017-12-26 NOTE — PROGRESS NOTES
"Trevor Gillis Jr. is a 47 y.o. male here for a non-provider visit for:   FLU    Reason for immunization: Annual Flu Vaccine  Immunization records indicate need for vaccine: Yes, confirmed with Epic  Minimum interval has been met for this vaccine: Yes  ABN completed: Not Indicated    Order and dose verified by: AUDRA DE LA PAZ Dated  08/07/15 was given to patient: Yes  All IAC Questionnaire questions were answered \"No.\"    Patient tolerated injection and no adverse effects were observed or reported: Yes    Pt scheduled for next dose in series: Not Indicated    "

## 2017-12-26 NOTE — PROGRESS NOTES
Established Patient    Trevor presents today with the following:    CC: Follow-up of recent episode of bronchitis    HPI:   ID: 46-year-old gentleman with past medical history of nonobstructive hypertrophic cardiomyopathy, obstructive sleep apnea, currently not on CPAP due to lack of insurance coverage, dyslipidemia, hypertension, anemia, gastroesophageal reflux disease,history of traumatic knees and head trauma in elarly aldolesent, history of ongoing intermittent left arm/leg and left side of face numbness came for follow-up.     Acute Bronchitis: Resolved  Treated with azithromycin, guaifenesin, Flonase       Near syncope  Dizzy spells and headaches  History of intermittent lightheadedness with near syncope.  Patient has warning sign and he stabilizes himself by supporting himself by the wall  Patient never passed out, never fall  patient did not have any episode while driving: He is very careful and asks for help for driving because of recent headaches    Denies palpitation, any, seizures, dehydration, chest pain, blurry vision, tinnitus, vertigo or hearing deficit or speech problems,    Recently evaluated by neurology, couple of times;MRI cannot be done because of device, neurology and cardiology working To Change the Device Versus Referral to [place]        Hypertrophic nonobstructive cardiomyopathy (HCC)  Hypertrophic cardiomyopathy (HCC)  Shortness of breath  Family history of sudden cardiac death due to hypertrophic cardiomyopathy.  Head AICD in 2014 because of ventricular tachycardia  Denies orthopnea/PND, chest pain  Device was checked on 11/28 with intermittent  V. tach  had stress echo after optimal beta-blockade with bisoprolol  Follows Dr. Gillis, last visit earlier this month  Getting genetic testing, only child is her daughter who just established with Dr. Gillis  Currently on verapamil 300, Lasix 20 as needed  MRI cannot be done because of device, neurology and cardiology working To Change  the Device Versus Referral to [place]        Tobacco abuse  JEREMIAH (obstructive sleep apnea)  Was diagnosed with obstructive sleep apnea and had sleep studies one and half years ago and was prescribed CPAP but at that time he did not get CPAP because of insurance declined  He has been intermittently quitting smoking and then relapsing  He uses albuterol as needed and his breathing is better, not wheezing any more  PFT done recently which were unremarkable        Numbness in both hands  History of numbness of left arm and left side of face  History of numbness of bilateral feet  He endorses numbness in both hands which is intermittent  He was admitted in the hospital  for left upper extremity and left side of face numbness few months ago  CAT scan was done which was unremarkable---MRI was not done because of device  He was started on gabapentin and numbness is better    Recently evaluated by neurology, couple of times  MRI cannot be done because of device, neurology and cardiology working To Change the Device Versus Referral to [place]         Hypothyroidism, unspecified type  he ran out of his medication for last 5 months but now feeling better after starting 25 µg about 5 weeks back        Anemia    currently not on iron replacement  Denies black-colored stool, bloody diarrhea  History of esophagitis and is currently on omeprazole, denies heartburns, intermittent chest pain, odynophagia or dysphagia  Hemoglobin is stable  He had EGD, following gastroenterologist    Reason for use?esophaitis  How long has patient been on it?2 years  Last time patient was off? Did symptoms resolve?none  Alternative prescriptions attempted?none  H. Pylori checked? Seen by GI? EGD in past?no/yes/yes     Obesity  counseled about diet and weight loss, verbalized understanding     Health Care Maintenenace  Require flu shot     Patient Active Problem List    Diagnosis Date Noted   • SOB (shortness of breath) 10/24/2016     Priority: High    • Near syncope 06/23/2015     Priority: Medium   • Dyslipidemia 10/24/2016     Priority: Low   • Hypothyroidism 05/05/2016     Priority: Low   • Need for influenza vaccination 12/26/2017   • Bronchitis 11/20/2017   • Obesity (BMI 30-39.9) 08/08/2017   • Numbness of face 08/07/2017   • Hypertension 01/16/2017   • Tobacco abuse 11/03/2016   • Hypertrophic cardiomyopathy (CMS-HCC) 10/30/2016   • Numbness of left hand 07/12/2016   • Prediabetes 07/12/2016   • Hyperglycemia 05/06/2016   • JEREMIAH (obstructive sleep apnea) 05/05/2016   • Shoulder pain 06/23/2015   • Inguinal hernia 09/10/2014   • Dual ICD (implantable cardioverter-defibrillator) in place 07/26/2014   • FH: sudden cardiac death (SCD) 07/16/2014   • NSVT (nonsustained ventricular tachycardia) (CMS-HCC) 07/16/2014   • Dizzy spells 07/16/2014   • Syncope 11/19/2012       Current Outpatient Prescriptions   Medication Sig Dispense Refill   • albuterol 108 (90 Base) MCG/ACT Aero Soln inhalation aerosol Inhale 2 Puffs by mouth every 6 hours as needed for Shortness of Breath. 1 Inhaler 3   • fluticasone (FLONASE) 50 MCG/ACT nasal spray Spray 1 Spray in nose every day. 16 g 1   • levothyroxine (SYNTHROID) 25 MCG Tab Take 1 Tab by mouth Every morning on an empty stomach. 30 Tab 1   • gabapentin (NEURONTIN) 100 MG Cap TAKE 1 CAP BY MOUTH 3 TIMES A DAY. TAKE 200 MG AT BEDTIME  MG IN THE MORNING 90 Cap 0   • atorvastatin (LIPITOR) 20 MG Tab Take 1 Tab by mouth every day. 90 Tab 0   • omeprazole (PRILOSEC) 20 MG Tablet Delayed Response delayed-release tablet TAKE 1 TABLET BY MOUTH 30 MIN BEFORE FIRST MEAL  11   • potassium chloride SA (KDUR) 20 MEQ Tab CR TAKE 1 TABLET BY MOUTH EVERY DAY TAKE WITH LASIX AS NEEDED FOR SWELLING  1   • Verapamil HCl  MG CAPSULE SR 24 HR Take 1 Cap by mouth every day. 90 Cap 3   • furosemide (LASIX) 20 MG Tab Take 1 tablet daily as needed for edema 30 Tab 6   • aspirin 81 MG tablet Take 81 mg by mouth every day.       No current  facility-administered medications for this visit.        ROS: As per HPI. Additional pertinent symptoms as noted below.    GI: Denies nausea vomiting and abdominal discomfort  HEENT: Positive for cough, nasal congestion, no lymphadenopathy  Genitourinary: Denies dysuria, hematuria, suprapubic discomfort  Neurological: Denies any focal weakness , chronic intermittent numbness, chronic intermittent headache  Musculoskeletal: Denies arthritis swelling  Psychiatric: Denies any suicidal or homicidal ideation    /90   Pulse 80   Temp 36.1 °C (97 °F)   Ht 1.829 m (6')   Wt (!) 125.4 kg (276 lb 6 oz)   SpO2 99%   BMI 37.48 kg/m²     Physical Exam   Constitutional:  oriented to person, place, and time. No distress.   Eyes: Pupils are equal, round, and reactive to light. No scleral icterus.  Neck: Neck supple. No thyromegaly present.   Cardiovascular: ESM, Normal rate, regular rhythm.  Exam reveals no gallop and no friction rub.  Pulmonary/Chest: Breath sounds normal. Chest wall is not dull to percussion.   Musculoskeletal:   no edema.   Lymphadenopathy: no cervical adenopathy  Neurological: alert and oriented to person, place, and time.   Skin: No cyanosis. Nails show no clubbing.       Note: I have reviewed all pertinent labs and diagnostic tests associated with this visit with specific comments listed under the assessment and plan below    Assessment and Plan      Hypertrophic nonobstructive cardiomyopathy (HCC)  Tobacco abuse/JEREMIAH (obstructive sleep apnea)  Numbness in both hands/History of numbness of left arm and left side of face/History of numbness of bilateral feet  Near syncope/Dizzy spells and headaches  Hypothyroidism, unspecified type  Anemia/Esophagitis   Obesity  Need for influenza vaccination  Hyperglycemia    -Educated about PPI use: He will get appointment with gastroenterologist will consider stopping after discussion; he may require repeat EGD  -Counseled about smoking cessation: Verbalized  understanding: Smokes off and on and trying to cut back  -Counseled about weight loss, diet restriction  -Continue verapamil and Lasix without any changes: Genetic testing per Dr. Gillis  -Repeat lipid panel, TSH and check glycohemoglobin  -Counseled about importance of sleep studies given his history of HOCM/ICD , he is reluctant and will reconsider  -Give flu shot today    Folloowup: 15 weeks    Signed by: Blake Oseguera M.D.

## 2018-01-03 NOTE — TELEPHONE ENCOUNTER
Paperwork left in basket on Charlee's desk to be filled out after pt calls to answer questions on form.

## 2018-01-09 RX ORDER — LORATADINE 10 MG/1
10 TABLET ORAL DAILY
Qty: 7 TAB | Refills: 0 | Status: SHIPPED | OUTPATIENT
Start: 2018-01-09 | End: 2018-01-16

## 2018-01-09 RX ORDER — GABAPENTIN 100 MG/1
100 CAPSULE ORAL 3 TIMES DAILY
Qty: 90 CAP | Refills: 0 | Status: SHIPPED | OUTPATIENT
Start: 2018-01-09 | End: 2018-02-12 | Stop reason: SDUPTHER

## 2018-01-09 NOTE — TELEPHONE ENCOUNTER
Last seen: 12/26/17 by Dr. Oseguera  Next appt: 03/07/18 with Dr. Oseguera    Was the patient seen in the last year in this department? Yes   Does patient have an active prescription for medications requested? No   Received Request Via: Pharmacy

## 2018-04-09 ENCOUNTER — HOSPITAL ENCOUNTER (OUTPATIENT)
Dept: RADIOLOGY | Facility: MEDICAL CENTER | Age: 48
End: 2018-04-09
Attending: STUDENT IN AN ORGANIZED HEALTH CARE EDUCATION/TRAINING PROGRAM
Payer: MEDICAID

## 2018-04-09 ENCOUNTER — OFFICE VISIT (OUTPATIENT)
Dept: INTERNAL MEDICINE | Facility: MEDICAL CENTER | Age: 48
End: 2018-04-09
Payer: MEDICAID

## 2018-04-09 ENCOUNTER — HOSPITAL ENCOUNTER (OUTPATIENT)
Dept: LAB | Facility: MEDICAL CENTER | Age: 48
End: 2018-04-09
Attending: STUDENT IN AN ORGANIZED HEALTH CARE EDUCATION/TRAINING PROGRAM
Payer: MEDICAID

## 2018-04-09 VITALS
OXYGEN SATURATION: 95 % | DIASTOLIC BLOOD PRESSURE: 90 MMHG | TEMPERATURE: 98.5 F | HEART RATE: 100 BPM | SYSTOLIC BLOOD PRESSURE: 110 MMHG | BODY MASS INDEX: 38.19 KG/M2 | WEIGHT: 282 LBS | HEIGHT: 72 IN

## 2018-04-09 DIAGNOSIS — Z72.0 TOBACCO ABUSE: ICD-10-CM

## 2018-04-09 DIAGNOSIS — G89.29 CHRONIC SHOULDER PAIN, UNSPECIFIED LATERALITY: ICD-10-CM

## 2018-04-09 DIAGNOSIS — R60.9 EDEMA, UNSPECIFIED TYPE: ICD-10-CM

## 2018-04-09 DIAGNOSIS — Z95.810 DUAL ICD (IMPLANTABLE CARDIOVERTER-DEFIBRILLATOR) IN PLACE: ICD-10-CM

## 2018-04-09 DIAGNOSIS — G47.33 OSA (OBSTRUCTIVE SLEEP APNEA): ICD-10-CM

## 2018-04-09 DIAGNOSIS — I47.29 NSVT (NONSUSTAINED VENTRICULAR TACHYCARDIA) (HCC): ICD-10-CM

## 2018-04-09 DIAGNOSIS — M79.641 PAIN OF RIGHT HAND: ICD-10-CM

## 2018-04-09 DIAGNOSIS — I42.2 HYPERTROPHIC CARDIOMYOPATHY (HCC): ICD-10-CM

## 2018-04-09 DIAGNOSIS — J02.9 SORE THROAT: ICD-10-CM

## 2018-04-09 DIAGNOSIS — E66.9 OBESITY (BMI 30-39.9): ICD-10-CM

## 2018-04-09 DIAGNOSIS — E03.9 HYPOTHYROIDISM, UNSPECIFIED TYPE: ICD-10-CM

## 2018-04-09 DIAGNOSIS — I42.2 HYPERTROPHIC NONOBSTRUCTIVE CARDIOMYOPATHY (HCC): ICD-10-CM

## 2018-04-09 DIAGNOSIS — I10 ESSENTIAL HYPERTENSION: ICD-10-CM

## 2018-04-09 DIAGNOSIS — R73.03 PREDIABETES: ICD-10-CM

## 2018-04-09 DIAGNOSIS — M25.519 CHRONIC SHOULDER PAIN, UNSPECIFIED LATERALITY: ICD-10-CM

## 2018-04-09 DIAGNOSIS — E78.5 DYSLIPIDEMIA: ICD-10-CM

## 2018-04-09 PROBLEM — J40 BRONCHITIS: Status: RESOLVED | Noted: 2017-11-20 | Resolved: 2018-04-09

## 2018-04-09 LAB
FLUAV+FLUBV AG SPEC QL IA: NORMAL
INT CON NEG: NORMAL
INT CON NEG: NORMAL
INT CON POS: NORMAL
INT CON POS: NORMAL
S PYO AG THROAT QL: NORMAL
TSH SERPL DL<=0.005 MIU/L-ACNC: 3.01 UIU/ML (ref 0.38–5.33)

## 2018-04-09 PROCEDURE — 87804 INFLUENZA ASSAY W/OPTIC: CPT | Performed by: INTERNAL MEDICINE

## 2018-04-09 PROCEDURE — 36415 COLL VENOUS BLD VENIPUNCTURE: CPT

## 2018-04-09 PROCEDURE — 87880 STREP A ASSAY W/OPTIC: CPT | Performed by: INTERNAL MEDICINE

## 2018-04-09 PROCEDURE — 84443 ASSAY THYROID STIM HORMONE: CPT

## 2018-04-09 PROCEDURE — 99214 OFFICE O/P EST MOD 30 MIN: CPT | Mod: GC | Performed by: INTERNAL MEDICINE

## 2018-04-09 PROCEDURE — 73130 X-RAY EXAM OF HAND: CPT | Mod: RT

## 2018-04-09 RX ORDER — NICOTINE POLACRILEX 4 MG/1
20 GUM, CHEWING ORAL DAILY
Qty: 90 TAB | Refills: 1 | Status: SHIPPED | OUTPATIENT
Start: 2018-04-09 | End: 2018-10-01

## 2018-04-09 RX ORDER — ACETAMINOPHEN 500 MG
500 TABLET ORAL EVERY 6 HOURS PRN
Qty: 30 TAB | Refills: 0 | Status: SHIPPED | OUTPATIENT
Start: 2018-04-09 | End: 2018-05-03

## 2018-04-09 RX ORDER — VERAPAMIL HYDROCHLORIDE 300 MG/1
300 CAPSULE, EXTENDED RELEASE ORAL DAILY
Qty: 90 CAP | Refills: 3 | Status: SHIPPED | OUTPATIENT
Start: 2018-04-09 | End: 2018-10-01

## 2018-04-09 RX ORDER — FLUTICASONE PROPIONATE 50 MCG
1 SPRAY, SUSPENSION (ML) NASAL DAILY
Qty: 16 G | Refills: 1 | Status: SHIPPED | OUTPATIENT
Start: 2018-04-09 | End: 2018-05-03

## 2018-04-09 RX ORDER — LEVOTHYROXINE SODIUM 0.03 MG/1
25 TABLET ORAL
Qty: 90 TAB | Refills: 1 | Status: SHIPPED | OUTPATIENT
Start: 2018-04-09 | End: 2018-10-01

## 2018-04-09 RX ORDER — AZITHROMYCIN 250 MG/1
TABLET, FILM COATED ORAL
Qty: 6 TAB | Refills: 0 | Status: SHIPPED | OUTPATIENT
Start: 2018-04-09 | End: 2018-05-03

## 2018-04-09 RX ORDER — ALBUTEROL SULFATE 90 UG/1
2 AEROSOL, METERED RESPIRATORY (INHALATION) EVERY 6 HOURS PRN
Qty: 1 INHALER | Refills: 3 | Status: SHIPPED | OUTPATIENT
Start: 2018-04-09 | End: 2018-10-01

## 2018-04-09 RX ORDER — FUROSEMIDE 20 MG/1
TABLET ORAL
Qty: 90 TAB | Refills: 1 | Status: SHIPPED | OUTPATIENT
Start: 2018-04-09 | End: 2018-10-01

## 2018-04-09 RX ORDER — POTASSIUM CHLORIDE 20 MEQ/1
TABLET, EXTENDED RELEASE ORAL
Qty: 90 TAB | Refills: 1 | Status: SHIPPED | OUTPATIENT
Start: 2018-04-09 | End: 2018-10-01

## 2018-04-09 RX ORDER — ATORVASTATIN CALCIUM 20 MG/1
20 TABLET, FILM COATED ORAL DAILY
Qty: 90 TAB | Refills: 1 | Status: SHIPPED | OUTPATIENT
Start: 2018-04-09 | End: 2018-10-01

## 2018-04-09 RX ORDER — GABAPENTIN 100 MG/1
100 CAPSULE ORAL 3 TIMES DAILY
Qty: 270 CAP | Refills: 0 | Status: SHIPPED | OUTPATIENT
Start: 2018-04-09 | End: 2018-05-03

## 2018-04-09 NOTE — PROGRESS NOTES
Established Patient    Trevor presents today with the following:       CC: Headache, nasal congestion, sore throat     HPI:   ID: 46-year-old gentleman with past medical history of nonobstructive hypertrophic cardiomyopathy, obstructive sleep apnea, currently not on CPAP due to lack of insurance coverage, dyslipidemia, hypertension, anemia, gastroesophageal reflux disease,history of traumatic knees and head trauma in elarly aldolesent, history of ongoing intermittent left arm/leg and left side of face numbness came in for one day history of and nasal congestion/headache and sore throat.       Sore throat/nasal congestion  Exposed to sick friend last night  Patient presented with one-day history of sore throat, nasal congestion, poor oral intake  Endorses headache and myalgia  Denies fever, phlegm  Diagnosis sinus congestion with clear discharge  No orthopnea or PND    Near syncope  Dizzy spells and headaches  History of intermittent lightheadedness with near syncope.  Patient has warning sign and he stabilizes himself by supporting himself by the wall  Patient never passed out, never fall  patient did not have any episode while driving: He is very careful and asks for help for driving because of recent headaches    Denies any new or worsening symptoms: evaluated by neurology, couple of times;MRI cannot be done because of device, neurology and cardiology working To Change the Device Versus Referral to [place]        Hypertrophic nonobstructive cardiomyopathy (HCC)  Hypertrophic cardiomyopathy (HCC)  Shortness of breath  Family history of sudden cardiac death due to hypertrophic cardiomyopathy.  Had AICD in 2014 because of ventricular tachycardia  Denies orthopnea/PND, chest pain  Device was checked on 11/28 with intermittent  V. tach  had stress echo after optimal beta-blockade with bisoprolol  Follows Dr. Gillis; only child is her daughter who just established with Dr. Gillis  Currently on verapamil 300, Lasix  20 as needed  MRI cannot be done because of device, neurology and cardiology working To Change the Device Versus Referral to [place]        Tobacco abuse  JEREMIAH (obstructive sleep apnea)  Was diagnosed with obstructive sleep apnea and had sleep studies one and half years ago and was prescribed CPAP but at that time he did not get CPAP because of insurance declined  He has been intermittently quitting smoking and then relapsing  He uses albuterol as needed and his breathing is better, not wheezing any more  PFT done  which were unremarkable     Right hand laceration/Pain of right hand  Numbness in both hands  History of numbness of left arm and left side of face  History of numbness of bilateral feet  Chronic numbness of both hands, and left arm left-sided face and bilateral feet is stable:   CAT scan was done which was unremarkable---MRI was not done because of device  He was started on gabapentin and numbness is better    MRI cannot be done because of device, neurology and cardiology working To Change the Device Versus Referral to [facility] [place]  Now developed a laceration on right wrist dorsal aspect while hitting his truck in anger: Up to date with tetanus vaccine     Hypothyroidism, unspecified type  now feeling better after starting 25 µg about 5 weeks back     Dyspepsia/Esophagitis    History of esophagitis and is currently on omeprazole, denies heartburns, intermittent chest pain, odynophagia or dysphagia  Hemoglobin is stable  He had EGD, following gastroenterologist     Obesity  counseled about diet and weight loss, verbalized understanding       Patient Active Problem List    Diagnosis Date Noted   • Dyslipidemia 10/24/2016     Priority: Low   • Hypothyroidism 05/05/2016     Priority: Low   • Pain of right hand 04/09/2018   • Need for influenza vaccination 12/26/2017   • Obesity (BMI 30-39.9) 08/08/2017   • Hypertension 01/16/2017   • Tobacco abuse 11/03/2016   • Hypertrophic cardiomyopathy (CMS-HCC) 10/30/2016    • Prediabetes 07/12/2016   • Hyperglycemia 05/06/2016   • JEREMIAH (obstructive sleep apnea) 05/05/2016   • Shoulder pain 06/23/2015   • Inguinal hernia 09/10/2014   • Dual ICD (implantable cardioverter-defibrillator) in place 07/26/2014   • FH: sudden cardiac death (SCD) 07/16/2014   • NSVT (nonsustained ventricular tachycardia) (CMS-[facility]) 07/16/2014   • Dizzy spells 07/16/2014       Current Outpatient Prescriptions   Medication Sig Dispense Refill   • levothyroxine (SYNTHROID) 25 MCG Tab Take 1 Tab by mouth Every morning on an empty stomach. 90 Tab 1   • gabapentin (NEURONTIN) 100 MG Cap Take 1 Cap by mouth 3 times a day. Take 200 mg at bedtime and 100 mg in the morning 270 Cap 0   • albuterol 108 (90 Base) MCG/ACT Aero Soln inhalation aerosol Inhale 2 Puffs by mouth every 6 hours as needed for Shortness of Breath. 1 Inhaler 3   • fluticasone (FLONASE) 50 MCG/ACT nasal spray Spray 1 Spray in nose every day. 16 g 1   • atorvastatin (LIPITOR) 20 MG Tab Take 1 Tab by mouth every day. 90 Tab 1   • omeprazole (PRILOSEC) 20 MG Tablet Delayed Response delayed-release tablet Take 1 Tab by mouth every day. 90 Tab 1   • potassium chloride SA (KDUR) 20 MEQ Tab CR TAKE 1 TABLET BY MOUTH EVERY DAY TAKE WITH LASIX AS NEEDED FOR SWELLING 90 Tab 1   • Verapamil HCl  MG CAPSULE SR 24 HR Take 1 Cap by mouth every day. 90 Cap 3   • furosemide (LASIX) 20 MG Tab Take 1 tablet daily as needed for edema 90 Tab 1   • fluticasone (FLONASE) 50 MCG/ACT nasal spray Spray 1 Spray in nose every day. 16 g 1   • azithromycin (ZITHROMAX) 250 MG Tab Take 2 tablet once and then one tablet daily 6 Tab 0   • acetaminophen (TYLENOL) 500 MG Tab Take 1 Tab by mouth every 6 hours as needed. 30 Tab 0   • aspirin 81 MG tablet Take 81 mg by mouth every day.       No current facility-administered medications for this visit.        ROS: As per HPI. Additional pertinent symptoms as noted below.    GI: Denies nausea vomiting and abdominal discomfort  HEENT:  Positive for cough, nasal congestion, no lymphadenopathy  Genitourinary: Denies dysuria, hematuria, suprapubic discomfort  Neurological: Denies any focal weakness , chronic intermittent numbness, chronic intermittent headache  Musculoskeletal: Denies arthritis swelling  Psychiatric: Denies any suicidal or homicidal ideation    /90   Pulse 100   Temp 36.9 °C (98.5 °F)   Ht 1.829 m (6')   Wt (!) 127.9 kg (282 lb)   SpO2 95%   BMI 38.25 kg/m²       Physical Exam   Constitutional:  oriented to person, place, and time. No distress.   Eyes: Pupils are equal, round, and reactive to light. No scleral icterus.  Neck: Neck supple. No thyromegaly present.   Cardiovascular: ESM, Normal rate, regular rhythm.  Exam reveals no gallop and no friction rub.  Pulmonary/Chest: Breath sounds normal. Chest wall is not dull to percussion.   Musculoskeletal:  no edema. Laceration and swelling right hand and dorsal aspect and second toe dorsum  Lymphadenopathy: no cervical adenopathy  Neurological: alert and oriented to person, place, and time.   Skin: No cyanosis. Nails show no clubbing.        Note: I have reviewed all pertinent labs and diagnostic tests associated with this visit with specific comments listed under the assessment and plan below     Assessment and Plan     Pain of right hand  Sore throat  Hypertrophic nonobstructive cardiomyopathy (HCC)  Tobacco abuse/JEREMIAH (obstructive sleep apnea)  Numbness in both hands/History of numbness of left arm and left side of face/History of numbness of bilateral feet  Near syncope/Dizzy spells and headaches  Hypothyroidism, unspecified type  /Esophagitis   Obesity  Hyperglycemia     -He is up-to-date with tetanus shot: Will get x-ray of the R hand: Sterile bandage applied  -Rapid strep and influenza negative: We will treat him with Flonase, Tylenol and azithromycin: Doxycycline is not covered by his insurance: Last : Educated about staying hydrated  -Educated about long-term  PPI use: He will get appointment with gastroenterologist will consider stopping after discussion; he may require repeat EGD  -Counseled about smoking cessation: Verbalized understanding: Smokes off and on and trying to cut back  -Counseled about weight loss, diet restriction  -Continue verapamil and Lasix without any changes: Genetic testing per Dr. Gillis  -Repeat TSH   -Counseled about importance of sleep studies given his history of HOCM/ICD , he is reluctant and will reconsider  -Give flu shot today  -Recommended to do lab work which was ordered on prior visit     Folloowup: 2 weeks        Signed by: Blake Oseguera M.D.

## 2018-04-23 ENCOUNTER — APPOINTMENT (OUTPATIENT)
Dept: INTERNAL MEDICINE | Facility: MEDICAL CENTER | Age: 48
End: 2018-04-23
Payer: MEDICAID

## 2018-05-03 ENCOUNTER — APPOINTMENT (OUTPATIENT)
Dept: RADIOLOGY | Facility: MEDICAL CENTER | Age: 48
End: 2018-05-03
Attending: EMERGENCY MEDICINE
Payer: MEDICAID

## 2018-05-03 ENCOUNTER — HOSPITAL ENCOUNTER (EMERGENCY)
Facility: MEDICAL CENTER | Age: 48
End: 2018-05-03
Attending: EMERGENCY MEDICINE
Payer: MEDICAID

## 2018-05-03 VITALS
WEIGHT: 284.39 LBS | HEART RATE: 83 BPM | SYSTOLIC BLOOD PRESSURE: 154 MMHG | RESPIRATION RATE: 18 BRPM | HEIGHT: 75 IN | BODY MASS INDEX: 35.36 KG/M2 | TEMPERATURE: 97.5 F | OXYGEN SATURATION: 98 % | DIASTOLIC BLOOD PRESSURE: 102 MMHG

## 2018-05-03 DIAGNOSIS — R25.1 TREMORS OF NERVOUS SYSTEM: ICD-10-CM

## 2018-05-03 LAB
ALBUMIN SERPL BCP-MCNC: 4.2 G/DL (ref 3.2–4.9)
ALBUMIN/GLOB SERPL: 1.5 G/DL
ALP SERPL-CCNC: 100 U/L (ref 30–99)
ALT SERPL-CCNC: 17 U/L (ref 2–50)
ANION GAP SERPL CALC-SCNC: 7 MMOL/L (ref 0–11.9)
AST SERPL-CCNC: 14 U/L (ref 12–45)
BASOPHILS # BLD AUTO: 1 % (ref 0–1.8)
BASOPHILS # BLD: 0.05 K/UL (ref 0–0.12)
BILIRUB SERPL-MCNC: 0.5 MG/DL (ref 0.1–1.5)
BUN SERPL-MCNC: 15 MG/DL (ref 8–22)
CALCIUM SERPL-MCNC: 9.4 MG/DL (ref 8.5–10.5)
CHLORIDE SERPL-SCNC: 108 MMOL/L (ref 96–112)
CO2 SERPL-SCNC: 26 MMOL/L (ref 20–33)
CREAT SERPL-MCNC: 1.04 MG/DL (ref 0.5–1.4)
EKG IMPRESSION: NORMAL
EOSINOPHIL # BLD AUTO: 0.23 K/UL (ref 0–0.51)
EOSINOPHIL NFR BLD: 4.8 % (ref 0–6.9)
ERYTHROCYTE [DISTWIDTH] IN BLOOD BY AUTOMATED COUNT: 43.7 FL (ref 35.9–50)
GLOBULIN SER CALC-MCNC: 2.8 G/DL (ref 1.9–3.5)
GLUCOSE SERPL-MCNC: 100 MG/DL (ref 65–99)
HCT VFR BLD AUTO: 42.1 % (ref 42–52)
HGB BLD-MCNC: 14.1 G/DL (ref 14–18)
IMM GRANULOCYTES # BLD AUTO: 0.01 K/UL (ref 0–0.11)
IMM GRANULOCYTES NFR BLD AUTO: 0.2 % (ref 0–0.9)
LYMPHOCYTES # BLD AUTO: 1.94 K/UL (ref 1–4.8)
LYMPHOCYTES NFR BLD: 40.1 % (ref 22–41)
MCH RBC QN AUTO: 28.7 PG (ref 27–33)
MCHC RBC AUTO-ENTMCNC: 33.5 G/DL (ref 33.7–35.3)
MCV RBC AUTO: 85.6 FL (ref 81.4–97.8)
MONOCYTES # BLD AUTO: 0.48 K/UL (ref 0–0.85)
MONOCYTES NFR BLD AUTO: 9.9 % (ref 0–13.4)
NEUTROPHILS # BLD AUTO: 2.13 K/UL (ref 1.82–7.42)
NEUTROPHILS NFR BLD: 44 % (ref 44–72)
NRBC # BLD AUTO: 0 K/UL
NRBC BLD-RTO: 0 /100 WBC
PLATELET # BLD AUTO: 228 K/UL (ref 164–446)
PMV BLD AUTO: 9.7 FL (ref 9–12.9)
POTASSIUM SERPL-SCNC: 4.1 MMOL/L (ref 3.6–5.5)
PROT SERPL-MCNC: 7 G/DL (ref 6–8.2)
RBC # BLD AUTO: 4.92 M/UL (ref 4.7–6.1)
SODIUM SERPL-SCNC: 141 MMOL/L (ref 135–145)
TROPONIN I SERPL-MCNC: 0.04 NG/ML (ref 0–0.04)
TROPONIN I SERPL-MCNC: 0.05 NG/ML (ref 0–0.04)
WBC # BLD AUTO: 4.8 K/UL (ref 4.8–10.8)

## 2018-05-03 PROCEDURE — 700102 HCHG RX REV CODE 250 W/ 637 OVERRIDE(OP): Performed by: EMERGENCY MEDICINE

## 2018-05-03 PROCEDURE — A9270 NON-COVERED ITEM OR SERVICE: HCPCS | Performed by: EMERGENCY MEDICINE

## 2018-05-03 PROCEDURE — 80053 COMPREHEN METABOLIC PANEL: CPT

## 2018-05-03 PROCEDURE — 70496 CT ANGIOGRAPHY HEAD: CPT

## 2018-05-03 PROCEDURE — 99284 EMERGENCY DEPT VISIT MOD MDM: CPT

## 2018-05-03 PROCEDURE — 93005 ELECTROCARDIOGRAM TRACING: CPT | Performed by: EMERGENCY MEDICINE

## 2018-05-03 PROCEDURE — 70498 CT ANGIOGRAPHY NECK: CPT

## 2018-05-03 PROCEDURE — 84484 ASSAY OF TROPONIN QUANT: CPT

## 2018-05-03 PROCEDURE — 36415 COLL VENOUS BLD VENIPUNCTURE: CPT

## 2018-05-03 PROCEDURE — 85025 COMPLETE CBC W/AUTO DIFF WBC: CPT

## 2018-05-03 RX ORDER — LORAZEPAM 1 MG/1
1 TABLET ORAL ONCE
Status: COMPLETED | OUTPATIENT
Start: 2018-05-03 | End: 2018-05-03

## 2018-05-03 RX ORDER — GABAPENTIN 100 MG/1
100-200 CAPSULE ORAL 2 TIMES DAILY
COMMUNITY
End: 2020-01-21

## 2018-05-03 RX ORDER — LORAZEPAM 1 MG/1
1 TABLET ORAL EVERY 12 HOURS PRN
Qty: 12 TAB | Refills: 0 | Status: SHIPPED | OUTPATIENT
Start: 2018-05-03 | End: 2018-05-08

## 2018-05-03 RX ADMIN — LORAZEPAM 1 MG: 1 TABLET ORAL at 15:20

## 2018-05-03 NOTE — ED NOTES
Med rec complete per patient with consent to do so in front of family  Allergies reviewed    Patient stated he completed a zpak about 2 weeks ago

## 2018-05-03 NOTE — ED PROVIDER NOTES
"ED Provider Note    CHIEF COMPLAINT  Chief Complaint   Patient presents with   • Tremors   • Slurred Speech     intermittent        HPI  Trevor Gillis Jr. is a 47 y.o. male who presents to emergency room today with complaints of tremors for 4 years to right arm and slurred speech for 5 months. Apparently his family members/friends one evaluated by him to the emergency room symptoms have not changed. He's been seen by a neurologist previously annoyed [facility] and was told \"there was nothing that they could do\"'s history pacemaker insertion from a cardiomyopathy and he felt this may be causing some of his tremors however tremors are intermittent to the right hand and has been present for many years and not worse than usual. Is no visual loss no nausea no vomiting no chest pain or shortness breath he states he has some chest pain earlier today. Pain was described as sharp and reproducible. He has no difficulty talking at this time and he moves both upper and lower extremities without difficulty.    REVIEW OF SYSTEMS  See HPI for further details. All other systems are negative.     PAST MEDICAL HISTORY  Past Medical History:   Diagnosis Date   • Pacemaker 2014   • Syncope 11/19/2012   • Dyslipidemia    • Fracture of left clavicle    • Hernia    • HTN (hypertension)    • Hypertrophic cardiomyopathy (HCC)    • JEREMIAH (obstructive sleep apnea)        FAMILY HISTORY  [unfilled]    SOCIAL HISTORY  Social History     Social History   • Marital status: Single     Spouse name: N/A   • Number of children: N/A   • Years of education: N/A     Social History Main Topics   • Smoking status: Former Smoker     Packs/day: 1.00     Years: 25.00     Types: Cigarettes   • Smokeless tobacco: Never Used      Comment: 07/07/17 quit smoking   • Alcohol use No      Comment: used to be heavy alcoholism (18 years ago),    • Drug use: No   • Sexual activity: Not on file     Other Topics Concern   • Not on file     Social History Narrative " "  • No narrative on file       SURGICAL HISTORY  Past Surgical History:   Procedure Laterality Date   • IRRIGATION & DEBRIDEMENT GENERAL Left 1/16/2017    Procedure: IRRIGATION & DEBRIDEMENT GENERAL-INDEX FINGER;  Surgeon: Kia Aldridge M.D.;  Location: SURGERY [facility];  Service:    • INGUINAL HERNIA REPAIR  9/10/2014    Performed by Christie Ott M.D. at SURGERY SAME DAY [facility] ORS   • RECOVERY  7/30/2014    Performed by Cath-Recovery Surgery at SURGERY SAME DAY [facility] ORS   • OTHER CARDIAC SURGERY  7/25/14    AICD   • RECOVERY  7/25/2014    Performed by Cath-Recovery Surgery at SURGERY SAME DAY [facility] ORS   • APPENDECTOMY     • TONSILLECTOMY         CURRENT MEDICATIONS  Home Medications     Reviewed by Marsha Pizarro PhT (Pharmacy Tech) on 05/03/18 at 1515  Med List Status: Complete   Medication Last Dose Status   albuterol 108 (90 Base) MCG/ACT Aero Soln inhalation aerosol 5/3/2018 Active   atorvastatin (LIPITOR) 20 MG Tab 5/2/2018 Active   furosemide (LASIX) 20 MG Tab 5/2/2018 Active   gabapentin (NEURONTIN) 100 MG Cap 5/3/2018 Active   levothyroxine (SYNTHROID) 25 MCG Tab 5/3/2018 Active   omeprazole (PRILOSEC) 20 MG Tablet Delayed Response delayed-release tablet 5/3/2018 Active   potassium chloride SA (KDUR) 20 MEQ Tab CR 5/2/2018 Active   Verapamil HCl  MG CAPSULE SR 24 HR 5/2/2018 Active                ALLERGIES  Allergies   Allergen Reactions   • Tomato Hives       PHYSICAL EXAM  VITAL SIGNS: /102   Pulse 83   Temp 36.4 °C (97.5 °F) (Temporal)   Resp 18   Ht 1.905 m (6' 3\")   Wt (!) 129 kg (284 lb 6.3 oz)   SpO2 98%   BMI 35.55 kg/m²  Room air O2: 100    Constitutional: Well developed, Well nourished, mild acute distress, Non-toxic appearance.   HENT: Normocephalic, Atraumatic, Bilateral external ears normal, Oropharynx moist, No oral exudates, Nose normal.   Eyes: PERRLA, EOMI, Conjunctiva normal, No discharge.   Neck: Normal range of motion, No tenderness, " Supple, No stridor.   Lymphatic: No lymphadenopathy noted.   Cardiovascular: Normal heart rate, Normal rhythm, No murmurs, No rubs, No gallops.   Thorax & Lungs: Normal breath sounds, No respiratory distress, No wheezing, No chest tenderness.   Abdomen: Bowel sounds normal, Soft, No tenderness, No masses, No pulsatile masses.   Skin: Warm, Dry, No erythema, No rash.   Back: No tenderness, No CVA tenderness.   Extremities: Intact distal pulses, No edema, No tenderness, No cyanosis, No clubbing.   Musculoskeletal: Good range of motion in all major joints. No tenderness to palpation or major deformities noted.   Neurologic: Alert & oriented x 3, intermittent minor tremors to his right hand otherwise no focal deficits or lateralizing signs there is no evidence of any slurred speech and NIH score of 0.  Psychiatric: Affect normal, Judgment normal, Mood normal.     EKG;  Normal sinus rhythm at 80 beats per minute, no ST elevation or depression, there is hypertrophy noted, poor R-wave progression, no diagnostic Q waves. This a 12-lead EKG is compared to previous EKG dated 6/2017 no interval change.      RADIOLOGY/PROCEDURES  CT-CTA HEAD WITH & W/O-POST PROCESS   Final Result      No thrombosis is seen within the [place].      1.7 mm outpouching in the region of the anterior communicating artery may represent a tiny infundibulum. Tiny aneurysm not excluded.      No acute intracranial abnormality is identified.      Paranasal sinus disease.      Maxillary and mandibular periapical lucencies and dental caries.      CT-CTA NECK WITH & W/O-POST PROCESSING   Final Result      CT angiogram of the neck within normal limits.            COURSE & MEDICAL DECISION MAKING  Pertinent Labs & Imaging studies reviewed. (See chart for details)  Reviewed CT scans of findings with patient. I have set up a neurology appointment on 6/4 for patient. Is placed on Ativan for his tremors I discussed controlled substance policy that this  medication can be dictating DrPatrick sell it, give tellers keep from children don't drive or operate machinery with medication. Patient verbalizes need for follow-up as well as worsening symptoms return promptly emergency room. Discharged in stable condition as above to home    FINAL IMPRESSION  1. Tremors right arm  2. History of hypertrophic cardiomyopathy  3. Hypertension by history         Electronically signed by: Trevor Pierce, 5/3/2018 4:43 PM

## 2018-05-03 NOTE — ED NOTES
Discharge instructions given. All questions answered. Prescriptions given x1. Pt to follow-up with neurology. Pt verbalized understanding. All belongings with pt. Pt self ambulatory to lobby. Pt educated not to drive while on Ativan.

## 2018-05-03 NOTE — ED TRIAGE NOTES
"Pt comes in complaining of a tremor to his right hand and trouble with speech. Pt reporting he is seeing a neurologist but was told \"because I have a pacemaker they can't do much\"   "

## 2018-05-03 NOTE — DISCHARGE INSTRUCTIONS
Tremor  A tremor is trembling or shaking that you cannot control. Most tremors affect the hands or arms. Tremors can also affect the head, vocal cords, face, and other parts of the body. There are many types of tremors. Common types include:  · Essential tremor. These usually occur in people over the age of 40. It may run in families and can happen in otherwise healthy people.  · Resting tremor. These occur when the muscles are at rest, such as when your hands are resting in your lap. People with Parkinson disease often have resting tremors.  · Postural tremor. These occur when you try to hold a pose, such as keeping your hands outstretched.  · Kinetic tremor. These occur during purposeful movement, such as trying to touch a finger to your nose.  · Task-specific tremor. These may occur when you perform tasks such as handwriting, speaking, or standing.  · Psychogenic tremor. These dramatically lessen or disappear when you are distracted. They can happen in people of all ages.  Some types of tremors have no known cause. Tremors can also be a symptom of nervous system problems (neurological disorders) that may occur with aging. Some tremors go away with treatment while others do not.  Follow these instructions at home:  Watch your tremor for any changes. The following actions may help to lessen any discomfort you are feeling:  · Take medicines only as directed by your health care provider.  · Limit alcohol intake to no more than 1 drink per day for nonpregnant women and 2 drinks per day for men. One drink equals 12 oz of beer, 5 oz of wine, or 1½ oz of hard liquor.  · Do not use any tobacco products, including cigarettes, chewing tobacco, or electronic cigarettes. If you need help quitting, ask your health care provider.  · Avoid extreme heat or cold.  · Limit the amount of caffeine you consume as directed by your health care provider.  · Try to get 8 hours of sleep each night.  · Find ways to manage your stress, such  as meditation or yoga.  · Keep all follow-up visits as directed by your health care provider. This is important.  Contact a health care provider if:  · You start having a tremor after starting a new medicine.  · You have tremor with other symptoms such as:  ¨ Numbness.  ¨ Tingling.  ¨ Pain.  ¨ Weakness.  · Your tremor gets worse.  · Your tremor interferes with your day-to-day life.  This information is not intended to replace advice given to you by your health care provider. Make sure you discuss any questions you have with your health care provider.  Document Released: 12/08/2003 Document Revised: 08/20/2017 Document Reviewed: 06/15/2015  Elsevier Interactive Patient Education © 2017 [employer]

## 2018-05-03 NOTE — ED NOTES
PT self ambulatory to room. Pt states the slurred speech comes and goes. Pt currently does not have slurred speech or tremors. NIHSS =0.

## 2018-05-14 ENCOUNTER — HOSPITAL ENCOUNTER (EMERGENCY)
Facility: MEDICAL CENTER | Age: 48
End: 2018-05-14
Attending: EMERGENCY MEDICINE
Payer: MEDICAID

## 2018-05-14 ENCOUNTER — APPOINTMENT (OUTPATIENT)
Dept: RADIOLOGY | Facility: MEDICAL CENTER | Age: 48
End: 2018-05-14
Attending: EMERGENCY MEDICINE
Payer: MEDICAID

## 2018-05-14 VITALS
SYSTOLIC BLOOD PRESSURE: 144 MMHG | HEIGHT: 75 IN | RESPIRATION RATE: 25 BRPM | BODY MASS INDEX: 35.06 KG/M2 | WEIGHT: 281.97 LBS | HEART RATE: 62 BPM | DIASTOLIC BLOOD PRESSURE: 76 MMHG | OXYGEN SATURATION: 98 %

## 2018-05-14 DIAGNOSIS — R42 DIZZINESS: ICD-10-CM

## 2018-05-14 LAB
ALBUMIN SERPL BCP-MCNC: 3.9 G/DL (ref 3.2–4.9)
ALBUMIN/GLOB SERPL: 1.6 G/DL
ALP SERPL-CCNC: 92 U/L (ref 30–99)
ALT SERPL-CCNC: 16 U/L (ref 2–50)
ANION GAP SERPL CALC-SCNC: 6 MMOL/L (ref 0–11.9)
APTT PPP: 27.4 SEC (ref 24.7–36)
AST SERPL-CCNC: 15 U/L (ref 12–45)
BASOPHILS # BLD AUTO: 1.1 % (ref 0–1.8)
BASOPHILS # BLD: 0.05 K/UL (ref 0–0.12)
BILIRUB SERPL-MCNC: 0.6 MG/DL (ref 0.1–1.5)
BUN SERPL-MCNC: 16 MG/DL (ref 8–22)
CALCIUM SERPL-MCNC: 9.1 MG/DL (ref 8.5–10.5)
CHLORIDE SERPL-SCNC: 109 MMOL/L (ref 96–112)
CO2 SERPL-SCNC: 20 MMOL/L (ref 20–33)
CREAT SERPL-MCNC: 1.06 MG/DL (ref 0.5–1.4)
EKG IMPRESSION: NORMAL
EOSINOPHIL # BLD AUTO: 0.23 K/UL (ref 0–0.51)
EOSINOPHIL NFR BLD: 4.9 % (ref 0–6.9)
ERYTHROCYTE [DISTWIDTH] IN BLOOD BY AUTOMATED COUNT: 42.5 FL (ref 35.9–50)
GLOBULIN SER CALC-MCNC: 2.5 G/DL (ref 1.9–3.5)
GLUCOSE SERPL-MCNC: 105 MG/DL (ref 65–99)
HCT VFR BLD AUTO: 40.7 % (ref 42–52)
HGB BLD-MCNC: 13.8 G/DL (ref 14–18)
IMM GRANULOCYTES # BLD AUTO: 0.01 K/UL (ref 0–0.11)
IMM GRANULOCYTES NFR BLD AUTO: 0.2 % (ref 0–0.9)
INR PPP: 1.01 (ref 0.87–1.13)
LYMPHOCYTES # BLD AUTO: 1.58 K/UL (ref 1–4.8)
LYMPHOCYTES NFR BLD: 33.8 % (ref 22–41)
MCH RBC QN AUTO: 28.6 PG (ref 27–33)
MCHC RBC AUTO-ENTMCNC: 33.9 G/DL (ref 33.7–35.3)
MCV RBC AUTO: 84.4 FL (ref 81.4–97.8)
MONOCYTES # BLD AUTO: 0.4 K/UL (ref 0–0.85)
MONOCYTES NFR BLD AUTO: 8.6 % (ref 0–13.4)
NEUTROPHILS # BLD AUTO: 2.4 K/UL (ref 1.82–7.42)
NEUTROPHILS NFR BLD: 51.4 % (ref 44–72)
NRBC # BLD AUTO: 0 K/UL
NRBC BLD-RTO: 0 /100 WBC
PLATELET # BLD AUTO: 231 K/UL (ref 164–446)
PMV BLD AUTO: 9.5 FL (ref 9–12.9)
POTASSIUM SERPL-SCNC: 4 MMOL/L (ref 3.6–5.5)
PROT SERPL-MCNC: 6.4 G/DL (ref 6–8.2)
PROTHROMBIN TIME: 13 SEC (ref 12–14.6)
RBC # BLD AUTO: 4.82 M/UL (ref 4.7–6.1)
SODIUM SERPL-SCNC: 135 MMOL/L (ref 135–145)
T4 FREE SERPL-MCNC: 0.78 NG/DL (ref 0.53–1.43)
TROPONIN I SERPL-MCNC: 0.01 NG/ML (ref 0–0.04)
TSH SERPL DL<=0.005 MIU/L-ACNC: 5.68 UIU/ML (ref 0.38–5.33)
WBC # BLD AUTO: 4.7 K/UL (ref 4.8–10.8)

## 2018-05-14 PROCEDURE — 85610 PROTHROMBIN TIME: CPT

## 2018-05-14 PROCEDURE — 93005 ELECTROCARDIOGRAM TRACING: CPT

## 2018-05-14 PROCEDURE — 84439 ASSAY OF FREE THYROXINE: CPT

## 2018-05-14 PROCEDURE — 80053 COMPREHEN METABOLIC PANEL: CPT

## 2018-05-14 PROCEDURE — 85025 COMPLETE CBC W/AUTO DIFF WBC: CPT

## 2018-05-14 PROCEDURE — 94760 N-INVAS EAR/PLS OXIMETRY 1: CPT

## 2018-05-14 PROCEDURE — 36415 COLL VENOUS BLD VENIPUNCTURE: CPT

## 2018-05-14 PROCEDURE — 99284 EMERGENCY DEPT VISIT MOD MDM: CPT

## 2018-05-14 PROCEDURE — 84484 ASSAY OF TROPONIN QUANT: CPT

## 2018-05-14 PROCEDURE — 93005 ELECTROCARDIOGRAM TRACING: CPT | Performed by: EMERGENCY MEDICINE

## 2018-05-14 PROCEDURE — 85730 THROMBOPLASTIN TIME PARTIAL: CPT

## 2018-05-14 PROCEDURE — 71045 X-RAY EXAM CHEST 1 VIEW: CPT

## 2018-05-14 PROCEDURE — 84443 ASSAY THYROID STIM HORMONE: CPT

## 2018-05-14 NOTE — DISCHARGE INSTRUCTIONS
Dizziness  Dizziness is a common problem. It is a feeling of unsteadiness or light-headedness. You may feel like you are about to faint. Dizziness can lead to injury if you stumble or fall. Anyone can become dizzy, but dizziness is more common in older adults. This condition can be caused by a number of things, including medicines, dehydration, or illness.  Follow these instructions at home:  Taking these steps may help with your condition:  Eating and drinking  · Drink enough fluid to keep your urine clear or pale yellow. This helps to keep you from becoming dehydrated. Try to drink more clear fluids, such as water.  · Do not drink alcohol.  · Limit your caffeine intake if directed by your health care provider.  · Limit your salt intake if directed by your health care provider.  Activity  · Avoid making quick movements.  ¨ Rise slowly from chairs and steady yourself until you feel okay.  ¨ In the morning, first sit up on the side of the bed. When you feel okay, stand slowly while you hold onto something until you know that your balance is fine.  · Move your legs often if you need to  one place for a long time. Tighten and relax your muscles in your legs while you are standing.  · Do not drive or operate heavy machinery if you feel dizzy.  · Avoid bending down if you feel dizzy. Place items in your home so that they are easy for you to reach without leaning over.  Lifestyle  · Do not use any tobacco products, including cigarettes, chewing tobacco, or electronic cigarettes. If you need help quitting, ask your health care provider.  · Try to reduce your stress level, such as with yoga or meditation. Talk with your health care provider if you need help.  General instructions  · Watch your dizziness for any changes.  · Take medicines only as directed by your health care provider. Talk with your health care provider if you think that your dizziness is caused by a medicine that you are taking.  · Tell a friend or  a family member that you are feeling dizzy. If he or she notices any changes in your behavior, have this person call your health care provider.  · Keep all follow-up visits as directed by your health care provider. This is important.  Contact a health care provider if:  · Your dizziness does not go away.  · Your dizziness or light-headedness gets worse.  · You feel nauseous.  · You have reduced hearing.  · You have new symptoms.  · You are unsteady on your feet or you feel like the room is spinning.  Get help right away if:  · You vomit or have diarrhea and are unable to eat or drink anything.  · You have problems talking, walking, swallowing, or using your arms, hands, or legs.  · You feel generally weak.  · You are not thinking clearly or you have trouble forming sentences. It may take a friend or family member to notice this.  · You have chest pain, abdominal pain, shortness of breath, or sweating.  · Your vision changes.  · You notice any bleeding.  · You have a headache.  · You have neck pain or a stiff neck.  · You have a fever.  This information is not intended to replace advice given to you by your health care provider. Make sure you discuss any questions you have with your health care provider.  Document Released: 06/13/2002 Document Revised: 05/25/2017 Document Reviewed: 12/14/2015  [employer] Interactive Patient Education © 2017 [employer]      Cardiac Event Monitoring  A cardiac event monitor is a small recording device used to help detect abnormal heart rhythms (arrhythmias). The monitor is used to record heart rhythm when noticeable symptoms such as the following occur:  · Fast heartbeats (palpitations), such as heart racing or fluttering.  · Dizziness.  · Fainting or light-headedness.  · Unexplained weakness.  The monitor is wired to two electrodes placed on your chest. Electrodes are flat, sticky disks that attach to your skin. The monitor can be worn for up to 30 days. You will wear the monitor at  all times, except when bathing.   HOW TO USE YOUR CARDIAC EVENT MONITOR  A technician will prepare your chest for the electrode placement. The technician will show you how to place the electrodes, how to work the monitor, and how to replace the batteries. Take time to practice using the monitor before you leave the office. Make sure you understand how to send the information from the monitor to your health care provider. This requires a telephone with a landline, not a cell phone. You need to:  · Wear your monitor at all times, except when you are in water:  ¨ Do not get the monitor wet.  ¨ Take the monitor off when bathing. Do not swim or use a hot tub with it on.  · Keep your skin clean. Do not put body lotion or moisturizer on your chest.  · Change the electrodes daily or any time they stop sticking to your skin. You might need to use tape to keep them on.  · It is possible that your skin under the electrodes could become irritated. To keep this from happening, try to put the electrodes in slightly different places on your chest. However, they must remain in the area under your left breast and in the upper right section of your chest.  · Make sure the monitor is safely clipped to your clothing or in a location close to your body that your health care provider recommends.  · Press the button to record when you feel symptoms of heart trouble, such as dizziness, weakness, light-headedness, palpitations, thumping, shortness of breath, unexplained weakness, or a fluttering or racing heart. The monitor is always on and records what happened slightly before you pressed the button, so do not worry about being too late to get good information.  · Keep a diary of your activities, such as walking, doing chores, and taking medicine. It is especially important to note what you were doing when you pushed the button to record your symptoms. This will help your health care provider determine what might be contributing to your  symptoms. The information stored in your monitor will be reviewed by your health care provider alongside your diary entries.  · Send the recorded information as recommended by your health care provider. It is important to understand that it will take some time for your health care provider to process the results.  · Change the batteries as recommended by your health care provider.  SEEK IMMEDIATE MEDICAL CARE IF:   · You have chest pain.  · You have extreme difficulty breathing or shortness of breath.  · You develop a very fast heartbeat that persists.  · You develop dizziness that does not go away.  · You faint or constantly feel you are about to faint.     This information is not intended to replace advice given to you by your health care provider. Make sure you discuss any questions you have with your health care provider.     Document Released: 09/26/2009 Document Revised: 01/08/2016 Document Reviewed: 06/16/2014  [employer] Interactive Patient Education ©2016 [employer]

## 2018-05-14 NOTE — ED PROVIDER NOTES
ED Provider Note  CHIEF COMPLAINT  Chief Complaint   Patient presents with   • Dizziness     Had episode yesterday where he became extremely dizzy, diaphoretic, and almost passed out. Continued to not feel well through the night so came to ED this am. Hx of cardiomyopathy so states he wanted to make sure it was not a worsening heart problem.        HPI  Trevor Gillis Jr. is a 47 y.o. male who presents for evaluation of dizziness and lightheadedness . He says he just felt a little funny. It wasn't really vertigo. It is more lightheadedness. He never had a syncopal episode. . He has a history of cardiomyopathy. He has a history of a cardiac pacemaker. No headache, jaw pain, no chest pain, no difficulty breathing. He said he really didn't pass out he just very lightheaded not feeling well over the past few days. History of hypertension and sleep apnea and pacemaker    REVIEW OF SYSTEMS  No headache, no jaw pain, no chest pain, no difficulty breathing. No vomiting or diarrhea.  ALL OTHER SYSTEMS NEGATIVE    ALLERGIES  Known drug allergies    PAST MEDICAL HISTORY  Past Medical History:   Diagnosis Date   • Dyslipidemia    • Fracture of left clavicle    • Hernia    • HTN (hypertension)    • Hypertrophic cardiomyopathy (HCC)    • JEREMIAH (obstructive sleep apnea)    • Pacemaker 2014   • Syncope 11/19/2012       SURGICAL HISTORY  Past Surgical History:   Procedure Laterality Date   • IRRIGATION & DEBRIDEMENT GENERAL Left 1/16/2017    Procedure: IRRIGATION & DEBRIDEMENT GENERAL-INDEX FINGER;  Surgeon: Kia Aldridge M.D.;  Location: SURGERY [facility];  Service:    • INGUINAL HERNIA REPAIR  9/10/2014    Performed by Christie Ott M.D. at SURGERY SAME DAY [facility]   • RECOVERY  7/30/2014    Performed by Cath-Recovery Surgery at SURGERY SAME DAY [facility]   • OTHER CARDIAC SURGERY  7/25/14    [facility]   • RECOVERY  7/25/2014    Performed by Cath-Recovery Surgery at SURGERY SAME DAY [facility]   • APPENDECTOMY   "   • TONSILLECTOMY         FAMILY HISTORY  Family History   Problem Relation Age of Onset   • Other Mother      Accident   • Stroke Father    • Heart Attack Father      MI at age 24?   • Heart Disease Paternal Grandfather    • Hypertension Paternal Grandmother    • Diabetes Paternal Grandmother        SOCIAL HISTORY  Nonsmoker    PHYSICAL EXAM  GENERAL: Alert male adult  VITAL SIGNS: /76   Pulse 90   Resp 16   Ht 1.905 m (6' 3\")   Wt (!) 127.9 kg (281 lb 15.5 oz)   SpO2 96%   BMI 35.24 kg/m²    Constitutional: Alert healthy-appearing adult HENT: Scalp is normal size and nontender. Ears are clear. Nose is clear. Throat is clear with no stridor no drooling no trismus. Teeth are all intact.  Eyes: Pupils equal round and reactive to light, extraocular motor fall. There is no scleral icterus.  Neck: Neck is supple and nontender. There is no meningismus. No adenitis. No thyromegaly.  Lymphatic: No adenopathy.   Cardiovascular: Heart regular rhythm without murmurs or gallops   Thorax & Lungs: No chest wall tenderness. Lungs are clear. Patient has good breath sounds bilateral. No rales, no rhonchi, no wheezes.  Abdomen: Abdomen is soft, nontender, not rigid, no guarding, and no organomegaly. There is no palpable hernia   Skin: Warm, pink, and dry with no erythema and no rash.   Back: Nontender, no midline bony tenderness, no flank tenderness.  Extremities: Full range of motion  No tenderness to palpation and no deformities noted. No calf or thigh swelling. No calf or thigh tenderness. No clinical DVT.  Neurologic: Alert & oriented . Cranial nerves are grossly intact as tested. Patient moves all 4 extremities well. Patient has good strong flexion and extension of the ankle joints knee joints hip joints and elbow joints. Sensation is normal and symmetrical in the upper and lower extremities.   Psychiatric: Patient is alert oriented coherent and rational.     EKG  EKG Interpretation    Interpreted by me    Rhythm: " normal sinus   Rate: normal  Axis: normal  Ectopy: none  Conduction: normal  ST Segments: no acute change  T Waves: no acute change  Q Waves: none    Clinical Impression: Pacemaker rhythm with no specific ST-T wave change. Left bundle branch block pattern.    RADIOLOGY/PROCEDURES        COURSE & MEDICAL DECISION MAKING  Patient presents for evaluation of acute recurrent episodes with dizziness. He has a history of cardiomyopathy, thyroid disease, cardiac pacemaker. Differential diagnosis: Syncope, acute coronary syndrome, unstable angina, esophageal spasm, pacemaker dysfunction, intracranial event, metabolic problem, anemia, dehydration, etc. never had a syncopal episode. He just felt a little funny and lightheaded.    Plan: #1 IV #2 cardiac monitor, pulse ox monitor, blood pressure monitor. #3. EKG and chest x-ray #4. CT of the head #5. Lab evaluation including CBC, CMP, troponin, T4, TSH, etc. #6.. Review of previous medical records    Laboratory and reexamination: Interrogation of the pacemaker was performed by Devi Patel the nurse and the interrogation was normal. CBC is normal with no anemia and no bandemia. Chemistry panel is normal. Troponin is negative. Thyroid studies normal.    Results for orders placed or performed during the hospital encounter of 05/14/18   CBC WITH DIFFERENTIAL   Result Value Ref Range    WBC 4.7 (L) 4.8 - 10.8 K/uL    RBC 4.82 4.70 - 6.10 M/uL    Hemoglobin 13.8 (L) 14.0 - 18.0 g/dL    Hematocrit 40.7 (L) 42.0 - 52.0 %    MCV 84.4 81.4 - 97.8 fL    MCH 28.6 27.0 - 33.0 pg    MCHC 33.9 33.7 - 35.3 g/dL    RDW 42.5 35.9 - 50.0 fL    Platelet Count 231 164 - 446 K/uL    MPV 9.5 9.0 - 12.9 fL    Neutrophils-Polys 51.40 44.00 - 72.00 %    Lymphocytes 33.80 22.00 - 41.00 %    Monocytes 8.60 0.00 - 13.40 %    Eosinophils 4.90 0.00 - 6.90 %    Basophils 1.10 0.00 - 1.80 %    Immature Granulocytes 0.20 0.00 - 0.90 %    Nucleated RBC 0.00 /100 WBC    Neutrophils (Absolute) 2.40 1.82 - 7.42 K/uL     Lymphs (Absolute) 1.58 1.00 - 4.80 K/uL    Monos (Absolute) 0.40 0.00 - 0.85 K/uL    Eos (Absolute) 0.23 0.00 - 0.51 K/uL    Baso (Absolute) 0.05 0.00 - 0.12 K/uL    Immature Granulocytes (abs) 0.01 0.00 - 0.11 K/uL    NRBC (Absolute) 0.00 K/uL   COMP METABOLIC PANEL   Result Value Ref Range    Sodium 135 135 - 145 mmol/L    Potassium 4.0 3.6 - 5.5 mmol/L    Chloride 109 96 - 112 mmol/L    Co2 20 20 - 33 mmol/L    Anion Gap 6.0 0.0 - 11.9    Glucose 105 (H) 65 - 99 mg/dL    Bun 16 8 - 22 mg/dL    Creatinine 1.06 0.50 - 1.40 mg/dL    Calcium 9.1 8.5 - 10.5 mg/dL    AST(SGOT) 15 12 - 45 U/L    ALT(SGPT) 16 2 - 50 U/L    Alkaline Phosphatase 92 30 - 99 U/L    Total Bilirubin 0.6 0.1 - 1.5 mg/dL    Albumin 3.9 3.2 - 4.9 g/dL    Total Protein 6.4 6.0 - 8.2 g/dL    Globulin 2.5 1.9 - 3.5 g/dL    A-G Ratio 1.6 g/dL   TROPONIN   Result Value Ref Range    Troponin I 0.01 0.00 - 0.04 ng/mL   PRTOTHROMBIN TIME (INR)   Result Value Ref Range    PT 13.0 12.0 - 14.6 sec    INR 1.01 0.87 - 1.13   APTT   Result Value Ref Range    APTT 27.4 24.7 - 36.0 sec   TSH   Result Value Ref Range    TSH 5.680 (H) 0.380 - 5.330 uIU/mL   FREE THYROXINE   Result Value Ref Range    Free T-4 0.78 0.53 - 1.43 ng/dL   ESTIMATED GFR   Result Value Ref Range    GFR If African American >60 >60 mL/min/1.73 m 2    GFR If Non African American >60 >60 mL/min/1.73 m 2   EKG (NOW)   Result Value Ref Range    Report       [facility] Emergency Dept.    Test Date:  2018  Pt Name:    ROCK MELISSA                Department: ER  MRN:        [id number]                      Room:  Gender:     Male                         Technician: [id number]  :        1970                   Requested By:ER TRIAGE PROTOCOL  Order #:    [id number]                    Reading MD: GARY GANSERT, MD    Measurements  Intervals                                Axis  Rate:       77                           P:          0  VA:         76                            QRS:        -81  QRSD:       122                          T:          96  QT:         452  QTc:        512    Interpretive Statements  ATRIAL-VENTRICULAR DUAL-PACED COMPLEXES  NO FURTHER RHYTHM ANALYSIS ATTEMPTED DUE TO PACED RHYTHM  LVH WITH IVCD, LAD AND SECONDARY REPOL ABNRM  Compared to ECG 05/03/2018 12:24:39  Intraventricular conduction delay now present  Left ventricular hypertrophy now present  Early repolarization now present  Atrial-sensed ve ntricular-paced complex(es) or rhythm no longer present    Electronically Signed On 5- 7:07:55 PDT by GARY GANSERT, MD        Patient has a completely normal evaluation here including his pacemaker being interrogated and lab etc. Everything normal. His vital signs are normal. He feels fine. He does not want to be admitted to the hospital. He never had a syncopal episode. He'll follow-up with his family physician. He stable on discharge at 9 AM.    Home treatment: #1 patient being given a copy of his reports #2; Dr. Jeffrey Gillis and get in to see him this week. He'll return here for any lightheadedness or syncope or any other symptoms. Stable on discharge.  FINAL IMPRESSION  1. Acute and recurrent dizzy spells  2. Cardiomyopathy  3. Cardiac pacemaker      Electronically signed by: Gary Gansert, 5/14/2018 9 AM AM

## 2018-05-14 NOTE — ED NOTES
Patient discharged home w instructions and follow up, given copies of labs, ekg. No questions at this time. IV dced , no bleeding, or swelling, dressed w gauze

## 2018-05-14 NOTE — ED TRIAGE NOTES
Chief Complaint   Patient presents with   • Dizziness     Had episode yesterday where he became extremely dizzy, diaphoretic, and almost passed out. Continued to not feel well through the night so came to ED this am. Hx of cardiomyopathy so states he wanted to make sure it was not a worsening heart problem.      Pt ambulatory to triage for above, steady on feet. EKG completed in triage. No signs of distress. Returned to [facility], educated on triage process, instructed to notify staff of worsening symptoms/concerns.

## 2018-06-04 ENCOUNTER — APPOINTMENT (OUTPATIENT)
Dept: NEUROLOGY | Facility: MEDICAL CENTER | Age: 48
End: 2018-06-04
Payer: MEDICAID

## 2018-07-20 ENCOUNTER — APPOINTMENT (OUTPATIENT)
Dept: NEUROLOGY | Facility: MEDICAL CENTER | Age: 48
End: 2018-07-20
Payer: MEDICAID

## 2018-07-30 ENCOUNTER — OFFICE VISIT (OUTPATIENT)
Dept: INTERNAL MEDICINE | Facility: MEDICAL CENTER | Age: 48
End: 2018-07-30
Payer: MEDICAID

## 2018-07-30 VITALS
OXYGEN SATURATION: 94 % | HEIGHT: 72 IN | WEIGHT: 272 LBS | BODY MASS INDEX: 36.84 KG/M2 | DIASTOLIC BLOOD PRESSURE: 80 MMHG | SYSTOLIC BLOOD PRESSURE: 136 MMHG | TEMPERATURE: 98.1 F | HEART RATE: 101 BPM

## 2018-07-30 DIAGNOSIS — M79.641 PAIN OF RIGHT HAND: ICD-10-CM

## 2018-07-30 DIAGNOSIS — E03.8 SUBCLINICAL HYPOTHYROIDISM: ICD-10-CM

## 2018-07-30 DIAGNOSIS — R42 DIZZY SPELLS: ICD-10-CM

## 2018-07-30 DIAGNOSIS — E66.9 OBESITY (BMI 30-39.9): ICD-10-CM

## 2018-07-30 DIAGNOSIS — I10 ESSENTIAL HYPERTENSION: ICD-10-CM

## 2018-07-30 PROCEDURE — 99214 OFFICE O/P EST MOD 30 MIN: CPT | Mod: GC | Performed by: INTERNAL MEDICINE

## 2018-07-30 RX ORDER — LORAZEPAM 1 MG/1
TABLET ORAL
Refills: 0 | COMMUNITY
Start: 2018-05-04 | End: 2020-01-21

## 2018-07-30 ASSESSMENT — ENCOUNTER SYMPTOMS
ORTHOPNEA: 0
TREMORS: 0
BLURRED VISION: 0
EYE DISCHARGE: 0
SEIZURES: 0
SPEECH CHANGE: 0
PALPITATIONS: 0
WEIGHT LOSS: 0
HEADACHES: 0
CLAUDICATION: 0
TINGLING: 1
COUGH: 0
FEVER: 0
FOCAL WEAKNESS: 0
DIZZINESS: 0
SENSORY CHANGE: 0
CHILLS: 0
DOUBLE VISION: 0
LOSS OF CONSCIOUSNESS: 0

## 2018-07-30 NOTE — LETTER
[facility]  [facility] - INTERNAL MEDICINE  [address] [facility] [id number]     July 30, 2018    Patient: Trevor Gillis Jr.   YOB: 1970   Date of Visit: 7/30/2018       To Whom It May Concern:    Trevor Gillis was seen and treated in our department on 7/30/2018. If you have any questions or concerns please contact us [phone].    Thank you.       Sincerely,         Lona Oseguera M.D.

## 2018-07-30 NOTE — PATIENT INSTRUCTIONS
Dizziness  Dizziness is a common problem. It makes you feel unsteady or lightheaded. You may feel like you are about to pass out (faint). Dizziness can lead to injury if you stumble or fall. Anyone can get dizzy, but dizziness is more common in older adults. This condition can be caused by a number of things, including:  · Medicines.  · Dehydration.  · Illness.  Follow these instructions at home:  Following these instructions may help with your condition:  Eating and drinking  · Drink enough fluid to keep your pee (urine) clear or pale yellow. This helps to keep you from getting dehydrated. Try to drink more clear fluids, such as water.  · Do not drink alcohol.  · Limit how much caffeine you drink or eat if told by your doctor.  · Limit how much salt you drink or eat if told by your doctor.  Activity  · Avoid making quick movements.  ¨ When you stand up from sitting in a chair, steady yourself until you feel okay.  ¨ In the morning, first sit up on the side of the bed. When you feel okay, stand slowly while you hold onto something. Do this until you know that your balance is fine.  · Move your legs often if you need to  one place for a long time. Tighten and relax your muscles in your legs while you are standing.  · Do not drive or use heavy machinery if you feel dizzy.  · Avoid bending down if you feel dizzy. Place items in your home so that they are easy for you to reach without leaning over.  Lifestyle  · Do not use any tobacco products, including cigarettes, chewing tobacco, or electronic cigarettes. If you need help quitting, ask your doctor.  · Try to lower your stress level, such as with yoga or meditation. Talk with your doctor if you need help.  General instructions  · Watch your dizziness for any changes.  · Take medicines only as told by your doctor. Talk with your doctor if you think that your dizziness is caused by a medicine that you are taking.  · Tell a friend or a family member that you are  feeling dizzy. If he or she notices any changes in your behavior, have this person call your doctor.  · Keep all follow-up visits as told by your doctor. This is important.  Contact a doctor if:  · Your dizziness does not go away.  · Your dizziness or light-headedness gets worse.  · You feel sick to your stomach (nauseous).  · You have trouble hearing.  · You have new symptoms.  · You are unsteady on your feet or you feel like the room is spinning.  Get help right away if:  · You throw up (vomit) or have diarrhea and are unable to eat or drink anything.  · You have trouble:  ¨ Talking.  ¨ Walking.  ¨ Swallowing.  ¨ Using your arms, hands, or legs.  · You feel generally weak.  · You are not thinking clearly or you have trouble forming sentences. It may take a friend or family member to notice this.  · You have:  ¨ Chest pain.  ¨ Pain in your belly (abdomen).  ¨ Shortness of breath.  ¨ Sweating.  · Your vision changes.  · You are bleeding.  · You have a headache.  · You have neck pain or a stiff neck.  · You have a fever.  This information is not intended to replace advice given to you by your health care provider. Make sure you discuss any questions you have with your health care provider.  Document Released: 12/06/2012 Document Revised: 05/25/2017 Document Reviewed: 12/14/2015  [employer] Interactive Patient Education © 2017 [employer]      Obesity, Adult  Introduction  Obesity is having too much body fat. If you have a BMI of 30 or more, you are obese. BMI is a number that explains how much body fat you have. Obesity is often caused by taking in (consuming) more calories than your body uses.  Obesity can cause serious health problems. Changing your lifestyle can help to treat obesity.  Follow these instructions at home:  Eating and drinking  · Follow advice from your doctor about what to eat and drink. Your doctor may tell you to:  ¨ Cut down on (limit) fast foods, sweets, and processed snack foods.  ¨ Choose  low-fat options. For example, choose low-fat milk instead of whole milk.  ¨ Eat 5 or more servings of fruits or vegetables every day.  ¨ Eat at home more often. This gives you more control over what you eat.  ¨ Choose healthy foods when you eat out.  ¨ Learn what a healthy portion size is. A portion size is the amount of a certain food that is healthy for you to eat at one time. This is different for each person.  ¨ Keep low-fat snacks available.  ¨ Avoid sugary drinks. These include soda, fruit juice, iced tea that is sweetened with sugar, and flavored milk.  ¨ Eat a healthy breakfast.  · Drink enough water to keep your pee (urine) clear or pale yellow.  · Do not go without eating for long periods of time (do not fast).  · Do not go on popular or trendy diets (fad diets).  Physical Activity  · Exercise often, as told by your doctor. Ask your doctor:  ¨ What types of exercise are safe for you.  ¨ How often you should exercise.  · Warm up and stretch before being active.  · Do slow stretching after being active (cool down).  · Rest between times of being active.  Lifestyle  · Limit how much time you spend in front of your TV, computer, or video game system (be less sedentary).  · Find ways to reward yourself that do not involve food.  · Limit alcohol intake to no more than 1 drink a day for nonpregnant women and 2 drinks a day for men. One drink equals 12 oz of beer, 5 oz of wine, or 1½ oz of hard liquor.  General instructions  · Keep a weight loss journal. This can help you keep track of:  ¨ The food that you eat.  ¨ The exercise that you do.  · Take over-the-counter and prescription medicines only as told by your doctor.  · Take vitamins and supplements only as told by your doctor.  · Think about joining a support group. Your doctor may be able to help with this.  · Keep all follow-up visits as told by your doctor. This is important.  Contact a doctor if:  · You cannot meet your weight loss goal after you have  changed your diet and lifestyle for 6 weeks.  This information is not intended to replace advice given to you by your health care provider. Make sure you discuss any questions you have with your health care provider.  Document Released: 03/11/2013 Document Revised: 05/25/2017 Document Reviewed: 10/05/2016  © 2017 [employer]

## 2018-07-31 NOTE — PROGRESS NOTES
At the time of the visit, I personally examined the patient and evaluated the patient's medical history, physical examination, laboratory results/studies, and assessment and I discussed the findings and formulated the care plan documented with the resident physician.    Gretchen Coelho M.D.

## 2018-07-31 NOTE — PROGRESS NOTES
Established Patient    Trevor presents today with the following:    CC: Presyncope    HPI: 46 y/o male with PMH of Non Obstructive cardiomyopathy, H/O chronic presyncope, obstructive sleep apnea, dyslipidemia, hypertension, anemia, GERD, h/o traumatic knees and head trauma in early adeloscent, h/o chronic arm/leg numbness  Came in today for recent presyncopal episode.     Near Syncope: History of intermittent lightheadedness with near syncope for last 5/6 years. Last episode yesterday, lasted few seconds, resolved by itself. Patient has warning sign and he stabilizes himself by supporting himself by the wall. Patient never passed out, never fell down. No exacerbating factors. Denies palpitation, any, seizures, dehydration, chest pain, blurry vision, tinnitus, vertigo or hearing deficit or speech problems. Following cardiology and Neurology for evaluation of this, so far all workup negative.     Hypertrophic nonobstructive cardiomyopathy:  F/h of sudden cardiac death to HCM. Had AICD in 2014 because of V. Tac  Follows Dr. Gillis, is going to schedule next appointment today.  Currently on Verapamil 300, Lasix as needed.    Numbness in both hands/feet  He has numbness in both hands and feet which comes and goes. Lasts for seconds/ minutes. He was admitted in the hospital  for left upper extremity and left side of face numbness last year, CAT scan was unremarkable---MRI was not done because of AICD  He was started on gabapentin. He was following neurology in the past, recently stopped because they can't do furthe investigations because of the AICD    Hypothyroidism  On 25mcg Thyroxine at home. Last TSH value little bit high.       Patient Active Problem List    Diagnosis Date Noted   • Dyslipidemia 10/24/2016     Priority: Low   • Hypothyroidism 05/05/2016     Priority: Low   • Pain of right hand 04/09/2018   • Need for influenza vaccination 12/26/2017   • Obesity (BMI 30-39.9) 08/08/2017   • Hypertension 01/16/2017   •  Tobacco abuse 11/03/2016   • Hypertrophic cardiomyopathy (HCC) 10/30/2016   • Prediabetes 07/12/2016   • Hyperglycemia 05/06/2016   • JEREMIAH (obstructive sleep apnea) 05/05/2016   • Shoulder pain 06/23/2015   • Inguinal hernia 09/10/2014   • Dual ICD (implantable cardioverter-defibrillator) in place 07/26/2014   • FH: sudden cardiac death (SCD) 07/16/2014   • NSVT (nonsustained ventricular tachycardia) ([facility]) 07/16/2014   • Dizzy spells 07/16/2014       Current Outpatient Prescriptions   Medication Sig Dispense Refill   • gabapentin (NEURONTIN) 100 MG Cap Take 100-200 mg by mouth 2 Times a Day. 100 mg in morning  200 mg at night     • levothyroxine (SYNTHROID) 25 MCG Tab Take 1 Tab by mouth Every morning on an empty stomach. 90 Tab 1   • albuterol 108 (90 Base) MCG/ACT Aero Soln inhalation aerosol Inhale 2 Puffs by mouth every 6 hours as needed for Shortness of Breath. 1 Inhaler 3   • atorvastatin (LIPITOR) 20 MG Tab Take 1 Tab by mouth every day. 90 Tab 1   • omeprazole (PRILOSEC) 20 MG Tablet Delayed Response delayed-release tablet Take 1 Tab by mouth every day. 90 Tab 1   • Verapamil HCl  MG CAPSULE SR 24 HR Take 1 Cap by mouth every day. 90 Cap 3   • furosemide (LASIX) 20 MG Tab Take 1 tablet daily as needed for edema 90 Tab 1   • LORazepam (ATIVAN) 1 MG Tab TAKE 1 TAB BY MOUTH EVERY 12 HOURS AS NEEDED FOR UP TO 5 DAYS  0   • potassium chloride SA (KDUR) 20 MEQ Tab CR TAKE 1 TABLET BY MOUTH EVERY DAY TAKE WITH LASIX AS NEEDED FOR SWELLING 90 Tab 1     No current facility-administered medications for this visit.        Social History     Social History   • Marital status: Single     Spouse name: N/A   • Number of children: N/A   • Years of education: N/A     Occupational History   • Not on file.     Social History Main Topics   • Smoking status: Former Smoker     Packs/day: 1.00     Years: 25.00     Types: Cigarettes   • Smokeless tobacco: Never Used      Comment: 07/07/17 quit smoking   • Alcohol use No       Comment: used to be heavy alcoholism (18 years ago),    • Drug use: No   • Sexual activity: Not on file     Other Topics Concern   • Not on file     Social History Narrative   • No narrative on file       Family History   Problem Relation Age of Onset   • Other Mother         Accident   • Stroke Father    • Heart Attack Father         MI at age 24?   • Heart Disease Paternal Grandfather    • Hypertension Paternal Grandmother    • Diabetes Paternal Grandmother        ROS: As per HPI. Additional pertinent symptoms as noted below.    Review of Systems   Constitutional: Negative for chills, fever and weight loss.   HENT: Negative for ear discharge, ear pain, hearing loss and tinnitus.    Eyes: Negative for blurred vision, double vision and discharge.   Respiratory: Negative for cough.    Cardiovascular: Negative for chest pain, palpitations, orthopnea, claudication and leg swelling.   Skin: Negative for rash.   Neurological: Positive for tingling (Both hands and feet, on and off). Negative for dizziness, tremors, sensory change, speech change, focal weakness, seizures, loss of consciousness and headaches.       /80   Pulse (!) 101   Temp 36.7 °C (98.1 °F)   Ht 1.829 m (6')   Wt 123.4 kg (272 lb)   SpO2 94%   BMI 36.89 kg/m²     Physical Exam  Physical Exam   Constitutional: He is oriented to person, place, and time. He appears well-developed.   HENT:   Head: Normocephalic and atraumatic.   Eyes: Pupils are equal, round, and reactive to light. Conjunctivae and EOM are normal. Right eye exhibits no discharge. Left eye exhibits no discharge.   Neck: Normal range of motion. No JVD present.   Cardiovascular: Normal rate, regular rhythm, normal heart sounds and intact distal pulses.    No murmur heard.  Pulmonary/Chest: Effort normal and breath sounds normal. No respiratory distress.   Abdominal: Soft. Bowel sounds are normal.   Musculoskeletal: Normal range of motion.   Neurological: He is alert and oriented to  person, place, and time. No cranial nerve deficit. Coordination normal.   Skin: Skin is warm.         Note: I have reviewed all pertinent labs and diagnostic tests associated with this visit with specific comments listed under the assessment and plan below      Assessment and Plan    1. Dizzy spells  -Advised patient on good hydration, healthy diet. Episodes are like lightheadedness. No vertigo. Has had evaluation in the past. So far no cause found. Take medications regularly.    2. Tingling of hands and feet  -Will recheck TSH and FT4 values. Has been seen by neurology in the past. EMG cannot be done due to AICD    3. Essential hypertension  -Continue home medication. Eat low salt diet. Excercise regularly    4. Obesity (BMI 30-39.9)  -Advised on exercise and healthy diet    5. Subclinical hypothyroidism  -Ordered TSH and FT4 to recheck      Followup: Return in about 6 months (around 1/30/2019) for Long.      Signed by: Lona Oseguera M.D.

## 2018-08-14 ENCOUNTER — HOSPITAL ENCOUNTER (EMERGENCY)
Facility: MEDICAL CENTER | Age: 48
End: 2018-08-14
Attending: EMERGENCY MEDICINE
Payer: MEDICAID

## 2018-08-14 ENCOUNTER — APPOINTMENT (OUTPATIENT)
Dept: RADIOLOGY | Facility: MEDICAL CENTER | Age: 48
End: 2018-08-14
Attending: EMERGENCY MEDICINE
Payer: MEDICAID

## 2018-08-14 VITALS
TEMPERATURE: 96.7 F | BODY MASS INDEX: 34.18 KG/M2 | RESPIRATION RATE: 16 BRPM | SYSTOLIC BLOOD PRESSURE: 143 MMHG | WEIGHT: 274.91 LBS | HEIGHT: 75 IN | OXYGEN SATURATION: 94 % | DIASTOLIC BLOOD PRESSURE: 95 MMHG | HEART RATE: 66 BPM

## 2018-08-14 DIAGNOSIS — R51.9 ACUTE NONINTRACTABLE HEADACHE, UNSPECIFIED HEADACHE TYPE: Primary | ICD-10-CM

## 2018-08-14 DIAGNOSIS — R53.83 MALAISE AND FATIGUE: ICD-10-CM

## 2018-08-14 DIAGNOSIS — R53.81 MALAISE AND FATIGUE: ICD-10-CM

## 2018-08-14 DIAGNOSIS — Z95.810 AICD (AUTOMATIC CARDIOVERTER/DEFIBRILLATOR) PRESENT: ICD-10-CM

## 2018-08-14 DIAGNOSIS — Z86.79 HISTORY OF HYPERTROPHIC CARDIOMYOPATHY: ICD-10-CM

## 2018-08-14 LAB
ALBUMIN SERPL BCP-MCNC: 4.1 G/DL (ref 3.2–4.9)
ALBUMIN/GLOB SERPL: 1.4 G/DL
ALP SERPL-CCNC: 89 U/L (ref 30–99)
ALT SERPL-CCNC: 12 U/L (ref 2–50)
ANION GAP SERPL CALC-SCNC: 8 MMOL/L (ref 0–11.9)
AST SERPL-CCNC: 14 U/L (ref 12–45)
BASOPHILS # BLD AUTO: 0.8 % (ref 0–1.8)
BASOPHILS # BLD: 0.04 K/UL (ref 0–0.12)
BILIRUB SERPL-MCNC: 0.5 MG/DL (ref 0.1–1.5)
BNP SERPL-MCNC: 34 PG/ML (ref 0–100)
BUN SERPL-MCNC: 18 MG/DL (ref 8–22)
CALCIUM SERPL-MCNC: 9 MG/DL (ref 8.5–10.5)
CHLORIDE SERPL-SCNC: 106 MMOL/L (ref 96–112)
CO2 SERPL-SCNC: 25 MMOL/L (ref 20–33)
CREAT SERPL-MCNC: 1.19 MG/DL (ref 0.5–1.4)
EOSINOPHIL # BLD AUTO: 0.25 K/UL (ref 0–0.51)
EOSINOPHIL NFR BLD: 5.1 % (ref 0–6.9)
ERYTHROCYTE [DISTWIDTH] IN BLOOD BY AUTOMATED COUNT: 42.8 FL (ref 35.9–50)
GLOBULIN SER CALC-MCNC: 2.9 G/DL (ref 1.9–3.5)
GLUCOSE SERPL-MCNC: 108 MG/DL (ref 65–99)
HCT VFR BLD AUTO: 42 % (ref 42–52)
HGB BLD-MCNC: 14.5 G/DL (ref 14–18)
IMM GRANULOCYTES # BLD AUTO: 0.02 K/UL (ref 0–0.11)
IMM GRANULOCYTES NFR BLD AUTO: 0.4 % (ref 0–0.9)
LYMPHOCYTES # BLD AUTO: 1.51 K/UL (ref 1–4.8)
LYMPHOCYTES NFR BLD: 30.9 % (ref 22–41)
MCH RBC QN AUTO: 29.4 PG (ref 27–33)
MCHC RBC AUTO-ENTMCNC: 34.5 G/DL (ref 33.7–35.3)
MCV RBC AUTO: 85.2 FL (ref 81.4–97.8)
MONOCYTES # BLD AUTO: 0.48 K/UL (ref 0–0.85)
MONOCYTES NFR BLD AUTO: 9.8 % (ref 0–13.4)
NEUTROPHILS # BLD AUTO: 2.58 K/UL (ref 1.82–7.42)
NEUTROPHILS NFR BLD: 53 % (ref 44–72)
NRBC # BLD AUTO: 0 K/UL
NRBC BLD-RTO: 0 /100 WBC
PLATELET # BLD AUTO: 264 K/UL (ref 164–446)
PMV BLD AUTO: 9.3 FL (ref 9–12.9)
POTASSIUM SERPL-SCNC: 4.5 MMOL/L (ref 3.6–5.5)
PROT SERPL-MCNC: 7 G/DL (ref 6–8.2)
RBC # BLD AUTO: 4.93 M/UL (ref 4.7–6.1)
SODIUM SERPL-SCNC: 139 MMOL/L (ref 135–145)
TROPONIN I SERPL-MCNC: 0.02 NG/ML (ref 0–0.04)
TROPONIN I SERPL-MCNC: 0.02 NG/ML (ref 0–0.04)
WBC # BLD AUTO: 4.9 K/UL (ref 4.8–10.8)

## 2018-08-14 PROCEDURE — 96374 THER/PROPH/DIAG INJ IV PUSH: CPT

## 2018-08-14 PROCEDURE — 71045 X-RAY EXAM CHEST 1 VIEW: CPT

## 2018-08-14 PROCEDURE — 96375 TX/PRO/DX INJ NEW DRUG ADDON: CPT

## 2018-08-14 PROCEDURE — 99285 EMERGENCY DEPT VISIT HI MDM: CPT

## 2018-08-14 PROCEDURE — 80053 COMPREHEN METABOLIC PANEL: CPT

## 2018-08-14 PROCEDURE — 85025 COMPLETE CBC W/AUTO DIFF WBC: CPT

## 2018-08-14 PROCEDURE — 83880 ASSAY OF NATRIURETIC PEPTIDE: CPT

## 2018-08-14 PROCEDURE — 70450 CT HEAD/BRAIN W/O DYE: CPT

## 2018-08-14 PROCEDURE — 99284 EMERGENCY DEPT VISIT MOD MDM: CPT

## 2018-08-14 PROCEDURE — 84484 ASSAY OF TROPONIN QUANT: CPT

## 2018-08-14 PROCEDURE — 700111 HCHG RX REV CODE 636 W/ 250 OVERRIDE (IP): Performed by: EMERGENCY MEDICINE

## 2018-08-14 PROCEDURE — 36415 COLL VENOUS BLD VENIPUNCTURE: CPT

## 2018-08-14 RX ORDER — DEXAMETHASONE SODIUM PHOSPHATE 10 MG/ML
10 INJECTION, SOLUTION INTRAMUSCULAR; INTRAVENOUS ONCE
Status: COMPLETED | OUTPATIENT
Start: 2018-08-14 | End: 2018-08-14

## 2018-08-14 RX ORDER — DIPHENHYDRAMINE HYDROCHLORIDE 50 MG/ML
25 INJECTION INTRAMUSCULAR; INTRAVENOUS ONCE
Status: COMPLETED | OUTPATIENT
Start: 2018-08-14 | End: 2018-08-14

## 2018-08-14 RX ORDER — METOCLOPRAMIDE HYDROCHLORIDE 5 MG/ML
10 INJECTION INTRAMUSCULAR; INTRAVENOUS ONCE
Status: COMPLETED | OUTPATIENT
Start: 2018-08-14 | End: 2018-08-14

## 2018-08-14 RX ADMIN — DIPHENHYDRAMINE HYDROCHLORIDE 25 MG: 50 INJECTION, SOLUTION INTRAMUSCULAR; INTRAVENOUS at 10:40

## 2018-08-14 RX ADMIN — DEXAMETHASONE SODIUM PHOSPHATE 10 MG: 10 INJECTION, SOLUTION INTRAMUSCULAR; INTRAVENOUS at 10:40

## 2018-08-14 RX ADMIN — METOCLOPRAMIDE 10 MG: 5 INJECTION, SOLUTION INTRAMUSCULAR; INTRAVENOUS at 10:40

## 2018-08-14 ASSESSMENT — PAIN SCALES - WONG BAKER: WONGBAKER_NUMERICALRESPONSE: DOESN'T HURT AT ALL

## 2018-08-14 ASSESSMENT — PAIN SCALES - GENERAL: PAINLEVEL_OUTOF10: 0

## 2018-08-14 ASSESSMENT — LIFESTYLE VARIABLES: DO YOU DRINK ALCOHOL: NO

## 2018-08-14 NOTE — ED NOTES
Per [employer] report shows that yesterday there was a short non-sustained v-tach rhythm that lasted less than a minute yesterday morning. Tech advises that he doesn't believe this would have caused any shock and that pacemaker is functioning well.

## 2018-08-14 NOTE — DISCHARGE INSTRUCTIONS
Follow-up with primary care 1-2 days for reevaluation.    Continue home medications as previously indicated.    Return to the emergency department for recurrent headache, altered mental status, visual changes, slurred speech, focal weakness, for chest pain, palpitations, shortness of breath, syncope, AICD discharge or other new concerns.    General Headache Without Cause  Introduction  A headache is pain or discomfort felt around the head or neck area. There are many causes and types of headaches. In some cases, the cause may not be found.  Follow these instructions at home:  Managing pain  · Take over-the-counter and prescription medicines only as told by your doctor.  · Lie down in a dark, quiet room when you have a headache.  · If directed, apply ice to the head and neck area:  ¨ Put ice in a plastic bag.  ¨ Place a towel between your skin and the bag.  ¨ Leave the ice on for 20 minutes, 2-3 times per day.  · Use a heating pad or hot shower to apply heat to the head and neck area as told by your doctor.  · Keep lights dim if bright lights bother you or make your headaches worse.  Eating and drinking  · Eat meals on a regular schedule.  · Lessen how much alcohol you drink.  · Lessen how much caffeine you drink, or stop drinking caffeine.  General instructions  · Keep all follow-up visits as told by your doctor. This is important.  · Keep a journal to find out if certain things bring on headaches. For example, write down:  ¨ What you eat and drink.  ¨ How much sleep you get.  ¨ Any change to your diet or medicines.  · Relax by getting a massage or doing other relaxing activities.  · Lessen stress.  · Sit up straight. Do not tighten (tense) your muscles.  · Do not use tobacco products. This includes cigarettes, chewing tobacco, or e-cigarettes. If you need help quitting, ask your doctor.  · Exercise regularly as told by your doctor.  · Get enough sleep. This often means 7-9 hours of sleep.  Contact a doctor  if:  · Your symptoms are not helped by medicine.  · You have a headache that feels different than the other headaches.  · You feel sick to your stomach (nauseous) or you throw up (vomit).  · You have a fever.  Get help right away if:  · Your headache becomes really bad.  · You keep throwing up.  · You have a stiff neck.  · You have trouble seeing.  · You have trouble speaking.  · You have pain in the eye or ear.  · Your muscles are weak or you lose muscle control.  · You lose your balance or have trouble walking.  · You feel like you will pass out (faint) or you pass out.  · You have confusion.  This information is not intended to replace advice given to you by your health care provider. Make sure you discuss any questions you have with your health care provider.  Document Released: 09/26/2009 Document Revised: 05/25/2017 Document Reviewed: 04/11/2016  © 2017 [employer]

## 2018-08-14 NOTE — ED NOTES
Pt resting w/ VSS at this time; chart up for re-eval. Report given to Cristiane SILVA, care transferred at this time.

## 2018-08-14 NOTE — LETTER
August 24, 2018         Trevor Gillis Jr.  [address]   [facility] [address]        Dear Trevor:      Below are the results from your recent visit:    Resulted Orders   CBC WITH DIFFERENTIAL   Result Value Ref Range    WBC 4.9 4.8 - 10.8 K/uL    RBC 4.93 4.70 - 6.10 M/uL    Hemoglobin 14.5 14.0 - 18.0 g/dL    Hematocrit 42.0 42.0 - 52.0 %    MCV 85.2 81.4 - 97.8 fL    MCH 29.4 27.0 - 33.0 pg    MCHC 34.5 33.7 - 35.3 g/dL    RDW 42.8 35.9 - 50.0 fL    Platelet Count 264 164 - 446 K/uL    MPV 9.3 9.0 - 12.9 fL    Neutrophils-Polys 53.00 44.00 - 72.00 %    Lymphocytes 30.90 22.00 - 41.00 %    Monocytes 9.80 0.00 - 13.40 %    Eosinophils 5.10 0.00 - 6.90 %    Basophils 0.80 0.00 - 1.80 %    Immature Granulocytes 0.40 0.00 - 0.90 %    Nucleated RBC 0.00 /100 WBC    Neutrophils (Absolute) 2.58 1.82 - 7.42 K/uL      Comment:      Includes immature neutrophils, if present.    Lymphs (Absolute) 1.51 1.00 - 4.80 K/uL    Monos (Absolute) 0.48 0.00 - 0.85 K/uL    Eos (Absolute) 0.25 0.00 - 0.51 K/uL    Baso (Absolute) 0.04 0.00 - 0.12 K/uL    Immature Granulocytes (abs) 0.02 0.00 - 0.11 K/uL    NRBC (Absolute) 0.00 K/uL   COMP METABOLIC PANEL   Result Value Ref Range    Sodium 139 135 - 145 mmol/L    Potassium 4.5 3.6 - 5.5 mmol/L    Chloride 106 96 - 112 mmol/L    Co2 25 20 - 33 mmol/L    Anion Gap 8.0 0.0 - 11.9    Glucose 108 (H) 65 - 99 mg/dL    Bun 18 8 - 22 mg/dL    Creatinine 1.19 0.50 - 1.40 mg/dL    Calcium 9.0 8.5 - 10.5 mg/dL    AST(SGOT) 14 12 - 45 U/L    ALT(SGPT) 12 2 - 50 U/L    Alkaline Phosphatase 89 30 - 99 U/L    Total Bilirubin 0.5 0.1 - 1.5 mg/dL    Albumin 4.1 3.2 - 4.9 g/dL    Total Protein 7.0 6.0 - 8.2 g/dL    Globulin 2.9 1.9 - 3.5 g/dL    A-G Ratio 1.4 g/dL   TROPONIN   Result Value Ref Range    Troponin I 0.02 0.00 - 0.04 ng/mL      Comment:      The Ultra Troponin I is a highly sensitive assay.  Effective 4-1-2011, the reference range for positive Troponin  has been changed.  This change  follows the recommendation of  the American College of Cardiology (ACC) committee in  conjunction with the 99th percentile reference population.  ___________________________________________________  Normal ultra TNI:  0.00-0.04 ng/mL  Clinical Correlation Indicated:  0.05 - 0.78 ng/mL  Suggestive of MI:  >0.78 ng/mL     ESTIMATED GFR   Result Value Ref Range    GFR If African American >60 >60 mL/min/1.73 m 2    GFR If Non African American >60 >60 mL/min/1.73 m 2   TROPONIN   Result Value Ref Range    Troponin I 0.02 0.00 - 0.04 ng/mL      Comment:      The Ultra Troponin I is a highly sensitive assay.  Effective 4-1-2011, the reference range for positive Troponin  has been changed.  This change follows the recommendation of  the American College of Cardiology (ACC) committee in  conjunction with the 99th percentile reference population.  ___________________________________________________  Normal ultra TNI:  0.00-0.04 ng/mL  Clinical Correlation Indicated:  0.05 - 0.78 ng/mL  Suggestive of MI:  >0.78 ng/mL     BTYPE NATRIURETIC PEPTIDE   Result Value Ref Range    B Natriuretic Peptide 34 0 - 100 pg/mL      Comment:      Effective 11/08/2012, this assay is being performed using new  instrumentation.  Correlation studies with the previous  instrumentation showed a negative bias.  For the most part  this is clinically insignificant.  Clinical correlation may  be required when comparing previous results with results  received after 11/07/2012.       The test results show that all are within normal limits.    If you have any questions or concerns, please don't hesitate to call.        Sincerely,      No name on file.    Electronically Signed

## 2018-08-14 NOTE — ED NOTES
Pt given discharge instructions. PIV removed, dressing applied. Signed copy in chart. Pt states all belongings in possession. Pt ambulatory off unit.

## 2018-08-14 NOTE — ED TRIAGE NOTES
"Chief Complaint   Patient presents with   • Head Ache     since yesterday   • Malaise   • Slurred Speech     Pt thinks his speech is \"ever so slightly slurred\", but not sure   • Pacemaker Check/Dysfunction     Pt has hx of hypertrophic cardiomyopathy w/ AICD, has felt AICD going off \"intermittently and not like big shocks\"      Pt ambulatory to triage for above w/ no difficulty. Pt appears unwell, states he just generally \"feels like shit\". Pt denies visual changes, weakness, numbness, or any other neurologic manifestations. Pt placed back in the lobby at this time, charge RN aware of Pt.    Blood pressure 143/95, pulse 82, temperature (!) 35.7 °C (96.2 °F), resp. rate 16, height 1.905 m (6' 3\"), weight 124.7 kg (274 lb 14.6 oz), SpO2 96 %.      "

## 2018-08-14 NOTE — ED PROVIDER NOTES
"ED Provider Note    CHIEF COMPLAINT  Chief Complaint   Patient presents with   • Head Ache     since yesterday   • Malaise   • Slurred Speech     Pt thinks his speech is \"ever so slightly slurred\", but not sure   • Pacemaker Check/Dysfunction     Pt has hx of hypertrophic cardiomyopathy w/ AICD, has felt AICD going off \"intermittently and not like big shocks\"        HPI  Trevor Gillis Jr. is a 48 y.o. male who ambulates to the emergency department for \"not feeling well.\"  Patient states he has had headache since yesterday morning, global, throbbing, 4-6 out of 10.  Patient has history of similar headaches, every 1-2 months.  Patient left work yesterday went home and went to sleep but awoke this morning not feeling well \"like I am sick.\"  Headache persists.  No visual changes.  No neck pain or stiffness.  No focal weakness or paresthesias.  Patient states his \"better half\" said that he had a \"ever so slightly slurred\" speech earlier this morning but patient denies such both agree that had completely resolved.  No chest pain or palpitations.  No syncope.  Patient states he frequently feels his AICD, but none for 2 days,\" definitely not a shock that is completely different.\"  Mild shortness of breath, also chronic for patient due to hypertrophic cardiomyopathy.  He has been working more lately than he should.  Otherwise denies cough or congestion, fever or chills.  Patient is actually feeling much better after arrival to the emergency department.    REVIEW OF SYSTEMS  See HPI for further details. All other systems are negative.     PAST MEDICAL HISTORY   has a past medical history of Dyslipidemia; Fracture of left clavicle; Hernia; HTN (hypertension); Hypertrophic cardiomyopathy (HCC); JEREMIAH (obstructive sleep apnea); Pacemaker (2014); and Syncope (11/19/2012).    SOCIAL HISTORY  Social History     Social History Main Topics   • Smoking status: Former Smoker     Packs/day: 1.00     Years: 25.00     Types: " "Cigarettes   • Smokeless tobacco: Never Used      Comment: 07/07/17 quit smoking   • Alcohol use No      Comment: used to be heavy alcoholism (18 years ago),    • Drug use: No   • Sexual activity: Not on file       SURGICAL HISTORY   has a past surgical history that includes tonsillectomy; other cardiac surgery (7/25/14); recovery (7/25/2014); recovery (7/30/2014); inguinal hernia repair (9/10/2014); appendectomy; and irrigation & debridement general (Left, 1/16/2017).    CURRENT MEDICATIONS  Home Medications    **Home medications have not yet been reviewed for this encounter**         ALLERGIES  Allergies   Allergen Reactions   • Tomato Hives       PHYSICAL EXAM  VITAL SIGNS: /95   Pulse 66   Temp 35.9 °C (96.7 °F)   Resp 16   Ht 1.905 m (6' 3\")   Wt 124.7 kg (274 lb 14.6 oz)   SpO2 94%   BMI 34.36 kg/m²   Pulse ox interpretation: I interpret this pulse ox as normal.  Constitutional: Alert in no apparent distress.  HENT: Normocephalic, atraumatic. Bilateral external ears normal, Nose normal. Moist mucous membranes.    Eyes: Pupils are equal and reactive, Conjunctiva normal.   Neck: Normal range of motion, Supple.  No JVD.  Lymphatic: No lymphadenopathy noted.   Cardiovascular: Regular rate and rhythm, no murmurs. Distal pulses intact.  No peripheral edema.  Thorax & Lungs: Normal breath sounds.  No wheezing/rales/ronchi. No increased work of breathing, clipped speech or retractions.   Abdomen: Soft, non-distended, non-tender to palpation. No palpable or pulsatile masses.   Skin: Warm, Dry, No erythema, No rash.   Musculoskeletal: Good range of motion in all major joints.  Neurologic: Alert and oriented ×4.  Speech is clear and cohesive.  No aphasia or slurred speech.  Cranial nerves II through XII intact bilaterally.  5 out of 5 strength in 4 extremities.  Gait stable independently without ataxia.  Psychiatric: Affect normal, Judgment normal, Mood normal.       DIAGNOSTIC STUDIES / " PROCEDURES    LABS  Results for orders placed or performed during the hospital encounter of 08/14/18   CBC WITH DIFFERENTIAL   Result Value Ref Range    WBC 4.9 4.8 - 10.8 K/uL    RBC 4.93 4.70 - 6.10 M/uL    Hemoglobin 14.5 14.0 - 18.0 g/dL    Hematocrit 42.0 42.0 - 52.0 %    MCV 85.2 81.4 - 97.8 fL    MCH 29.4 27.0 - 33.0 pg    MCHC 34.5 33.7 - 35.3 g/dL    RDW 42.8 35.9 - 50.0 fL    Platelet Count 264 164 - 446 K/uL    MPV 9.3 9.0 - 12.9 fL    Neutrophils-Polys 53.00 44.00 - 72.00 %    Lymphocytes 30.90 22.00 - 41.00 %    Monocytes 9.80 0.00 - 13.40 %    Eosinophils 5.10 0.00 - 6.90 %    Basophils 0.80 0.00 - 1.80 %    Immature Granulocytes 0.40 0.00 - 0.90 %    Nucleated RBC 0.00 /100 WBC    Neutrophils (Absolute) 2.58 1.82 - 7.42 K/uL    Lymphs (Absolute) 1.51 1.00 - 4.80 K/uL    Monos (Absolute) 0.48 0.00 - 0.85 K/uL    Eos (Absolute) 0.25 0.00 - 0.51 K/uL    Baso (Absolute) 0.04 0.00 - 0.12 K/uL    Immature Granulocytes (abs) 0.02 0.00 - 0.11 K/uL    NRBC (Absolute) 0.00 K/uL   COMP METABOLIC PANEL   Result Value Ref Range    Sodium 139 135 - 145 mmol/L    Potassium 4.5 3.6 - 5.5 mmol/L    Chloride 106 96 - 112 mmol/L    Co2 25 20 - 33 mmol/L    Anion Gap 8.0 0.0 - 11.9    Glucose 108 (H) 65 - 99 mg/dL    Bun 18 8 - 22 mg/dL    Creatinine 1.19 0.50 - 1.40 mg/dL    Calcium 9.0 8.5 - 10.5 mg/dL    AST(SGOT) 14 12 - 45 U/L    ALT(SGPT) 12 2 - 50 U/L    Alkaline Phosphatase 89 30 - 99 U/L    Total Bilirubin 0.5 0.1 - 1.5 mg/dL    Albumin 4.1 3.2 - 4.9 g/dL    Total Protein 7.0 6.0 - 8.2 g/dL    Globulin 2.9 1.9 - 3.5 g/dL    A-G Ratio 1.4 g/dL   TROPONIN   Result Value Ref Range    Troponin I 0.02 0.00 - 0.04 ng/mL   ESTIMATED GFR   Result Value Ref Range    GFR If African American >60 >60 mL/min/1.73 m 2    GFR If Non African American >60 >60 mL/min/1.73 m 2   TROPONIN   Result Value Ref Range    Troponin I 0.02 0.00 - 0.04 ng/mL   BTYPE NATRIURETIC PEPTIDE   Result Value Ref Range    B Natriuretic Peptide 34 0  - 100 pg/mL     RADIOLOGY  CT-HEAD W/O   Final Result      No evidence of acute intracranial process.      DX-CHEST-PORTABLE (1 VIEW)   Final Result      Mild cardiomegaly.            COURSE & MEDICAL DECISION MAKING  Nursing notes and vital signs were reviewed. (See chart for details)  The patients records were reviewed, history was obtained from the patient;     ED evaluation for multiple vague complaints to include headache, malaise and fatigue, shortness of breath is unrevealing.  Symptomatology is somewhat chronic and recurring in nature.  Patient is neurologically intact, nonfocal.  Headache resolved with Reglan, Benadryl and Decadron.  No history of clinical evidence to suggest subarachnoid hemorrhage or other mass-effect.  No meningitis.  CT head is unremarkable.  Labs within normal limits, including troponin and BNP.  No clinical or radiographic evidence for pneumonia, pneumothorax or thoracic aortic dissection.  Vital signs are stable without fever tachycardia.  Patient's blood pressures well controlled.  He is never hypoxic.  No clinical evidence for sepsis.  Continuous telemetry without ectopy or arrhythmia.  Pacer interrogation unrevealing as described by nursing staff after review with tech.  Cannot exclude developing viral syndrome.    Patient is stable for discharge at this time, anticipatory guidance provided, continue home medications, close follow-up is encouraged, and strict ED return instructions have been detailed. Patient is agreeable to the disposition and plan.    Patient's blood pressure was elevated in the emergency department, and has been referred to primary care for close monitoring.      FINAL IMPRESSION  (R51) Acute nonintractable headache, unspecified headache type  (primary encounter diagnosis)  (R53.81,  R53.83) Malaise and fatigue  (Z86.79) History of hypertrophic cardiomyopathy  (Z95.810) AICD (automatic cardioverter/defibrillator) present      Electronically signed by: Amy  JAGRUTI Grady, 8/14/2018 1:09 PM      This dictation was created using voice recognition software. The accuracy of the dictation is limited to the abilities of the software. I expect there may be some errors of grammar and possibly content. The nursing notes were reviewed and certain aspects of this information were incorporated into this note.

## 2018-10-01 ENCOUNTER — HOSPITAL ENCOUNTER (EMERGENCY)
Facility: MEDICAL CENTER | Age: 48
End: 2018-10-01

## 2018-10-01 ENCOUNTER — HOSPITAL ENCOUNTER (EMERGENCY)
Facility: MEDICAL CENTER | Age: 48
End: 2018-10-01
Attending: EMERGENCY MEDICINE

## 2018-10-01 ENCOUNTER — APPOINTMENT (OUTPATIENT)
Dept: RADIOLOGY | Facility: MEDICAL CENTER | Age: 48
End: 2018-10-01
Attending: EMERGENCY MEDICINE

## 2018-10-01 VITALS
SYSTOLIC BLOOD PRESSURE: 115 MMHG | HEART RATE: 68 BPM | WEIGHT: 259.7 LBS | TEMPERATURE: 98.1 F | BODY MASS INDEX: 32.29 KG/M2 | RESPIRATION RATE: 18 BRPM | DIASTOLIC BLOOD PRESSURE: 78 MMHG | HEIGHT: 75 IN | OXYGEN SATURATION: 95 %

## 2018-10-01 DIAGNOSIS — I42.1 HYPERTROPHIC OBSTRUCTIVE CARDIOMYOPATHY (HOCM) (HCC): ICD-10-CM

## 2018-10-01 DIAGNOSIS — G44.89 OTHER HEADACHE SYNDROME: ICD-10-CM

## 2018-10-01 DIAGNOSIS — R07.89 OTHER CHEST PAIN: ICD-10-CM

## 2018-10-01 LAB
ALBUMIN SERPL BCP-MCNC: 3.9 G/DL (ref 3.2–4.9)
ALBUMIN/GLOB SERPL: 1.1 G/DL
ALP SERPL-CCNC: 84 U/L (ref 30–99)
ALT SERPL-CCNC: 13 U/L (ref 2–50)
ANION GAP SERPL CALC-SCNC: 6 MMOL/L (ref 0–11.9)
AST SERPL-CCNC: 12 U/L (ref 12–45)
BASOPHILS # BLD AUTO: 1 % (ref 0–1.8)
BASOPHILS # BLD: 0.03 K/UL (ref 0–0.12)
BILIRUB SERPL-MCNC: 0.6 MG/DL (ref 0.1–1.5)
BNP SERPL-MCNC: 22 PG/ML (ref 0–100)
BUN SERPL-MCNC: 11 MG/DL (ref 8–22)
CALCIUM SERPL-MCNC: 9.5 MG/DL (ref 8.5–10.5)
CHLORIDE SERPL-SCNC: 105 MMOL/L (ref 96–112)
CO2 SERPL-SCNC: 26 MMOL/L (ref 20–33)
CREAT SERPL-MCNC: 1.12 MG/DL (ref 0.5–1.4)
EKG IMPRESSION: NORMAL
EOSINOPHIL # BLD AUTO: 0.23 K/UL (ref 0–0.51)
EOSINOPHIL NFR BLD: 7.5 % (ref 0–6.9)
ERYTHROCYTE [DISTWIDTH] IN BLOOD BY AUTOMATED COUNT: 41.4 FL (ref 35.9–50)
GLOBULIN SER CALC-MCNC: 3.4 G/DL (ref 1.9–3.5)
GLUCOSE SERPL-MCNC: 113 MG/DL (ref 65–99)
HCT VFR BLD AUTO: 43.1 % (ref 42–52)
HGB BLD-MCNC: 15.2 G/DL (ref 14–18)
IMM GRANULOCYTES # BLD AUTO: 0.01 K/UL (ref 0–0.11)
IMM GRANULOCYTES NFR BLD AUTO: 0.3 % (ref 0–0.9)
LIPASE SERPL-CCNC: 19 U/L (ref 11–82)
LYMPHOCYTES # BLD AUTO: 1.05 K/UL (ref 1–4.8)
LYMPHOCYTES NFR BLD: 34.3 % (ref 22–41)
MCH RBC QN AUTO: 29.8 PG (ref 27–33)
MCHC RBC AUTO-ENTMCNC: 35.3 G/DL (ref 33.7–35.3)
MCV RBC AUTO: 84.5 FL (ref 81.4–97.8)
MONOCYTES # BLD AUTO: 0.46 K/UL (ref 0–0.85)
MONOCYTES NFR BLD AUTO: 15 % (ref 0–13.4)
NEUTROPHILS # BLD AUTO: 1.28 K/UL (ref 1.82–7.42)
NEUTROPHILS NFR BLD: 41.9 % (ref 44–72)
NRBC # BLD AUTO: 0 K/UL
NRBC BLD-RTO: 0 /100 WBC
PLATELET # BLD AUTO: 211 K/UL (ref 164–446)
PMV BLD AUTO: 9.5 FL (ref 9–12.9)
POTASSIUM SERPL-SCNC: 4.2 MMOL/L (ref 3.6–5.5)
PROT SERPL-MCNC: 7.3 G/DL (ref 6–8.2)
RBC # BLD AUTO: 5.1 M/UL (ref 4.7–6.1)
SODIUM SERPL-SCNC: 137 MMOL/L (ref 135–145)
TROPONIN I SERPL-MCNC: <0.01 NG/ML (ref 0–0.04)
WBC # BLD AUTO: 3.1 K/UL (ref 4.8–10.8)

## 2018-10-01 PROCEDURE — 83690 ASSAY OF LIPASE: CPT

## 2018-10-01 PROCEDURE — 71045 X-RAY EXAM CHEST 1 VIEW: CPT

## 2018-10-01 PROCEDURE — 36415 COLL VENOUS BLD VENIPUNCTURE: CPT

## 2018-10-01 PROCEDURE — 700111 HCHG RX REV CODE 636 W/ 250 OVERRIDE (IP): Performed by: EMERGENCY MEDICINE

## 2018-10-01 PROCEDURE — 96374 THER/PROPH/DIAG INJ IV PUSH: CPT

## 2018-10-01 PROCEDURE — 85025 COMPLETE CBC W/AUTO DIFF WBC: CPT

## 2018-10-01 PROCEDURE — 99285 EMERGENCY DEPT VISIT HI MDM: CPT

## 2018-10-01 PROCEDURE — 84484 ASSAY OF TROPONIN QUANT: CPT

## 2018-10-01 PROCEDURE — 700102 HCHG RX REV CODE 250 W/ 637 OVERRIDE(OP): Performed by: EMERGENCY MEDICINE

## 2018-10-01 PROCEDURE — 83880 ASSAY OF NATRIURETIC PEPTIDE: CPT

## 2018-10-01 PROCEDURE — 99242 OFF/OP CONSLTJ NEW/EST SF 20: CPT | Performed by: INTERNAL MEDICINE

## 2018-10-01 PROCEDURE — A9270 NON-COVERED ITEM OR SERVICE: HCPCS | Performed by: EMERGENCY MEDICINE

## 2018-10-01 PROCEDURE — 80053 COMPREHEN METABOLIC PANEL: CPT

## 2018-10-01 PROCEDURE — 93005 ELECTROCARDIOGRAM TRACING: CPT | Performed by: EMERGENCY MEDICINE

## 2018-10-01 RX ORDER — FUROSEMIDE 20 MG/1
20 TABLET ORAL PRN
COMMUNITY
End: 2020-01-21

## 2018-10-01 RX ORDER — VERAPAMIL HYDROCHLORIDE 300 MG/1
300 CAPSULE, EXTENDED RELEASE ORAL EVERY EVENING
COMMUNITY
End: 2020-01-21

## 2018-10-01 RX ORDER — LEVOTHYROXINE SODIUM 0.03 MG/1
25 TABLET ORAL EVERY EVENING
COMMUNITY
End: 2020-01-21

## 2018-10-01 RX ORDER — METOCLOPRAMIDE HYDROCHLORIDE 5 MG/ML
10 INJECTION INTRAMUSCULAR; INTRAVENOUS ONCE
Status: COMPLETED | OUTPATIENT
Start: 2018-10-01 | End: 2018-10-01

## 2018-10-01 RX ORDER — HYDROCODONE BITARTRATE AND ACETAMINOPHEN 5; 325 MG/1; MG/1
1 TABLET ORAL ONCE
Status: COMPLETED | OUTPATIENT
Start: 2018-10-01 | End: 2018-10-01

## 2018-10-01 RX ORDER — ATORVASTATIN CALCIUM 20 MG/1
20 TABLET, FILM COATED ORAL NIGHTLY
COMMUNITY
End: 2020-01-21

## 2018-10-01 RX ORDER — OMEPRAZOLE 20 MG/1
20 CAPSULE, DELAYED RELEASE ORAL EVERY EVENING
COMMUNITY
End: 2020-01-21

## 2018-10-01 RX ADMIN — METOCLOPRAMIDE 10 MG: 5 INJECTION, SOLUTION INTRAMUSCULAR; INTRAVENOUS at 09:29

## 2018-10-01 RX ADMIN — HYDROCODONE BITARTRATE AND ACETAMINOPHEN 1 TABLET: 5; 325 TABLET ORAL at 09:28

## 2018-10-01 ASSESSMENT — PAIN SCALES - GENERAL: PAINLEVEL_OUTOF10: 0

## 2018-10-01 NOTE — ED PROVIDER NOTES
ED Provider Note    CHIEF COMPLAINT  Chief Complaint   Patient presents with   • Headache     x2 days, ha not taken any OTC meds   • Nausea     no vomiting       HPI  Trevor Gillis Jr. is a 48 y.o. male who presents to the emergency department for headache.  The patient headache for the last several days.  It is gradual onset worsening over about 24 hours as his whole head and is constant and mild to moderate in severity.  Occasions associate with nausea but there is no vomiting.  Denies any trauma.  Denies any focal numbness tingling or weakness in his arms or legs.  Not a thunderclap headache.  Not the worst headache of his life.  No fevers no chills no neck stiffness no photophobia no recent febrile illness.    He has had similar headaches in the past again with pain medicines this 1 has not.    Second complaint is chest pain.  He has a history of hypertrophic cardiomyopathy and intermittent chest pain.  Describes as pressure in the center of his chest.  Last for several minutes at a time.  He has no associated shortness of breath nausea vomiting diaphoresis.  No tearing pain radiation scapula.  No cough, hemoptysis fevers or chills.  No pain or swelling legs.  No history of PE or DVT.  Nothing specifically makes it better nothing makes it worse.  Is concerned because he has a pacemaker and has a history of hypertrophic cardiomyopathy    REVIEW OF SYSTEMS  See HPI for further details. All other systems are negative.    PAST MEDICAL HISTORY  Past Medical History:   Diagnosis Date   • Dyslipidemia    • Fracture of left clavicle    • Hernia    • HTN (hypertension)    • Hypertrophic cardiomyopathy (HCC)    • JEREMIAH (obstructive sleep apnea)    • Pacemaker 2014   • Syncope 11/19/2012       FAMILY HISTORY  Family History   Problem Relation Age of Onset   • Other Mother         Accident   • Stroke Father    • Heart Attack Father         MI at age 24?   • Heart Disease Paternal Grandfather    • Hypertension Paternal  Grandmother    • Diabetes Paternal Grandmother        SOCIAL HISTORY  Social History     Social History   • Marital status: Single     Spouse name: N/A   • Number of children: N/A   • Years of education: N/A     Social History Main Topics   • Smoking status: Former Smoker     Packs/day: 1.00     Years: 25.00     Types: Cigarettes   • Smokeless tobacco: Never Used      Comment: 07/07/17 quit smoking   • Alcohol use No      Comment: used to be heavy alcoholism (18 years ago),    • Drug use: No   • Sexual activity: Not on file     Other Topics Concern   • Not on file     Social History Narrative   • No narrative on file       SURGICAL HISTORY  Past Surgical History:   Procedure Laterality Date   • IRRIGATION & DEBRIDEMENT GENERAL Left 1/16/2017    Procedure: IRRIGATION & DEBRIDEMENT GENERAL-INDEX FINGER;  Surgeon: Kia Aldridge M.D.;  Location: SURGERY [facility];  Service:    • INGUINAL HERNIA REPAIR  9/10/2014    Performed by Christie Ott M.D. at SURGERY SAME DAY [facility] ORS   • RECOVERY  7/30/2014    Performed by Cath-Recovery Surgery at SURGERY SAME DAY [facility] ORS   • OTHER CARDIAC SURGERY  7/25/14    AICD   • RECOVERY  7/25/2014    Performed by Cath-Recovery Surgery at SURGERY SAME DAY [facility] ORS   • APPENDECTOMY     • TONSILLECTOMY         CURRENT MEDICATIONS  Home Medications     Reviewed by Anat Cardenas R.N. (Registered Nurse) on 10/01/18 at 0802  Med List Status: Partial   Medication Last Dose Status   albuterol 108 (90 Base) MCG/ACT Aero Soln inhalation aerosol 7/30/2018 Active   atorvastatin (LIPITOR) 20 MG Tab 9/30/2018 Active   furosemide (LASIX) 20 MG Tab 9/30/2018 Active   gabapentin (NEURONTIN) 100 MG Cap 9/30/2018 Active   levothyroxine (SYNTHROID) 25 MCG Tab 9/30/2018 Active   LORazepam (ATIVAN) 1 MG Tab 6/30/2018 Active   omeprazole (PRILOSEC) 20 MG Tablet Delayed Response delayed-release tablet 7/30/2018 Active   potassium chloride SA (KDUR) 20 MEQ Tab CR > Month Active  "  Verapamil HCl  MG CAPSULE SR 24 HR 9/30/2018 Active                ALLERGIES  Allergies   Allergen Reactions   • Tomato Hives       PHYSICAL EXAM  VITAL SIGNS: /78   Pulse (!) 57   Temp 36.7 °C (98.1 °F) (Temporal)   Resp 20   Ht 1.905 m (6' 3\")   Wt 117.8 kg (259 lb 11.2 oz)   SpO2 99%   BMI 32.46 kg/m²    Constitutional: Well developed, Well nourished, No acute distress, Non-toxic appearance.   HENT: Normocephalic, Atraumatic, Bilateral external ears normal, Oropharynx moist, No oral exudates, Nose normal.   Eyes: PERRL, EOMI, Conjunctiva normal, No discharge.   Neck: Normal range of motion, No tenderness, Supple, No stridor.    Lymphatic: No lymphadenopathy noted.   Cardiovascular: Normal heart rate, Normal rhythm, No murmurs, No rubs, No gallops.   Thorax & Lungs: Normal breath sounds, No respiratory distress, No wheezing,   Abdomen: Bowel sounds normal, Soft, No tenderness, No masses, No pulsatile masses.   Skin: Warm, Dry, No erythema, No rash.   Musculoskeletal: Good range of motion in all major joints.   Neurologic: Alert & oriented x 3, Normal motor function, Normal sensory function, No focal deficits noted.   Psychiatric: Affect normal,    Results for orders placed or performed during the hospital encounter of 10/01/18   CBC WITH DIFFERENTIAL   Result Value Ref Range    WBC 3.1 (L) 4.8 - 10.8 K/uL    RBC 5.10 4.70 - 6.10 M/uL    Hemoglobin 15.2 14.0 - 18.0 g/dL    Hematocrit 43.1 42.0 - 52.0 %    MCV 84.5 81.4 - 97.8 fL    MCH 29.8 27.0 - 33.0 pg    MCHC 35.3 33.7 - 35.3 g/dL    RDW 41.4 35.9 - 50.0 fL    Platelet Count 211 164 - 446 K/uL    MPV 9.5 9.0 - 12.9 fL    Neutrophils-Polys 41.90 (L) 44.00 - 72.00 %    Lymphocytes 34.30 22.00 - 41.00 %    Monocytes 15.00 (H) 0.00 - 13.40 %    Eosinophils 7.50 (H) 0.00 - 6.90 %    Basophils 1.00 0.00 - 1.80 %    Immature Granulocytes 0.30 0.00 - 0.90 %    Nucleated RBC 0.00 /100 WBC    Neutrophils (Absolute) 1.28 (L) 1.82 - 7.42 K/uL    Lymphs " (Absolute) 1.05 1.00 - 4.80 K/uL    Monos (Absolute) 0.46 0.00 - 0.85 K/uL    Eos (Absolute) 0.23 0.00 - 0.51 K/uL    Baso (Absolute) 0.03 0.00 - 0.12 K/uL    Immature Granulocytes (abs) 0.01 0.00 - 0.11 K/uL    NRBC (Absolute) 0.00 K/uL   COMP METABOLIC PANEL   Result Value Ref Range    Sodium 137 135 - 145 mmol/L    Potassium 4.2 3.6 - 5.5 mmol/L    Chloride 105 96 - 112 mmol/L    Co2 26 20 - 33 mmol/L    Anion Gap 6.0 0.0 - 11.9    Glucose 113 (H) 65 - 99 mg/dL    Bun 11 8 - 22 mg/dL    Creatinine 1.12 0.50 - 1.40 mg/dL    Calcium 9.5 8.5 - 10.5 mg/dL    AST(SGOT) 12 12 - 45 U/L    ALT(SGPT) 13 2 - 50 U/L    Alkaline Phosphatase 84 30 - 99 U/L    Total Bilirubin 0.6 0.1 - 1.5 mg/dL    Albumin 3.9 3.2 - 4.9 g/dL    Total Protein 7.3 6.0 - 8.2 g/dL    Globulin 3.4 1.9 - 3.5 g/dL    A-G Ratio 1.1 g/dL   TROPONIN   Result Value Ref Range    Troponin I <0.01 0.00 - 0.04 ng/mL   LIPASE   Result Value Ref Range    Lipase 19 11 - 82 U/L   BTYPE NATRIURETIC PEPTIDE   Result Value Ref Range    B Natriuretic Peptide 22 0 - 100 pg/mL   ESTIMATED GFR   Result Value Ref Range    GFR If African American >60 >60 mL/min/1.73 m 2    GFR If Non African American >60 >60 mL/min/1.73 m 2   EKG (NOW)   Result Value Ref Range    Report       [facility] Emergency Dept.    Test Date:  2018-10-01  Pt Name:    ROCK MELISSA                Department: ER  MRN:        [id number]                      Room:       [facility]  Gender:     Male                         Technician: [id number]  :        1970                   Requested By:DIPTI WANG  Order #:    [id number]                    Reading MD: DIPTI WANG. AMD    Measurements  Intervals                                Axis  Rate:       64                           P:          1  NH:         168                          QRS:        -86  QRSD:       106                          T:          127  QT:         484  QTc:        500    Interpretive Statements  ATRIAL-SENSED  VENTRICULAR-PACED RHYTHM  NO FURTHER ANALYSIS ATTEMPTED DUE TO PACED RHYTHM  Compared to ECG 05/14/2018 06:05:35  AV dual-paced complex(es) or rhythm no longer present  Intraventricular conduction delay no longer present  Left ventricular hypertrophy no longer present  Early repolarization no  longer present    Electronically Signed On 10-1-2018 9:36:59 PDT by DIPTI WANG. AMD          RADIOLOGY/PROCEDURES  DX-CHEST-PORTABLE (1 VIEW)   Final Result      No acute cardiac or pulmonary abnormality is noted.            COURSE & MEDICAL DECISION MAKING  Pertinent Labs & Imaging studies reviewed. (See chart for details)    Patient presents the emergency department for a headache.  He was here fairly recently for headache and he had a normal head CT.  He is given some oral pain medications the headache is resolved.    There is no red flags in his history or physical exam to suggest meningitis, tumor, hemorrhage, sinus thrombosis, sinusitis, or other acute pathology.  CT scan of his brain was performed about 6 weeks ago and was normal.  Earlier this year he had CTAs which showed a questionable little abnormality they could not exclude an aneurysm.    The patient was given some pain medications, he had good relief of his headache pain.  Looking through his CTs he does have this abnormality on the CTA but without a thunderclap headache with gradual worsening pain, no neck stiffness normal neurologic exam and nausea only I think is very unlikely this is a ruptured aneurysm and just does not fit the clinical pattern.  Given the fact that this is been going on for 2 days he has to be worked up with a repeat CTA and a lumbar puncture.  I do not think we can justify lumbar puncture I think it would be very low yield.    Patient does have some chest discomfort.  He has hypertrophic cardiomyopathy which makes this difficult to interpret.  His EKG is abnormal because it is paced.  Basic labs were obtained these are normal.   Troponin is negative.  I consulted cardiology.  He was seen by Dr. Guerrero who saw the patient felt he can go home and not follow-up as an outpatient.  He is given instructions and cleared by cardiology.    Reassessment the patient is feeling better.  Denies any other acute concerns or complaints.  We discharged home to return if worsening headache or other concerns.    Patient was in the follow-up instructions and discharge instructions.  He is agreeable with the plan.  He is discharged in good condition    FINAL IMPRESSION  1. Other headache syndrome    2. Other chest pain    3. Hypertrophic obstructive cardiomyopathy (HOCM) (HCC)        2.   3.         Electronically signed by: Josh Jean, 10/1/2018 9:37 AM

## 2018-10-01 NOTE — ED TRIAGE NOTES
Ambulates to triage  Chief Complaint   Patient presents with   • Headache     x2 days, ha not taken any OTC meds   • Nausea     no vomiting     VSS.

## 2018-10-01 NOTE — DISCHARGE INSTRUCTIONS
Return to the Emergency department for more pain for for any other concerns.  Follow-up with the cardiologist.  And your doctor

## 2018-10-01 NOTE — CONSULTS
DATE OF SERVICE:  10/01/2018    CHIEF COMPLAINT:  Chest pain.    HISTORY OF PRESENT ILLNESS:  Patient is a pleasant 48-year-old gentleman seen   in consultation at the request of Dr. Jesse Rubio in the ER for evaluation of   chest pain.  Patient was actually admitted to the ER today with severe   headache.  After in the emergency room, he developed 1 minute worth of   anterior chest pain that was not pleuritic in nature and this spontaneously   resolved.  No history of known coronary artery disease.  He does have a   history of hypertrophic cardiomyopathy without obstruction and also placement   of AICD for primary prevention.  He had episodes of nonsustained VT noted on   Holter monitor.  Patient has stress echocardiogram less than 2 years ago that   was negative for any ischemia.  He did have a significant outflow obstruction   on the stress echo.  Cardiac risk factor profiles negative for diabetes or   hypertension and positive for previous smoking.    ALLERGIES:  None.    FAMILY HISTORY:  No family history of premature cardiac death in the family.    Mother  of automobile accident.    MEDICATIONS ON ADMISSION:  Furosemide 20 mg as needed, atorvastatin 20 mg a   day, levothyroxine, lorazepam as needed, verapamil 300 mg sustained release   daily.    REVIEW OF SYSTEMS:  NEUROLOGIC:  History of severe headache.  EYES:  No recent visual changes.  PULMONARY:  Denies shortness of breath, history of chest pain as above.  GASTROINTESTINAL:  No abdominal pain.  RENAL:  No diarrhea.  MUSCULOSKELETAL:  No recent trauma.    PHYSICAL EXAMINATION:  GENERAL:  The patient is resting comfortably, in no distress, would like to   leave.  VITAL SIGNS:  Afebrile, blood pressure is 97/73, heart rate is 62.  HEENT:  Pupils are equal, gaze conjugate, sclerae nonicteric.  NECK:  There is no JVD.  LUNGS:  Clear to auscultation.  BACK:  Without deformity.  CHEST:  AICD is in place without erythema or erosion.  HEART:  Regular rate and  rhythm without murmur.  ABDOMEN:  Soft, nontender, no masses, no pain to palpation.  EXTREMITIES:  No edema.  Posterior tibial pulses are 2+.  NEUROLOGIC:  He is alert and cooperative.  Cranial nerves are intact.    PERTINENT LABORATORY DATA:  Hemoglobin is 15.2, potassium is 4.2, GFR is   greater than 60.  Troponin is less than 0.01.    IMPRESSION:  Chest pain, atypical and that lasted less than a minute and   resolved.    EKG personally reviewed and demonstrates sinus rhythm with ventricular paced   beats.  It appears ____ beats with narrow QRS complex, inverted T waves across   from V2-V5, which may be secondary to from his hypertrophic cardiomyopathy.    The chest pain does not appear to be ischemic.  From a cardiac view, he can be   discharged from the ER today and follow up in our office on a routine basis.       ____________________________________     MD JASIEL ESQUIVEL / TRU    DD:  10/01/2018 12:05:12  DT:  10/01/2018 12:26:03    D#:  [id number]  Job#:  [id number]

## 2018-10-01 NOTE — ED NOTES
Med rec updated and complete  Allergies reviewed  Interviewed pt with brother at bedside with permission from pt.  Pt reports that he can't get his POTASSIUM 20MEQ filled.  Pt reports no antibiotics in the last 30 days.  Pt reports no OTC's or vitamins.

## 2018-10-01 NOTE — ED NOTES
Pt discharge home. Pt given discharge instructions  Pt verbalized understanding, all questions answered ,vss upon d/c. Pt steady on feet upon discharge

## 2019-05-24 NOTE — TELEPHONE ENCOUNTER
Dr Gillis stopped Diltiazem on 12/13/16. His edema is probably is a result of two Calcium channel blockers. Please have him stop Diltiazem and continue Verapamil. We can go up on the Verapamil if we need to but try just stopping the Dilt for now. Reviewed with Dr Gillis   [facility]  Molly Abdalla MD, [address] [address] [address]  Phone:  [phone]  Fax: [phone]       Chief Complaint   Patient presents with    Abnormal Lab Results     follow up    . SUBJECTIVE:    Mikayla Isaacs is a 54 y.o. male Patient returns today with known history of obesity, intertrigo, diabetes, dyslipidemia, and is seen for evaluation. Current Outpatient Medications   Medication Sig Dispense Refill    atorvastatin (LIPITOR) 10 mg tablet Take 1 Tab by mouth daily. 30 Tab 11    metFORMIN ER (GLUCOPHAGE XR) 500 mg tablet Take 1 Tab by mouth daily (with dinner). 30 Tab 11    clotrimazole-betamethasone (LOTRISONE) topical cream Apply  to affected area two (2) times a day. 45 g 11     Past Medical History:   Diagnosis Date    [facility] mellitus[facility] type 2) [facility] 05/17/2019    Dyslipidemia     Obesity (BMI 30.0-34. 9)     Obesity (BMI 30.0-34. 9)     Preventative health care      History reviewed. No pertinent surgical history.   No Known Allergies      REVIEW OF SYSTEMS:  General: negative for - chills or fever  ENT: negative for - headaches, nasal congestion or tinnitus  Respiratory: negative for - cough, hemoptysis, shortness of breath or wheezing  Cardiovascular : negative for - chest pain, edema, palpitations or shortness of breath  Gastrointestinal: negative for - abdominal pain, blood in stools, heartburn or nausea/vomiting  Genito-Urinary: no dysuria, trouble voiding, or hematuria  Musculoskeletal: negative for - gait disturbance, joint pain, joint stiffness or joint swelling  Neurological: no TIA or stroke symptoms  Hematologic: no bruises, no bleeding, no swollen glands  Integument: no lumps, mole changes, nail changes or rash  Endocrine: no malaise/lethargy or unexpected weight changes      Social History     Socioeconomic History    Marital status:      Spouse name: Not on file    Number of children: Not on file    Years of education: Not on file    Highest education level: Not on file   Tobacco Use    Smoking status: Never Smoker    Smokeless tobacco: Never Used   Substance and Sexual Activity    Alcohol use: Never     Frequency: Never    Drug use: Never    Sexual activity: Yes     Partners: Female     Birth control/protection: None   Social History Narrative    Habits:  He is a lifetime nonsmoker, non drinker, non drug abuser.         Social History:  The patient is , lives with his wife. He was born in [place]. He has been in the [address] [place] [address],[address] since 2005. They have three children, two daughters ages 9 and 11, and a son age 3. He completed his ALIA at [employer]. He was in [employer] for five years. 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 Mosque background is [employer].         Family History:  Father 76, alive and well. Mother 67 with diabetes. Six brothers and four sisters are alive and well. Family History   Problem Relation Age of Onset    Diabetes Mother        OBJECTIVE:    Visit Vitals  /88   Pulse 74   Temp 98 °F (36.7 °C) (Oral)   Resp 15   Ht 6' 2\" (1.88 m)   Wt 249 lb 9.6 oz (113.2 kg)   SpO2 96%   BMI 32.05 kg/m²     CONSTITUTIONAL: well , well nourished, appears age appropriate  EYES: perrla, eom intact  ENMT:moist mucous membranes, pharynx clear  NECK: supple. Thyroid normal  RESPIRATORY: Chest: clear bilaterally   CARDIOVASCULAR: Heart: regular rate and rhythm  GASTROINTESTINAL: Abdomen: soft, bowel sounds active  HEMATOLOGIC: no pathological lymph nodes palpated  MUSCULOSKELETAL: Extremities: no edema, pulse 1+   INTEGUMENT: No unusual rashes or suspicious skin lesions noted. Nails appear normal.  NEUROLOGIC: non-focal exam   MENTAL STATUS: alert and oriented, appropriate affect           ASSESSMENT:  1. Obesity (BMI 30.0-34.9)    2. Intertrigo    3.  Type 2 diabetes mellitus without complication, without long-term current use of insulin ([facility]      Reviewed lab studies _______________ diabetes mellitus. He is going to stick with the diet and exercise and go to the diabetic teaching center, and will start Metformin, which he started a couple days ago. We will also treat him with a low dose statin to get his cholesterol to normal.  We encourage physical activity 30 minutes five days a week and a heart healthy, diabetic, weight reducing diet. He will be back to see us in three months. At that time we will make decisions about if he needs to continue the oral agents. I have discussed the diagnosis with the patient and the intended plan as seen in the  orders above. The patient understands and agees with the plan. The patient has   received an after visit summary and questions were answered concerning  future plans  Patient labs and/or xrays were reviewed  Past records were reviewed. PLAN:  .  Orders Placed This Encounter    REFERRAL TO [address] [place] [address] I, the treating physician or qualified provider, am referring this patient for medically necessary services as indicated below. Plan of Care: Initial Diabetes Self-Management Training (DSMT)* (Includes 9 hours . .. Follow-up and Dispositions    · Return in about 3 months (around 8/24/2019). ATTENTION:   This medical record was transcribed using an electronic medical records system. Although proofread, it may and can contain electronic and spelling errors. Other human spelling and other errors may be present. Corrections may be executed at a later time. Please feel free to contact us for any clarifications as needed.

## 2020-01-21 ENCOUNTER — APPOINTMENT (OUTPATIENT)
Dept: CARDIOLOGY | Facility: MEDICAL CENTER | Age: 50
End: 2020-01-21
Attending: INTERNAL MEDICINE

## 2020-01-21 ENCOUNTER — APPOINTMENT (OUTPATIENT)
Dept: RADIOLOGY | Facility: MEDICAL CENTER | Age: 50
End: 2020-01-21

## 2020-01-21 ENCOUNTER — APPOINTMENT (OUTPATIENT)
Dept: RADIOLOGY | Facility: MEDICAL CENTER | Age: 50
End: 2020-01-21
Attending: EMERGENCY MEDICINE

## 2020-01-21 ENCOUNTER — HOSPITAL ENCOUNTER (EMERGENCY)
Facility: MEDICAL CENTER | Age: 50
End: 2020-01-21
Attending: EMERGENCY MEDICINE

## 2020-01-21 VITALS
HEIGHT: 75 IN | RESPIRATION RATE: 16 BRPM | BODY MASS INDEX: 35.06 KG/M2 | DIASTOLIC BLOOD PRESSURE: 94 MMHG | OXYGEN SATURATION: 95 % | HEART RATE: 86 BPM | TEMPERATURE: 98.6 F | WEIGHT: 281.97 LBS | SYSTOLIC BLOOD PRESSURE: 131 MMHG

## 2020-01-21 DIAGNOSIS — R07.9 CHEST PAIN, UNSPECIFIED TYPE: ICD-10-CM

## 2020-01-21 DIAGNOSIS — M25.512 ACUTE PAIN OF LEFT SHOULDER: ICD-10-CM

## 2020-01-21 LAB
ALBUMIN SERPL BCP-MCNC: 4.1 G/DL (ref 3.2–4.9)
ALBUMIN/GLOB SERPL: 1.3 G/DL
ALP SERPL-CCNC: 81 U/L (ref 30–99)
ALT SERPL-CCNC: 17 U/L (ref 2–50)
ANION GAP SERPL CALC-SCNC: 9 MMOL/L (ref 0–11.9)
AST SERPL-CCNC: 14 U/L (ref 12–45)
BASOPHILS # BLD AUTO: 1.2 % (ref 0–1.8)
BASOPHILS # BLD: 0.06 K/UL (ref 0–0.12)
BILIRUB SERPL-MCNC: 0.5 MG/DL (ref 0.1–1.5)
BUN SERPL-MCNC: 14 MG/DL (ref 8–22)
CALCIUM SERPL-MCNC: 9.4 MG/DL (ref 8.5–10.5)
CHLORIDE SERPL-SCNC: 107 MMOL/L (ref 96–112)
CO2 SERPL-SCNC: 23 MMOL/L (ref 20–33)
CREAT SERPL-MCNC: 1.15 MG/DL (ref 0.5–1.4)
EKG IMPRESSION: NORMAL
EOSINOPHIL # BLD AUTO: 0.31 K/UL (ref 0–0.51)
EOSINOPHIL NFR BLD: 6.2 % (ref 0–6.9)
ERYTHROCYTE [DISTWIDTH] IN BLOOD BY AUTOMATED COUNT: 41.2 FL (ref 35.9–50)
GLOBULIN SER CALC-MCNC: 3.2 G/DL (ref 1.9–3.5)
GLUCOSE SERPL-MCNC: 100 MG/DL (ref 65–99)
HCT VFR BLD AUTO: 44.3 % (ref 42–52)
HGB BLD-MCNC: 15.3 G/DL (ref 14–18)
IMM GRANULOCYTES # BLD AUTO: 0.01 K/UL (ref 0–0.11)
IMM GRANULOCYTES NFR BLD AUTO: 0.2 % (ref 0–0.9)
LV EJECT FRACT  99904: 70
LV EJECT FRACT MOD 2C 99903: 69.39
LV EJECT FRACT MOD 4C 99902: 63.92
LV EJECT FRACT MOD BP 99901: 63.53
LYMPHOCYTES # BLD AUTO: 1.86 K/UL (ref 1–4.8)
LYMPHOCYTES NFR BLD: 37.1 % (ref 22–41)
MCH RBC QN AUTO: 30 PG (ref 27–33)
MCHC RBC AUTO-ENTMCNC: 34.5 G/DL (ref 33.7–35.3)
MCV RBC AUTO: 86.9 FL (ref 81.4–97.8)
MONOCYTES # BLD AUTO: 0.39 K/UL (ref 0–0.85)
MONOCYTES NFR BLD AUTO: 7.8 % (ref 0–13.4)
NEUTROPHILS # BLD AUTO: 2.38 K/UL (ref 1.82–7.42)
NEUTROPHILS NFR BLD: 47.5 % (ref 44–72)
NRBC # BLD AUTO: 0 K/UL
NRBC BLD-RTO: 0 /100 WBC
PLATELET # BLD AUTO: 240 K/UL (ref 164–446)
PMV BLD AUTO: 9.4 FL (ref 9–12.9)
POTASSIUM SERPL-SCNC: 4 MMOL/L (ref 3.6–5.5)
PROT SERPL-MCNC: 7.3 G/DL (ref 6–8.2)
RBC # BLD AUTO: 5.1 M/UL (ref 4.7–6.1)
SODIUM SERPL-SCNC: 139 MMOL/L (ref 135–145)
TROPONIN T SERPL-MCNC: 8 NG/L (ref 6–19)
WBC # BLD AUTO: 5 K/UL (ref 4.8–10.8)

## 2020-01-21 PROCEDURE — 93306 TTE W/DOPPLER COMPLETE: CPT | Mod: 26 | Performed by: INTERNAL MEDICINE

## 2020-01-21 PROCEDURE — 99244 OFF/OP CNSLTJ NEW/EST MOD 40: CPT | Performed by: INTERNAL MEDICINE

## 2020-01-21 PROCEDURE — 93005 ELECTROCARDIOGRAM TRACING: CPT | Performed by: EMERGENCY MEDICINE

## 2020-01-21 PROCEDURE — 71045 X-RAY EXAM CHEST 1 VIEW: CPT

## 2020-01-21 PROCEDURE — 36415 COLL VENOUS BLD VENIPUNCTURE: CPT

## 2020-01-21 PROCEDURE — 84484 ASSAY OF TROPONIN QUANT: CPT

## 2020-01-21 PROCEDURE — 99284 EMERGENCY DEPT VISIT MOD MDM: CPT

## 2020-01-21 PROCEDURE — 93306 TTE W/DOPPLER COMPLETE: CPT

## 2020-01-21 PROCEDURE — 80053 COMPREHEN METABOLIC PANEL: CPT

## 2020-01-21 PROCEDURE — 85025 COMPLETE CBC W/AUTO DIFF WBC: CPT

## 2020-01-21 PROCEDURE — 93005 ELECTROCARDIOGRAM TRACING: CPT

## 2020-01-21 RX ORDER — ATORVASTATIN CALCIUM 20 MG/1
20 TABLET, FILM COATED ORAL DAILY
Qty: 30 TAB | Refills: 0 | Status: SHIPPED | OUTPATIENT
Start: 2020-01-21 | End: 2020-05-22

## 2020-01-21 RX ORDER — ATORVASTATIN CALCIUM 20 MG/1
20 TABLET, FILM COATED ORAL
Status: DISCONTINUED | OUTPATIENT
Start: 2020-01-21 | End: 2020-01-21 | Stop reason: HOSPADM

## 2020-01-21 RX ORDER — VERAPAMIL HYDROCHLORIDE 240 MG/1
120 TABLET, FILM COATED, EXTENDED RELEASE ORAL
Status: DISCONTINUED | OUTPATIENT
Start: 2020-01-22 | End: 2020-01-21 | Stop reason: HOSPADM

## 2020-01-21 NOTE — ED TRIAGE NOTES
Chief Complaint   Patient presents with   • Shoulder Pain   • Chest Pain     C/o chest pain X 2 weeks.  Described as burning, non radiating, nothing makes it better or worse.  Denies n/v, sob, diaphoresis.  Pt also c/o shoulder pain that began while lifting an object at work.  + cms.  Triage process explained to patient.  Pt back to waiting room.  Pt instructed to inform RN if any changes or questions arise.

## 2020-01-21 NOTE — ED PROVIDER NOTES
ED Provider Note    3:53 PM  Patient signed out to me by Dr. Ruff pending evaluation by cardiology.  Cardiology, Dr. Varghese evaluated the patient and ordered an echocardiogram.  He advised starting him on verapamil 120 and Lipitor 20 mg and the patient can go after he gets an ICD interrogation as well as his echo.  And he will follow-up in clinic as an outpatient.    The note accurately reflects work and decisions made by me.  Melina Rai M.D.  1/21/2020  10:42 PM

## 2020-01-21 NOTE — ED PROVIDER NOTES
ED Provider Note    Scribed for Oswald Ruff M.D. by Jennifer Kim. 1/21/2020  1:45 PM    Primary care provider: Lona Oseguera M.D.  Means of arrival: Walk-In  History obtained from: Patient  History limited by: None    CHIEF COMPLAINT  Chief Complaint   Patient presents with   • Shoulder Pain   • Chest Pain     HPI  Trevor Gillis Jr. is a 49 y.o. male with a history of pacemaker and hypertrophic cardiomyopathy who presents to the Emergency Department complaining of intermittent, non radiating, and sporadic chest pain over the past week. Patient describes his chest pain as burning sensation localized throughout his entire chest. No modifying factors noted. He also endorses left shoulder pain since this morning while lifting an object at work. Cardiologist is Dr. Gillis. Denies fever or cough. Denies past medical history of stents.    REVIEW OF SYSTEMS  Pertinent positives include shoulder pain, chest pain.   Pertinent negatives include no fever or cough.    All other systems reviewed and negative. See HPI for further details.     PAST MEDICAL HISTORY   has a past medical history of Dyslipidemia, Fracture of left clavicle, Hernia, HTN (hypertension), Hypertrophic cardiomyopathy (HCC), JEREMIAH (obstructive sleep apnea), Pacemaker (2014), and Syncope (11/19/2012).    SURGICAL HISTORY   has a past surgical history that includes tonsillectomy; other cardiac surgery (7/25/14); recovery (7/25/2014); recovery (7/30/2014); inguinal hernia repair (9/10/2014); appendectomy; and irrigation & debridement general (Left, 1/16/2017).    SOCIAL HISTORY  Social History     Tobacco Use   • Smoking status: Former Smoker     Packs/day: 1.00     Years: 25.00     Pack years: 25.00     Types: Cigarettes   • Smokeless tobacco: Never Used   • Tobacco comment: 07/07/17 quit smoking   Substance Use Topics   • Alcohol use: No     Alcohol/week: 0.0 oz     Comment: used to be heavy alcoholism (18 years ago),    • Drug use: No     "  Social History     Substance and Sexual Activity   Drug Use No       FAMILY HISTORY  Family History   Problem Relation Age of Onset   • Other Mother         Accident   • Stroke Father    • Heart Attack Father         MI at age 24?   • Heart Disease Paternal Grandfather    • Hypertension Paternal Grandmother    • Diabetes Paternal Grandmother        CURRENT MEDICATIONS  No current facility-administered medications for this encounter.     Current Outpatient Medications:   •  atorvastatin (LIPITOR) 20 MG Tab, Take 20 mg by mouth every evening., Disp: , Rfl:   •  furosemide (LASIX) 20 MG Tab, Take 20 mg by mouth as needed. Take 1 tablet daily as needed for edema, Disp: , Rfl:   •  levothyroxine (SYNTHROID) 25 MCG Tab, Take 25 mcg by mouth every evening., Disp: , Rfl:   •  omeprazole (PRILOSEC) 20 MG delayed-release capsule, Take 20 mg by mouth every evening., Disp: , Rfl:   •  Verapamil HCl  MG CAPSULE SR 24 HR, Take 300 mg by mouth every evening., Disp: , Rfl:   •  LORazepam (ATIVAN) 1 MG Tab, TAKE 1 TAB BY MOUTH EVERY 12 HOURS AS NEEDED FOR UP TO 5 DAYS, Disp: , Rfl: 0  •  gabapentin (NEURONTIN) 100 MG Cap, Take 100-200 mg by mouth 2 Times a Day. 100 mg in morning 200 mg at night, Disp: , Rfl:       ALLERGIES  Allergies   Allergen Reactions   • Tomato Hives and Vomiting       PHYSICAL EXAM  VITAL SIGNS: BP (!) 166/105   Pulse 95   Temp 37 °C (98.6 °F) (Temporal)   Resp 18   Ht 1.905 m (6' 3\")   Wt (!) 127.9 kg (281 lb 15.5 oz)   SpO2 95%   BMI 35.24 kg/m²     Nursing note and vitals reviewed.  Constitutional: Well-developed and well-nourished. No distress.   HENT: Head is normocephalic and atraumatic. Oropharynx is clear and moist without exudate or erythema.   Eyes: Pupils are equal, round, and reactive to light. Conjunctiva are normal.   Cardiovascular: Normal rate and regular rhythm. No murmur heard. Normal radial pulses.   Pulmonary/Chest: Breath sounds normal. No wheezes or rales. No chest wall " tenderness.   Abdominal: Soft and non-tender. No distention   Musculoskeletal: Pain with range of motion of left shoulder. No calf tenderness or palpable cords.   Neurological: Awake, alert and oriented to person, place, and time. No focal deficits noted.  Skin: Skin is warm and dry. No rash.   Psychiatric: Normal mood and affect. Appropriate for clinical situation      DIAGNOSTIC STUDIES / PROCEDURES    EKG Interpretation  Interpreted by me as below    LABS  Results for orders placed or performed during the hospital encounter of 01/21/20   CBC with Differential   Result Value Ref Range    WBC 5.0 4.8 - 10.8 K/uL    RBC 5.10 4.70 - 6.10 M/uL    Hemoglobin 15.3 14.0 - 18.0 g/dL    Hematocrit 44.3 42.0 - 52.0 %    MCV 86.9 81.4 - 97.8 fL    MCH 30.0 27.0 - 33.0 pg    MCHC 34.5 33.7 - 35.3 g/dL    RDW 41.2 35.9 - 50.0 fL    Platelet Count 240 164 - 446 K/uL    MPV 9.4 9.0 - 12.9 fL    Neutrophils-Polys 47.50 44.00 - 72.00 %    Lymphocytes 37.10 22.00 - 41.00 %    Monocytes 7.80 0.00 - 13.40 %    Eosinophils 6.20 0.00 - 6.90 %    Basophils 1.20 0.00 - 1.80 %    Immature Granulocytes 0.20 0.00 - 0.90 %    Nucleated RBC 0.00 /100 WBC    Neutrophils (Absolute) 2.38 1.82 - 7.42 K/uL    Lymphs (Absolute) 1.86 1.00 - 4.80 K/uL    Monos (Absolute) 0.39 0.00 - 0.85 K/uL    Eos (Absolute) 0.31 0.00 - 0.51 K/uL    Baso (Absolute) 0.06 0.00 - 0.12 K/uL    Immature Granulocytes (abs) 0.01 0.00 - 0.11 K/uL    NRBC (Absolute) 0.00 K/uL   Complete Metabolic Panel (CMP)   Result Value Ref Range    Sodium 139 135 - 145 mmol/L    Potassium 4.0 3.6 - 5.5 mmol/L    Chloride 107 96 - 112 mmol/L    Co2 23 20 - 33 mmol/L    Anion Gap 9.0 0.0 - 11.9    Glucose 100 (H) 65 - 99 mg/dL    Bun 14 8 - 22 mg/dL    Creatinine 1.15 0.50 - 1.40 mg/dL    Calcium 9.4 8.5 - 10.5 mg/dL    AST(SGOT) 14 12 - 45 U/L    ALT(SGPT) 17 2 - 50 U/L    Alkaline Phosphatase 81 30 - 99 U/L    Total Bilirubin 0.5 0.1 - 1.5 mg/dL    Albumin 4.1 3.2 - 4.9 g/dL    Total  Protein 7.3 6.0 - 8.2 g/dL    Globulin 3.2 1.9 - 3.5 g/dL    A-G Ratio 1.3 g/dL   Troponin   Result Value Ref Range    Troponin T 8 6 - 19 ng/L   ESTIMATED GFR   Result Value Ref Range    GFR If African American >60 >60 mL/min/1.73 m 2    GFR If Non African American >60 >60 mL/min/1.73 m 2   EKG   Result Value Ref Range    Report       [facility] Emergency Dept.    Test Date:  2020  Pt Name:    ROCK MELISSA                Department: ER  MRN:        [id number]                      Room:  Gender:     Male                         Technician: [id number]  :        1970                   Requested By:ER TRIAGE PROTOCOL  Order #:    [id number]                    Reading MD: HIGINIO HAIR MD    Measurements  Intervals                                Axis  Rate:       82                           P:  OR:         163                          QRS:        268  QRSD:       112                          T:          122  QT:         419  QTc:        490    Interpretive Statements  Atrial-sensed ventricular-paced complexes  No further analysis attempted due to paced rhythm  Compared to ECG 10/01/2018 09:14:17  No significant changes  Electronically Signed On 2020 13:45:15 PST by HIGINIO HAIR MD       All labs reviewed by me.    RADIOLOGY  DX-CHEST-PORTABLE (1 VIEW)   Final Result      No acute cardiopulmonary abnormality.        The radiologist's interpretation of all radiological studies have been reviewed by me.      COURSE & MEDICAL DECISION MAKING  Nursing notes, VS, PMSFHx reviewed in chart.     1:45 PM - Patient seen and examined at bedside. Discussed with the patient that his labs are reassuring and normal and chest x-ray is unrevealing, however EKG is abnormal. Plan of care was discussed with him which includes cardiology consult to discuss further intervention. He verbalized his understanding and agrees with the plan of care. Ordered DX-chest, estimated GFR, CBC with differential, CMP,  troponin, and EKG to evaluate his symptoms. The differential diagnoses include but are not limited to: ACS, chest wall pain, musculoskeletal shoulder pain.      1:47 PM - Paged cardiology.    2:09 PM - Consulted with Dr. Varghese (Cardiology) who will come see the patient.  Further treatment and disposition as per cardiology recommendations.      DISPOSITION:  Patient will be discharged home in stable condition.      FINAL IMPRESSION  1. Chest pain, unspecified type    2. Acute pain of left shoulder          IJennifer (Scribe), am scribing for, and in the presence of, Oswald Ruff M.D..    Electronically signed by: Jennifer Kim (Scribe), 1/21/2020    IOswald M.D. personally performed the services described in this documentation, as scribed by Jennifer Kim in my presence, and it is both accurate and complete.    C    The note accurately reflects work and decisions made by me.  Oswald Ruff M.D.  1/21/2020  3:00 PM

## 2020-01-21 NOTE — CONSULTS
Cardiology Initial Consult Note    Date of note:    1/21/2020      Consulting Physician: Oswald Ruff M.D.    Patient ID:    Name:   Trevor Gillis   YOB: 1970  Age:   49 y.o.  male   MRN:   [id number]      Reason for Consultation: chest pain    HPI:  Trevor Gillis Jr. is a 49 y.o.-year-old male with a history of hypertrophic cardiomyopathy, dyslipidemia, obesity, hypertension, and family history of SCD s/p ICD placement who presents with chest pain.     He reports for the last week he has had occasional 3/10 substernal non-exertional burning chest pain which resolves without medications and recurs without any necessary associated symptoms. 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    He was lost to f/u with our cardiology clinic 2 years ago and last saw Dr. Jeffrey Gillis. Since then he lost his insurance and got denied for disability so he started working but has not regained insurance. He is taking no medications.     Of note, he reports over the last two years, he has had multiple ICD shocks, but none recent. He has not sought medical attention for these.           ROS  Constitution: Negative for chills, fever and night sweats.   HENT: Negative for nosebleeds.    Eyes: Negative for vision loss in left eye and vision loss in right eye.   Respiratory: Negative for hemoptysis.    Gastrointestinal: Negative for hematemesis, hematochezia and melena.   Genitourinary: Negative for hematuria.   Neurological: Negative for focal weakness, numbness and paresthesias.     + chronic leg swelling, occasional facial numbness, chronic for years.     All others reviewed and negative.      Past Medical History:   Diagnosis Date   • Dyslipidemia    • Fracture of left clavicle    • Hernia    • HTN (hypertension)    • Hypertrophic cardiomyopathy (HCC)    • JEREMIAH (obstructive sleep apnea)    • Pacemaker 2014   • Syncope 11/19/2012       Past Surgical History:   Procedure  Laterality Date   • IRRIGATION & DEBRIDEMENT GENERAL Left 1/16/2017    Procedure: IRRIGATION & DEBRIDEMENT GENERAL-INDEX FINGER;  Surgeon: Kia Aldridge M.D.;  Location: SURGERY [facility];  Service:    • INGUINAL HERNIA REPAIR  9/10/2014    Performed by Christie Ott M.D. at SURGERY SAME DAY [facility] ORS   • RECOVERY  7/30/2014    Performed by Cath-Recovery Surgery at SURGERY SAME DAY [facility] ORS   • OTHER CARDIAC SURGERY  7/25/14    AICD   • RECOVERY  7/25/2014    Performed by Cath-Recovery Surgery at SURGERY SAME DAY [facility] ORS   • APPENDECTOMY     • TONSILLECTOMY         Medications: None    Allergies   Allergen Reactions   • Tomato Hives and Vomiting         Family History   Problem Relation Age of Onset   • Other Mother         Accident   • Stroke Father    • Heart Attack Father         MI at age 24?   • Heart Disease Paternal Grandfather    • Hypertension Paternal Grandmother    • Diabetes Paternal Grandmother          Social History     Socioeconomic History   • Marital status: Single     Spouse name: Not on file   • Number of children: Not on file   • Years of education: Not on file   • Highest education level: Not on file   Occupational History   • Not on file   Social Needs   • Financial resource strain: Not on file   • Food insecurity:     Worry: Not on file     Inability: Not on file   • Transportation needs:     Medical: Not on file     Non-medical: Not on file   Tobacco Use   • Smoking status: Former Smoker     Packs/day: 1.00     Years: 25.00     Pack years: 25.00     Types: Cigarettes   • Smokeless tobacco: Never Used   • Tobacco comment: 07/07/17 quit smoking   Substance and Sexual Activity   • Alcohol use: No     Alcohol/week: 0.0 oz     Comment: used to be heavy alcoholism (18 years ago),    • Drug use: No   • Sexual activity: Not on file   Lifestyle   • Physical activity:     Days per week: Not on file     Minutes per session: Not on file   • Stress: Not on file   Relationships   •  "Social connections:     Talks on phone: Not on file     Gets together: Not on file     Attends Adventism service: Not on file     Active member of club or organization: Not on file     Attends meetings of clubs or organizations: Not on file     Relationship status: Not on file   • Intimate partner violence:     Fear of current or ex partner: Not on file     Emotionally abused: Not on file     Physically abused: Not on file     Forced sexual activity: Not on file   Other Topics Concern   • Not on file   Social History Narrative   • Not on file         Physical Exam  Body mass index is 35.24 kg/m².  BP (!) 166/105   Pulse 95   Temp 37 °C (98.6 °F) (Temporal)   Resp 18   Ht 1.905 m (6' 3\")   Wt (!) 127.9 kg (281 lb 15.5 oz)   SpO2 95%   Vitals:    01/21/20 1028 01/21/20 1036 01/21/20 1322   BP: 153/103  (!) 166/105   Pulse: 86  95   Resp: 16  18   Temp: 37 °C (98.6 °F)     TempSrc: Temporal     SpO2: 95%  95%   Weight:  (!) 127.9 kg (281 lb 15.5 oz)    Height: 1.905 m (6' 3\")       Oxygen Therapy:  Pulse Oximetry: 95 %, O2 Delivery: None (Room Air)    General: No apparent distress  Eyes: nl conjunctiva  ENT: OP clear  Neck: JVP <8 cm H2O, no carotid bruits  Lungs: normal respiratory effort, CTAB  Heart: RRR, 1/6 systolic murmur at RUSB, no rubs or gallops, trace edema bilateral lower extremities. No LV/RV heave on cardiac palpatation. 2+ bilateral radial pulses.    Abdomen: soft, non tender, non distended, no masses, normal bowel sounds.  No HSM.  Extremities/MSK: no clubbing, no cyanosis  Neurological: No focal sensory deficits  Psychiatric: Appropriate affect, A/O x 3  Skin: Warm extremities        Labs (personally reviewed and notable for):   Trop 8      Cardiac Imaging and Procedures Review:    EKG dated 1/21/2020: My personal interpretation is a sensed, v paced. Non-specific st changes.     Echo dated 5/2016:   CONCLUSIONS  Complete echo: 05/06/14  Limited echo: 07/29/14  Compared to the images of the prior " study done 05/06/14 - there has   been   Severe asymmetric septal hypertrophy.  Normal left ventricular systolic function.  Left ventricular ejection fraction is visually estimated to be 70%.  Grade I diastolic dysfunction.  Normal right ventricular size and systolic function.  Mild aortic insufficiency.  Compared to the images of the prior study done 7/29/2014 -  there has   been no significant change.     Exercise stress testing (10/31/2016):   Echocardiography Laboratory  CONCLUSIONS  Near mid cavity obstruction noted on every other beat though no   significant gradient. This occurred at a heart rate of  80 bpm.  Compared to the study of 6/2014. Pt was able to obtain a heart rate of   140 bpm without as much obstruction.      Radiology test Review:  CXR:   IMPRESSION:     No acute cardiopulmonary abnormality.       Impression and Medical Decision Making:  # Hypertrophic cardiomyopathy lost to f/u, previously on verapamil. Lost to f/u and now off all medications.   # ICD placed for primary prevention  # GERD, he tells me he has severe esophageal sphincter malfunction, likely cause of current chest pain.  # Obesity  # Hypertension  # Pre-diabetes  # Current smoking, rare  # Dyslipidemia    Recommendations:  # check echo, and interrogate ICD  # start verapamil 120mg PO daily for hypertension, restart lipitor  # if above show nothing worrisome, we did discuss DC home with close cardiology f/u. Otherwise, would recommend admission overnight for medication optimization if he has in fact had recent ICD shocks (within the last 3 months) or if he has an LVOT gradient of >50mmHg by echo.   # prn PPI for chest pain    Discussed with Dr. Rai.     Thank you for allowing me to participate in the care of this patient, Cardiology will sign off, however if above testing is positive, please let me know. Please contact me with any questions.      Braden Varghese MD  Cardiologist, [facility] [place]    [phone]

## 2020-01-22 NOTE — ED NOTES
Pt discharged to home. Pt was given follow up instructions and prescriptions for Verapamil and Lipitor. Pt verbalized understanding of all instructions for discharge and is ambulatory out of ED with steady gait.

## 2020-01-27 ENCOUNTER — APPOINTMENT (OUTPATIENT)
Dept: CARDIOLOGY | Facility: MEDICAL CENTER | Age: 50
End: 2020-01-27

## 2020-04-01 ENCOUNTER — TELEPHONE (OUTPATIENT)
Dept: CARDIOLOGY | Facility: MEDICAL CENTER | Age: 50
End: 2020-04-01

## 2020-04-01 NOTE — TELEPHONE ENCOUNTER
"Pt daughter called. Pt PM needs to be changed. Has 90 days she states. Pt at [facility] for \"blipping heart\" She asked how to get this done. Advised have rep get us device interrogation, dtr will have records released to us, fax # given, Also advised due to Covid, if they are offering to do at [facility] would rec doing there to decrease exposure by 2nd admit to [facility]. FYI to TK/AW  "

## 2020-05-22 ENCOUNTER — TELEPHONE (OUTPATIENT)
Dept: CARDIOLOGY | Facility: MEDICAL CENTER | Age: 50
End: 2020-05-22

## 2020-05-22 ENCOUNTER — NON-PROVIDER VISIT (OUTPATIENT)
Dept: CARDIOLOGY | Facility: MEDICAL CENTER | Age: 50
End: 2020-05-22
Payer: COMMERCIAL

## 2020-05-22 ENCOUNTER — OFFICE VISIT (OUTPATIENT)
Dept: CARDIOLOGY | Facility: MEDICAL CENTER | Age: 50
End: 2020-05-22
Payer: COMMERCIAL

## 2020-05-22 VITALS
HEIGHT: 75 IN | BODY MASS INDEX: 34.69 KG/M2 | SYSTOLIC BLOOD PRESSURE: 160 MMHG | WEIGHT: 279 LBS | OXYGEN SATURATION: 94 % | DIASTOLIC BLOOD PRESSURE: 78 MMHG | HEART RATE: 92 BPM

## 2020-05-22 DIAGNOSIS — I42.2 HYPERTROPHIC CARDIOMYOPATHY (HCC): ICD-10-CM

## 2020-05-22 DIAGNOSIS — Z45.02 ELECTIVE REPLACEMENT INDICATED FOR IMPLANTABLE CARDIOVERTER-DEFIBRILLATOR (ICD): ICD-10-CM

## 2020-05-22 DIAGNOSIS — Z82.41 FAMILY HISTORY OF SUDDEN CARDIAC DEATH (SCD): ICD-10-CM

## 2020-05-22 DIAGNOSIS — I47.29 NSVT (NONSUSTAINED VENTRICULAR TACHYCARDIA) (HCC): ICD-10-CM

## 2020-05-22 DIAGNOSIS — Z95.810 DUAL ICD (IMPLANTABLE CARDIOVERTER-DEFIBRILLATOR) IN PLACE: ICD-10-CM

## 2020-05-22 DIAGNOSIS — E78.5 DYSLIPIDEMIA: ICD-10-CM

## 2020-05-22 PROCEDURE — 93283 PRGRMG EVAL IMPLANTABLE DFB: CPT | Performed by: INTERNAL MEDICINE

## 2020-05-22 PROCEDURE — 99214 OFFICE O/P EST MOD 30 MIN: CPT | Mod: 25 | Performed by: INTERNAL MEDICINE

## 2020-05-22 ASSESSMENT — FIBROSIS 4 INDEX: FIB4 SCORE: 0.66

## 2020-05-22 NOTE — PROGRESS NOTES
Chief Complaint   Patient presents with   • Ventricular Tachycardia     F/V DX:VT       Subjective:   Trevor Gillis Jr. is a 49 y.o. male who presents today with history of hypertrophic cardiomyopathy status post implantable defibrillator primary prevention.  Has not received a shock.  Patient stopped his verapamil and statin.  Has not had increasing chest pains feels well off of the verapamil.  No recent lipid study.  Device is at [facility].  Denies any constitutional symptoms.  Working and feeling well    Past Medical History:   Diagnosis Date   • Dyslipidemia    • Fracture of left clavicle    • Hernia    • HTN (hypertension)    • Hypertrophic cardiomyopathy (HCC)    • JEREMIAH (obstructive sleep apnea)    • Syncope 11/19/2012     Past Surgical History:   Procedure Laterality Date   • IRRIGATION & DEBRIDEMENT GENERAL Left 1/16/2017    Procedure: IRRIGATION & DEBRIDEMENT GENERAL-INDEX FINGER;  Surgeon: Kia Aldridge M.D.;  Location: SURGERY [facility];  Service:    • INGUINAL HERNIA REPAIR  9/10/2014    Performed by Christie Ott M.D. at SURGERY SAME DAY [facility] ORS   • RECOVERY  7/30/2014    Performed by Cath-Recovery Surgery at SURGERY SAME DAY [facility] ORS   • OTHER CARDIAC SURGERY  7/25/14    AICD   • RECOVERY  7/25/2014    Performed by Cath-Recovery Surgery at SURGERY SAME DAY [facility] ORS   • APPENDECTOMY     • TONSILLECTOMY       Family History   Problem Relation Age of Onset   • Other Mother         Accident   • Stroke Father    • Heart Attack Father         MI at age 24?   • Heart Disease Paternal Grandfather    • Hypertension Paternal Grandmother    • Diabetes Paternal Grandmother      Social History     Socioeconomic History   • Marital status: Single     Spouse name: Not on file   • Number of children: Not on file   • Years of education: Not on file   • Highest education level: Not on file   Occupational History   • Not on file   Social Needs   • Financial resource strain: Not on file   • Food  "insecurity     Worry: Not on file     Inability: Not on file   • Transportation needs     Medical: Not on file     Non-medical: Not on file   Tobacco Use   • Smoking status: Former Smoker     Packs/day: 1.00     Years: 25.00     Pack years: 25.00     Types: Cigarettes   • Smokeless tobacco: Never Used   • Tobacco comment: 07/07/17 quit smoking   Substance and Sexual Activity   • Alcohol use: No     Alcohol/week: 0.0 oz     Comment: used to be heavy alcoholism (18 years ago),    • Drug use: No   • Sexual activity: Not on file   Lifestyle   • Physical activity     Days per week: Not on file     Minutes per session: Not on file   • Stress: Not on file   Relationships   • Social connections     Talks on phone: Not on file     Gets together: Not on file     Attends Restorationism service: Not on file     Active member of club or organization: Not on file     Attends meetings of clubs or organizations: Not on file     Relationship status: Not on file   • Intimate partner violence     Fear of current or ex partner: Not on file     Emotionally abused: Not on file     Physically abused: Not on file     Forced sexual activity: Not on file   Other Topics Concern   • Not on file   Social History Narrative   • Not on file     Allergies   Allergen Reactions   • Tomato Hives and Vomiting     Outpatient Encounter Medications as of 5/22/2020   Medication Sig Dispense Refill   • [DISCONTINUED] atorvastatin (LIPITOR) 20 MG Tab Take 1 Tab by mouth every day. (Patient not taking: Reported on 5/22/2020) 30 Tab 0   • [DISCONTINUED] verapamil SR (CALAN-SR) 120 MG CR tablet Take 1 Tab by mouth every day. (Patient not taking: Reported on 5/22/2020) 30 Tab 0     No facility-administered encounter medications on file as of 5/22/2020.      ROS     Objective:   /78 (BP Location: Left arm, Patient Position: Sitting, BP Cuff Size: Large adult)   Pulse 92   Ht 1.905 m (6' 3\")   Wt (!) 126.6 kg (279 lb)   SpO2 94%   BMI 34.87 kg/m² "     Physical Exam   Constitutional: He is oriented to person, place, and time. He appears well-developed and well-nourished.   HENT:   Head: Normocephalic and atraumatic.   Eyes: EOM are normal.   Neck: Normal range of motion. Neck supple.   Cardiovascular: Normal rate, regular rhythm and intact distal pulses. Exam reveals no gallop and no friction rub.   Murmur heard.  Pulmonary/Chest: Effort normal and breath sounds normal.   Abdominal: Soft.   Musculoskeletal: Normal range of motion.         General: No edema.   Neurological: He is alert and oriented to person, place, and time.   Skin: Skin is warm and dry.   Psychiatric: He has a normal mood and affect. His behavior is normal. Judgment and thought content normal.       Assessment:     1. Hypertrophic cardiomyopathy (HCC)     2. FH: sudden cardiac death (SCD)     3. Dual ICD (implantable cardioverter-defibrillator) in place     4. NSVT (nonsustained ventricular tachycardia) ([facility])     5. Dyslipidemia         Medical Decision Making:  Today's Assessment / Status / Plan:   1.  Hypertrophic cardiomyopathy.  Status post primary prevention AICD at [facility].  Schedule change.  2.  Hyperlipidemia recheck lipids.  The risk, benefits, and alternatives to ICD replacement were discussed in great detail, specific risks mentioned including bleeding, infection, cardiac perforation with possible tamponade requiring pericardiocentesis or open heart surgery.  In addition the possibility of lead dislodgment (2-3%), inappropriate shocks, pneumothorax (3%), hemothorax were discussed. Also mentioned were the possibility of death, stroke, and myocardial infarction. The patient verbalized understanding of these potential complications and wishes to proceed with this procedure.

## 2020-05-22 NOTE — TELEPHONE ENCOUNTER
Patient scheduled for ICD gen change on 6-5-20 at [facility] with Dr. Gillis.

## 2020-05-27 ENCOUNTER — TELEPHONE (OUTPATIENT)
Dept: CARDIOLOGY | Facility: MEDICAL CENTER | Age: 50
End: 2020-05-27

## 2020-05-27 NOTE — TELEPHONE ENCOUNTER
Patient scheduled for OLIVA on 6-5-20 at [facility] with Dr. Milligan at 11:30. AFSANEHI to Dr. Milligan.

## 2020-05-29 ENCOUNTER — TELEPHONE (OUTPATIENT)
Dept: CARDIOLOGY | Facility: MEDICAL CENTER | Age: 50
End: 2020-05-29

## 2020-05-29 NOTE — TELEPHONE ENCOUNTER
I left 2 messages earlier this week requesting a call back with his new insurance information. Per the patient he has [employer].     I left another message today requesting a call back today. I did inform him that if we don't hear back from him today then he may be considered a self pay for his gen change scheduled for 6-5-20.

## 2020-06-04 DIAGNOSIS — Z01.810 PRE-OPERATIVE CARDIOVASCULAR EXAMINATION: ICD-10-CM

## 2020-06-04 DIAGNOSIS — Z01.812 PRE-OPERATIVE LABORATORY EXAMINATION: ICD-10-CM

## 2020-06-04 LAB
ALBUMIN SERPL BCP-MCNC: 4.5 G/DL (ref 3.2–4.9)
ALBUMIN/GLOB SERPL: 1.6 G/DL
ALP SERPL-CCNC: 95 U/L (ref 30–99)
ALT SERPL-CCNC: 24 U/L (ref 2–50)
ANION GAP SERPL CALC-SCNC: 13 MMOL/L (ref 7–16)
AST SERPL-CCNC: 20 U/L (ref 12–45)
BILIRUB SERPL-MCNC: 0.4 MG/DL (ref 0.1–1.5)
BUN SERPL-MCNC: 14 MG/DL (ref 8–22)
CALCIUM SERPL-MCNC: 9.1 MG/DL (ref 8.5–10.5)
CHLORIDE SERPL-SCNC: 105 MMOL/L (ref 96–112)
CO2 SERPL-SCNC: 22 MMOL/L (ref 20–33)
COVID ORDER STATUS COVID19: NORMAL
CREAT SERPL-MCNC: 1.21 MG/DL (ref 0.5–1.4)
ERYTHROCYTE [DISTWIDTH] IN BLOOD BY AUTOMATED COUNT: 41.5 FL (ref 35.9–50)
GLOBULIN SER CALC-MCNC: 2.8 G/DL (ref 1.9–3.5)
GLUCOSE SERPL-MCNC: 112 MG/DL (ref 65–99)
HCT VFR BLD AUTO: 43.7 % (ref 42–52)
HGB BLD-MCNC: 15.2 G/DL (ref 14–18)
INR PPP: 0.96 (ref 0.87–1.13)
MCH RBC QN AUTO: 29.8 PG (ref 27–33)
MCHC RBC AUTO-ENTMCNC: 34.8 G/DL (ref 33.7–35.3)
MCV RBC AUTO: 85.7 FL (ref 81.4–97.8)
PLATELET # BLD AUTO: 273 K/UL (ref 164–446)
PMV BLD AUTO: 9.1 FL (ref 9–12.9)
POTASSIUM SERPL-SCNC: 4.3 MMOL/L (ref 3.6–5.5)
PROT SERPL-MCNC: 7.3 G/DL (ref 6–8.2)
PROTHROMBIN TIME: 13 SEC (ref 12–14.6)
RBC # BLD AUTO: 5.1 M/UL (ref 4.7–6.1)
SARS-COV-2 RNA RESP QL NAA+PROBE: NOTDETECTED
SODIUM SERPL-SCNC: 140 MMOL/L (ref 135–145)
SPECIMEN SOURCE: NORMAL
TSH SERPL DL<=0.005 MIU/L-ACNC: 7.06 UIU/ML (ref 0.38–5.33)
WBC # BLD AUTO: 4.9 K/UL (ref 4.8–10.8)

## 2020-06-04 PROCEDURE — C9803 HOPD COVID-19 SPEC COLLECT: HCPCS

## 2020-06-04 PROCEDURE — 80053 COMPREHEN METABOLIC PANEL: CPT

## 2020-06-04 PROCEDURE — 85610 PROTHROMBIN TIME: CPT

## 2020-06-04 PROCEDURE — 36415 COLL VENOUS BLD VENIPUNCTURE: CPT

## 2020-06-04 PROCEDURE — 85027 COMPLETE CBC AUTOMATED: CPT

## 2020-06-04 PROCEDURE — 93005 ELECTROCARDIOGRAM TRACING: CPT

## 2020-06-04 PROCEDURE — U0004 COV-19 TEST NON-CDC HGH THRU: HCPCS

## 2020-06-04 PROCEDURE — 84443 ASSAY THYROID STIM HORMONE: CPT

## 2020-06-04 ASSESSMENT — FIBROSIS 4 INDEX: FIB4 SCORE: 0.66

## 2020-06-05 ENCOUNTER — APPOINTMENT (OUTPATIENT)
Dept: CARDIOLOGY | Facility: MEDICAL CENTER | Age: 50
End: 2020-06-05
Attending: INTERNAL MEDICINE
Payer: COMMERCIAL

## 2020-06-05 ENCOUNTER — HOSPITAL ENCOUNTER (OUTPATIENT)
Facility: MEDICAL CENTER | Age: 50
End: 2020-06-05
Attending: INTERNAL MEDICINE | Admitting: INTERNAL MEDICINE
Payer: COMMERCIAL

## 2020-06-05 VITALS
WEIGHT: 281.53 LBS | HEIGHT: 75 IN | BODY MASS INDEX: 35 KG/M2 | RESPIRATION RATE: 12 BRPM | HEART RATE: 83 BPM | OXYGEN SATURATION: 94 % | SYSTOLIC BLOOD PRESSURE: 130 MMHG | TEMPERATURE: 97.9 F | DIASTOLIC BLOOD PRESSURE: 75 MMHG

## 2020-06-05 DIAGNOSIS — Z45.02 ELECTIVE REPLACEMENT INDICATED FOR IMPLANTABLE CARDIOVERTER-DEFIBRILLATOR (ICD): ICD-10-CM

## 2020-06-05 LAB
CHOLEST SERPL-MCNC: 178 MG/DL (ref 100–199)
EKG IMPRESSION: NORMAL
HDLC SERPL-MCNC: 37 MG/DL
LDLC SERPL CALC-MCNC: 120 MG/DL
TRIGL SERPL-MCNC: 107 MG/DL (ref 0–149)

## 2020-06-05 PROCEDURE — 99153 MOD SED SAME PHYS/QHP EA: CPT

## 2020-06-05 PROCEDURE — 93010 ELECTROCARDIOGRAM REPORT: CPT | Performed by: INTERNAL MEDICINE

## 2020-06-05 PROCEDURE — 99152 MOD SED SAME PHYS/QHP 5/>YRS: CPT | Performed by: INTERNAL MEDICINE

## 2020-06-05 PROCEDURE — 160002 HCHG RECOVERY MINUTES (STAT)

## 2020-06-05 PROCEDURE — 700101 HCHG RX REV CODE 250

## 2020-06-05 PROCEDURE — 36415 COLL VENOUS BLD VENIPUNCTURE: CPT

## 2020-06-05 PROCEDURE — 700111 HCHG RX REV CODE 636 W/ 250 OVERRIDE (IP)

## 2020-06-05 PROCEDURE — 33263 RMVL & RPLCMT DFB GEN 2 LEAD: CPT | Performed by: INTERNAL MEDICINE

## 2020-06-05 PROCEDURE — 80061 LIPID PANEL: CPT

## 2020-06-05 RX ORDER — TRAMADOL HYDROCHLORIDE 50 MG/1
50 TABLET ORAL EVERY 6 HOURS PRN
Status: DISCONTINUED | OUTPATIENT
Start: 2020-06-05 | End: 2020-06-05 | Stop reason: HOSPADM

## 2020-06-05 RX ORDER — MIDAZOLAM HYDROCHLORIDE 1 MG/ML
INJECTION INTRAMUSCULAR; INTRAVENOUS
Status: COMPLETED
Start: 2020-06-05 | End: 2020-06-05

## 2020-06-05 RX ORDER — LIDOCAINE HYDROCHLORIDE 20 MG/ML
INJECTION, SOLUTION INFILTRATION; PERINEURAL
Status: COMPLETED
Start: 2020-06-05 | End: 2020-06-05

## 2020-06-05 RX ORDER — DOXYCYCLINE 100 MG/1
100 CAPSULE ORAL 2 TIMES DAILY
Qty: 8 CAP | Refills: 0 | Status: SHIPPED | OUTPATIENT
Start: 2020-06-05 | End: 2020-08-05

## 2020-06-05 RX ORDER — ACETAMINOPHEN 325 MG/1
650 TABLET ORAL EVERY 4 HOURS PRN
Status: DISCONTINUED | OUTPATIENT
Start: 2020-06-05 | End: 2020-06-05 | Stop reason: HOSPADM

## 2020-06-05 RX ORDER — CEFAZOLIN SODIUM 1 G/3ML
INJECTION, POWDER, FOR SOLUTION INTRAMUSCULAR; INTRAVENOUS
Status: COMPLETED
Start: 2020-06-05 | End: 2020-06-05

## 2020-06-05 RX ADMIN — FENTANYL CITRATE 25 MCG: 50 INJECTION INTRAMUSCULAR; INTRAVENOUS at 12:15

## 2020-06-05 RX ADMIN — MIDAZOLAM HYDROCHLORIDE 2 MG: 1 INJECTION, SOLUTION INTRAMUSCULAR; INTRAVENOUS at 12:20

## 2020-06-05 RX ADMIN — FENTANYL CITRATE 100 MCG: 50 INJECTION INTRAMUSCULAR; INTRAVENOUS at 12:21

## 2020-06-05 RX ADMIN — LIDOCAINE HYDROCHLORIDE: 20 INJECTION, SOLUTION INFILTRATION; PERINEURAL at 12:27

## 2020-06-05 RX ADMIN — CEFAZOLIN 3000 MG: 1 INJECTION, POWDER, FOR SOLUTION INTRAMUSCULAR; INTRAVENOUS at 12:26

## 2020-06-05 RX ADMIN — MIDAZOLAM HYDROCHLORIDE 1.5 MG: 1 INJECTION, SOLUTION INTRAMUSCULAR; INTRAVENOUS at 12:26

## 2020-06-05 RX ADMIN — MIDAZOLAM HYDROCHLORIDE 1.5 MG: 1 INJECTION, SOLUTION INTRAMUSCULAR; INTRAVENOUS at 12:15

## 2020-06-05 ASSESSMENT — FIBROSIS 4 INDEX: FIB4 SCORE: 0.7

## 2020-06-05 NOTE — OP REPORT
Electrophysiology Procedure Note  [facility]    PROCEDURE: Dual-chamber defibrillator generator change,   moderate sedation administered by RN and supervised by physician    : Jeffrey Gillis M.D.    ANESTHESIA: Moderate sedation,  start time 1205, stop time 1230  the moderate sedation document has been reviewed, signed and scanned into media     ESTIMATED BLOOD LOSS: 20 cc.    SPECIMENS: None.    COMPLICATIONS: None    INDICATION: AICD at [place], hypertrophic cardiomyopathy    PRE-PROCEDURE ECG: Sinus rhythm    POST-PROCEDURE ECG: Sinus rhythm    DESCRIPTION OF PROCEDURE: After informed written consent, the patient was brought to the electrophysiology lab in the fasting, unsedated state. The patient was prepped and draped in the usual sterile fashion. The procedure was performed under moderate sedation with local anesthetic. An incision was made with a scalpel along the old scar. Access to the device pocket was made using a combination of blunt dissection and electrocautery. The old generator and leads were freed from adhesions and the generator disconnected from the leads. Leads tested fine.  The pocket was irrigated with antibiotic solution, and the new generator was connected to the leads and inserted back in the pocket. The wound was closed with three layers of absorbable sutures and covered with Steri-Strips.   I personally supervised the administration of moderate sedation by the RN and observed the level of consciousness and physiologic status throughout the procedure.  Following recovery from sedation, the patient was transferred to a monitored bed in good condition.    IMPLANTED DEVICE INFORMATION:    Pulse generator is a Medtronic model BSPJ2V2  Serial number [id number]     LEAD INFORMATION:  1. Right atrial lead is a Medtronic model 5076-52, serial number [id number], P wave 3.6 millivolts, threshold 0.5 volts, pacing impedance 399 ohms, implant date 7/25/2014  2. Right ventricular  lead is a Medtronic model 7698P31, serial number [id number], R wave 13 millivolts, threshold 0.75 volts, pacing impedance 304 ohms, implant date 7/25/2014    DEVICE PROGRAMMING:    As previous programmed     IMPRESSIONS:  1. Successful dual-chamber defibrillator generator change.    RECOMMENDATIONS:  1. Transfer to PPU.  2. Follow-up in device clinic for wound check and device interrogation.

## 2020-06-05 NOTE — PROGRESS NOTES
6/5/2020    Mr. Gillis is a patient of mine.  He is released to go back to work on June 8, 2020 without restrictions.  Any questions please do not hesitate to call.    Sincerely,    Dr. Jeffrey Gillis

## 2020-06-05 NOTE — DISCHARGE INSTRUCTIONS
ACTIVITY: Rest and take it easy for the first 24 hours.  A responsible adult is recommended to remain with you during that time.  It is normal to feel sleepy.  We encourage you to not do anything that requires balance, judgment or coordination.    MILD FLU-LIKE SYMPTOMS ARE NORMAL. YOU MAY EXPERIENCE GENERALIZED MUSCLE ACHES, THROAT IRRITATION, HEADACHE AND/OR SOME NAUSEA.    FOR 24 HOURS DO NOT:  Drive, operate machinery or run household appliances.  Drink beer or alcoholic beverages.   Make important decisions or sign legal documents.    SPECIAL INSTRUCTIONS:   Pacemaker Battery Change  A pacemaker battery usually lasts 4 to 12 years. Once or twice per year, you will be asked to visit your health care provider to have a full evaluation of your pacemaker. When a battery needs to be replaced, the entire pacemaker is replaced so that you can benefit from new circuitry and any new features that have been added to pacemakers. Most often, this procedure is very simple because the leads are already in place.   There are many things that affect how long a pacemaker battery will last, including:   · The age of the pacemaker.    · The number of leads (1, 2, or 3).    · The pacemaker work load. If the pacemaker is helping the heart more often, the battery will not last as long as it would if the pacemaker did not need to help the heart.    · Power (voltage) settings.   LET YOUR HEALTH CARE PROVIDER KNOW ABOUT:   · Any allergies you have.    · All medicines you are taking, including vitamins, herbs, eye drops, creams, and over-the-counter medicines.    · Previous problems you or members of your family have had with the use of anesthetics.    · Any blood disorders you have.    · Previous surgeries you have had, especially since your last pacemaker placement.    · Medical conditions you have.    · Possibility of pregnancy, if this applies.  · Symptoms of chest pain, trouble breathing, palpitations, light-headedness, or feelings  of an abnormal or irregular heartbeat.  RISKS AND COMPLICATIONS   Generally, this is a safe procedure. However, as with any procedure, problems can occur and include:   · Bleeding.    · Bruising of the skin around where the incision was made.    · Pain at the incision site.    · Pulling apart of the skin at the incision site.    · Infection.    · Allergic reaction to anesthetics or other medicines used during the procedure.    People with diabetes may have a temporary increase in their blood sugar after any surgical procedure.   BEFORE THE PROCEDURE   · Wash all of the skin around the area of the chest where the pacemaker is located.    · Ask your health care provider for help with any medicine adjustments before the pacemaker is replaced.    · Do not eat or drink anything after midnight on the night before the procedure or as directed by your health care provider.  · Ask your health care provider if you can take a sip of water with any approved medicines the morning of the procedure.  PROCEDURE   · After giving medicine to numb the skin (local anesthetic), your health care provider will make a cut to reopen the pocket holding the pacemaker.    · The old pacemaker will be disconnected from its leads.    · The leads will be tested.    · If needed, the leads will be replaced. If the leads are functioning properly, the new pacemaker may be connected to the existing leads.  · A heart monitor and the pacemaker  will be used to make sure that the new pacemaker is working properly.  · The incision site will then be closed. A dressing will be placed over the pacemaker site. The dressing will be removed 24-48 hours afterward.  AFTER THE PROCEDURE   · You will be taken to a recovery area after the new pacemaker implant is completed. Your vital signs such as blood pressure, heart rate, breathing, and oxygen levels will be monitored.  · Your health care provider will tell you when you will need to next test your  pacemaker or when to return to the office for follow-up for removal of stitches.  This information is not intended to replace advice given to you by your health care provider. Make sure you discuss any questions you have with your health care provider.  Document Released: 03/27/2008 Document Revised: 01/08/2016 Document Reviewed: 07/02/2014  [employer] Interactive Patient Education © 2017 [employer]      DIET: To avoid nausea, slowly advance diet as tolerated, avoiding spicy or greasy foods for the first day.  Add more substantial food to your diet according to your physician's instructions.    INCREASE FLUIDS AND FIBER TO AVOID CONSTIPATION.    SURGICAL DRESSING/BATHING: Reinforce; Maintain surgical dressing x 7 days, No shower. Sponge bath only x 7 days.  May remove outer dressing after 7 days. May shower after 7 days. LEAVE TAPE STRIPS ON FOR 1 WEEK OR UNTIL THEY FALL OFF.    FOLLOW-UP APPOINTMENT:  A follow-up appointment should be arranged with the Device Clinic; call to schedule.    You should CALL YOUR PHYSICIAN if you develop:  Fever greater than 101 degrees F.  Pain not relieved by medication, or persistent nausea or vomiting.  Excessive bleeding (blood soaking through dressing) or unexpected drainage from the wound.  Extreme redness or swelling around the incision site, drainage of pus or foul smelling drainage.  Inability to urinate or empty your bladder within 8 hours.  Problems with breathing or chest pain.    You should call 911 if you develop problems with breathing or chest pain.  If you are unable to contact your doctor or surgical center, you should go to the nearest emergency room or urgent care center.  Physician's telephone #: [phone]    If any questions arise, call your doctor.  If your doctor is not available, please feel free to call the Surgical Center at [phone].  The Center is open Monday through Friday from 7AM to 7PM.  You can also call the [facility] HOTLINE open 24 hours/day, 7  days/week and speak to a nurse at [phone], or toll free at [phone].    A registered nurse may call you a few days after your surgery to see how you are doing after your procedure.    MEDICATIONS: Resume taking daily medication.  Take prescribed pain medication with food.  If no medication is prescribed, you may take non-aspirin pain medication if needed.  PAIN MEDICATION CAN BE VERY CONSTIPATING.  Take a stool softener or laxative such as senokot, pericolace, or milk of magnesia if needed.    If your physician has prescribed pain medication that includes Acetaminophen (Tylenol), do not take additional Acetaminophen (Tylenol) while taking the prescribed medication.    Depression / Suicide Risk    As you are discharged from this [facility] facility, it is important to learn how to keep safe from harming yourself.    Recognize the warning signs:  · Abrupt changes in personality, positive or negative- including increase in energy   · Giving away possessions  · Change in eating patterns- significant weight changes-  positive or negative  · Change in sleeping patterns- unable to sleep or sleeping all the time   · Unwillingness or inability to communicate  · Depression  · Unusual sadness, discouragement and loneliness  · Talk of wanting to die  · Neglect of personal appearance   · Rebelliousness- reckless behavior  · Withdrawal from people/activities they love  · Confusion- inability to concentrate     If you or a loved one observes any of these behaviors or has concerns about self-harm, here's what you can do:  · Talk about it- your feelings and reasons for harming yourself  · Remove any means that you might use to hurt yourself (examples: pills, rope, extension cords, firearm)  · Get professional help from the community (Mental Health, Substance Abuse, psychological counseling)  · Do not be alone:Call your Safe Contact- someone whom you trust who will be there for you.  · Call your local CRISIS HOTLINE  533-8178 or 542-907-0058  · Call your local Children's Mobile Crisis Response Team [place] (964) 961-6984 or www.[employer]  · Call the toll free National Suicide Prevention Hotlines   · National Suicide Prevention Lifeline 347-132-NQLX (9433)  · National Cyber[employer] Line Network 800-SUICIDE (702-5643)

## 2020-06-18 ENCOUNTER — TELEPHONE (OUTPATIENT)
Dept: CARDIOLOGY | Facility: MEDICAL CENTER | Age: 50
End: 2020-06-18

## 2020-08-05 ENCOUNTER — APPOINTMENT (OUTPATIENT)
Dept: RADIOLOGY | Facility: MEDICAL CENTER | Age: 50
End: 2020-08-05
Attending: EMERGENCY MEDICINE
Payer: COMMERCIAL

## 2020-08-05 ENCOUNTER — HOSPITAL ENCOUNTER (EMERGENCY)
Facility: MEDICAL CENTER | Age: 50
End: 2020-08-05
Attending: EMERGENCY MEDICINE
Payer: COMMERCIAL

## 2020-08-05 VITALS
OXYGEN SATURATION: 94 % | RESPIRATION RATE: 23 BRPM | DIASTOLIC BLOOD PRESSURE: 60 MMHG | WEIGHT: 290 LBS | BODY MASS INDEX: 36.06 KG/M2 | TEMPERATURE: 97.3 F | HEART RATE: 88 BPM | HEIGHT: 75 IN | SYSTOLIC BLOOD PRESSURE: 109 MMHG

## 2020-08-05 DIAGNOSIS — T18.108A IMPACTED FOREIGN BODY IN ESOPHAGUS, INITIAL ENCOUNTER: ICD-10-CM

## 2020-08-05 LAB
ALBUMIN SERPL BCP-MCNC: 4.5 G/DL (ref 3.2–4.9)
ALBUMIN/GLOB SERPL: 1.6 G/DL
ALP SERPL-CCNC: 97 U/L (ref 30–99)
ALT SERPL-CCNC: 34 U/L (ref 2–50)
ANION GAP SERPL CALC-SCNC: 15 MMOL/L (ref 7–16)
AST SERPL-CCNC: 30 U/L (ref 12–45)
BASOPHILS # BLD AUTO: 0.8 % (ref 0–1.8)
BASOPHILS # BLD: 0.04 K/UL (ref 0–0.12)
BILIRUB SERPL-MCNC: 1 MG/DL (ref 0.1–1.5)
BUN SERPL-MCNC: 16 MG/DL (ref 8–22)
CALCIUM SERPL-MCNC: 9.6 MG/DL (ref 8.5–10.5)
CHLORIDE SERPL-SCNC: 104 MMOL/L (ref 96–112)
CO2 SERPL-SCNC: 21 MMOL/L (ref 20–33)
CREAT SERPL-MCNC: 1.15 MG/DL (ref 0.5–1.4)
EKG IMPRESSION: NORMAL
EOSINOPHIL # BLD AUTO: 0.22 K/UL (ref 0–0.51)
EOSINOPHIL NFR BLD: 4.3 % (ref 0–6.9)
ERYTHROCYTE [DISTWIDTH] IN BLOOD BY AUTOMATED COUNT: 41.4 FL (ref 35.9–50)
GLOBULIN SER CALC-MCNC: 2.9 G/DL (ref 1.9–3.5)
GLUCOSE SERPL-MCNC: 98 MG/DL (ref 65–99)
HCT VFR BLD AUTO: 43 % (ref 42–52)
HGB BLD-MCNC: 15.3 G/DL (ref 14–18)
IMM GRANULOCYTES # BLD AUTO: 0.02 K/UL (ref 0–0.11)
IMM GRANULOCYTES NFR BLD AUTO: 0.4 % (ref 0–0.9)
LYMPHOCYTES # BLD AUTO: 1.79 K/UL (ref 1–4.8)
LYMPHOCYTES NFR BLD: 35.4 % (ref 22–41)
MCH RBC QN AUTO: 30.1 PG (ref 27–33)
MCHC RBC AUTO-ENTMCNC: 35.6 G/DL (ref 33.7–35.3)
MCV RBC AUTO: 84.6 FL (ref 81.4–97.8)
MONOCYTES # BLD AUTO: 0.46 K/UL (ref 0–0.85)
MONOCYTES NFR BLD AUTO: 9.1 % (ref 0–13.4)
NEUTROPHILS # BLD AUTO: 2.53 K/UL (ref 1.82–7.42)
NEUTROPHILS NFR BLD: 50 % (ref 44–72)
NRBC # BLD AUTO: 0 K/UL
NRBC BLD-RTO: 0 /100 WBC
PLATELET # BLD AUTO: 304 K/UL (ref 164–446)
PMV BLD AUTO: 8.9 FL (ref 9–12.9)
POTASSIUM SERPL-SCNC: 4.5 MMOL/L (ref 3.6–5.5)
PROT SERPL-MCNC: 7.4 G/DL (ref 6–8.2)
RBC # BLD AUTO: 5.08 M/UL (ref 4.7–6.1)
SODIUM SERPL-SCNC: 140 MMOL/L (ref 135–145)
TROPONIN T SERPL-MCNC: 9 NG/L (ref 6–19)
WBC # BLD AUTO: 5.1 K/UL (ref 4.8–10.8)

## 2020-08-05 PROCEDURE — 96375 TX/PRO/DX INJ NEW DRUG ADDON: CPT

## 2020-08-05 PROCEDURE — 500066 HCHG BITE BLOCK, ECT: Performed by: INTERNAL MEDICINE

## 2020-08-05 PROCEDURE — A9270 NON-COVERED ITEM OR SERVICE: HCPCS | Performed by: EMERGENCY MEDICINE

## 2020-08-05 PROCEDURE — 160203 HCHG ENDO MINUTES - 1ST 30 MINS LEVEL 4: Performed by: INTERNAL MEDICINE

## 2020-08-05 PROCEDURE — 80053 COMPREHEN METABOLIC PANEL: CPT

## 2020-08-05 PROCEDURE — 99285 EMERGENCY DEPT VISIT HI MDM: CPT

## 2020-08-05 PROCEDURE — 94770 HCHG CO2 EXPIRED GAS DETERMINATION: CPT

## 2020-08-05 PROCEDURE — 700111 HCHG RX REV CODE 636 W/ 250 OVERRIDE (IP): Performed by: EMERGENCY MEDICINE

## 2020-08-05 PROCEDURE — 700102 HCHG RX REV CODE 250 W/ 637 OVERRIDE(OP): Performed by: EMERGENCY MEDICINE

## 2020-08-05 PROCEDURE — 93005 ELECTROCARDIOGRAM TRACING: CPT | Performed by: EMERGENCY MEDICINE

## 2020-08-05 PROCEDURE — 96374 THER/PROPH/DIAG INJ IV PUSH: CPT

## 2020-08-05 PROCEDURE — 96376 TX/PRO/DX INJ SAME DRUG ADON: CPT

## 2020-08-05 PROCEDURE — 93005 ELECTROCARDIOGRAM TRACING: CPT

## 2020-08-05 PROCEDURE — 71045 X-RAY EXAM CHEST 1 VIEW: CPT

## 2020-08-05 PROCEDURE — 36415 COLL VENOUS BLD VENIPUNCTURE: CPT

## 2020-08-05 PROCEDURE — 502240 HCHG MISC OR SUPPLY RC 0272: Performed by: INTERNAL MEDICINE

## 2020-08-05 PROCEDURE — 85025 COMPLETE CBC W/AUTO DIFF WBC: CPT

## 2020-08-05 PROCEDURE — 160048 HCHG OR STATISTICAL LEVEL 1-5: Performed by: INTERNAL MEDICINE

## 2020-08-05 PROCEDURE — 84484 ASSAY OF TROPONIN QUANT: CPT

## 2020-08-05 RX ORDER — NITROGLYCERIN 0.4 MG/1
0.4 TABLET SUBLINGUAL ONCE
Status: COMPLETED | OUTPATIENT
Start: 2020-08-05 | End: 2020-08-05

## 2020-08-05 RX ORDER — OMEPRAZOLE 40 MG/1
40 CAPSULE, DELAYED RELEASE ORAL DAILY
Qty: 30 CAP | Refills: 0 | Status: SHIPPED | OUTPATIENT
Start: 2020-08-05 | End: 2022-09-02

## 2020-08-05 RX ORDER — PROPOFOL 10 MG/ML
INJECTION, EMULSION INTRAVENOUS
Status: COMPLETED | OUTPATIENT
Start: 2020-08-05 | End: 2020-08-05

## 2020-08-05 RX ADMIN — GLUCAGON 1 MG: 1 INJECTION, POWDER, LYOPHILIZED, FOR SOLUTION INTRAMUSCULAR; INTRAVENOUS at 14:08

## 2020-08-05 RX ADMIN — PROPOFOL 100 MG: 10 INJECTION, EMULSION INTRAVENOUS at 14:56

## 2020-08-05 RX ADMIN — PROPOFOL 50 MG: 10 INJECTION, EMULSION INTRAVENOUS at 14:59

## 2020-08-05 RX ADMIN — PROPOFOL 30 MG: 10 INJECTION, EMULSION INTRAVENOUS at 15:03

## 2020-08-05 RX ADMIN — GLUCAGON 1 MG: 1 INJECTION, POWDER, LYOPHILIZED, FOR SOLUTION INTRAMUSCULAR; INTRAVENOUS at 13:40

## 2020-08-05 RX ADMIN — NITROGLYCERIN 0.4 MG: 0.4 TABLET, ORALLY DISINTEGRATING SUBLINGUAL at 14:00

## 2020-08-05 RX ADMIN — PROPOFOL 30 MG: 10 INJECTION, EMULSION INTRAVENOUS at 15:01

## 2020-08-05 RX ADMIN — PROPOFOL 30 MG: 10 INJECTION, EMULSION INTRAVENOUS at 15:00

## 2020-08-05 ASSESSMENT — ENCOUNTER SYMPTOMS
SHORTNESS OF BREATH: 0
BLOOD IN STOOL: 0
ABDOMINAL PAIN: 0
VOMITING: 0
PALPITATIONS: 0
COUGH: 0

## 2020-08-05 ASSESSMENT — FIBROSIS 4 INDEX: FIB4 SCORE: 0.72

## 2020-08-05 NOTE — CONSULTS
Gastroenterology Consult Note:    Martin Townsend M.D.  Date & Time note created:    8/5/2020   2:38 PM     Referring MD:  Dr. Lee Quick    Patient ID:  Name:             Trevor Gillis   YOB: 1970  Age:                 50 y.o.  male   MRN:               [id number]                                                             Reason for Consult:      Esophageal meat impaction    History of Present Illness:    Trevor Gillis, age 50, was evaluated by  about 2018 for solid food dysphagia.  An EGD was performed, and medication was prescribed.  Those details are currently unavailable.     Since then he has had mid substernal solid food dysphagia about every 2 months, but each time the food has spontaneously passed.  Last night at 10 PM he ate a bite of steak, and developed substernal dysphagia with obstruction.  Since then he has been unable to clear his secretions, and has had recurrent regurgitation of saliva.  As a result he came to the ER for evaluation.     He has a history of cardiomyopathy with pacemaker/AICD, but has been asymptomatic recently.    EGD with foreign body removal  1.  Esophageal meat impaction due to a large bolus of meat, successfully removed in pieces.  2.  Moderate ulcerative esophagitis from 34-40 cm, with a probable ringed esophagus also    Assessment:  GERD with ulcerative esophagitis, possible EOE and dysphagia  Successful resolution of an esophageal meat impaction    Plan:   Omeprazole 40 mg daily  Swallowing precautions: We discussed these.  Office visit with Dr. La in 4 weeks.    Labs:  Recent Labs     08/05/20  1327   WBC 5.1   RBC 5.08   HEMOGLOBIN 15.3   HEMATOCRIT 43.0   MCV 84.6   MCH 30.1   MCHC 35.6*   RDW 41.4   PLATELETCT 304   MPV 8.9*       review  Recent Labs     08/05/20  1327   SODIUM 140   POTASSIUM 4.5   CHLORIDE 104   CO2 21   GLUCOSE 98   BUN 16   CREATININE 1.15   CALCIUM 9.6     Recent Labs     08/05/20  1327   ALTSGPT 34  "  ASTSGOT 30   ALKPHOSPHAT 97   TBILIRUBIN 1.0   GLUCOSE 98       No results found for: BLOODCULTU, BLDCULT, BCHOLD     GI/Nutrition:  No orders of the defined types were placed in this encounter.      Past Medical History:   Past Medical History:   Diagnosis Date   • Dyslipidemia    • Fracture of left clavicle    • Heart burn    • Hernia    • Hiatus hernia syndrome     possible   • HTN (hypertension)    • Hypertrophic cardiomyopathy (HCC)    • JEREMIAH (obstructive sleep apnea)    • Pacemaker     followed by Dr Gillis   • Psychiatric problem    • Snoring    • Syncope 11/19/2012       Past Surgical History:  Past Surgical History:   Procedure Laterality Date   • IRRIGATION & DEBRIDEMENT GENERAL Left 1/16/2017    Procedure: IRRIGATION & DEBRIDEMENT GENERAL-INDEX FINGER;  Surgeon: Kia Aldridge M.D.;  Location: SURGERY [facility];  Service:    • INGUINAL HERNIA REPAIR  9/10/2014    Performed by Christie Ott M.D. at SURGERY SAME DAY [facility] ORS   • RECOVERY  7/30/2014    Performed by Cath-Recovery Surgery at SURGERY SAME DAY [facility] ORS   • OTHER CARDIAC SURGERY  7/25/14    AICD   • RECOVERY  7/25/2014    Performed by Cath-Recovery Surgery at SURGERY SAME DAY [facility] ORS   • APPENDECTOMY     • TONSILLECTOMY         Medication Allergy:  Allergies   Allergen Reactions   • Tomato Hives and Vomiting       Hospital Medications:  IV infusions:  None        Current Outpatient Medications:  No current facility-administered medications on file prior to encounter.      No current outpatient medications on file prior to encounter.       Physical Exam:  Vitals/ General Appearance:   Weight/BMI: Body mass index is 36.25 kg/m².  /71   Pulse 81   Temp 36.3 °C (97.3 °F) (Temporal)   Resp 16   Ht 1.905 m (6' 3\")   Wt (!) 131.5 kg (290 lb)   SpO2 92%   Vitals:    08/05/20 1324 08/05/20 1332 08/05/20 1400 08/05/20 1401   BP: 139/92 156/110  114/71   Pulse: 88 94 83 81   Resp: 18 14 12 16   Temp:       TempSrc:     "   SpO2: 98% 98% 89% 92%   Weight:       Height:         Oxygen Therapy:  Pulse Oximetry: 92 %, O2 (LPM): 2, O2 Delivery Device: Nasal Cannula    Physical Exam   Constitutional: He is well-developed, well-nourished, and in no distress.   HENT:   Head: Normocephalic and atraumatic.   Mouth/Throat: Oropharynx is clear and moist.   Eyes: Pupils are equal, round, and reactive to light. EOM are normal.   Neck: Neck supple. No thyromegaly present.   Cardiovascular: Normal heart sounds. Exam reveals no friction rub.   No murmur heard.  Pulmonary/Chest: He has no wheezes. He has no rales.   Normal breath sounds anteriorly.   Abdominal: He exhibits no mass. There is no hepatosplenomegaly. There is no abdominal tenderness. There is no guarding.   Musculoskeletal:         General: No edema.   Lymphadenopathy:     He has no cervical adenopathy.   Neurological: He is alert. No cranial nerve deficit.   Skin: No rash noted. No erythema.   Psychiatric: Memory and affect normal.   Vitals reviewed.      Review of Systems:      Review of Systems   Respiratory: Negative for cough and shortness of breath.    Cardiovascular: Negative for chest pain and palpitations.   Gastrointestinal: Negative for abdominal pain, blood in stool, melena and vomiting.             Family History:  Family History   Problem Relation Age of Onset   • Other Mother         Accident   • Stroke Father    • Heart Attack Father         MI at age 24?   • Heart Disease Paternal Grandfather    • Hypertension Paternal Grandmother    • Diabetes Paternal Grandmother        Social History:  Social History     Socioeconomic History   • Marital status: Single     Spouse name: Not on file   • Number of children: Not on file   • Years of education: Not on file   • Highest education level: Not on file   Occupational History   • Not on file   Social Needs   • Financial resource strain: Not on file   • Food insecurity     Worry: Not on file     Inability: Not on file   •  Transportation needs     Medical: Not on file     Non-medical: Not on file   Tobacco Use   • Smoking status: Former Smoker     Packs/day: 1.00     Years: 25.00     Pack years: 25.00     Types: Cigarettes   • Smokeless tobacco: Never Used   Substance and Sexual Activity   • Alcohol use: Not Currently     Alcohol/week: 0.0 oz     Comment: used to be heavy alcoholism (18 years ago),    • Drug use: No   • Sexual activity: Not on file   Lifestyle   • Physical activity     Days per week: Not on file     Minutes per session: Not on file   • Stress: Not on file   Relationships   • Social connections     Talks on phone: Not on file     Gets together: Not on file     Attends Restorationism service: Not on file     Active member of club or organization: Not on file     Attends meetings of clubs or organizations: Not on file     Relationship status: Not on file   • Intimate partner violence     Fear of current or ex partner: Not on file     Emotionally abused: Not on file     Physically abused: Not on file     Forced sexual activity: Not on file   Other Topics Concern   • Not on file   Social History Narrative   • Not on file       MDM (Data Review):     Records reviewed and summarized in current documentation    Problem List:     Patient Active Problem List    Diagnosis Date Noted   • Dyslipidemia 10/24/2016     Priority: Low   • Hypothyroidism 05/05/2016     Priority: Low   • Pain of right hand 04/09/2018   • Need for influenza vaccination 12/26/2017   • Obesity (BMI 30-39.9) 08/08/2017   • Hypertension 01/16/2017   • Tobacco abuse 11/03/2016   • Hypertrophic cardiomyopathy (HCC) 10/30/2016   • Prediabetes 07/12/2016   • Hyperglycemia 05/06/2016   • JEREMIAH (obstructive sleep apnea) 05/05/2016   • Shoulder pain 06/23/2015   • Inguinal hernia 09/10/2014   • Dual ICD (implantable cardioverter-defibrillator) in place 07/26/2014   • FH: sudden cardiac death (SCD) 07/16/2014   • NSVT (nonsustained ventricular tachycardia) (HCC) 07/16/2014    • Dizzy spells 07/16/2014         Thank your for the opportunity to assist in the care of your patient.  Please call for any questions or concerns.    Martin Townsend M.D.

## 2020-08-05 NOTE — ED TRIAGE NOTES
Trevor Gillis Jr.  50 y.o. male  Chief Complaint   Patient presents with   • Food Stuck In Throat     pt states he was eating steak last night and ever since feels like it is stuck. Pt reports discomfort to his sternum. Pt reports constnat gagging and inability to consume food or drink. Pt reports hx of similar complaint.        Pt to Triage via wheelchair.   Pt is alert and oriented, speaking in full sentences, follows commands and responds appropriately to questions. Resp are even and unlabored. Pt occasionally gagging.   Charge RN aware of pt; Pt roomed to RED.

## 2020-08-05 NOTE — ED NOTES
Meds given to patient. He is sitting up on stretcher and wretching. Speaking clear sentences. Call bell within reach, will continue to monitor

## 2020-08-05 NOTE — RESPIRATORY CARE
Conscious Sedation Respiratory Update       O2 (LPM): 2 (08/05/20 1514)       Events/Summary/Plan: concious sedation ETCO2 27, SP02 97% (08/05/20 1501)

## 2020-08-05 NOTE — ED NOTES
Discharge instructions reviewed with patient and signed. He verbalized understanding of his follow up instructions. He is aware that his prescription is being sent to the pharmacy. He has all his belongings, ambulates with a steady gait and is being driven home by a friend

## 2020-08-05 NOTE — ED NOTES
Pt brought back to rm [address] 10 from triage. Pt able to transfer self to bed, pt in gown, on monitor, call light in reach. PIV line established. Blood drawn and sent to the lab. Chart up for ERP.

## 2020-08-05 NOTE — OR SURGEON
Immediate Post OP Note    PreOp Diagnosis: Esophageal meat impaction    PostOp Diagnosis: Successful resolution of esophageal meat impaction    Procedure(s):  GASTROSCOPY, WITH FOREIGN BODY REMOVAL    Surgeon(s):  Martin Townsend M.D.    Anesthesiologist/Type of Anesthesia: Dr. Lee Quick  No anesthesia staff entered./General    Surgical Staff:  Endoscopy Technician: (Unknown)  Endoscopy Nurse: (Unknown)    Specimens removed if any:  * No specimens in log *    Estimated Blood Loss: None    Findings:   1.  Esophageal meat impaction due to a large bolus of meat, successfully removed in pieces.  2.  Moderate ulcerative esophagitis from 34-40 cm, with a probable ringed esophagus also    Complications: None        8/5/2020 3:12 PM Martin Townsend M.D.

## 2020-08-05 NOTE — ED NOTES
Patient back to baseline after conscious sedation. Three pieces of steak were removed from his esophagus. He is speaking clear, full sentences at this time. AAOx4. VSS on monitor. Will continue to assess

## 2020-08-05 NOTE — OP REPORT
EGD PROCEDURE NOTE:    Martin Townsend M.D.  Date & Time note created:    8/5/2020   3:16 PM       Patient ID:  Name:             Trevor Gillis   YOB: 1970  Age:                 50 y.o.  male   MRN:               [id number]     PROCEDURE: Esophagoscopy with foreign body removal       SURGEON: Martin Townsend M.D.     PREPROCEDURE DIAGNOSIS: Esophageal meat impaction    POSTPROCEDURE DIAGNOSIS: Successful resolution of esophageal meat impaction    SPECIMENS   None    FINDINGS   1.  Esophageal meat impaction due to a large bolus of meat, successfully removed in pieces.  2.  Moderate ulcerative esophagitis from 34-40 cm, with a probable ringed esophagus also     IMPRESSION   GERD with ulcerative esophagitis and possible EOE  Successful resolution of esophageal meat impaction    PLAN   See consult    COMPLICATIONS: None    PHYSICAL EXAM    Lungs: Clear to auscultation anteriorly.   Heart: Normal heart sounds. No murmur.   Abdomen: No masses, tenderness or organomegaly.     CONSENT   The procedure was explained to the patient, including the indications, risks, benefits, alternatives and method of performing the procedure. Questions about the procedure were solicited and answered to the patient’s satisfaction, who appeared to understand and agreed to proceed with it.     EGD PROCEDURE   After signed informed consent, the patient was placed in the left lateral decubitus position, and deep sedation was administered by the anesthesiologist.  The flexible videoendoscope was introduced into the oropharynx, and advanced into the esophagus.  In the distal esophagus, there was a large meat impaction.  The meat impaction was successfully resolved by grasping it with a snare and removing the endoscope, delivering pieces of the meat through the mouth.  About 5 passes were made, and the final one delivered a very large piece of meat, completing the process of removal.  The endoscope was then reintroduced  into the esophagus and advanced to the GE junction.  On withdrawal of the endoscope, there was moderate ulcerative esophagitis from 34-40 cm, and this segment of esophagus also had a ringed pattern suggesting EOE.  The remainder of the esophagus was unremarkable.  Complete resolution of the meat impaction was verified, on withdrawal of the endoscope. The patient seemed to tolerate the procedure well, and there were no immediate complications.       Martin Townsend M.D.

## 2020-08-05 NOTE — ED PROVIDER NOTES
ED Provider Note    ED Provider Note    Primary care provider: No primary care provider on file.  Means of arrival: Walk-in  History obtained from: Patient    CHIEF COMPLAINT  Chief Complaint   Patient presents with   • Food Stuck In Throat     pt states he was eating steak last night and ever since feels like it is stuck. Pt reports discomfort to his sternum. Pt reports constnat gagging and inability to consume food or drink. Pt reports hx of similar complaint.      Seen at 1:26 PM.   HPI  Trevor Gillis Jr. is a 50 y.o. male who presents to the Emergency Department probable food bolus impaction.  The patient states that 10 PM he was eating a steak.  He suddenly was unable to pass any saliva or food.  He feels a pressure in the mid esophageal region that he is fairly confident is a food bolus impaction.  He has spent the last 14 hours spitting, attempting to vomit without any significant improvement.  He has not been able to tolerate any p.o. since the event occurred.  He reports this having happened to him in the past.  He did have a upper endoscopy done by Dr. Beatty about 2 years ago.  He was told that there was something wrong with 1 of the rings in his esophagus and that this may happen again.  If it were to recur he should come to the emergency department immediately.    He denies any shortness of breath.  He denies numbness, weakness.  REVIEW OF SYSTEMS  See HPI,   Remainder of ROS negative.     PAST MEDICAL HISTORY   has a past medical history of Dyslipidemia, Fracture of left clavicle, Heart burn, Hernia, Hiatus hernia syndrome, HTN (hypertension), Hypertrophic cardiomyopathy (HCC), JEREMIAH (obstructive sleep apnea), Pacemaker, Psychiatric problem, Snoring, and Syncope (11/19/2012).    SURGICAL HISTORY   has a past surgical history that includes tonsillectomy; other cardiac surgery (7/25/14); recovery (7/25/2014); recovery (7/30/2014); inguinal hernia repair (9/10/2014); appendectomy; and irrigation &  "debridement general (Left, 1/16/2017).    SOCIAL HISTORY  Social History     Tobacco Use   • Smoking status: Former Smoker     Packs/day: 1.00     Years: 25.00     Pack years: 25.00     Types: Cigarettes   • Smokeless tobacco: Never Used   Substance Use Topics   • Alcohol use: Not Currently     Alcohol/week: 0.0 oz     Comment: used to be heavy alcoholism (18 years ago),    • Drug use: No      Social History     Substance and Sexual Activity   Drug Use No       FAMILY HISTORY  Family History   Problem Relation Age of Onset   • Other Mother         Accident   • Stroke Father    • Heart Attack Father         MI at age 24?   • Heart Disease Paternal Grandfather    • Hypertension Paternal Grandmother    • Diabetes Paternal Grandmother        CURRENT MEDICATIONS  Reviewed.  See Encounter Summary.     ALLERGIES  Allergies   Allergen Reactions   • Tomato Hives and Vomiting       PHYSICAL EXAM  VITAL SIGNS: /60   Pulse 88   Temp 36.3 °C (97.3 °F) (Temporal)   Resp (!) 23   Ht 1.905 m (6' 3\")   Wt (!) 131.5 kg (290 lb)   SpO2 94%   BMI 36.25 kg/m²   Constitutional: Awake, alert in no apparent distress.  Appears uncomfortable, spitting into an emesis basin.    HENT: Normocephalic, Bilateral external ears normal. Nose normal.   Eyes: Conjunctiva normal, non-icteric, EOMI.    Thorax & Lungs: Easy unlabored respirations, Clear to ascultation bilaterally.  Cardiovascular: Regular rate, Regular rhythm, No murmurs, rubs or gallops. Bilateral pulses symmetrical.  Borderline tachycardic.  Abdomen:  Soft, nontender, nondistended, normal active bowel sounds.   :    Skin: Visualized skin is  Dry, No erythema, No rash.   Musculoskeletal:   No cyanosis, clubbing or edema. No leg asymmetry.   Neurologic: Alert, Grossly non-focal.   Psychiatric: Normal affect, Normal mood  Lymphatic:  No cervical LAD    EKG   12 lead Interpreted by me  Rhythm: Normal sinus rhythm  Rate: 78  Axis: normal  Ectopy: none  Conduction: normal  ST " Segments: no acute change  T Waves: no acute change  Clinical Impression: Diffuse T wave inversions and borderline depressions, seen on prior EKGs, otherwise unremarkable without acute ischemic changes.       RADIOLOGY  DX-CHEST-PORTABLE (1 VIEW)   Final Result      No acute cardiopulmonary abnormality.            COURSE & MEDICAL DECISION MAKING  Pertinent Labs & Imaging studies reviewed. (See chart for details)    Differential diagnoses include but are not limited to: Esophageal foreign body, much less likely Boerhaave's, cardiac chest pain.    1:26 PM - Medical record reviewed, multiple visits to cardiology for electrophysiology, I did not see any prior records for esophageal foreign body or GI notes.    1:37 PM-case discussed with Dr. Townsend, GI consultants.  He agrees to trial of glucagon, I will Tiger text him after 30 minutes if no improvement in symptoms.    2 PM -Dr. Townsend made aware of failure with glucagon.    2:30 PM -conscious sedation performed by me, Dr. Townsend performed the EGD and removed a very large food bolus.    3:46 PM-patient made full recovery from anesthesia.        Conscious sedation procedure note:  Informed consent was obtained prior to sedation.  The patient was placed on end-tidal CO2 and preoxygenated with a nasal cannula.  I administered several boluses of IV propofol.  Please see the nursing notes for timeframe and administering of medications.  He received upfront 100 mg bolus followed by successive smaller boluses to maintain sedation throughout the procedure.  At one point he had a brief desaturation to 86%, this lasted fewer than 15 seconds and resolve spontaneously.  Throughout the sedation except for this episode the patient maintained oxygen aspirations in the mid 90s.    He made a full recovery following the sedation.  This was done without any complications.  Total length of sedation approximately 20 minutes.  Decision Making:  This is a 50 y.o. year old male who  presents with food bolus impaction in the mid esophagus.  The patient has been suffering with this for greater than 12 hours.  Brief trial was made with glucagon and nitroglycerin.  The patient did have some improvement in his pain but was unable to tolerate any p.o. indicating he still had a retained foreign body.  The case was discussed with gastroenterology, an EGD was performed in the emergency department.  Patient had a very large food bolus that was removed.  He has some erosive esophagitis from the food bolus.  No active bleeding was appreciated.  Appearance of the esophagus is consistent with eosinophilic esophagitis.  The patient will be placed on a PPI and a soft diet.  Is recommended he follow-up with his gastroenterologist in the next month.    He does have a significant cardiac history, EKG does not show any acute ischemic changes, high since her troponin is negative.  Chest x-ray does not show any evidence of Boerhaave's.  The patient is now back to baseline after food bolus impaction.    Discharge Medications:  New Prescriptions    OMEPRAZOLE (PRILOSEC) 40 MG DELAYED-RELEASE CAPSULE    Take 1 Cap by mouth every day.       The patient was discharged home (see d/c instructions) was told to return immediately for any signs or symptoms listed, or any worsening at all.  The patient verbally agreed to the discharge precautions and follow-up plan which is documented in EPIC.    The patient's blood pressure is elevated today. >120/80. I have referred them to primary care for follow up.       FINAL IMPRESSION  1. Impacted foreign body in esophagus, initial encounter

## 2020-09-11 NOTE — TELEPHONE ENCOUNTER
Pt said his disability has been denied and he must go to work.  He currently is employed by [employer], temporary work.   Needs a letter stating he has no work restrictions.    To Dr. Gillis.  OK to write letter?            Nursing: call [phone] and leave message.  This is his friend Nadeem's phone.   Abdominal Pain, N/V/D

## 2020-10-03 NOTE — ED NOTES
Med Rec completed per patient   Allergies reviewed  No ORAL antibiotics in last 14 days    Patient stated he hasn't taken medications in over a year   No

## 2021-07-20 ENCOUNTER — HOSPITAL ENCOUNTER (EMERGENCY)
Facility: MEDICAL CENTER | Age: 51
End: 2021-07-20
Attending: EMERGENCY MEDICINE
Payer: COMMERCIAL

## 2021-07-20 VITALS
OXYGEN SATURATION: 96 % | HEIGHT: 75 IN | DIASTOLIC BLOOD PRESSURE: 94 MMHG | TEMPERATURE: 98.1 F | HEART RATE: 84 BPM | RESPIRATION RATE: 16 BRPM | WEIGHT: 294.53 LBS | BODY MASS INDEX: 36.62 KG/M2 | SYSTOLIC BLOOD PRESSURE: 144 MMHG

## 2021-07-20 DIAGNOSIS — R53.81 MALAISE: ICD-10-CM

## 2021-07-20 LAB
ALBUMIN SERPL BCP-MCNC: 4 G/DL (ref 3.2–4.9)
ALBUMIN/GLOB SERPL: 1.6 G/DL
ALP SERPL-CCNC: 94 U/L (ref 30–99)
ALT SERPL-CCNC: 17 U/L (ref 2–50)
ANION GAP SERPL CALC-SCNC: 8 MMOL/L (ref 7–16)
AST SERPL-CCNC: 15 U/L (ref 12–45)
BASOPHILS # BLD AUTO: 1 % (ref 0–1.8)
BASOPHILS # BLD: 0.04 K/UL (ref 0–0.12)
BILIRUB SERPL-MCNC: 0.3 MG/DL (ref 0.1–1.5)
BUN SERPL-MCNC: 17 MG/DL (ref 8–22)
CALCIUM SERPL-MCNC: 8.9 MG/DL (ref 8.5–10.5)
CHLORIDE SERPL-SCNC: 106 MMOL/L (ref 96–112)
CO2 SERPL-SCNC: 24 MMOL/L (ref 20–33)
CREAT SERPL-MCNC: 1.11 MG/DL (ref 0.5–1.4)
EKG IMPRESSION: NORMAL
EOSINOPHIL # BLD AUTO: 0.2 K/UL (ref 0–0.51)
EOSINOPHIL NFR BLD: 4.9 % (ref 0–6.9)
ERYTHROCYTE [DISTWIDTH] IN BLOOD BY AUTOMATED COUNT: 43.5 FL (ref 35.9–50)
GLOBULIN SER CALC-MCNC: 2.5 G/DL (ref 1.9–3.5)
GLUCOSE SERPL-MCNC: 116 MG/DL (ref 65–99)
HCT VFR BLD AUTO: 42.2 % (ref 42–52)
HGB BLD-MCNC: 14.1 G/DL (ref 14–18)
IMM GRANULOCYTES # BLD AUTO: 0.01 K/UL (ref 0–0.11)
IMM GRANULOCYTES NFR BLD AUTO: 0.2 % (ref 0–0.9)
LYMPHOCYTES # BLD AUTO: 1.53 K/UL (ref 1–4.8)
LYMPHOCYTES NFR BLD: 37.6 % (ref 22–41)
MCH RBC QN AUTO: 29 PG (ref 27–33)
MCHC RBC AUTO-ENTMCNC: 33.4 G/DL (ref 33.7–35.3)
MCV RBC AUTO: 86.8 FL (ref 81.4–97.8)
MONOCYTES # BLD AUTO: 0.36 K/UL (ref 0–0.85)
MONOCYTES NFR BLD AUTO: 8.8 % (ref 0–13.4)
NEUTROPHILS # BLD AUTO: 1.93 K/UL (ref 1.82–7.42)
NEUTROPHILS NFR BLD: 47.5 % (ref 44–72)
NRBC # BLD AUTO: 0 K/UL
NRBC BLD-RTO: 0 /100 WBC
PLATELET # BLD AUTO: 246 K/UL (ref 164–446)
PMV BLD AUTO: 9.7 FL (ref 9–12.9)
POTASSIUM SERPL-SCNC: 4 MMOL/L (ref 3.6–5.5)
PROT SERPL-MCNC: 6.5 G/DL (ref 6–8.2)
RBC # BLD AUTO: 4.86 M/UL (ref 4.7–6.1)
SODIUM SERPL-SCNC: 138 MMOL/L (ref 135–145)
TROPONIN T SERPL-MCNC: 19 NG/L (ref 6–19)
WBC # BLD AUTO: 4.1 K/UL (ref 4.8–10.8)

## 2021-07-20 PROCEDURE — 80053 COMPREHEN METABOLIC PANEL: CPT

## 2021-07-20 PROCEDURE — 84484 ASSAY OF TROPONIN QUANT: CPT

## 2021-07-20 PROCEDURE — 93005 ELECTROCARDIOGRAM TRACING: CPT | Performed by: EMERGENCY MEDICINE

## 2021-07-20 PROCEDURE — 85025 COMPLETE CBC W/AUTO DIFF WBC: CPT

## 2021-07-20 PROCEDURE — 99283 EMERGENCY DEPT VISIT LOW MDM: CPT

## 2021-07-20 PROCEDURE — 93005 ELECTROCARDIOGRAM TRACING: CPT

## 2021-07-20 ASSESSMENT — ENCOUNTER SYMPTOMS
FEVER: 0
VOMITING: 0
HEADACHES: 0
NAUSEA: 0
DIZZINESS: 0
SHORTNESS OF BREATH: 0
COUGH: 0
FALLS: 0
CHILLS: 0
NECK PAIN: 0
ABDOMINAL PAIN: 0

## 2021-07-20 ASSESSMENT — FIBROSIS 4 INDEX: FIB4 SCORE: 0.81

## 2021-07-20 ASSESSMENT — PAIN DESCRIPTION - PAIN TYPE: TYPE: ACUTE PAIN

## 2021-07-20 NOTE — ED NOTES
All DC discussed. Pt verbalized understanding of all DC instruction r/t fatigue, s/s to return to the ER for and follow up recommendations. PIV removed. Pt ambulated to lobby with upright and steady gait.

## 2021-07-20 NOTE — ED NOTES
Spoke to [employer] rep about missing interrogation report. Per [employer], local representative will be contacted & fax report over ASAP. Dr. Roblero notified

## 2021-07-20 NOTE — ED TRIAGE NOTES
"Chief Complaint   Patient presents with   • Lightheadedness     pt states \"woke up and i felt like my blood pressure just tanked, my heart dropped\"        Pt ambulatory to triage. Pt alert and oriented. Pt educated on triage process and to update staff if any changes. Pt placed back into lobby.    "

## 2022-07-27 ENCOUNTER — NURSE TRIAGE (OUTPATIENT)
Dept: CARDIOLOGY | Facility: MEDICAL CENTER | Age: 52
End: 2022-07-27
Payer: COMMERCIAL

## 2022-07-27 NOTE — TELEPHONE ENCOUNTER
SUPA    Caller: - Trevor    Reported Symptoms: Sharp Chest Pains    Recent Blood Pressure/Heart Rate Reading: N/A    Callback Number: [phone]    Thank you,   Alona CORONADO

## 2022-07-27 NOTE — TELEPHONE ENCOUNTER
Sudheer Mendosa M.D.  You 3 minutes ago (3:07 PM)       Thanks Crystal! Yea I'd send this jeannie to the emergency room. Can tell him to relay to ER team that cardiologist recommends an echo.      Phone Number Called: [phone]    Call outcome: Spoke to patient regarding message below.    Message: Called to inform patient of VR recommendations. Patient verbalizes understanding and will proceed to the ED.

## 2022-07-27 NOTE — TELEPHONE ENCOUNTER
"Phone Number Called: [phone]    Call outcome: Spoke to patient regarding message below.    Message: Called to follow up with patient regarding chest pains. Patient has not been seen in our clinic in 2 years and states he has no way to send a transmission from his AICD. Patient would like to re-establish with cardiology. Will send to ADD for advice until patient can re-establish.     Reason for Disposition  • Intermittent mild chest pain lasting a few seconds each time    Answer Assessment - Initial Assessment Questions  1. LOCATION: \"Where does it hurt?\"        \"right in the heart\"  2. RADIATION: \"Does the pain go anywhere else?\" (e.g., into neck, jaw, arms, back)      Does not radiate  3. ONSET: \"When did the chest pain begin?\" (Minutes, hours or days)       About 1 week  4. PATTERN \"Does the pain come and go, or has it been constant since it started?\"  \"Does it get worse with exertion?\"       Comes and go. No pattern.  5. DURATION: \"How long does it last\" (e.g., seconds, minutes, hours)      Lasts a few seconds  6. SEVERITY: \"How bad is the pain?\"  (e.g., Scale 1-10; mild, moderate, or severe)     - MILD (1-3): doesn't interfere with normal activities      - MODERATE (4-7): interferes with normal activities or awakens from sleep     - SEVERE (8-10): excruciating pain, unable to do any normal activities        6-7/10  7. CARDIAC RISK FACTORS: \"Do you have any history of heart problems or risk factors for heart disease?\" (e.g., prior heart attack, angina; high blood pressure, diabetes, being overweight, high cholesterol, smoking, or strong family history of heart disease)      Patient has AICD. States he has not had a shock from device. Cannot send transmission  8. PULMONARY RISK FACTORS: \"Do you have any history of lung disease?\"  (e.g., blood clots in lung, asthma, emphysema, birth control pills)      None  9. CAUSE: \"What do you think is causing the chest pain?\"      Unknown  10. OTHER SYMPTOMS: \"Do you have " "any other symptoms?\" (e.g., dizziness, nausea, vomiting, sweating, fever, difficulty breathing, cough)        Patient has CHF symptoms, but no change  11. PREGNANCY: \"Is there any chance you are pregnant?\" \"When was your last menstrual period?\"        N/A    Protocols used: CHEST PAIN-A-[place]      "

## 2022-08-09 ENCOUNTER — TELEPHONE (OUTPATIENT)
Dept: CARDIOLOGY | Facility: MEDICAL CENTER | Age: 52
End: 2022-08-09
Payer: COMMERCIAL

## 2022-08-15 ENCOUNTER — OCCUPATIONAL MEDICINE (OUTPATIENT)
Dept: URGENT CARE | Facility: PHYSICIAN GROUP | Age: 52
End: 2022-08-15
Payer: COMMERCIAL

## 2022-08-15 ENCOUNTER — NON-PROVIDER VISIT (OUTPATIENT)
Dept: URGENT CARE | Facility: PHYSICIAN GROUP | Age: 52
End: 2022-08-15

## 2022-08-15 ENCOUNTER — HOSPITAL ENCOUNTER (OUTPATIENT)
Dept: RADIOLOGY | Facility: MEDICAL CENTER | Age: 52
End: 2022-08-15
Attending: FAMILY MEDICINE
Payer: COMMERCIAL

## 2022-08-15 VITALS
TEMPERATURE: 98.1 F | WEIGHT: 280 LBS | HEART RATE: 77 BPM | SYSTOLIC BLOOD PRESSURE: 124 MMHG | HEIGHT: 75 IN | DIASTOLIC BLOOD PRESSURE: 86 MMHG | BODY MASS INDEX: 34.82 KG/M2 | OXYGEN SATURATION: 98 % | RESPIRATION RATE: 16 BRPM

## 2022-08-15 DIAGNOSIS — S46.211A RUPTURE OF RIGHT DISTAL BICEPS TENDON, INITIAL ENCOUNTER: ICD-10-CM

## 2022-08-15 DIAGNOSIS — Z02.1 PRE-EMPLOYMENT DRUG SCREENING: ICD-10-CM

## 2022-08-15 LAB
AMP AMPHETAMINE: NEGATIVE
COC COCAINE: NEGATIVE
INT CON NEG: NORMAL
INT CON POS: NORMAL
MET METHAMPHETAMINES: NEGATIVE
OPI OPIATES: NEGATIVE
PCP PHENCYCLIDINE: NEGATIVE
POC DRUG COMMENT 753798-OCCUPATIONAL HEALTH: NORMAL
THC: NEGATIVE

## 2022-08-15 PROCEDURE — 80305 DRUG TEST PRSMV DIR OPT OBS: CPT | Performed by: FAMILY MEDICINE

## 2022-08-15 PROCEDURE — 99204 OFFICE O/P NEW MOD 45 MIN: CPT | Performed by: FAMILY MEDICINE

## 2022-08-15 PROCEDURE — 73060 X-RAY EXAM OF HUMERUS: CPT | Mod: RT

## 2022-08-15 PROCEDURE — 73080 X-RAY EXAM OF ELBOW: CPT | Mod: RT

## 2022-08-15 ASSESSMENT — FIBROSIS 4 INDEX: FIB4 SCORE: 0.74

## 2022-08-15 ASSESSMENT — ENCOUNTER SYMPTOMS
TINGLING: 0
SENSORY CHANGE: 0

## 2022-08-15 NOTE — PROGRESS NOTES
Subjective:   Trevor Gillis Jr. is a 52 y.o. male who presents for Arm Injury (NEW WC, lifted heavy box, sharp pain in R bicep, pain with movement, swelling )    Date of injury 8/15/2022.  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  Ice was applied initially.  Denies numbness tingling or weakness in the right hand.  Denies decreased range of motion to flexion in the right elbow yet reports subjective weakness to elbow flexion and supination.   See the C4 and D39 form    Arm Injury    PMH:  has a past medical history of Dyslipidemia, Fracture of left clavicle, Heart burn, Hernia, Hiatus hernia syndrome, HTN (hypertension), Hypertrophic cardiomyopathy (HCC), JEREMIAH (obstructive sleep apnea), Pacemaker, Psychiatric problem, Snoring, and Syncope (11/19/2012).  MEDS:   Current Outpatient Medications:     omeprazole (PRILOSEC) 40 MG delayed-release capsule, Take 1 Cap by mouth every day. (Patient not taking: Reported on 8/15/2022), Disp: 30 Cap, Rfl: 0  ALLERGIES:   Allergies   Allergen Reactions    Tomato Hives and Vomiting     SURGHX:   Past Surgical History:   Procedure Laterality Date    NM UPPER GI ENDOSCOPY,REMOV F.B.  8/5/2020    Procedure: GASTROSCOPY, WITH FOREIGN BODY REMOVAL;  Surgeon: Martin Townsend M.D.;  [facility];  Service: Gastroenterology    IRRIGATION & DEBRIDEMENT GENERAL Left 1/16/2017    Procedure: IRRIGATION & DEBRIDEMENT GENERAL-INDEX FINGER;  Surgeon: Kia Aldridge M.D.;  Location: SURGERY [facility];  Service:     INGUINAL HERNIA REPAIR  9/10/2014    Performed by Christie Ott M.D. at SURGERY SAME DAY [facility]    RECOVERY  7/30/2014    Performed by [facility]Recovery Surgery at SURGERY SAME DAY  "[facility] ORS    OTHER CARDIAC SURGERY  7/25/14    AICD    RECOVERY  7/25/2014    Performed by Cath-Recovery Surgery at SURGERY SAME DAY [facility]    APPENDECTOMY      TONSILLECTOMY       SOCHX:  reports that he has been smoking cigarettes. He has a 25.00 pack-year smoking history. He has never used smokeless tobacco. He reports that he does not use drugs.  FH:   Family History   Problem Relation Age of Onset    Other Mother         Accident    Stroke Father     Heart Attack Father         MI at age 24?    Heart Disease Paternal Grandfather     Hypertension Paternal Grandmother     Diabetes Paternal Grandmother      Review of Systems   Neurological:  Negative for tingling and sensory change.   All other systems reviewed and are negative.     Objective:   /86   Pulse 77   Temp 36.7 °C (98.1 °F) (Temporal)   Resp 16   Ht 1.905 m (6' 3\")   Wt (!) 127 kg (280 lb)   SpO2 98%   BMI 35.00 kg/m²   Physical Exam  Vitals and nursing note reviewed.   Constitutional:       General: He is not in acute distress.     Appearance: He is well-developed.   HENT:      Head: Normocephalic and atraumatic.      Right Ear: External ear normal.      Left Ear: External ear normal.      Nose: Nose normal.      Mouth/Throat:      Mouth: Mucous membranes are moist.   Eyes:      Conjunctiva/sclera: Conjunctivae normal.   Cardiovascular:      Rate and Rhythm: Normal rate.   Pulmonary:      Effort: Pulmonary effort is normal. No respiratory distress.      Breath sounds: Normal breath sounds.   Abdominal:      General: There is no distension.   Musculoskeletal:         General: Normal range of motion.   Skin:     General: Skin is warm and dry.   Neurological:      General: No focal deficit present.      Mental Status: He is alert and oriented to person, place, and time. Mental status is at baseline.      Gait: Gait (gait at baseline) normal.   Psychiatric:         Judgment: Judgment normal.     Right arm: Distal volar bulge and " deformity with proximal translation with elbow flexion consistent with distal biceps tendon rupture, trace edema, minimal ecchymosis, diffusely tender to palpation, no bony point tenderness, flexion intact to 90 degrees with limitations from guarding, extension intact to 0 degrees with guarding noted, motor strength intact 4 out of 5 to elbow flexion and supination yet with guarding noted, right elbow extension and pronation 5 out of 5 with no limitations, neurovascular intact throughout  Right shoulder: Nontender to palpation, full range of motion noted       DX-HUMERUS 2+ RIGHT  Order: [id number]  Status: Final result    Visible to patient: No (scheduled for 8/16/2022  2:34 PM)    Next appt: None    Dx: Rupture of right distal biceps tendon...    0 Result Notes  Details    Reading Physician Reading Date Result Priority   Daniel Figueroa M.D.  [phone] 8/15/2022 Urgent Care     Narrative & Impression     8/15/2022 4:10 PM     HISTORY/REASON FOR EXAM:  Pain/Deformity Following Trauma.        TECHNIQUE/EXAM DESCRIPTION AND NUMBER OF VIEWS:  2 views of the RIGHT humerus.     COMPARISON: None     FINDINGS:  No acute fracture is noted. There is no foreign body or soft tissue defect.     IMPRESSION:        No acute fracture noted about the right humerus.           Exam Ended: 08/15/22  4:28 PM Last Resulted: 08/15/22  4:32 PM                    DX-ELBOW-COMPLETE 3+ RIGHT  Order: [id number]  Status: Final result    Visible to patient: No (scheduled for 8/16/2022  2:35 PM)    Next appt: None    Dx: Rupture of right distal biceps tendon...    0 Result Notes  Details    Reading Physician Reading Date Result Priority   Daniel Figueroa M.D.  [phone] 8/15/2022 Urgent Care     Narrative & Impression     8/15/2022 4:09 PM     HISTORY/REASON FOR EXAM:  Pain/Deformity Following Trauma.  .     TECHNIQUE/EXAM DESCRIPTION AND NUMBER OF VIEWS:  3 views of the RIGHT elbow.     COMPARISON: None     FINDINGS:  No acute  fracture is noted. There is no effusion. There is no foreign body or soft tissue defect.     IMPRESSION:        No acute fracture noted about the right elbow.           Exam Ended: 08/15/22  4:26 PM Last Resulted: 08/15/22  4:33 PM                        Assessment/Plan:   1. Rupture of right distal biceps tendon, initial encounter  - Slings  - DX-HUMERUS 2+ RIGHT; Future  - DX-ELBOW-COMPLETE 3+ RIGHT; Future  - Referral to Orthopedics  - Referral to Occupational Medicine    See the C4 and D 39 form.  Recommend initiation of urgent orthopedic surgery consultation and occupational medicine consultation for anticipated necessity for surgical management and/or prolonged disposition.  Sling, recommend acetaminophen as needed, ice application as needed.        Medical Decision Making/Course:  In the course of preparing for this visit with review of the pertinent past medical history, recent and past clinic visits, current medications, and performing chart, immunization, medical history and medication reconciliation, and in the further course of obtaining the current history pertinent to the clinic visit today, performing an exam and evaluation, ordering and independently evaluating labs, tests including independent interpretation evaluation of x-ray imaging, and/or procedures, prescribing any recommended new medications as noted above and including application of sling during urgent care course, providing any pertinent counseling and education and recommending further coordination of care, at least  42 minutes of total time were spent during this encounter.      Please note that this dictation was created using voice recognition software. I have worked with consultants from the vendor as well as technical experts from [facility] to optimize the interface. I have made every reasonable attempt to correct obvious errors, but I expect that there are errors of grammar and possibly content that I did not discover before  finalizing the note.

## 2022-08-15 NOTE — LETTER
[facility] [place]  [address] [place] [address]  Phone:  [phone] - Fax:  [phone]   [facility] Progress Report and Disability Certification  Date of Service: 8/15/2022   No Show:  No  Date / Time of Next Visit: 8/22/2022   Claim Information   Patient Name: Trevor Gillis Jr.  Claim Number:     Employer:    Date of Injury: 8/15/2022     Insurer / TPA:    ID / SSN:     Occupation:   Diagnosis: The encounter diagnosis was Rupture of right distal biceps tendon, initial encounter.    Medical Information   Related to Industrial Injury? Yes    Subjective Complaints:  Date of injury 8/15/2022.  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  Ice was applied initially.  Denies numbness tingling or weakness in the right hand.  Denies decreased range of motion to flexion in the right elbow yet reports subjective weakness to elbow flexion and supination.   Objective Findings: Right arm: Distal volar bulge and deformity with proximal translation with elbow flexion consistent with distal biceps tendon rupture, trace edema, minimal ecchymosis, diffusely tender to palpation, no bony point tenderness, flexion intact to 90 degrees with limitations from guarding, extension intact to 0 degrees with guarding noted, motor strength intact 4 out of 5 to elbow flexion and supination yet with guarding noted, right elbow extension and pronation 5 out of 5 with no limitations, neurovascular intact throughout  Right shoulder: Nontender to palpation, full range of motion noted   Pre-Existing Condition(s):     Assessment:   Initial Visit    Status: Additional Care Required  Permanent Disability:No    Plan:    Comments:Orthopedic surgery consultation, occupational medicine consultation, sling    Diagnostics:   Comments:X-ray right humerus and elbow: No acute fracture    Comments:       Disability Information   Status: Released to Restricted Duty    From:  8/15/2022  Through: 8/22/2022 Restrictions are: Temporary   Physical Restrictions   Sitting:    Standing:    Stooping:    Bending:      Squatting:    Walking:    Climbing:    Pushing:      Pulling:    Other:    Reaching Above Shoulder (L):   Reaching Above Shoulder (R):       Reaching Below Shoulder (L):    Reaching Below Shoulder (R):      Not to exceed Weight Limits   Carrying(hrs):   Weight Limit(lb):   Lifting(hrs):   Weight  Limit(lb):     Comments: No use of right upper extremity, recommend sling    Repetitive Actions   Hands: i.e. Fine Manipulations from Grasping:     Feet: i.e. Operating Foot Controls:     Driving / Operate Machinery:     Health Care Provider’s Original or Electronic Signature  Rigo Villeda M.D. Health Care Provider’s Original or Electronic Signature    Jeffrey Nesbitt MD         Clinic Name / Location: [address] [address] Clinic Phone Number: Dept: [phone]   Appointment Time: 2:05 Pm Visit Start Time: 3:35 PM   Check-In Time:  2:09 Pm Visit Discharge Time:  5:15 PM   Original-Treating Physician or Chiropractor    Page 2-Insurer/TPA    Page 3-Employer    Page 4-Employee

## 2022-08-15 NOTE — LETTER
"EMPLOYEE’S CLAIM FOR COMPENSATION/ REPORT OF INITIAL TREATMENT  FORM C-4    EMPLOYEE’S CLAIM - PROVIDE ALL INFORMATION REQUESTED   First Name  Trevor Last Name  Carlin Birthdate                    1970                Sex  male Claim Number (Insurer’s Use Only)    Home Address  [address] Age  52 y.o. Height  1.905 m (6' 3\") Weight  (!) 127 kg (280 lb) [facility] Zip  [address] Telephone  [phone] (home)    Mailing Address  [address] [facility] Zip  [address] Primary Language Spoken  English    Insurer   Third-Party       Employee's Occupation (Job Title) When Injury or Occupational Disease Occurred      Employer's Name/Company Name     Telephone      Office Mail Address (Number and Street)   [address] [facility]  Zip  [address]    Date of Injury  8/15/2022               Hours Injury  1:30 PM Date Employer Notified  8/15/2022 Last Day of Work after Injury     or Occupational Disease  8/15/2022 Supervisor to Whom Injury     Reported  Eric   Address or Location of Accident (if applicable)  Work [1]   What were you doing at the time of accident? (if applicable)  Putting stuff away    How did this injury or occupational disease occur? (Be specific an answer in detail. Use additional sheet if necessary)  Picking up a box   If you believe that you have an occupational disease, when did you first have knowledge of the disability and it relationship to your employment?  N/A Witnesses to the Accident  N/A      Nature of Injury or Occupational Disease  Workers' Compensation  Part(s) of Body Injured or Affected  Lower Arm (R), Lower Arm (R), Lower Arm (R)    I certify that the above is true and correct to the best of my knowledge and that I have provided this information in order to obtain the benefits of [place] and " [place]  I hereby authorize any physician, chiropractor, surgeon, practitioner, or other person, any hospital, including [facility], any medical service organization, any insurance company, or other institution or organization to release to each other, any medical or other information, including benefits paid or payable, pertinent to this injury or disease, except information relative to diagnosis, treatment and/or counseling for AIDS, psychological conditions, alcohol or controlled substances, for which I must give specific authorization.  A Photostat of this authorization shall be as valid as the original.     Date   Place Employee’s Original or  *Electronic Signature   THIS REPORT MUST BE COMPLETED AND MAILED WITHIN 3 WORKING DAYS OF TREATMENT   Place  [facility]  Name of Facility  [place]   Date  8/15/2022 Diagnosis and Description of Injury or Occupational Disease  (S46.211A) Rupture of right distal biceps tendon, initial encounter Is there evidence the injured employee was under the influence of alcohol and/or another controlled substance at the time of accident?  ? No ? Yes (if yes, please explain)    Hour  3:35 PM   The encounter diagnosis was Rupture of right distal biceps tendon, initial encounter. No   Treatment  Orthopedic surgery consultation, sling  Have you advised the patient to remain off work five days or     more?    X-Ray Findings    Comments:X-ray right humerus and elbow: No acute fracture noted   ? Yes Indicate dates:   From   To      From information given by the employee, together with medical evidence, can        you directly connect this injury or occupational disease as job incurred?  Yes ? No If no, is the injured employee capable of:  ? full duty  No ? modified duty  Yes   Is additional medical care by a physician indicated?  Yes If Modified Duty, Specify any  "Limitations / Restrictions  No use of right upper extremity   Do you know of any previous injury or disease contributing to this condition or occupational disease?  ? Yes ? No (Explain if yes)                          No   Date  8/15/2022 Print Health Care Provider's   Rigo Davis M.D. I certify the employer’s copy of  this form was mailed on:   Address  [address] Insurer’s Use Only     [facility] Zip  [address]    Provider’s Tax ID Number  [id number] Telephone  Dept: [phone]             Health Care Provider’s Original or Electronic Signature  e-SignRIGO DAVIS M.D. Degree (MD,DO, DC,PA-C,APRN)   MD      * Complete and attach Release of Information (Form C-4A) when injured employee signs C-4 Form electronically  ORIGINAL - TREATING HEALTHCARE PROVIDER PAGE 2 - INSURER/TPA PAGE 3 - EMPLOYER PAGE 4 - EMPLOYEE             Form C-4 (rev.08/21)           BRIEF DESCRIPTION OF RIGHTS AND BENEFITS  (Pursuant to [place] 616C.050)    Notice of Injury or Occupational Disease (Incident Report Form C-1): If an injury or occupational disease (OD) arises out of and in the course of employment, you must provide written notice to your employer as soon as practicable, but no later than 7 days after the accident or OD. Your employer shall maintain a sufficient supply of the required forms.    Claim for Compensation (Form C-4): If medical treatment is sought, the form C-4 is available at the place of initial treatment. A completed \"Claim for Compensation\" (Form C-4) must be filed within 90 days after an accident or OD. The treating physician or chiropractor must, within 3 working days after treatment, complete and mail to the employer, the employer's insurer and third-party , the Claim for Compensation.    Medical Treatment: If you require medical treatment for your on-the-job injury or OD, you may be required to select a physician or chiropractor from a list provided by your workers’ " compensation insurer, if it has contracted with an Organization for Managed Care (MCO) or Preferred Provider Organization (PPO) or providers of health care. If your employer has not entered into a contract with an MCO or PPO, you may select a physician or chiropractor from the Panel of Physicians and Chiropractors. Any medical costs related to your industrial injury or OD will be paid by your insurer.    Temporary Total Disability (TTD): If your doctor has certified that you are unable to work for a period of at least 5 consecutive days, or 5 cumulative days in a 20-day period, or places restrictions on you that your employer does not accommodate, you may be entitled to TTD compensation.    Temporary Partial Disability (TPD): If the wage you receive upon reemployment is less than the compensation for TTD to which you are entitled, the insurer may be required to pay you TPD compensation to make up the difference. TPD can only be paid for a maximum of 24 months.    Permanent Partial Disability (PPD): When your medical condition is stable and there is an indication of a PPD as a result of your injury or OD, within 30 days, your insurer must arrange for an evaluation by a rating physician or chiropractor to determine the degree of your PPD. The amount of your PPD award depends on the date of injury, the results of the PPD evaluation, your age and wage.    Permanent Total Disability (PTD): If you are medically certified by a treating physician or chiropractor as permanently and totally disabled and have been granted a PTD status by your insurer, you are entitled to receive monthly benefits not to exceed 66 2/3% of your average monthly wage. The amount of your PTD payments is subject to reduction if you previously received a lump-sum PPD award.    Vocational Rehabilitation Services: You may be eligible for vocational rehabilitation services if you are unable to return to the job due to a permanent physical impairment or  permanent restrictions as a result of your injury or occupational disease.    Transportation and Per Roberto Reimbursement: You may be eligible for travel expenses and per roberto associated with medical treatment.    Reopening: You may be able to reopen your claim if your condition worsens after claim closure.     Appeal Process: If you disagree with a written determination issued by the insurer or the insurer does not respond to your request, you may appeal to the Department of Administration, , by following the instructions contained in your determination letter. You must appeal the determination within 70 days from the date of the determination letter at [address], [address], [place], [place] [address], or [address], [address], [place], [place] [address]. If you disagree with the  decision, you may appeal to the Department of Administration, . You must file your appeal within 30 days from the date of the  decision letter at [address], [address], [place], [place] [address], or [address], [address], [place], [place] [address]. If you disagree with a decision of an , you may file a petition for judicial review with the District Court. You must do so within 30 days of the Appeal Officer’s decision. You may be represented by an  at your own expense or you may contact the [facility] for possible representation.    [place]  for Injured Workers (NAIW): If you disagree with a  decision, you may request that NAIW represent you without charge at an  Hearing. For information regarding denial of benefits, you may contact the [facility] at: [address], [address], [place] [address], [phone], or [address], [facility] [address], [place] [address], [phone]    To File a Complaint with the Division: If you wish to file a complaint with the  of the  Division of Industrial Relations (DIR),  please contact the Workers’ Compensation Section, [address], [address], [place], [place] [address], telephone [phone], or [address], [place], [place] [address], telephone [phone].    For assistance with Workers’ Compensation Issues: You may contact the [facility] Office for Consumer Health Assistance, [address], [place], [place] [address], Toll Free 1-762.257.7302, Web site: http://[facility].[place].gov/Programs/HANS E-mail: hans@[facility].nv.gov              __________________________________________________________________                                    _________________            Employee Name / Signature                                                                                                                            Date                                                                                                                                                                                                                              D-2 (rev. 10/20)

## 2022-08-30 ENCOUNTER — TELEPHONE (OUTPATIENT)
Dept: OCCUPATIONAL MEDICINE | Facility: CLINIC | Age: 52
End: 2022-08-30
Payer: COMMERCIAL

## 2022-08-30 NOTE — TELEPHONE ENCOUNTER
Phone Number Called: [phone] (home)       Call outcome: Left detailed message for patient. Informed to call back with any additional questions.    Message: patient has an appt with the [facility] tomorrow and does not need to be seen here at [facility]. Appt was cancelled.

## 2022-08-31 PROBLEM — S46.219A RUPTURE OF DISTAL BICEPS TENDON: Status: ACTIVE | Noted: 2022-08-31

## 2022-09-02 ENCOUNTER — PRE-ADMISSION TESTING (OUTPATIENT)
Dept: ADMISSIONS | Facility: MEDICAL CENTER | Age: 52
End: 2022-09-02
Attending: ORTHOPAEDIC SURGERY
Payer: COMMERCIAL

## 2022-09-02 ENCOUNTER — TELEPHONE (OUTPATIENT)
Dept: CARDIOLOGY | Facility: MEDICAL CENTER | Age: 52
End: 2022-09-02
Payer: COMMERCIAL

## 2022-09-02 DIAGNOSIS — Z01.812 PRE-OPERATIVE LABORATORY EXAMINATION: ICD-10-CM

## 2022-09-02 DIAGNOSIS — Z01.810 PRE-OPERATIVE CARDIOVASCULAR EXAMINATION: ICD-10-CM

## 2022-09-02 LAB
ANION GAP SERPL CALC-SCNC: 11 MMOL/L (ref 7–16)
BUN SERPL-MCNC: 13 MG/DL (ref 8–22)
CALCIUM SERPL-MCNC: 9.3 MG/DL (ref 8.4–10.2)
CHLORIDE SERPL-SCNC: 106 MMOL/L (ref 96–112)
CO2 SERPL-SCNC: 22 MMOL/L (ref 20–33)
CREAT SERPL-MCNC: 1.11 MG/DL (ref 0.5–1.4)
EST. AVERAGE GLUCOSE BLD GHB EST-MCNC: 120 MG/DL
GFR SERPLBLD CREATININE-BSD FMLA CKD-EPI: 81 ML/MIN/1.73 M 2
GLUCOSE SERPL-MCNC: 117 MG/DL (ref 65–99)
HBA1C MFR BLD: 5.8 % (ref 4–5.6)
POTASSIUM SERPL-SCNC: 3.6 MMOL/L (ref 3.6–5.5)
SODIUM SERPL-SCNC: 139 MMOL/L (ref 135–145)

## 2022-09-02 PROCEDURE — 36415 COLL VENOUS BLD VENIPUNCTURE: CPT

## 2022-09-02 PROCEDURE — 80048 BASIC METABOLIC PNL TOTAL CA: CPT

## 2022-09-02 PROCEDURE — 83036 HEMOGLOBIN GLYCOSYLATED A1C: CPT

## 2022-09-02 PROCEDURE — 93005 ELECTROCARDIOGRAM TRACING: CPT

## 2022-09-02 RX ORDER — ASPIRIN 325 MG
325 TABLET ORAL EVERY 6 HOURS PRN
COMMUNITY
End: 2023-12-02

## 2022-09-02 ASSESSMENT — FIBROSIS 4 INDEX: FIB4 SCORE: 0.74

## 2022-09-02 NOTE — TELEPHONE ENCOUNTER
Spoke with [facility]--received clearance for device 9/2/22--advised patient has not been seen in clinic or with EP/Cardiology x 2 years. Stated I would contact [employer] rep to check device prior to surgery 9/6.    Left message with [place]/[employer] rep with name of patient and surgery time.    Will contact rep 9/6 in AM to confirm.

## 2022-09-04 LAB — EKG IMPRESSION: NORMAL

## 2022-09-04 PROCEDURE — 93010 ELECTROCARDIOGRAM REPORT: CPT | Performed by: INTERNAL MEDICINE

## 2022-09-06 ENCOUNTER — ANESTHESIA EVENT (OUTPATIENT)
Dept: SURGERY | Facility: MEDICAL CENTER | Age: 52
End: 2022-09-06
Payer: COMMERCIAL

## 2022-09-06 ENCOUNTER — APPOINTMENT (OUTPATIENT)
Dept: RADIOLOGY | Facility: MEDICAL CENTER | Age: 52
End: 2022-09-06
Attending: ORTHOPAEDIC SURGERY
Payer: COMMERCIAL

## 2022-09-06 ENCOUNTER — ANESTHESIA (OUTPATIENT)
Dept: SURGERY | Facility: MEDICAL CENTER | Age: 52
End: 2022-09-06
Payer: COMMERCIAL

## 2022-09-06 ENCOUNTER — HOSPITAL ENCOUNTER (OUTPATIENT)
Facility: MEDICAL CENTER | Age: 52
Setting detail: OUTPATIENT SURGERY
End: 2022-09-06
Attending: ORTHOPAEDIC SURGERY | Admitting: ORTHOPAEDIC SURGERY
Payer: COMMERCIAL

## 2022-09-06 VITALS
RESPIRATION RATE: 14 BRPM | HEART RATE: 75 BPM | TEMPERATURE: 98.6 F | OXYGEN SATURATION: 95 % | BODY MASS INDEX: 33.88 KG/M2 | SYSTOLIC BLOOD PRESSURE: 116 MMHG | DIASTOLIC BLOOD PRESSURE: 85 MMHG | HEIGHT: 75 IN | WEIGHT: 272.49 LBS

## 2022-09-06 DIAGNOSIS — G89.18 POST-OP PAIN: ICD-10-CM

## 2022-09-06 PROCEDURE — 24342 REPAIR OF RUPTURED TENDON: CPT | Mod: 29SX,RT | Performed by: PHYSICIAN ASSISTANT

## 2022-09-06 PROCEDURE — 160028 HCHG SURGERY MINUTES - 1ST 30 MINS LEVEL 3: Performed by: ORTHOPAEDIC SURGERY

## 2022-09-06 PROCEDURE — 700101 HCHG RX REV CODE 250: Performed by: ANESTHESIOLOGY

## 2022-09-06 PROCEDURE — 160036 HCHG PACU - EA ADDL 30 MINS PHASE I: Performed by: ORTHOPAEDIC SURGERY

## 2022-09-06 PROCEDURE — 700105 HCHG RX REV CODE 258: Performed by: ANESTHESIOLOGY

## 2022-09-06 PROCEDURE — A9270 NON-COVERED ITEM OR SERVICE: HCPCS | Performed by: ANESTHESIOLOGY

## 2022-09-06 PROCEDURE — 700101 HCHG RX REV CODE 250: Performed by: ORTHOPAEDIC SURGERY

## 2022-09-06 PROCEDURE — 73070 X-RAY EXAM OF ELBOW: CPT | Mod: RT

## 2022-09-06 PROCEDURE — 160002 HCHG RECOVERY MINUTES (STAT): Performed by: ORTHOPAEDIC SURGERY

## 2022-09-06 PROCEDURE — 160048 HCHG OR STATISTICAL LEVEL 1-5: Performed by: ORTHOPAEDIC SURGERY

## 2022-09-06 PROCEDURE — 01716 ANES NRV MSC UPR A&E TNDSIS: CPT | Performed by: ANESTHESIOLOGY

## 2022-09-06 PROCEDURE — 700111 HCHG RX REV CODE 636 W/ 250 OVERRIDE (IP): Performed by: ANESTHESIOLOGY

## 2022-09-06 PROCEDURE — 160035 HCHG PACU - 1ST 60 MINS PHASE I: Performed by: ORTHOPAEDIC SURGERY

## 2022-09-06 PROCEDURE — 160009 HCHG ANES TIME/MIN: Performed by: ORTHOPAEDIC SURGERY

## 2022-09-06 PROCEDURE — 700102 HCHG RX REV CODE 250 W/ 637 OVERRIDE(OP): Performed by: ANESTHESIOLOGY

## 2022-09-06 PROCEDURE — 160025 RECOVERY II MINUTES (STATS): Performed by: ORTHOPAEDIC SURGERY

## 2022-09-06 PROCEDURE — 700105 HCHG RX REV CODE 258: Performed by: ORTHOPAEDIC SURGERY

## 2022-09-06 PROCEDURE — 24342 REPAIR OF RUPTURED TENDON: CPT | Mod: RT | Performed by: ORTHOPAEDIC SURGERY

## 2022-09-06 PROCEDURE — C1713 ANCHOR/SCREW BN/BN,TIS/BN: HCPCS | Performed by: ORTHOPAEDIC SURGERY

## 2022-09-06 PROCEDURE — 160046 HCHG PACU - 1ST 60 MINS PHASE II: Performed by: ORTHOPAEDIC SURGERY

## 2022-09-06 PROCEDURE — 160039 HCHG SURGERY MINUTES - EA ADDL 1 MIN LEVEL 3: Performed by: ORTHOPAEDIC SURGERY

## 2022-09-06 DEVICE — ENDOBUTTON CL ULTRA 15MM: Type: IMPLANTABLE DEVICE | Site: ARM | Status: FUNCTIONAL

## 2022-09-06 RX ORDER — OXYCODONE HYDROCHLORIDE 5 MG/1
5 TABLET ORAL EVERY 4 HOURS PRN
Qty: 42 TABLET | Refills: 0 | Status: SHIPPED | OUTPATIENT
Start: 2022-09-06 | End: 2022-09-13

## 2022-09-06 RX ORDER — MIDAZOLAM HYDROCHLORIDE 1 MG/ML
INJECTION INTRAMUSCULAR; INTRAVENOUS PRN
Status: DISCONTINUED | OUTPATIENT
Start: 2022-09-06 | End: 2022-09-06 | Stop reason: SURG

## 2022-09-06 RX ORDER — DEXAMETHASONE SODIUM PHOSPHATE 4 MG/ML
INJECTION, SOLUTION INTRA-ARTICULAR; INTRALESIONAL; INTRAMUSCULAR; INTRAVENOUS; SOFT TISSUE PRN
Status: DISCONTINUED | OUTPATIENT
Start: 2022-09-06 | End: 2022-09-06 | Stop reason: SURG

## 2022-09-06 RX ORDER — LIDOCAINE HYDROCHLORIDE 20 MG/ML
INJECTION, SOLUTION EPIDURAL; INFILTRATION; INTRACAUDAL; PERINEURAL PRN
Status: DISCONTINUED | OUTPATIENT
Start: 2022-09-06 | End: 2022-09-06 | Stop reason: SURG

## 2022-09-06 RX ORDER — HYDROMORPHONE HYDROCHLORIDE 1 MG/ML
0.1 INJECTION, SOLUTION INTRAMUSCULAR; INTRAVENOUS; SUBCUTANEOUS
Status: DISCONTINUED | OUTPATIENT
Start: 2022-09-06 | End: 2022-09-06 | Stop reason: HOSPADM

## 2022-09-06 RX ORDER — HYDROMORPHONE HYDROCHLORIDE 1 MG/ML
0.4 INJECTION, SOLUTION INTRAMUSCULAR; INTRAVENOUS; SUBCUTANEOUS
Status: DISCONTINUED | OUTPATIENT
Start: 2022-09-06 | End: 2022-09-06 | Stop reason: HOSPADM

## 2022-09-06 RX ORDER — DIPHENHYDRAMINE HYDROCHLORIDE 50 MG/ML
12.5 INJECTION INTRAMUSCULAR; INTRAVENOUS
Status: DISCONTINUED | OUTPATIENT
Start: 2022-09-06 | End: 2022-09-06 | Stop reason: HOSPADM

## 2022-09-06 RX ORDER — NAPROXEN 500 MG/1
500 TABLET ORAL 2 TIMES DAILY WITH MEALS
Qty: 28 TABLET | Refills: 0 | Status: SHIPPED | OUTPATIENT
Start: 2022-09-06 | End: 2022-09-20

## 2022-09-06 RX ORDER — SODIUM CHLORIDE, SODIUM LACTATE, POTASSIUM CHLORIDE, CALCIUM CHLORIDE 600; 310; 30; 20 MG/100ML; MG/100ML; MG/100ML; MG/100ML
INJECTION, SOLUTION INTRAVENOUS CONTINUOUS
Status: ACTIVE | OUTPATIENT
Start: 2022-09-06 | End: 2022-09-06

## 2022-09-06 RX ORDER — KETOROLAC TROMETHAMINE 30 MG/ML
INJECTION, SOLUTION INTRAMUSCULAR; INTRAVENOUS PRN
Status: DISCONTINUED | OUTPATIENT
Start: 2022-09-06 | End: 2022-09-06 | Stop reason: SURG

## 2022-09-06 RX ORDER — SODIUM CHLORIDE, SODIUM LACTATE, POTASSIUM CHLORIDE, CALCIUM CHLORIDE 600; 310; 30; 20 MG/100ML; MG/100ML; MG/100ML; MG/100ML
INJECTION, SOLUTION INTRAVENOUS CONTINUOUS
Status: DISCONTINUED | OUTPATIENT
Start: 2022-09-06 | End: 2022-09-06 | Stop reason: HOSPADM

## 2022-09-06 RX ORDER — SODIUM CHLORIDE, SODIUM LACTATE, POTASSIUM CHLORIDE, CALCIUM CHLORIDE 600; 310; 30; 20 MG/100ML; MG/100ML; MG/100ML; MG/100ML
INJECTION, SOLUTION INTRAVENOUS
Status: DISCONTINUED | OUTPATIENT
Start: 2022-09-06 | End: 2022-09-06 | Stop reason: SURG

## 2022-09-06 RX ORDER — HALOPERIDOL 5 MG/ML
1 INJECTION INTRAMUSCULAR
Status: DISCONTINUED | OUTPATIENT
Start: 2022-09-06 | End: 2022-09-06 | Stop reason: HOSPADM

## 2022-09-06 RX ORDER — ONDANSETRON 2 MG/ML
4 INJECTION INTRAMUSCULAR; INTRAVENOUS
Status: DISCONTINUED | OUTPATIENT
Start: 2022-09-06 | End: 2022-09-06 | Stop reason: HOSPADM

## 2022-09-06 RX ORDER — CEFAZOLIN SODIUM 1 G/3ML
3 INJECTION, POWDER, FOR SOLUTION INTRAMUSCULAR; INTRAVENOUS ONCE
Status: DISCONTINUED | OUTPATIENT
Start: 2022-09-06 | End: 2022-09-06 | Stop reason: HOSPADM

## 2022-09-06 RX ORDER — OXYCODONE HCL 5 MG/5 ML
10 SOLUTION, ORAL ORAL
Status: COMPLETED | OUTPATIENT
Start: 2022-09-06 | End: 2022-09-06

## 2022-09-06 RX ORDER — OXYCODONE HCL 5 MG/5 ML
5 SOLUTION, ORAL ORAL
Status: COMPLETED | OUTPATIENT
Start: 2022-09-06 | End: 2022-09-06

## 2022-09-06 RX ORDER — ONDANSETRON 2 MG/ML
INJECTION INTRAMUSCULAR; INTRAVENOUS PRN
Status: DISCONTINUED | OUTPATIENT
Start: 2022-09-06 | End: 2022-09-06 | Stop reason: SURG

## 2022-09-06 RX ORDER — BUPIVACAINE HYDROCHLORIDE AND EPINEPHRINE 5; 5 MG/ML; UG/ML
INJECTION, SOLUTION EPIDURAL; INTRACAUDAL; PERINEURAL
Status: DISCONTINUED | OUTPATIENT
Start: 2022-09-06 | End: 2022-09-06 | Stop reason: HOSPADM

## 2022-09-06 RX ORDER — HYDROMORPHONE HYDROCHLORIDE 1 MG/ML
0.2 INJECTION, SOLUTION INTRAMUSCULAR; INTRAVENOUS; SUBCUTANEOUS
Status: DISCONTINUED | OUTPATIENT
Start: 2022-09-06 | End: 2022-09-06 | Stop reason: HOSPADM

## 2022-09-06 RX ORDER — MEPERIDINE HYDROCHLORIDE 25 MG/ML
12.5 INJECTION INTRAMUSCULAR; INTRAVENOUS; SUBCUTANEOUS
Status: DISCONTINUED | OUTPATIENT
Start: 2022-09-06 | End: 2022-09-06 | Stop reason: HOSPADM

## 2022-09-06 RX ORDER — CEFAZOLIN SODIUM 1 G/3ML
INJECTION, POWDER, FOR SOLUTION INTRAMUSCULAR; INTRAVENOUS PRN
Status: DISCONTINUED | OUTPATIENT
Start: 2022-09-06 | End: 2022-09-06 | Stop reason: SURG

## 2022-09-06 RX ADMIN — KETOROLAC TROMETHAMINE 30 MG: 30 INJECTION, SOLUTION INTRAMUSCULAR at 14:06

## 2022-09-06 RX ADMIN — OXYCODONE HYDROCHLORIDE 10 MG: 5 SOLUTION ORAL at 15:09

## 2022-09-06 RX ADMIN — MIDAZOLAM HYDROCHLORIDE 2 MG: 1 INJECTION, SOLUTION INTRAMUSCULAR; INTRAVENOUS at 13:12

## 2022-09-06 RX ADMIN — FENTANYL CITRATE 50 MCG: 50 INJECTION, SOLUTION INTRAMUSCULAR; INTRAVENOUS at 14:26

## 2022-09-06 RX ADMIN — FENTANYL CITRATE 50 MCG: 50 INJECTION, SOLUTION INTRAMUSCULAR; INTRAVENOUS at 14:48

## 2022-09-06 RX ADMIN — ONDANSETRON 4 MG: 2 INJECTION INTRAMUSCULAR; INTRAVENOUS at 13:12

## 2022-09-06 RX ADMIN — LIDOCAINE HYDROCHLORIDE 0.5 ML: 10 INJECTION, SOLUTION EPIDURAL; INFILTRATION; INTRACAUDAL; PERINEURAL at 10:16

## 2022-09-06 RX ADMIN — SODIUM CHLORIDE, POTASSIUM CHLORIDE, SODIUM LACTATE AND CALCIUM CHLORIDE: 600; 310; 30; 20 INJECTION, SOLUTION INTRAVENOUS at 13:09

## 2022-09-06 RX ADMIN — LIDOCAINE HYDROCHLORIDE 100 MG: 20 INJECTION, SOLUTION EPIDURAL; INFILTRATION; INTRACAUDAL at 13:12

## 2022-09-06 RX ADMIN — DEXAMETHASONE SODIUM PHOSPHATE 8 MG: 4 INJECTION, SOLUTION INTRA-ARTICULAR; INTRALESIONAL; INTRAMUSCULAR; INTRAVENOUS; SOFT TISSUE at 13:12

## 2022-09-06 RX ADMIN — PROPOFOL 200 MG: 10 INJECTION, EMULSION INTRAVENOUS at 13:12

## 2022-09-06 RX ADMIN — SODIUM CHLORIDE, POTASSIUM CHLORIDE, SODIUM LACTATE AND CALCIUM CHLORIDE: 600; 310; 30; 20 INJECTION, SOLUTION INTRAVENOUS at 10:16

## 2022-09-06 RX ADMIN — CEFAZOLIN 2 G: 330 INJECTION, POWDER, FOR SOLUTION INTRAMUSCULAR; INTRAVENOUS at 13:12

## 2022-09-06 ASSESSMENT — FIBROSIS 4 INDEX: FIB4 SCORE: 0.74

## 2022-09-06 ASSESSMENT — PAIN DESCRIPTION - PAIN TYPE: TYPE: SURGICAL PAIN

## 2022-09-06 NOTE — ANESTHESIA POSTPROCEDURE EVALUATION
Patient: Trevor Gillis Jr.    Procedure Summary     Date: 09/06/22 Room / Location:  OR  / SURGERY [facility]    Anesthesia Start: 1309 Anesthesia Stop: 1423    Procedure: Right open distal biceps repair (Right: Arm Upper) Diagnosis:       Rupture of distal biceps tendon      (Rupture of distal biceps tendon [S46.219A])    Surgeons: Nathan Higuera M.D. Responsible Provider: Tobey Gansert, M.D.    Anesthesia Type: general ASA Status: 3          Final Anesthesia Type: general  Last vitals  BP   Blood Pressure: (!) 145/105    Temp   36.2 °C (97.2 °F)    Pulse   79   Resp   16    SpO2   97 %      Anesthesia Post Evaluation    No notable events documented.     Nurse Pain Score: 0 (NPRS)

## 2022-09-06 NOTE — OR NURSING
Pt allergies and NPO status verified, home meds reconcilled. Belongings secured. Pt verbalizes understanding of the pain scale, expected course of stay, and plan of care.  Surgical site verified with pt.  IV access established.  Sequentials placed on legs.  [employer] rep in pre op interrogated pt's AICD.  No changes made, ok for anesthesia to use magnet, no recheck needed after surgery, per Rep.

## 2022-09-06 NOTE — DISCHARGE INSTRUCTIONS
If any questions arise, call your provider.  If your provider is not available, please feel free to call the Surgical Center at [phone].    MEDICATIONS: Resume taking daily medication.  Take prescribed pain medication with food.  If no medication is prescribed, you may take non-aspirin pain medication if needed.  PAIN MEDICATION CAN BE VERY CONSTIPATING.  Take a stool softener or laxative such as senokot, pericolace, or milk of magnesia if needed.    Last pain medication given at  3:09pm, 10mg Oxycodone.    What to Expect Post Anesthesia    Rest and take it easy for the first 24 hours.  A responsible adult is recommended to remain with you during that time.  It is normal to feel sleepy.  We encourage you to not do anything that requires balance, judgment or coordination.    FOR 24 HOURS DO NOT:  Drive, operate machinery or run household appliances.  Drink beer or alcoholic beverages.  Make important decisions or sign legal documents.    To avoid nausea, slowly advance diet as tolerated, avoiding spicy or greasy foods for the first day.  Add more substantial food to your diet according to your provider's instructions. INCREASE FLUIDS AND FIBER TO AVOID CONSTIPATION.    MILD FLU-LIKE SYMPTOMS ARE NORMAL.  YOU MAY EXPERIENCE GENERALIZED MUSCLE ACHES, THROAT IRRITATION, HEADACHE AND/OR SOME NAUSEA.

## 2022-09-06 NOTE — ANESTHESIA PROCEDURE NOTES
Airway    Date/Time: 9/6/2022 1:13 PM  Performed by: Tobey Gansert, M.D.  Authorized by: Tobey Gansert, M.D.     Location:  OR  Urgency:  Elective  Indications for Airway Management:  Anesthesia      Spontaneous Ventilation: absent    Sedation Level:  Deep  Preoxygenated: Yes    Final Airway Type:  Supraglottic airway  Final Supraglottic Airway:  Standard LMA    SGA Size:  5  Number of Attempts at Approach:  1

## 2022-09-06 NOTE — LETTER
August 31, 2022    Patient Name: Trevor Gillis   Surgeon Name: Nathan Higuera M.D.  Surgery Facility: [facility] ([address] [facility])  Surgery Date: 9/6/2022    The time of your surgery is not final and may change up to and until the day of your surgery. You will be contacted 1-2 business days prior to your surgery date with your check-in and surgery time.    BEFORE YOUR SURGERY   Pre Registration and/or Lab Work must be done within and no earlier than 28 days prior to your surgery date. Please call [facility] at [phone], option 2, option 1 for an appointment as soon as possible.    DAY OF YOUR SURGERY  Nothing to eat or drink after midnight     Refrain from smoking any substance after midnight prior to surgery. Smoking may interfere with the anesthetic and frequently produces nausea during the recovery period.    Continue taking all lifesaving medications. Including the morning of your surgery with small sip of water.    Please do NOT take on the day of surgery:  Diuretics: examples- furosemide (Lasix), spironolactone, hydrochlorothiazide  ACE-inhibitors: examples- lisinopril, ramipril, enalapril  “ARBs”: examples- losartan, Olmesartan, valsartan    Please arrive at the hospital/surgery center at the check-in time provided.     An adult will need to bring you and take you home after your surgery.     AFTER YOUR SURGERY  Post op Appointment:   Date: 9.14.22   Time: 2:00pm   With: Nathan Higuera M.D.   Location: [address] [place] [address]    - You must have someone provide transportation post surgery and someone to monitor you for at least 24 hours post-surgery. If you don't have either of these your appointment will be canceled.    TIME OFF WORK  FMLA or Disability forms can be faxed directly to: [phone] or you may drop them off at [address] [place] [phone]. Our office charges a $35.00 fee per form.  Forms will be completed within 10 business days of drop off and payment received. For the status of your forms you may contact our disability office directly [phone].    MEDICATION INSTRUCTIONS **Please read section completely**    The following medications should be stopped a minimum of 10 days prior to surgery:  All over the counter, Supplements & Herbal medications    Anorectics: Phentermine (Adipex-P, Lomaira and Suprenza), Phentermine-topiramate (Qsymia), Bupropion-naltrexone (Contrave)    Opiod Partial Agonists/Opioid Antagonists: Buprenorphine (Subocone, Belbuca, Butrans, Probuphine Implant, Sublocade), Naltrexone (ReVia, Vivitrol), Naloxone    Amphetamines: Dextroamphetamine/Amphetamine (Adderall, Mydayis), Methylphenidate Hydrochloride (Concerta, Metadate, Methylin, Ritalin)    The following medications should be stopped 5 days prior to surgery:  Blood Thinners: Any Aspirin, Aspirin products, anti-inflammatories such as ibuprofen and any blood thinners such as Coumadin and Plavix. Please consult your prescribing physician if you are on life saving blood thinners, in regards to when to stop medications prior to surgery.     The following medications should be stopped a minimum of 3 days prior to surgery:  PDE-5 inhibitors: Sildenafil (Viagra), Tadalafil (Cialis), Vardenafil (Levitra), Avanafil (Stendra)    MAO Inhibitors: Rasagiline (Azilect), Selegiline (Eldepryl, Emsam, Selapar), Isocarboxazid (Marplan), Phenelzine (Nardil)      Lakshmi Contreras   Surgery Scheduler  [email or web address]  ? [phone]   Fax: [phone]  EXT [address] [address] [phone]        You can use [employer] to message your Care Team. Don't have a [employer] account? Sign up here:

## 2022-09-06 NOTE — OR NURSING
1606: To stage ll. Up to chair w/ CNA assist. Pt denies pain or nausea.    1630: Home care instructions reviewed w/ pt and friend. No questions. Meets criteria for discharge.

## 2022-09-06 NOTE — H&P
"Surgery Orthopedic History & Physical Note    Date  9/6/2022    Primary Care Physician  Pcp Pt States None    CC  Pre-Op Diagnosis Codes:     * Rupture of distal biceps tendon [S46.219A]    HPI  This is a 52 y.o. male who presented with right elbow pain and weakness.    Past Medical History:   Diagnosis Date    Congestive heart failure (HCC)     cardiomyopathy    Dyslipidemia     Fracture of left clavicle     Heart burn     Heart valve disease     hypertrophic cardiomyopathy (inherited)    Hernia     Hiatus hernia syndrome     possible    HTN (hypertension)     no meds    Hypertrophic cardiomyopathy (HCC)     JEREMIAH (obstructive sleep apnea)     \"does not qualify for device\"    Pacemaker     followed by Dr Gillis    Psychiatric problem     Snoring     Syncope 11/19/2012       Past Surgical History:   Procedure Laterality Date    MN UPPER GI ENDOSCOPY,REMOV F.B.  8/5/2020    Procedure: GASTROSCOPY, WITH FOREIGN BODY REMOVAL;  Surgeon: Martin Townsend M.D.;  [facility]: ENDOSCOPY [facility];  Service: Gastroenterology    IRRIGATION & DEBRIDEMENT GENERAL Left 1/16/2017    Procedure: IRRIGATION & DEBRIDEMENT GENERAL-INDEX FINGER;  Surgeon: Kia Aldridge M.D.;  Location: SURGERY [facility];  Service:     INGUINAL HERNIA REPAIR  9/10/2014    Performed by Christie Ott M.D. at SURGERY SAME DAY [facility]    RECOVERY  7/30/2014    Performed by Cath-Recovery Surgery at SURGERY SAME DAY [facility]    OTHER CARDIAC SURGERY  7/25/14    AICD    RECOVERY  7/25/2014    Performed by Cath-Recovery Surgery at SURGERY SAME DAY [facility]    APPENDECTOMY      TONSILLECTOMY         Current Facility-Administered Medications   Medication Dose Route Frequency Provider Last Rate Last Admin    lidocaine (XYLOCAINE) 1 % injection 0.5 mL  0.5 mL Intradermal Once PRN Nathan Higuera M.D.   0.5 mL at 09/06/22 1016    lactated ringers infusion   Intravenous Continuous Nathan Higuera M.D. 10 mL/hr at 09/06/22 1016 " New Bag at 09/06/22 1016    ceFAZolin (ANCEF) injection 3 g  3 g Intravenous Once Nathan Higuera M.D.           Social History     Socioeconomic History    Marital status: Single     Spouse name: Not on file    Number of children: Not on file    Years of education: Not on file    Highest education level: Not on file   Occupational History    Not on file   Tobacco Use    Smoking status: Some Days     Years: 25.00     Types: Cigarettes    Smokeless tobacco: Never   Vaping Use    Vaping Use: Never used   Substance and Sexual Activity    Alcohol use: Not on file     Comment: used to be heavy alcoholism (18 years ago),     Drug use: No    Sexual activity: Not on file   Other Topics Concern    Not on file   Social History Narrative    Not on file     Social Determinants of Health     Financial Resource Strain: Not on file   Food Insecurity: Not on file   Transportation Needs: Not on file   Physical Activity: Not on file   Stress: Not on file   Social Connections: Not on file   Intimate Partner Violence: Not on file   Housing Stability: Not on file       Family History   Problem Relation Age of Onset    Other Mother         Accident    Stroke Father     Heart Attack Father         MI at age 24?    Heart Disease Paternal Grandfather     Hypertension Paternal Grandmother     Diabetes Paternal Grandmother        Allergies  Tomato    Review of Systems  Negative    Physical Exam  No skin lesions, abrasions, or lacerations.  There is an obvious reverse Tank deformity of the right upper arm.  No ecchymosis.  There is a positive hook test with no palpable distal biceps tendon within the antecubital fossa.  Range of motion is from 0 to 160 degrees.  There is weakness 5- out of 5 and some discomfort with resisted elbow flexion and forearm supination.  Neurovascular intact.    Vital Signs  Blood Pressure: (!) 145/105   Temperature: 36.2 °C (97.2 °F)   Pulse: 79   Respiration: 16   Pulse Oximetry: 97 %       Labs:                     Radiology:  No orders to display         Assessment/Plan:  Pre-Op Diagnosis Codes:     * Rupture of distal biceps tendon [S46.219A]  Procedure(s):  Right open distal biceps repair

## 2022-09-06 NOTE — ANESTHESIA PREPROCEDURE EVALUATION
Case: [id number] Date/Time: 09/06/22 1215    Procedure: Right open distal biceps repair (Right: Arm Upper)    Anesthesia type: General    Diagnosis: Rupture of distal biceps tendon [S46.219A]    Pre-op diagnosis: Rupture of distal biceps tendon [S46.219A]    Location:  OR  / SURGERY [facility]    Surgeons: Nathan Higuera M.D.          Relevant Problems   ANESTHESIA   (positive) JEREMIAH (obstructive sleep apnea)      CARDIAC   (positive) Hypertension      ENDO   (positive) Hypothyroidism      Other   (positive) Dual ICD (implantable cardioverter-defibrillator) in place   (positive) Hypertrophic cardiomyopathy (HCC)   (positive) NSVT (nonsustained ventricular tachycardia) (HCC)   (positive) Obesity (BMI 30-39.9)       Physical Exam    Airway   Mallampati: II  TM distance: >3 FB  Neck ROM: full       Cardiovascular - normal exam  Rhythm: regular  Rate: normal  (-) murmur     Dental - normal exam           Pulmonary - normal exam  Breath sounds clear to auscultation     Abdominal    Neurological - normal exam                 Anesthesia Plan    ASA 3   ASA physical status 3 criteria: implanted pacemaker    Plan - general       Airway plan will be LMA          Induction: intravenous    Postoperative Plan: Postoperative administration of opioids is intended.    Pertinent diagnostic labs and testing reviewed    Informed Consent:    Anesthetic plan and risks discussed with patient.    Use of blood products discussed with: patient whom consented to blood products.

## 2022-09-06 NOTE — DISCHARGE INSTR - OTHER INFO
Dr. Higuera Discharge Instructions  (Shoulder and Elbow)    Contact Info: If you have any questions or concerns, please contact Melita Shipman at [phone] during normal business hours (Monday-Friday: 8:00am-5:00pm) or the main office number at [phone] after normal business hours.     Diet: Resume your regular diet slowly and as tolerated.      Icing/Elevating: Apply ice to the operative shoulder or elbow near full time for the first 5 days following surgery.  Be sure to have a surgical dressing or towel between the ice and skin.  After 5 days, you should apply ice for only 10 minutes, 2-3 times per day.      Medications: Resume your home medications as normal, unless otherwise instructed.    Narcotic pain medication (Percocet, Norco, Oxycodone, etc.): Take the narcotic pain medication as needed for pain, as instructed on the medication bottle.  DO NOT drive, drink alcohol, or sign important documents while taking narcotic pain medications.    Anti-nausea medication (Zofran/Ondansetron, Phenergan, etc.): Take the anti-nausea medication if you are feeling ill or nauseated after surgery.      Regional Nerve Blocks: You may have a regional nerve block that was performed by the Anesthesia team prior to surgery to help with postoperative pain control.  The nerve block usually lasts 12-18 hours, but may last up to 24 hours.  You will know the nerve block is wearing off when you begin to have increasing discomfort or soreness around the shoulder.  Be sure to start taking the prescribed narcotic pain medication within a few hours after surgery and/or as soon as you feel soreness around the operative shoulder.      Showering/Dressing: You may remove the surgical dressing 3 DAYS AFTER SURGERY.   You will see Steri-Strips (white stickers) covering the sutures and incisions - these will stay on until your first postoperative appointment.  Once the surgical dressing has been removed, you may shower as normal.  Let  water run freely over the incisions and then pat the shoulder dry with a clean towel.  You may then leave the shoulder open to the air, or you may cover the shoulder with a simple fresh dry dressing or ACE wrap.  DO NOT scrub the incisions.  DO NOT apply soap, ointments, lotions, or Bacitracin on the incisions for at least 6 weeks after surgery.  DO NOT submerge the operative shoulder in a bath, pool, river, hot tub, lake, etc. for at least 6 weeks after surgery.      Driving: You may resume driving when you feel comfortable and safe doing so and when you are off all narcotic pain medications.        SPECIAL INSTRUCTIONS    Distal Biceps Repair at the Elbow  Sling: A splint and simple sling will be used for the first 1-2 weeks following surgery.  Once the splint is removed, you have no elbow range of motion restrictions.    Activity: You will remain non-weightbearing with the operative extremity for 2 weeks following surgery.  You may then begin to use the operative extremity for gentle activities of daily living, being careful not to lift anything heavier than approximately 1 pound (cup of coffee/bottle of water).    Recovery: Return to unrestricted activities is expected at 3-6 months.  Follow-Up: You will be seen back in the clinic for your first postoperative appointment approximately 7-10 days after surgery.  Your sutures will be removed at this time and the Steri-Strips replaced.    Physical Therapy: Physical therapy will be started approximately 7-10 days after surgery, unless instructed otherwise.  This appointment should already be scheduled (appointment time and date will be in your surgery letter/packet if scheduled at [facility], or the physical therapy prescription will be mailed/e-mailed to you if going to an outside physical therapy practice).    Heterotopic Ossification: You will take the anti-inflammatory medication Naproxen twice daily for 2 weeks following surgery.  This medication will help prevent  extra bone from forming around your elbow (heterotopic ossification).    You should take Prilosec (40 mg daily) OR Pepcid (20 mg twice daily) when taking the Naproxen to minimize stomach/gastrointestinal irritation (both of these medications can be purchased over-the-counter).      PROBLEMS  Reasons to contact Dr. Higuera's office immediately include:  Fevers over 101.3°F (38.5°C) consistently, chills, sweats   Increased drainage from or swelling around the incisions  Excessive bleeding (dressing is saturated)  Excessive redness around incisions  Discomfort and/or swelling in the lower leg and calf  Chest pain and/or shortness of breath  Pain not controlled by pain medication  Swelling that is accompanied by coolness or decreased sensation in the shoulder, arm forearm, wrist, or hand  Persistent nausea or vomiting

## 2022-09-06 NOTE — OR NURSING
"1410: To PACU from OR via gurney,  sleeping, respirations spontaneous and non-labored via OPA. Icepack applied over c/d/i right elbow surgical dressings. Plan to keep pt in PACU for full hour per [facility] protocol.   1416: OPA dc'd. Pt restless, trying to get out of the gurney, requiring repeated orientation and reminders to leave surgical dressing alone.  1425: Pt restless, saying \"ow,ow,ow\", plan analgesia. Pt still trying to get out of bed, needs frequent re-orientation.  1440: Pt able to orient easier, complains of pain and quickly falls asleep, will wait until more rouseable for analgesia.  1455: pt sleeping, not roused at this time.  1510: Pt more rouseable, complaining of pain, plan analgesia.   1525: Pt sleeping, not roused.  1540: Pt resting, states pain is improved, denies nausea.  1555: Rouses to voice, states pain is tolerable, denies nausea.  1600: No change in surgical site assessment. Meets criteria to transfer to Stage 2. Report called to Guzman SILVA.  "

## 2022-09-06 NOTE — OP REPORT
ATE OF SERVICE: 9/6/2022    SURGEON:  Nathan Higuera MD     ASSISTANT:  Yahaira Asencio PA-C     ANESTHESIOLOGIST:  Tobey Gansert, MD     ANESTHESIA:  General anesthesia     PREOPERATIVE DIAGNOSIS:    Right distal biceps tendon rupture     POSTOPERATIVE DIAGNOSES:    Right distal biceps tendon rupture     PROCEDURES PERFORMED:  Right open distal biceps tendon repair utilizing a single incision technique.       IMPLANTS:    [employer]ill[employer]hew Endobutton    INFORMED CONSENT:  The patient was informed of the risks, benefits and alternatives of the planned operation.  The risks include but are not limited to bleeding, infection, neurovascular damage, recurrent tendon tear, osteoarthritis/cartilage damage, pain, stiffness, heterotopic ossification, DVT, PE, MI, stroke, and death.  Advanced directives were reviewed.  After answering all questions, the patient elected to proceed with the planned operation and an informed consent was signed.     PROCEDURE:  The patient was identified in the preoperative holding area.  The correct procedural side and site were identified and marked.  The patient was then brought to the operating room and transferred to the operating room table where all bony prominences were well padded.  The patient underwent a general anesthesia by the anesthesia team.      The upper extremity was cleaned with several alcohol-soaked gauzes and then prepped and draped in the normal standard sterile fashion.       A procedural pause was performed with the operating room team.  The procedure, patient's identity, operative side, surgical site, and procedure to be performed were all verified.  The patient was given IV antibiotics prior to incision.    I then made an approximately 4 cm longitudinal incision over the volar forearm, located over the medial border of the brachioradialis and immediately distal to the elbow flexion crease.  Dissection was carried down through the skin and subcutaneous tissue.   The lateral antebrachial cutaneous nerve was identified and protected during the procedure.  I then identified the disrupted lacertus fibrosus as well as the neurovascular bundle.  I undermined some of the skin in the upper arm and located the distal biceps tendon stump, which was located about 4-5 cm proximal to the elbow flexion crease.  The tendon was retrieved through the wound and I noted reasonable mobility.  The distal stump was debrided, and the distal 2.5-3 cm of remaining tendon was secured with two #2 non-absorbable sutures, utilizing a Krackow-type stitch. The tendon was sized to 8 mm.  The [employer] Endobutton was obtained and the suture loop removed.  The suture tails were then threaded through the button and tied over a metal clamp, resulting in a suture length about 5 mm.      I then turned my attention back to the radial tuberosity.  With the forearm in maximum supination, I was able to bluntly dissect towards the radial tuberosity.  Throughout the case, I avoided excessive lateral retraction to avoid any potential iatrogenic injury to the posterior interosseous nerve.  The guidepin was drilled through both cortices of the proximal radius and its position was confirmed to be satisfactory on both AP and lateral fluoroscopy views.  I then drilled bicortically with the 4.5 mm reamer, and then drilled the near cortex only with the 8 mm acorn reamer.  A Beath pin was then pushed through the radius and through the skin of the posterior forearm.  The sutures were passed in a standard fashion.  With the elbow at 90 degrees of flexion and the forearm in maximum supination, the button was passed through the radius under fluoroscopy guidance and then flipped on the far cortex.  The button position was again confirmed to be satisfactory on both AP and lateral fluoroscopy views.  Under direct visualization and palpation, the tendon was noted to be docked nicely within the proximal radius tunnel.  On  range of motion, the elbow rested nicely at about 60 degrees of flexion without extensive tension on the repair.      The wound was copiously irrigated and meticulous hemostasis obtained.  The wound was closed in a layered fashion with Monocryl followed by Steri-Strips and Xeroform.  A sterile compressive dressing was applied followed by a posterior slab splint with the elbow flexed to 80 degrees.  The upper extremity was then placed in a simple sling.      Needle and sponge counts were correct at the end of the procedure as reported to the surgeon by the circulating nurse.  The patient tolerated the procedure well with no obvious intraoperative complications.  The patient was then transferred off the operating room table on to a regular hospital bed.  The patient was extubated by the anesthesia team.      ESTIMATED BLOOD LOSS: 25 mL     COMPLICATIONS:  None     TOURNIQUET TIME:  None     SPECIMENS:  None     WOUND TYPE:  Type 1, clean     POSTOPERATIVE PLAN:  The patient will be transferred back to the postoperative unit.  I expect the patient will be discharged from the hospital later today once mobilizing safely and tolerating oral medications.  The patient will be non-weightbearing with the upper extremity with the splint in place.  We will begin some gentle range of motion exercises in 7-10 days.  The patient will take naproxen for pharmacological heterotopic ossification prophylaxis.

## 2023-01-05 NOTE — ED NOTES
45-year-old male with history of pancreatitis, social drinker presents to the ED accompanied by sister complaining of bandlike abdominal pain, 8/10 radiation to the back that started yesterday afternoon. GI is called for evaluation.    # Acute Moderately-Severe Interstitial Pancreatitis d/t Hypertriglyceridemia  - Hemodynamically stable and no evidence of Sepsis  - Lipase 124   - LFTs: normal   - CTAP - as above  - US: cbd 4 mm     Rec:  - Keep NPO  - c/w lactated ringers at  50 cc/hr  - Please start insulin drip at 0.1-0.3 unit/kg/hr - Can titrate down when Triglyceride <500  - Can restart fenofibrate when Triglyceride <1000  - Monitor Triglyceride BID  - Monitor pain and adjust pain medication accordingly  - Please obtain CTAP w/ IV contrast if clinical deterioration in 48-72 hours  - Monitor urine output daily - Target Urine output: 0.5cc/kg/hr  - Monitor BUN and HCT BID - Target BUN <20 and HCT <44 and adjust fluids accordingly    discussed with sister at the bedside extensively    Pt given discharge instructions, follow up care. Pt verbalized understanding. RN to answer and questions pt and family had. VSS. Pt ambulated out to front lobby.      45-year-old male with history of pancreatitis, social drinker presents to the ED accompanied by sister complaining of bandlike abdominal pain, 8/10 radiation to the back that started yesterday afternoon. GI is called for evaluation.    # Acute Moderately-Severe Interstitial Pancreatitis d/t Hypertriglyceridemia  - Hemodynamically stable and no evidence of Sepsis  - Lipase 124   - LFTs: normal   - CTAP - as above  - US: cbd 4 mm     Rec:  - Keep NPO  - c/w lactated ringers at 250 cc/hr  - Please start insulin drip at 0.1-0.3 unit/kg/hr - Can titrate down when Triglyceride <500  - Can restart fenofibrate when Triglyceride <1000  - Monitor Triglyceride BID  - Monitor pain and adjust pain medication accordingly  - Please obtain CTAP w/ IV contrast if clinical deterioration in 48-72 hours  - Monitor urine output daily - Target Urine output: 0.5cc/kg/hr  - Monitor BUN and HCT BID - Target BUN <20 and HCT <44 and adjust fluids accordingly    discussed with sister at the bedside extensively    45-year-old male with history of pancreatitis, social drinker presents to the ED accompanied by sister complaining of bandlike abdominal pain, 8/10 radiation to the back that started yesterday afternoon. GI is called for evaluation of pancreatitis thought to be 2/2 hyperTG.    # Acute Moderately-Severe Interstitial Pancreatitis d/t Hypertriglyceridemia  - Hemodynamically stable and no evidence of Sepsis  - Lipase 124 - LFTs: normal   - CTAP - as above  - US: cbd 4 mm   - pt w/ hyperglycemia on admission and would benefit from insulin drip    Rec:  - Keep NPO for now  - c/w lactated ringers at 1.5 ml/kg/hr for maintenance therapy to avoid volume overload  - Please start insulin drip at 0.1-0.3 unit/kg/hr - Can discontinue insulin when Triglyceride <500  - Can restart fenofibrate when Triglyceride <1000  - Monitor Triglyceride BID  - Monitor pain and adjust pain medication accordingly  - Please obtain repeat CTAP w/ IV contrast if clinical deterioration in 72 hours  - Monitor urine output daily - Target Urine output: 0.5cc/kg/hr  - Monitor BUN and HCT BID - Target BUN <20 and HCT <44 and adjust fluids accordingly  - if patient doing well in am, can start on clear liquid diet    discussed with sister at the bedside extensively  45-year-old male with history of pancreatitis, social drinker presents to the ED accompanied by sister complaining of bandlike abdominal pain, 8/10 radiation to the back that started yesterday afternoon. GI is called for evaluation of pancreatitis thought to be 2/2 hyperTG.    # Acute Moderately-Severe Interstitial Pancreatitis d/t Hypertriglyceridemia  - Hemodynamically stable and no evidence of Sepsis  - Lipase 124 - LFTs: normal   - CTAP - as above  - US: cbd 4 mm   - pt w/ hyperglycemia on admission and would benefit from insulin drip    Rec:  - Keep NPO for now  - c/w lactated ringers at 1.5 ml/kg/hr for maintenance therapy to avoid volume overload  - Please start insulin drip at 0.1-0.3 unit/kg/hr - Can discontinue insulin when Triglyceride <500  - avoid hypoglycemia; FBG checks per ICU protocol and c/w IVF w/ D5LR per primary team  - Can restart fenofibrate when Triglyceride <1000  - Monitor Triglyceride BID  - Monitor pain and adjust pain medication accordingly  - Please obtain repeat CTAP w/ IV contrast if clinical deterioration in 72 hours  - Monitor urine output daily - Target Urine output: 0.5cc/kg/hr  - Monitor BUN and HCT BID - Target BUN <20 and HCT <44 and adjust fluids accordingly  - if patient doing well in am, can start on clear liquid diet    discussed with sister at the bedside extensively  45-year-old male with history of pancreatitis, social drinker presents to the ED accompanied by sister complaining of bandlike abdominal pain, 8/10 radiation to the back that started yesterday afternoon. GI is called for evaluation of pancreatitis thought to be 2/2 hyperTG.    # Acute Moderately-Severe Interstitial Pancreatitis d/t Hypertriglyceridemia  - Hemodynamically stable and no evidence of Sepsis  - Lipase 124 - LFTs: normal   - CTAP - as above  - US: cbd 4 mm   - pt w/ hyperglycemia on admission and would benefit from insulin drip    Rec:  - Keep NPO for now  - c/w lactated ringers at 1.5 ml/kg/hr for maintenance therapy to avoid volume overload  - Please start insulin drip at 0.1-0.3 unit/kg/hr - Can discontinue insulin when Triglyceride <500  - avoid hypoglycemia; FBG checks per ICU protocol and c/w IVF w/ D5LR per primary team  - Can restart fenofibrate when Triglyceride <1000  - Monitor Triglyceride BID  - Monitor pain and adjust pain medication accordingly  - Please obtain repeat CTAP w/ IV contrast if clinical deterioration in 72 hours  - Monitor urine output daily - Target Urine output: 0.5cc/kg/hr  - Monitor BUN and HCT BID - Target BUN <20 and HCT <44 and adjust fluids accordingly  - if patient doing well in am, can start on clear liquid diet    #hepatic steatosis  pt denies alcohol use   could be from NAFLD  LFTs WNL  -can check hep serologies (hep A IgG, hep B core ab, surface ab and ag, HCV ab), iron studies w/ TIBC, %sat and ferritin  -outpatient f/u at the hepatology clinic    discussed with sister at the bedside extensively

## 2023-09-23 ENCOUNTER — HOSPITAL ENCOUNTER (EMERGENCY)
Facility: MEDICAL CENTER | Age: 53
End: 2023-09-23
Attending: EMERGENCY MEDICINE
Payer: OTHER MISCELLANEOUS

## 2023-09-23 VITALS
BODY MASS INDEX: 35.68 KG/M2 | RESPIRATION RATE: 18 BRPM | TEMPERATURE: 98 F | HEART RATE: 122 BPM | SYSTOLIC BLOOD PRESSURE: 146 MMHG | DIASTOLIC BLOOD PRESSURE: 100 MMHG | HEIGHT: 75 IN | OXYGEN SATURATION: 94 % | WEIGHT: 287 LBS

## 2023-09-23 DIAGNOSIS — S09.90XA CLOSED HEAD INJURY, INITIAL ENCOUNTER: ICD-10-CM

## 2023-09-23 DIAGNOSIS — I15.8 OTHER SECONDARY HYPERTENSION: ICD-10-CM

## 2023-09-23 DIAGNOSIS — S00.91XA ABRASION OF HEAD, INITIAL ENCOUNTER: ICD-10-CM

## 2023-09-23 PROCEDURE — 305948 HCHG GREEN TRAUMA ACT PRE-NOTIFY NO CC

## 2023-09-23 PROCEDURE — 99284 EMERGENCY DEPT VISIT MOD MDM: CPT

## 2023-09-23 ASSESSMENT — FIBROSIS 4 INDEX: FIB4 SCORE: 0.71
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
## 2023-09-23 NOTE — ED NOTES
All discharge instructions given to pt.     Pt verbalized understanding of all discharge instructions.  All questions answered. All personal belongings sent with pt. Pt ambulatory to [facility].
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
## 2023-11-22 ENCOUNTER — OCCUPATIONAL MEDICINE (OUTPATIENT)
Dept: URGENT CARE | Facility: PHYSICIAN GROUP | Age: 53
End: 2023-11-22
Payer: COMMERCIAL

## 2023-11-22 ENCOUNTER — NON-PROVIDER VISIT (OUTPATIENT)
Dept: URGENT CARE | Facility: PHYSICIAN GROUP | Age: 53
End: 2023-11-22
Payer: COMMERCIAL

## 2023-11-22 VITALS
HEIGHT: 75 IN | RESPIRATION RATE: 14 BRPM | DIASTOLIC BLOOD PRESSURE: 108 MMHG | OXYGEN SATURATION: 97 % | SYSTOLIC BLOOD PRESSURE: 142 MMHG | HEART RATE: 95 BPM | BODY MASS INDEX: 34.82 KG/M2 | TEMPERATURE: 97.2 F | WEIGHT: 280 LBS

## 2023-11-22 DIAGNOSIS — Z02.1 PRE-EMPLOYMENT DRUG SCREENING: ICD-10-CM

## 2023-11-22 DIAGNOSIS — S61.412A LACERATION OF LEFT HAND WITHOUT FOREIGN BODY, INITIAL ENCOUNTER: ICD-10-CM

## 2023-11-22 LAB
AMP AMPHETAMINE: NORMAL
COC COCAINE: NORMAL
INT CON NEG: NORMAL
INT CON POS: NORMAL
MET METHAMPHETAMINES: NORMAL
OPI OPIATES: NORMAL
PCP PHENCYCLIDINE: NORMAL
POC DRUG COMMENT 753798-OCCUPATIONAL HEALTH: NEGATIVE
THC: NORMAL

## 2023-11-22 PROCEDURE — 3077F SYST BP >= 140 MM HG: CPT | Performed by: NURSE PRACTITIONER

## 2023-11-22 PROCEDURE — 12001 RPR S/N/AX/GEN/TRNK 2.5CM/<: CPT | Performed by: NURSE PRACTITIONER

## 2023-11-22 PROCEDURE — 3080F DIAST BP >= 90 MM HG: CPT | Performed by: NURSE PRACTITIONER

## 2023-11-22 PROCEDURE — 80305 DRUG TEST PRSMV DIR OPT OBS: CPT | Performed by: NURSE PRACTITIONER

## 2023-11-22 ASSESSMENT — FIBROSIS 4 INDEX: FIB4 SCORE: 0.71

## 2023-11-22 ASSESSMENT — ENCOUNTER SYMPTOMS
CONSTITUTIONAL NEGATIVE: 1
MUSCULOSKELETAL NEGATIVE: 1
NEUROLOGICAL NEGATIVE: 1
ROS SKIN COMMENTS: PER HPI
SENSORY CHANGE: 0
WEAKNESS: 0

## 2023-11-22 ASSESSMENT — VISUAL ACUITY: OU: 1

## 2023-11-22 NOTE — LETTER
"    EMPLOYEE’S CLAIM FOR COMPENSATION/ REPORT OF INITIAL TREATMENT  FORM C-4  PLEASE TYPE OR PRINT    EMPLOYEE’S CLAIM - PROVIDE ALL INFORMATION REQUESTED   First Name                    LISA Murray Last Name  Carlin Birthdate                    1970                Sex  []M  []F Claim Number (Insurer’s Use Only)     Home Address  [address] Age  53 y.o. Height  1.905 m (6' 3\") Weight  (!) 127 kg (280 lb) Social Security Number     [facility] Zip  [address] Telephone  There are no phone numbers on file.   Mailing Address  [address] [facility] Zip  [address] Primary Language Spoken  English    INSURER   THIRD-PARTY       Employee's Occupation (Job Title) When Injury or Occupational Disease Occurred  Production    Employer's Name/Company Name  [employer]  Telephone      Office Mail Address (Number and Street)  [address]     Date of Injury (if applicable) 11/22/2023               Hours Injury (if applicable)            am               pm Date Employer Notified  11/22/2023 Last Day of Work after Injury or Occupational Disease  11/22/2023 Supervisor to Whom Injury     Reported  Eric   Address or Location of Accident (if applicable)  Work [1]   What were you doing at the time of accident? (if applicable)  clean up the pices    How did this injury or occupational disease occur? (Be specific and answer in detail. Use additional sheet if necessary)  grab for a pices mater   If you believe that you have an occupational disease, when did you first have knowledge of the disability and its relationship to your employment?   Witnesses to the Accident (if applicable)        Nature of Injury or Occupational Disease  Workers' Compensation  Part(s) of Body Injured or Affected  Hand (L) Finger (L) Hand (L)    I CERTIFY THAT THE ABOVE IS TRUE AND CORRECT TO T HE BEST OF MY " KNOWLEDGE AND THAT I HAVE PROVIDED THIS INFORMATION IN ORDER TO OBTAIN THE BENEFITS OF [place], OR [place]).  I HEREBY AUTHORIZE ANY PHYSICIAN, CHIROPRACTOR, SURGEON, PRACTITIONER OR ANY OTHER PERSON, ANY HOSPITAL, INCLUDING [facility] OR [facility], ANY  MEDICAL SERVICE ORGANIZATION, ANY INSURANCE COMPANY, OR OTHER INSTITUTION OR ORGANIZATION TO RELEASE TO EACH OTHER, ANY MEDICAL OR OTHER INFORMATION, INCLUDING BENEFITS PAID OR PAYABLE, PERTINENT TO THIS INJURY OR DISEASE, EXCEPT INFORMATION RELATIVE TO DIAGNOSIS, TREATMENT AND/OR COUNSELING FOR AIDS, PSYCHOLOGICAL CONDITIONS, ALCOHOL OR CONTROLLED SUBSTANCES, FOR WHICH I MUST GIVE SPECIFIC AUTHORIZATION.  A PHOTOSTAT OF THIS AUTHORIZATION SHALL BE VALID AS THE ORIGINAL.     Date   Place Employee’s Original or  *Electronic Signature   THIS REPORT MUST BE COMPLETED AND MAILED WITHIN 3 WORKING DAYS OF TREATMENT   Place  [facility]    Name of Facility  [facility]   Date 11/22/2023 Diagnosis and Description of Injury or Occupational Disease  (S61.412A) Laceration of left hand without foreign body, initial encounter  The encounter diagnosis was Laceration of left hand without foreign body, initial encounter. Is there evidence that the injured employee was under the influence of alcohol and/or another controlled substance at the time of accident?  []No  [] Yes (if yes, please explain)   Hour 9:01 AM  No   Treatment: Initial visit.  Laceration repaired with sutures.  Advised basic wound care with mild soapy water.  Apply antibiotic ointment.  Keep laceration clean, dry, and protected.  Keep covered with dressing at work.  Follow-up in 7 days.  Monitor.  Seek immediate medical attention if symptoms change/worsen.    Have you advised the patient to remain off work five days or more?   [] Yes Indicate dates: From   To    []No If no, is the injured  employee capable of: [] full duty [] modified duty                                                             Yes     If modified duty, specify any limitations / restrictions:                                                                                                                                                                                                                                                                                                                                                                                                                  X-Ray Findings:      From information given by the employee, together with medical evidence, can you directly connect this injury or occupational disease as job incurred?  []Yes   [] No Yes    Is additional medical care by a physician indicated? []Yes [] No  Yes    Do you know of any previous injury or disease contributing to this condition or occupational disease? []Yes [] No (Explain if yes)                          No   Date  11/22/2023 Print Health Care Provider’s Name  CRESENCIO Mccall I certify that the employer’s copy of  this form was delivered to the employer on:   Address  [address] INSURER'S USE ONLY                       [place]  [address] Provider’s Tax ID Number  [id number]   Telephone  Dept: [phone]    Health Care Provider’s Original or Electronic Signature  e-MARCI Doran.P.R.NPatrick Degree (MD,DO, DC,PA-C,APRN)  APRN  Choose (if applicable)      ORIGINAL - TREATING HEALTHCARE PROVIDER PAGE 2 - INSURER/TPA PAGE 3 - EMPLOYER PAGE 4 - EMPLOYEE             Form C-4 (rev.08/23)        BRIEF DESCRIPTION OF RIGHTS AND BENEFITS  (Pursuant to [place] 616[place].050)    Notice of Injury or Occupational Disease (Incident Report Form C-1): If an injury or occupational disease (OD) arises out of and in the course of employment, you must provide written notice to your employer as soon as practicable,  "but no later than 7 days after the accident or OD. Your employer shall maintain a sufficient supply of the required forms.    Claim for Compensation (Form C-4): If medical treatment is sought, the form C-4 is available at the place of initial treatment. A completed \"Claim for Compensation\" (Form C-4) must be filed within 90 days after an accident or OD. The treating physician or chiropractor must, within 3 working days after treatment, complete and mail to the employer, the employer's insurer and third-party , the Claim for Compensation.    Medical Treatment: If you require medical treatment for your on-the-job injury or OD, you may be required to select a physician or chiropractor from a list provided by your workers’ compensation insurer, if it has contracted with an Organization for Managed Care (MCO) or Preferred Provider Organization (PPO) or providers of health care. If your employer has not entered into a contract with an MCO or PPO, you may select a physician or chiropractor from the Panel of Physicians and Chiropractors. Any medical costs related to your industrial injury or OD will be paid by your insurer.    Temporary Total Disability (TTD): If your doctor has certified that you are unable to work for a period of at least 5 consecutive days, or 5 cumulative days in a 20-day period, or places restrictions on you that your employer does not accommodate, you may be entitled to TTD compensation.    Temporary Partial Disability (TPD): If the wage you receive upon reemployment is less than the compensation for TTD to which you are entitled, the insurer may be required to pay you TPD compensation to make up the difference. TPD can only be paid for a maximum of 24 months.    Permanent Partial Disability (PPD): When your medical condition is stable and there is an indication of a PPD as a result of your injury or OD, within 30 days, your insurer must arrange for an evaluation by a rating physician or " chiropractor to determine the degree of your PPD. The amount of your PPD award depends on the date of injury, the results of the PPD evaluation, your age and wage.    Permanent Total Disability (PTD): If you are medically certified by a treating physician or chiropractor as permanently and totally disabled and have been granted a PTD status by your insurer, you are entitled to receive monthly benefits not to exceed 66 2/3% of your average monthly wage. The amount of your PTD payments is subject to reduction if you previously received a lump-sum PPD award.    Vocational Rehabilitation Services: You may be eligible for vocational rehabilitation services if you are unable to return to the job due to a permanent physical impairment or permanent restrictions as a result of your injury or occupational disease.    Transportation and Per Roberto Reimbursement: You may be eligible for travel expenses and per roberto associated with medical treatment.    Reopening: You may be able to reopen your claim if your condition worsens after claim closure.     Appeal Process: If you disagree with a written determination issued by the insurer or the insurer does not respond to your request, you may appeal to the Department of Administration, , by following the instructions contained in your determination letter. You must appeal the determination within 70 days from the date of the determination letter at [address], [address], [place], [place] [address], or [address] [facility] [address], [place] [address]. If you disagree with the  decision, you may appeal to the Department of Administration, . You must file your appeal within 30 days from the date of the  decision letter at [address], [address], [place], [place] [address], or [address], [place], [place] [address]. If you disagree with a decision of an , you may file a  petition for judicial review with the District Court. You must do so within 30 days of the Appeal Officer’s decision. You may be represented by an  at your own expense or you may contact the [facility] for possible representation.    [place]  for Injured Workers (NAIW): If you disagree with a  decision, you may request that NAIW represent you without charge at an  Hearing. For information regarding denial of benefits, you may contact the [facility] at: [address], [address], [place] [address], [phone], or [address], [address], [place] [address], [phone]    To File a Complaint with the Division: If you wish to file a complaint with the  of the Division of Industrial Relations (DIR),  please contact the Workers’ Compensation Section, [address], [place], [place] [address], telephone [phone], or [address], [place], [place] [address], telephone [phone].    For assistance with Workers’ Compensation Issues: You may contact the [facility] Office for Consumer Health Assistance, [address], [place], [place] [address], Toll Free 1-958.207.8101, Web site: http://[facility].[place].gov/Programs/CHRISTOPHER E-mail: [email or web address].nv.[facility]              __________________________________________________________________                                    _________________            Employee Name / Signature                                                                                                                            Date                                                                                                                                                                                                                              D-2 (rev. 10/20)

## 2023-11-22 NOTE — PROGRESS NOTES
"Subjective:     Trevor Gillis Jr. is a 53 y.o. male who presents for Laceration (Cleaning up a piece of steel, which sliced down middle of hand.)    DOI: 11/22/2023 @ 8:00 AM. Initial visit.     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  Webspace between left middle and ring finger got sliced with a piece of metal.  Denies numbness, tingling, weakness, or other symptoms.  Tdap received 1/16/2017 per chart review.  No previous pertinent injury or secondary employment.    Review of Systems   Constitutional: Negative.    Musculoskeletal: Negative.    Skin:         Per HPI   Neurological: Negative.  Negative for sensory change and weakness.   All other systems reviewed and are negative.    Refer to HPI for additional details.    During this visit, appropriate PPE was worn and hand hygiene was performed.    PMH: No pertinent past medical history to this problem  MEDS: Medications were reviewed in Epic  ALLERGIES: Allergies were reviewed in Epic  [employer]CH[employer]: Works as in production   FH: No pertinent family history to this problem      Objective:     BP (!) 142/108   Pulse 95   Temp 36.2 °C (97.2 °F)   Resp 14   Ht 1.905 m (6' 3\")   Wt (!) 127 kg (280 lb)   SpO2 97%   BMI 35.00 kg/m²     Physical Exam  Nursing note reviewed.   Constitutional:       General: He is not in acute distress.     Appearance: He is well-developed. He is not ill-appearing or toxic-appearing.   Eyes:      General: Vision grossly intact.   Cardiovascular:      Rate and Rhythm: Normal rate.   Pulmonary:      Effort: Pulmonary effort is normal. No respiratory distress.   Musculoskeletal:         General: No deformity. Normal range of motion.      Left hand: Laceration (2 cm straight partial thickness laceration of web space between left 3rd and 4th fingers) present. No swelling, deformity or tenderness. Normal range of motion. Normal strength. Normal sensation. Normal capillary refill.   Skin:     General: Skin is warm and dry.      " Capillary Refill: Capillary refill takes less than 2 seconds.      Coloration: Skin is not pale.   Neurological:      Mental Status: He is alert and oriented to person, place, and time.      Motor: No weakness.   Psychiatric:         Behavior: Behavior normal. Behavior is cooperative.       Assessment/Plan:     1. Laceration of left hand without foreign body, initial encounter    Initial visit.  Laceration repaired with sutures.  Advised basic wound care with mild soapy water.  Apply antibiotic ointment.  Keep laceration clean, dry, and protected.  Keep covered with dressing at work.  Follow-up in 7 days.  Monitor.  Seek immediate medical attention if symptoms change/worsen.     Procedure: Laceration Repair of Left Hand  - Risks, benefits, methods, and alternatives of primary wound closure reviewed.  - Verbal consent received from patient to proceed with laceration repair with sutures.  - Wound length 2 cm, location left hand, straight laceration, subcutaneous tissue visible, NVI, no visible tendon or bone.  - Area copiously irrigated with NS; no foreign bodies identified.  - Area soaked and cleansed with diluted chlorhexidine solution.  - Site prepared with Betadine.  - Sterile technique with sterile instruments.  - Local anesthesia achieved with 2 mL of 1% lidocaine without epinephrine.  - Applied #5 interrupted sutures with 4.0 Ethilon; good wound edge approximation achieved.  - Irrigated copiously with NS.  - Minimal bleeding with good hemostasis achieved.   - Antibiotic ointment and non-adhesive dressing applied.  - There were no procedural complications.  - Patient tolerated procedure well.  - Tetanus UTD.    Patient advised to monitor for signs of infection including, but not limited to, increased redness, warmth, pain, swelling, discharge, or fever. Wound care instructions provided. Cleanse with mild soapy water at least once a day. May briefly wet, but do not soak. Keep wound clean, dry, and protected. Use  OTC antibiotic ointment. Cover with clean dressing as needed, but replace any dressing used at least once every 24 hours or as needed. May apply ice packs, elevate, and take OTC acetaminophen/ibuprofen for pain/inflammation, per 's instructions, unless contraindicated. Suture removal in 7-10 days.    Differential diagnosis, natural history, supportive care, over-the-counter symptom management per 's instructions, close monitoring, and indications for immediate follow-up discussed.     All questions answered. Patient agrees with the plan of care.

## 2023-11-22 NOTE — LETTER
[facility]  [facility] [place] [address]  Phone:  [phone] - Fax:  [phone]   [facility] Progress Report and Disability Certification  Date of Service: 11/22/2023   No Show:  No  Date / Time of Next Visit: 11/29/2023   Claim Information   Patient Name: Trevor Gillis Jr.  Claim Number:     Employer: [employer]  Date of Injury: 11/22/2023     Insurer / TPA:    ID / SSN:     Occupation: Production  Diagnosis: The encounter diagnosis was Laceration of left hand without foreign body, initial encounter.    Medical Information   Related to Industrial Injury? Yes    Subjective Complaints:  DOI: 11/22/2023 @ 8:00 AM. Initial visit.     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  Webspace between left middle and ring finger got sliced with a piece of metal.  Denies numbness, tingling, weakness, or other symptoms.  Tdap received 1/16/2017 per chart review.  No previous pertinent injury or secondary employment.   Objective Findings: Physical Exam  Nursing note reviewed.   Constitutional:       General: He is not in acute distress.     Appearance: He is well-developed. He is not ill-appearing or toxic-appearing.   Eyes:      General: Vision grossly intact.   Cardiovascular:      Rate and Rhythm: Normal rate.   Pulmonary:      Effort: Pulmonary effort is normal. No respiratory distress.   Musculoskeletal:         General: No deformity. Normal range of motion.      Left hand: Laceration (2 cm straight partial thickness laceration of web space between left 3rd and 4th fingers) present. No swelling, deformity or tenderness. Normal range of motion. Normal strength. Normal sensation. Normal capillary refill.   Skin:     General: Skin is warm and dry.      Capillary Refill: Capillary refill takes less than 2 seconds.      Coloration: Skin is not pale.   Neurological:      Mental Status: He is alert and oriented to person, place, and time.       Motor: No weakness.   Psychiatric:         Behavior: Behavior normal. Behavior is cooperative.    Pre-Existing Condition(s):     Assessment:   Initial Visit    Status: Additional Care Required  Permanent Disability:No    Plan:      Diagnostics:      Comments:  Initial visit.  Laceration repaired with sutures.  Advised basic wound care with mild soapy water.  Apply antibiotic ointment.  Keep laceration clean, dry, and protected.  Keep covered with dressing at work.  Follow-up in 7 days.  Monitor.  Seek immediate medical attention if symptoms change/worsen.     Disability Information   Status: Released to Full Duty    From:  11/22/2023  Through: 11/29/2023 Restrictions are: Temporary   Physical Restrictions   Sitting:    Standing:    Stooping:    Bending:      Squatting:    Walking:    Climbing:    Pushing:      Pulling:    Other:    Reaching Above Shoulder (L):   Reaching Above Shoulder (R):       Reaching Below Shoulder (L):    Reaching Below Shoulder (R):      Not to exceed Weight Limits   Carrying(hrs):   Weight Limit(lb):   Lifting(hrs):   Weight  Limit(lb):     Comments: Keep laceration clean, dry, protected, and covered with dressing    Repetitive Actions   Hands: i.e. Fine Manipulations from Grasping:     Feet: i.e. Operating Foot Controls:     Driving / Operate Machinery:     Health Care Provider’s Original or Electronic Signature  CRESENCIO Mccall Health Care Provider’s Original or Electronic Signature    Jose E Monzon DO MPH     Clinic Name / Location: [address] [address] Clinic Phone Number: Dept: [phone]   Appointment Time: 8:20 Am Visit Start Time: 9:01 AM   Check-In Time:  8:38 Am Visit Discharge Time:  10:10 AM    Original-Treating Physician or Chiropractor    Page 2-Insurer/TPA    Page 3-Employer    Page 4-Employee

## 2023-11-29 ENCOUNTER — OCCUPATIONAL MEDICINE (OUTPATIENT)
Dept: URGENT CARE | Facility: PHYSICIAN GROUP | Age: 53
End: 2023-11-29
Payer: COMMERCIAL

## 2023-11-29 VITALS
HEIGHT: 75 IN | OXYGEN SATURATION: 94 % | RESPIRATION RATE: 18 BRPM | BODY MASS INDEX: 34.82 KG/M2 | WEIGHT: 280 LBS | SYSTOLIC BLOOD PRESSURE: 134 MMHG | TEMPERATURE: 97.6 F | HEART RATE: 80 BPM | DIASTOLIC BLOOD PRESSURE: 98 MMHG

## 2023-11-29 DIAGNOSIS — S61.412D LACERATION OF LEFT HAND WITHOUT FOREIGN BODY, SUBSEQUENT ENCOUNTER: ICD-10-CM

## 2023-11-29 PROCEDURE — 99213 OFFICE O/P EST LOW 20 MIN: CPT | Performed by: STUDENT IN AN ORGANIZED HEALTH CARE EDUCATION/TRAINING PROGRAM

## 2023-11-29 PROCEDURE — 3080F DIAST BP >= 90 MM HG: CPT | Performed by: STUDENT IN AN ORGANIZED HEALTH CARE EDUCATION/TRAINING PROGRAM

## 2023-11-29 PROCEDURE — 3075F SYST BP GE 130 - 139MM HG: CPT | Performed by: STUDENT IN AN ORGANIZED HEALTH CARE EDUCATION/TRAINING PROGRAM

## 2023-11-29 ASSESSMENT — FIBROSIS 4 INDEX: FIB4 SCORE: 0.71

## 2023-11-29 NOTE — LETTER
[facility]  [address] [facility] [place] [address]  Phone:  [phone] - Fax:  [phone]   [facility] Progress Report and Disability Certification  Date of Service: 11/29/2023   No Show:  No  Date / Time of Next Visit: 12/2/2023   Claim Information   Patient Name: Trevor Gillis Jr.  Claim Number:     Employer: [email or web address] [place]  Date of Injury: 11/22/2023     Insurer / TPA: [employer]  ID / SSN:     Occupation: Production  Diagnosis: The encounter diagnosis was Laceration of left hand without foreign body, subsequent encounter.    Medical Information   Related to Industrial Injury? Yes    Subjective Complaints:  DOI: 11/22/2023 @ 8:00 AM. Initial visit.     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  Webspace between left middle and ring finger got sliced with a piece of metal.  Denies numbness, tingling, weakness, or other symptoms.  Tdap received 1/16/2017 per chart review.  No previous pertinent injury or secondary employment.    Follow-up visit 11/29/2023: Patient is here for follow-up of laceration to the right hand.  Laceration to the webspace between the left middle and ring finger.  Repaired with sutures 7 days ago.  5 simple interrupted sutures placed.  Patient denies any worsening symptoms.  No redness, swelling, or discharge.  Reports that it has been healing normally.   Objective Findings: Left hand: Webspace between the middle and ring finger shows linear laceration with 5 simple interrupted sutures in place.  Good approximation.  No swelling or discharge.  1 simple interrupted suture removed in the middle of the laceration with mild dehiscence.  Remaining 4 sutures left in place for additional healing.  No erythema.   Pre-Existing Condition(s):     Assessment:   Condition Improved    Status: Additional Care Required  Permanent Disability:No    Plan:      Diagnostics:      Comments:       Disability Information   Status: Released to  Full Duty    From:  11/29/2023  Through: 12/2/2023 Restrictions are:     Physical Restrictions   Sitting:    Standing:    Stooping:    Bending:      Squatting:    Walking:    Climbing:    Pushing:      Pulling:    Other:    Reaching Above Shoulder (L):   Reaching Above Shoulder (R):       Reaching Below Shoulder (L):    Reaching Below Shoulder (R):      Not to exceed Weight Limits   Carrying(hrs):   Weight Limit(lb):   Lifting(hrs):   Weight  Limit(lb):     Comments: Patient is here for follow-up of a laceration to the left hand: 5 simple interrupted sutures placed.  Good approximation.  1 simple interrupted suture was removed from the middle of the laceration with mild dehiscence.  I left the 4 remaining sutures in place for additional healing.  Patient will follow-up in 3 days for reevaluation.  No signs of secondary infection.    Repetitive Actions   Hands: i.e. Fine Manipulations from Grasping:     Feet: i.e. Operating Foot Controls:     Driving / Operate Machinery:     Health Care Provider’s Original or Electronic Signature  Marlon Young P.A.-C. Health Care Provider’s Original or Electronic Signature    Jose E Monzon DO MPH     Clinic Name / Location: [address] [address] Clinic Phone Number: Dept: [phone]   Appointment Time: 5:00 Pm Visit Start Time: 5:55 PM   Check-In Time:  4:51 Pm Visit Discharge Time:  6:45PM   Original-Treating Physician or Chiropractor    Page 2-Insurer/TPA    Page 3-Employer    Page 4-Employee

## 2023-11-29 NOTE — LETTER
[facility]  [facility]  [place] [address]  Phone:  [phone] - Fax:  [phone]   [facility] Progress Report and Disability Certification  Date of Service: 11/29/2023   No Show:  No  Date / Time of Next Visit: 12/2/2023   Claim Information   Patient Name: Trevor Gillis Jr.  Claim Number:     Employer: [employer] *** Date of Injury: 11/22/2023     Insurer / TPA: [employer]er's *** ID / SSN:     Occupation: Production *** Diagnosis: There were no encounter diagnoses.    Medical Information   Related to Industrial Injury? Yes ***   Subjective Complaints:  DOI: 11/22/2023 @ 8:00 AM. Initial visit.     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  Webspace between left middle and ring finger got sliced with a piece of metal.  Denies numbness, tingling, weakness, or other symptoms.  Tdap received 1/16/2017 per chart review.  No previous pertinent injury or secondary employment.    Follow-up visit 11/29/2023: Patient is here for follow-up of laceration to the right hand.  Laceration to the webspace between the left middle and ring finger.  Repaired with sutures 7 days ago.  5 simple interrupted sutures placed.  Patient denies any worsening symptoms.  No redness, swelling, or discharge.  Reports that it has been healing normally.   Objective Findings: Left hand: Webspace between the middle and ring finger shows linear laceration with 5 simple interrupted sutures in place.  Good approximation.  No swelling or discharge.  1 simple interrupted suture removed in the middle of the laceration with mild dehiscence.  Remaining 4 sutures left in place for additional healing.  No erythema.   Pre-Existing Condition(s):     Assessment:   Condition Improved    Status: Additional Care Required  Permanent Disability:No    Plan:      Diagnostics:      Comments:       Disability Information   Status: Released to Full Duty    From:  11/29/2023  Through:  12/2/2023 Restrictions are:     Physical Restrictions   Sitting:    Standing:    Stooping:    Bending:      Squatting:    Walking:    Climbing:    Pushing:      Pulling:    Other:    Reaching Above Shoulder (L):   Reaching Above Shoulder (R):       Reaching Below Shoulder (L):    Reaching Below Shoulder (R):      Not to exceed Weight Limits   Carrying(hrs):   Weight Limit(lb):   Lifting(hrs):   Weight  Limit(lb):     Comments: Patient is here for follow-up of a laceration to the left hand: 5 simple interrupted sutures placed.  Good approximation.  1 simple interrupted suture was removed from the middle of the laceration with mild dehiscence.  I left the 4 remaining sutures in place for additional healing.  Patient will follow-up in 3 days for reevaluation.  No signs of secondary infection.    Repetitive Actions   Hands: i.e. Fine Manipulations from Grasping:     Feet: i.e. Operating Foot Controls:     Driving / Operate Machinery:     Health Care Provider’s Original or Electronic Signature  Marlon Young P.A.-C. Health Care Provider’s Original or Electronic Signature    Jose E Monzon DO [facility]     Clinic Name / Location: [address] [address] Clinic Phone Number: Dept: [phone]   Appointment Time: 5:00 Pm Visit Start Time: 5:55 PM   Check-In Time:  4:51 Pm Visit Discharge Time:  ***   Original-Treating Physician or Chiropractor    Page 2-Insurer/TPA    Page 3-Employer    Page 4-Employee

## 2023-11-30 NOTE — PROGRESS NOTES
"Subjective:     Trevor Gillis Jr. is a 53 y.o. male who presents for Follow-Up (WC DOI 11.22.2023/STITCHES REMOVAL (5))      DOI: 11/22/2023 @ 8:00 AM. Initial visit.     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  Webspace between left middle and ring finger got sliced with a piece of metal.  Denies numbness, tingling, weakness, or other symptoms.  Tdap received 1/16/2017 per chart review.  No previous pertinent injury or secondary employment.    Follow-up visit 11/29/2023: Patient is here for follow-up of laceration to the right hand.  Laceration to the webspace between the left middle and ring finger.  Repaired with sutures 7 days ago.  5 simple interrupted sutures placed.  Patient denies any worsening symptoms.  No redness, swelling, or discharge.  Reports that it has been healing normally.    PMH:   No pertinent past medical history to this problem  MEDS:  Medications were reviewed in EMR  ALLERGIES:  Allergies were reviewed in EMR  FH:   No pertinent family history to this problem       Objective:     BP (!) 134/98   Pulse 80   Temp 36.4 °C (97.6 °F) (Temporal)   Resp 18   Ht 1.905 m (6' 3\")   Wt (!) 127 kg (280 lb)   SpO2 94%   BMI 35.00 kg/m²     Left hand: Webspace between the middle and ring finger shows linear laceration with 5 simple interrupted sutures in place.  Good approximation.  No swelling or discharge.  1 simple interrupted suture removed in the middle of the laceration with mild dehiscence.  Remaining 4 sutures left in place for additional healing.  No erythema.    Assessment/Plan:       1. Laceration of left hand without foreign body, subsequent encounter    Released to Full Duty FROM 11/29/2023 TO 12/2/2023  Patient is here for follow-up of a laceration to the left hand: 5 simple interrupted sutures placed.  Good approximation.  1 simple interrupted suture was removed from the middle of the laceration with mild dehiscence.  I left the 4 remaining sutures in place for " additional healing.  Patient will follow-up in 3 days for reevaluation.  No signs of secondary infection.       Differential diagnosis, natural history, supportive care, and indications for immediate follow-up discussed.

## 2023-12-02 ENCOUNTER — OCCUPATIONAL MEDICINE (OUTPATIENT)
Dept: URGENT CARE | Facility: PHYSICIAN GROUP | Age: 53
End: 2023-12-02
Payer: COMMERCIAL

## 2023-12-02 VITALS
SYSTOLIC BLOOD PRESSURE: 108 MMHG | DIASTOLIC BLOOD PRESSURE: 88 MMHG | WEIGHT: 280 LBS | TEMPERATURE: 97.3 F | RESPIRATION RATE: 16 BRPM | HEART RATE: 104 BPM | OXYGEN SATURATION: 94 % | HEIGHT: 75 IN | BODY MASS INDEX: 34.82 KG/M2

## 2023-12-02 DIAGNOSIS — S61.412D LACERATION OF LEFT HAND WITHOUT FOREIGN BODY, SUBSEQUENT ENCOUNTER: ICD-10-CM

## 2023-12-02 PROCEDURE — 99213 OFFICE O/P EST LOW 20 MIN: CPT | Performed by: NURSE PRACTITIONER

## 2023-12-02 PROCEDURE — 3074F SYST BP LT 130 MM HG: CPT | Performed by: NURSE PRACTITIONER

## 2023-12-02 PROCEDURE — 3079F DIAST BP 80-89 MM HG: CPT | Performed by: NURSE PRACTITIONER

## 2023-12-02 ASSESSMENT — FIBROSIS 4 INDEX: FIB4 SCORE: 0.71

## 2023-12-02 NOTE — LETTER
[facility] [place] [facility]  [facility] [place] [address]  Phone:  [phone] - Fax:  [phone]   Occupational Health Network Progress Report and Disability Certification  Date of Service: 12/2/2023   No Show:  No  Date / Time of Next Visit: 12/6/2023   Claim Information   Patient Name: Trevor Gillis Jr.  Claim Number:     Employer: [email or web address] [place]  Date of Injury: 11/22/2023     Insurer / TPA: [employer]er's  ID / SSN:     Occupation: Production  Diagnosis: The encounter diagnosis was Laceration of left hand without foreign body, subsequent encounter.    Medical Information   Related to Industrial Injury? Yes    Subjective Complaints:  COPIED FROM INITIAL VISIT:     DOI: 11/22/2023 @ 8:00 AM. Initial visit.     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  Webspace between left middle and ring finger got sliced with a piece of metal.  Denies numbness, tingling, weakness, or other symptoms.  Tdap received 1/16/2017 per chart review.  No previous pertinent injury or secondary employment.      Today, 12/2/23: Patient presents for laceration follow-up.  During initial visit, 5 sutures placed.  Patient had a follow-up visit 3 days ago, 1 suture was removed, the remaining sutures were left in place as wound did not appear to be fully healed.  Patient denies any pain, drainage, numbness, tingling.  He has been performing full duty at work.  He is right-hand dominant.     Objective Findings: A/Ox4. NAD. Left hand: Webspace between the middle and ring finger shows linear laceration with 4 interrupted sutures in place.   No swelling, erythema, or discharge.  Fingers have FROM, N/V intact.  Mild dehiscence of wound noted.     Pre-Existing Condition(s): Denies    Assessment:   Condition Improved    Status: Additional Care Required  Permanent Disability:No    Plan: Medication  Comments:OTC Tylenol if needed, wound care discussed, amelie taping encouraged, full duty, return to clinic in  4 days for reevaluation.    Diagnostics:   Comments:N/A    Comments:       Disability Information   Status: Released to Full Duty    From:  12/2/2023  Through: 12/6/2023 Restrictions are:     Physical Restrictions   Sitting:    Standing:    Stooping:    Bending:      Squatting:    Walking:    Climbing:    Pushing:      Pulling:    Other:    Reaching Above Shoulder (L):   Reaching Above Shoulder (R):       Reaching Below Shoulder (L):    Reaching Below Shoulder (R):      Not to exceed Weight Limits   Carrying(hrs):   Weight Limit(lb):   Lifting(hrs):   Weight  Limit(lb):     Comments:      Repetitive Actions   Hands: i.e. Fine Manipulations from Grasping:     Feet: i.e. Operating Foot Controls:     Driving / Operate Machinery:     Health Care Provider’s Original or Electronic Signature  CRESENCIO Bowers Health Care Provider’s Original or Electronic Signature    Jose E Monzon DO MPH     Clinic Name / Location: [address] [address] Clinic Phone Number: Dept: [phone]   Appointment Time: 9:00 Am Visit Start Time: 9:04 AM   Check-In Time:  8:48 Am Visit Discharge Time:  0940   Original-Treating Physician or Chiropractor    Page 2-Insurer/TPA    Page 3-Employer    Page 4-Employee

## 2023-12-02 NOTE — PROGRESS NOTES
Chief Complaint   Patient presents with    Work-Related Injury     11/22  Hand laceration between fingers, stitched, was 5 stitches pt states he tried to remove some        HISTORY OF PRESENT ILLNESS: Patient is a pleasant 53 y.o. male who presents to urgent care today with a work comp follow up. COPIED FROM INITIAL VISIT:     DOI: 11/22/2023 @ 8:00 AM. Initial visit.     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  Webspace between left middle and ring finger got sliced with a piece of metal.  Denies numbness, tingling, weakness, or other symptoms.  Tdap received 1/16/2017 per chart review.  No previous pertinent injury or secondary employment.      Today, 12/2/23: Patient presents for laceration follow-up.  During initial visit, 5 sutures placed.  Patient had a follow-up visit 3 days ago, 1 suture was removed, the remaining sutures were left in place as wound did not appear to be fully healed.  Patient denies any pain, drainage, numbness, tingling.  He has been performing full duty at work.  He is right-hand dominant.      PMH: No pertinent past medical history to this problem  MEDS: Medications were reviewed in Epic  ALLERGIES: Allergies were reviewed in Epic  FH: No pertinent family history to this problem      ROS:  Review of Systems   Constitutional: Negative for fever, chills, weight loss, malaise, and fatigue.   HENT: Negative for ear pain, nosebleeds, congestion, sore throat and neck pain.    Eyes: Negative for vision changes.   Neuro: Negative for headache, sensory changes, weakness, seizure, LOC.   Cardiovascular: Negative for chest pain, palpitations, orthopnea and leg swelling.   Respiratory: Negative for cough, sputum production, shortness of breath and wheezing.   Gastrointestinal: Negative for abdominal pain, nausea, vomiting or diarrhea.   Genitourinary: Negative for dysuria, urgency and frequency.  Musculoskeletal: Negative for falls, neck pain, back pain, joint pain, myalgias.   Skin:  "Positive for laceration. Negative for rash, diaphoresis.     Exam:  /88   Pulse (!) 104   Temp 36.3 °C (97.3 °F) (Temporal)   Resp 16   Ht 1.905 m (6' 3\")   Wt (!) 127 kg (280 lb)   SpO2 94%   General: well-nourished, well-developed male in NAD  Head: normocephalic, atraumatic  Eyes: PERRLA, no conjunctival injection, acuity grossly intact, lids normal.  Ears: normal shape and symmetry, no tenderness, no discharge. External canals are without any significant edema or erythema. Tympanic membranes are without any inflammation, no effusion. Gross auditory acuity is intact.  Nose: symmetrical without tenderness, no discharge.  Mouth/Throat: reasonable hygiene, no erythema, exudates or tonsillar enlargement.  Neck: no masses, range of motion within normal limits, no tracheal deviation. No obvious thyroid enlargement.   Lymph: no cervical adenopathy. No supraclavicular adenopathy.   Neuro: alert and oriented. Cranial nerves 1-12 grossly intact. No sensory deficit.   Cardiovascular: regular rhythm. No edema.  Pulmonary: no distress. Chest is symmetrical with respiration, no wheezes, crackles, or rhonchi.   Musculoskeletal: no clubbing, appropriate muscle tone, gait is stable.  Skin: warm, no clubbing, no cyanosis, no rashes. Left hand: Webspace between the middle and ring finger shows linear laceration with 4 interrupted sutures in place.   No swelling, erythema, or discharge.  Fingers have FROM, N/V intact.  Mild dehiscence of wound noted.    Psych: appropriate mood, affect, judgement.         Assessment/Plan:  1. Laceration of left hand without foreign body, subsequent encounter            OTC Tylenol if needed, wound care discussed, amelie taping encouraged, full duty, return to clinic in 4 days for reevaluation.   Supportive care, differential diagnoses, and indications for immediate follow-up discussed with patient.   Pathogenesis of diagnosis discussed including typical length and natural progression. "   Instructed to return to clinic or nearest emergency department sooner for any change in condition, further concerns, or worsening of symptoms.  Patient states understanding of the plan of care and discharge instructions.          Please note that this dictation was created using voice recognition software. I have made every reasonable attempt to correct obvious errors, but I expect that there are errors of grammar and possibly content that I did not discover before finalizing the note.  Previous clinic visit encounter reviewed and considered in medical decision making today.       CHERIE Bowers.

## 2023-12-02 NOTE — LETTER
[facility]  [address] [facility] [place] [address]  Phone:  [phone] - Fax:  [phone]   [facility] Progress Report and Disability Certification  Date of Service: 12/2/2023   No Show:  No  Date / Time of Next Visit: 12/6/2023   Claim Information   Patient Name: Trevor Gillis Jr.  Claim Number:     Employer: [employer]  Date of Injury: 11/22/2023     Insurer / TPA: [employer]  ID / SSN:     Occupation: Production  Diagnosis: The encounter diagnosis was Laceration of left hand without foreign body, subsequent encounter.    Medical Information   Related to Industrial Injury? Yes    Subjective Complaints:  COPIED FROM INITIAL VISIT:     DOI: 11/22/2023 @ 8:00 AM. Initial visit.     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  Webspace between left middle and ring finger got sliced with a piece of metal.  Denies numbness, tingling, weakness, or other symptoms.  Tdap received 1/16/2017 per chart review.  No previous pertinent injury or secondary employment.      Today, 12/2/23: Patient presents for laceration follow-up.  During initial visit, 5 sutures placed.  Patient had a follow-up visit 3 days ago, 1 suture was removed, the remaining sutures were left in place as wound did not appear to be fully healed.  Patient denies any pain, drainage, numbness, tingling.  He has been performing full duty at work.  He is right-hand dominant.     Objective Findings: A/Ox4. NAD. Left hand: Webspace between the middle and ring finger shows linear laceration with 4 interrupted sutures in place.   No swelling, erythema, or discharge.  Fingers have FROM, N/V intact.  Mild dehiscence of wound noted.     Pre-Existing Condition(s): Denies    Assessment:   Condition Improved    Status: Additional Care Required  Permanent Disability:No    Plan: Medication  Comments:OTC Tylenol if needed, wound care discussed, amelie taping encouraged, full duty, return to clinic in  4 days for reevaluation.    Diagnostics:   Comments:N/A    Comments:       Disability Information   Status: Released to Full Duty    From:  12/2/2023  Through: 12/6/2023 Restrictions are:     Physical Restrictions   Sitting:    Standing:    Stooping:    Bending:      Squatting:    Walking:    Climbing:    Pushing:      Pulling:    Other:    Reaching Above Shoulder (L):   Reaching Above Shoulder (R):       Reaching Below Shoulder (L):    Reaching Below Shoulder (R):      Not to exceed Weight Limits   Carrying(hrs):   Weight Limit(lb):   Lifting(hrs):   Weight  Limit(lb):     Comments:      Repetitive Actions   Hands: i.e. Fine Manipulations from Grasping:     Feet: i.e. Operating Foot Controls:     Driving / Operate Machinery:     Health Care Provider’s Original or Electronic Signature  CRESENCIO Bowers Health Care Provider’s Original or Electronic Signature    Jose E Monzon DO MPH     Clinic Name / Location: [address] [address] Clinic Phone Number: Dept: [phone]   Appointment Time: 9:00 Am Visit Start Time: 9:04 AM   Check-In Time:  8:48 Am Visit Discharge Time:     Original-Treating Physician or Chiropractor    Page 2-Insurer/TPA    Page 3-Employer    Page 4-Employee

## 2023-12-06 ENCOUNTER — OCCUPATIONAL MEDICINE (OUTPATIENT)
Dept: URGENT CARE | Facility: PHYSICIAN GROUP | Age: 53
End: 2023-12-06
Payer: COMMERCIAL

## 2023-12-06 VITALS
DIASTOLIC BLOOD PRESSURE: 118 MMHG | SYSTOLIC BLOOD PRESSURE: 160 MMHG | TEMPERATURE: 97.9 F | WEIGHT: 280 LBS | RESPIRATION RATE: 18 BRPM | OXYGEN SATURATION: 98 % | BODY MASS INDEX: 34.82 KG/M2 | HEART RATE: 95 BPM | HEIGHT: 75 IN

## 2023-12-06 DIAGNOSIS — Z48.02 VISIT FOR SUTURE REMOVAL: ICD-10-CM

## 2023-12-06 DIAGNOSIS — S61.412D LACERATION OF LEFT HAND WITHOUT FOREIGN BODY, SUBSEQUENT ENCOUNTER: ICD-10-CM

## 2023-12-06 PROCEDURE — 3077F SYST BP >= 140 MM HG: CPT | Performed by: PHYSICIAN ASSISTANT

## 2023-12-06 PROCEDURE — 3080F DIAST BP >= 90 MM HG: CPT | Performed by: PHYSICIAN ASSISTANT

## 2023-12-06 PROCEDURE — 99212 OFFICE O/P EST SF 10 MIN: CPT | Performed by: PHYSICIAN ASSISTANT

## 2023-12-06 ASSESSMENT — FIBROSIS 4 INDEX: FIB4 SCORE: 0.71

## 2023-12-06 NOTE — LETTER
[address] [place] [address]  Phone:  [phone] - Fax:  [phone]   [facility] Progress Report and Disability Certification  Date of Service: 12/6/2023   No Show:  No  Date / Time of Next Visit:     Claim Information   Patient Name: Trevor Gillis Jr.  Claim Number:     Employer: [employer]  Date of Injury: 11/22/2023     Insurer / TPA: [employer]  ID / SSN:     Occupation: Production  Diagnosis: Diagnoses of Laceration of left hand without foreign body, subsequent encounter and Visit for suture removal were pertinent to this visit.    Medical Information   Related to Industrial Injury? Yes    Subjective Complaints:  DOI: 11/22/2023 @ 8:00 AM.   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  Webspace between left middle and ring finger got sliced with a piece of metal.  Denies numbness, tingling, weakness, or other symptoms.  Tdap received 1/16/2017 per chart review.  No previous pertinent injury or secondary employment.     12/2/23: Patient presents for laceration follow-up.  During initial visit, 5 sutures placed.  Patient had a follow-up visit 3 days ago, 1 suture was removed, the remaining sutures were left in place as wound did not appear to be fully healed.  Patient denies any pain, drainage, numbness, tingling.  He has been performing full duty at work.  He is right-hand dominant.    Follow up today 12/6/23: healing well he believes. Slight discomfort. He has been airing out the wound occasionally. No worsening. No significant redness or swelling. He has been keeping it clean and covered occasionally.    Objective Findings: Constitutional: Well-appearing in no acute distress  Left hand: Webspace between the middle and ring finger shows linear laceration with 4 interrupted sutures in place.   No swelling, erythema, or discharge.  Fingers have FROM, N/V intact.  Mild dehiscence of wound noted.    Pre-Existing  Condition(s):     Assessment:   Condition Improved    Status: Discharged /  MMI  Permanent Disability:No    Plan:      Diagnostics:      Comments:  Plan:   - sutures removed without complications.   - very small wound present. Keep area clean. Air it out occasionally. Keep covered with bandaid while at work.   - released to Full duty discharge MMI. Watch for signs of infection.     Disability Information   Status: Released to Full Duty    From:     Through:   Restrictions are:     Physical Restrictions   Sitting:    Standing:    Stooping:    Bending:      Squatting:    Walking:    Climbing:    Pushing:      Pulling:    Other:    Reaching Above Shoulder (L):   Reaching Above Shoulder (R):       Reaching Below Shoulder (L):    Reaching Below Shoulder (R):      Not to exceed Weight Limits   Carrying(hrs):   Weight Limit(lb):   Lifting(hrs):   Weight  Limit(lb):     Comments:      Repetitive Actions   Hands: i.e. Fine Manipulations from Grasping:     Feet: i.e. Operating Foot Controls:     Driving / Operate Machinery:     Health Care Provider’s Original or Electronic Signature  Angel Wright P.A.-C. Health Care Provider’s Original or Electronic Signature    Jose E Monzon DO MPH     Clinic Name / Location: [address] [address] Clinic Phone Number: Dept: [phone]   Appointment Time: 5:45 Pm Visit Start Time: 5:12 PM   Check-In Time:  5:01 Pm Visit Discharge Time:  5:42 PM   Original-Treating Physician or Chiropractor    Page 2-Insurer/TPA    Page 3-Employer    Page 4-Employee

## 2023-12-07 NOTE — PROGRESS NOTES
"Subjective:     Trevor Gillis Jr. is a 53 y.o. male who presents for Suture / Staple Removal ( F/U: Stitch removal. )      DOI: 11/22/2023 @ 8:00 AM.   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  Webspace between left middle and ring finger got sliced with a piece of metal.  Denies numbness, tingling, weakness, or other symptoms.  Tdap received 1/16/2017 per chart review.  No previous pertinent injury or secondary employment.     12/2/23: Patient presents for laceration follow-up.  During initial visit, 5 sutures placed.  Patient had a follow-up visit 3 days ago, 1 suture was removed, the remaining sutures were left in place as wound did not appear to be fully healed.  Patient denies any pain, drainage, numbness, tingling.  He has been performing full duty at work.  He is right-hand dominant.    Follow up today 12/6/23: healing well he believes. Slight discomfort. He has been airing out the wound occasionally. No worsening. No significant redness or swelling. He has been keeping it clean and covered occasionally.     PMH:   No pertinent past medical history to this problem  MEDS:  Medications were reviewed in EMR  ALLERGIES:  Allergies were reviewed in EMR  [employer]CH[employer]:  Works as production.   FH:   No pertinent family history to this problem       Objective:     BP (!) 160/118   Pulse 95   Temp 36.6 °C (97.9 °F)   Resp 18   Ht 1.905 m (6' 3\")   Wt (!) 127 kg (280 lb)   SpO2 98%   BMI 35.00 kg/m²     Constitutional: Well-appearing in no acute distress  Left hand: Webspace between the middle and ring finger shows linear laceration with 4 interrupted sutures in place.   No swelling, erythema, or discharge.  Fingers have FROM, N/V intact.  Mild dehiscence of wound noted.     Assessment/Plan:       1. Laceration of left hand without foreign body, subsequent encounter    2. Visit for suture removal    Released to Full Duty FROM   TO       Plan:   - sutures removed without complications.   - very small " wound present. Keep area clean. Air it out occasionally. Keep covered with bandaid while at work.   - released to Full duty discharge MMI. Watch for signs of infection.     Differential diagnosis, natural history, supportive care, and indications for immediate follow-up discussed.

## 2024-01-10 NOTE — ED PROVIDER NOTES
"ED Provider Note    ED Provider Note    Primary care provider: No primary care provider on file.  Means of arrival: POV  History obtained from: patient  History limited by: None    CHIEF COMPLAINT  Chief Complaint   Patient presents with   • Lightheadedness     pt states \"woke up and i felt like my blood pressure just tanked, my heart dropped\"        HPI  Trevor Gillis Jr. is a 50 y.o. male who presents to the Emergency Department with a chief complaint of an episode upon waking this morning where he felt like \"the power got turned off\".  Patient denies having any pain or shortness of breath but he felt drained\".  He states he has been dealing with the symptoms on and off for 8 years but has been able to manage them at home.  He has a history of familial heart disease.  His grandfather  at the age of 48.  He is followed by Dr. Jeffrey Gillis, EP, cardiologist and has an AICD in place.  He only takes aspirin.  He denies any recent change in his health until this morning.  No fever cough or cold symptoms.  No pain.  Again no chest pain or shortness of breath.  He does not have a sense of the room spinning or vertiginous symptoms.  No numbness or tingling.  No headache.  States he has been eating a healthy diet.  He denies drug or alcohol use.    REVIEW OF SYSTEMS  Review of Systems   Constitutional: Negative for chills and fever.   HENT: Negative for congestion.    Respiratory: Negative for cough and shortness of breath.    Cardiovascular: Negative for chest pain.   Gastrointestinal: Negative for abdominal pain, nausea and vomiting.   Genitourinary: Negative for dysuria.   Musculoskeletal: Negative for falls and neck pain.   Neurological: Negative for dizziness and headaches.   All other systems reviewed and are negative.      PAST MEDICAL HISTORY   has a past medical history of Dyslipidemia, Fracture of left clavicle, Heart burn, Hernia, Hiatus hernia syndrome, HTN (hypertension), Hypertrophic cardiomyopathy " "(HCC), JEREMIAH (obstructive sleep apnea), Pacemaker, Psychiatric problem, Snoring, and Syncope (11/19/2012).    SURGICAL HISTORY   has a past surgical history that includes tonsillectomy; other cardiac surgery (7/25/14); recovery (7/25/2014); recovery (7/30/2014); inguinal hernia repair (9/10/2014); appendectomy; irrigation & debridement general (Left, 1/16/2017); and upper gi endoscopy,remov f.b. (8/5/2020).    SOCIAL HISTORY  Social History     Tobacco Use   • Smoking status: Former Smoker     Packs/day: 1.00     Years: 25.00     Pack years: 25.00     Types: Cigarettes   • Smokeless tobacco: Never Used   Vaping Use   • Vaping Use: Never used   Substance Use Topics   • Alcohol use: Not Currently     Alcohol/week: 0.0 oz     Comment: used to be heavy alcoholism (18 years ago),    • Drug use: No      Social History     Substance and Sexual Activity   Drug Use No       FAMILY HISTORY  Family History   Problem Relation Age of Onset   • Other Mother         Accident   • Stroke Father    • Heart Attack Father         MI at age 24?   • Heart Disease Paternal Grandfather    • Hypertension Paternal Grandmother    • Diabetes Paternal Grandmother        CURRENT MEDICATIONS  Home Medications     Reviewed by Sandrita Crook R.N. (Registered Nurse) on 07/20/21 at 0622  Med List Status: Partial   Medication Last Dose Status   omeprazole (PRILOSEC) 40 MG delayed-release capsule  Active                ALLERGIES  Allergies   Allergen Reactions   • Tomato Hives and Vomiting       PHYSICAL EXAM  VITAL SIGNS: /94   Pulse 84   Temp 36.7 °C (98.1 °F) (Oral)   Resp 16   Ht 1.905 m (6' 3\")   Wt (!) 134 kg (294 lb 8.6 oz)   SpO2 96%   BMI 36.81 kg/m²   Vitals reviewed.  Constitutional: Patient is oriented to person, place, and time. Appears well-developed and well-nourished. No distress.    Head: Normocephalic and atraumatic.  Mouth/Throat: Oropharynx is clear and moist, no exudates.   Eyes: Conjunctivae are normal. Pupils " are equal, round, and reactive to light.   Neck: Normal range of motion.   Cardiovascular: Normal rate, regular rhythm and normal heart sounds. Normal peripheral pulses.  Pulmonary/Chest: Effort normal and breath sounds normal. No respiratory distress, no wheezes, rhonchi, or rales. No chest wall tenderness.  Abdominal: Soft. Bowel sounds are normal. There is no tenderness. No rebound or guarding, or peritoneal signs.   Musculoskeletal: No edema and no tenderness.   Neurological: No focal deficits. CN 2-12 intact.  Normal motor or sensory exam.  Normal speech.  Normal cognition.  Skin: Skin is warm and dry. No erythema. No pallor.   Psychiatric: Patient has a normal mood and affect.     LABS  Results for orders placed or performed during the hospital encounter of 07/20/21   TROPONIN   Result Value Ref Range    Troponin T 19 6 - 19 ng/L   CBC WITH DIFFERENTIAL   Result Value Ref Range    WBC 4.1 (L) 4.8 - 10.8 K/uL    RBC 4.86 4.70 - 6.10 M/uL    Hemoglobin 14.1 14.0 - 18.0 g/dL    Hematocrit 42.2 42.0 - 52.0 %    MCV 86.8 81.4 - 97.8 fL    MCH 29.0 27.0 - 33.0 pg    MCHC 33.4 (L) 33.7 - 35.3 g/dL    RDW 43.5 35.9 - 50.0 fL    Platelet Count 246 164 - 446 K/uL    MPV 9.7 9.0 - 12.9 fL    Neutrophils-Polys 47.50 44.00 - 72.00 %    Lymphocytes 37.60 22.00 - 41.00 %    Monocytes 8.80 0.00 - 13.40 %    Eosinophils 4.90 0.00 - 6.90 %    Basophils 1.00 0.00 - 1.80 %    Immature Granulocytes 0.20 0.00 - 0.90 %    Nucleated RBC 0.00 /100 WBC    Neutrophils (Absolute) 1.93 1.82 - 7.42 K/uL    Lymphs (Absolute) 1.53 1.00 - 4.80 K/uL    Monos (Absolute) 0.36 0.00 - 0.85 K/uL    Eos (Absolute) 0.20 0.00 - 0.51 K/uL    Baso (Absolute) 0.04 0.00 - 0.12 K/uL    Immature Granulocytes (abs) 0.01 0.00 - 0.11 K/uL    NRBC (Absolute) 0.00 K/uL   CMP   Result Value Ref Range    Sodium 138 135 - 145 mmol/L    Potassium 4.0 3.6 - 5.5 mmol/L    Chloride 106 96 - 112 mmol/L    Co2 24 20 - 33 mmol/L    Anion Gap 8.0 7.0 - 16.0    Glucose 116  (H) 65 - 99 mg/dL    Bun 17 8 - 22 mg/dL    Creatinine 1.11 0.50 - 1.40 mg/dL    Calcium 8.9 8.5 - 10.5 mg/dL    AST(SGOT) 15 12 - 45 U/L    ALT(SGPT) 17 2 - 50 U/L    Alkaline Phosphatase 94 30 - 99 U/L    Total Bilirubin 0.3 0.1 - 1.5 mg/dL    Albumin 4.0 3.2 - 4.9 g/dL    Total Protein 6.5 6.0 - 8.2 g/dL    Globulin 2.5 1.9 - 3.5 g/dL    A-G Ratio 1.6 g/dL   ESTIMATED GFR   Result Value Ref Range    GFR If African American >60 >60 mL/min/1.73 m 2    GFR If Non African American >60 >60 mL/min/1.73 m 2   EKG (NOW)   Result Value Ref Range    Report       [facility] Emergency Dept.    Test Date:  2021  Pt Name:    ROCK MELISSA                Department: ER  MRN:        [id number]                      Room:        12  Gender:     Male                         Technician: [id number]  :        1970                   Requested By:ER TRIAGE PROTOCOL  Order #:    [id number]                    Reading MD: DEEPIKA PATEL DO    Measurements  Intervals                                Axis  Rate:       71                           P:          25  MS:         172                          QRS:        245  QRSD:       92                           T:          162  QT:         436  QTc:        474    Interpretive Statements  SINUS RHYTHM  LEFT ANTERIOR FASCICULAR BLOCK  LATE PRECORDIAL R/S TRANSITION  CONSIDER LEFT VENTRICULAR HYPERTROPHY  ABNORMAL T, PROBABLE ISCHEMIA, ANT-LAT LEADS  Compared to ECG 2020 13:07:10  Left anterior fascicular block now present  T-wave abnormality still present  Possible ischemia still pres ent  Electronically Signed On 2021 6:34:48 PDT by DEEPIKA PATEL DO         All labs reviewed by me.    EKG Interpretation  Interpreted by me    COURSE & MEDICAL DECISION MAKING  Pertinent Labs & Imaging studies reviewed. (See chart for details)    Obtained and reviewed past medical records.  Patient's last encounter was an ED visit for esophageal food impaction with removal in  the ED by EGD.  Prior to that patient was seen by cardiac electrophysiologist, Dr. Jeffrey Gillis for defibrillator change.    6:34 AM - Patient seen and examined at bedside.  This is a very pleasant 50-year-old male who presents with an episode of lightheadedness, feeling drained.  He still has persistent symptoms although less severe.  No association with pain or other anginal equivalents.  No recent infectious symptoms.  No fever cough or URI type symptoms.  He does not have neurologic deficits or vertiginous symptoms.  It is unclear, what the cause of this and I have requested interrogation of his AICD.  An IV start with labs including a CBC, chemistry and troponin.  No known Covid exposures.  Vital signs are normal.      0950am D/W [employer].  Who contacted me with results of the interrogation of the patient's AICD.  There were no abnormalities found.  Patient is reevaluated at the bedside.  We discussed lab results    1005AM.  Patient's reevaluated the bedside.  He is feeling better.  We discussed lab results including a normal troponin.  He does not have anginal symptoms at this time.  He has not been demonstrating symptoms of orthostasis here in the ED.  He was able to ambulate to the bathroom without symptoms.  Electrolytes were normal.  At this point, I feel he can safely be discharged home.  He agrees.  He will follow up with his cardiologist Dr. Gillis.  He is advised to drink plenty of fluid and rest.  He is well-appearing and nontoxic.  Will be discharged home in stable condition.    FINAL IMPRESSION  1. Malaise                      clear

## 2024-01-18 ENCOUNTER — APPOINTMENT (OUTPATIENT)
Dept: RADIOLOGY | Facility: MEDICAL CENTER | Age: 54
End: 2024-01-18
Attending: EMERGENCY MEDICINE
Payer: OTHER MISCELLANEOUS

## 2024-01-18 ENCOUNTER — HOSPITAL ENCOUNTER (EMERGENCY)
Facility: MEDICAL CENTER | Age: 54
End: 2024-01-18
Attending: EMERGENCY MEDICINE
Payer: OTHER MISCELLANEOUS

## 2024-01-18 VITALS
RESPIRATION RATE: 18 BRPM | SYSTOLIC BLOOD PRESSURE: 164 MMHG | WEIGHT: 284.39 LBS | TEMPERATURE: 97.9 F | OXYGEN SATURATION: 95 % | BODY MASS INDEX: 35.36 KG/M2 | DIASTOLIC BLOOD PRESSURE: 95 MMHG | HEART RATE: 82 BPM | HEIGHT: 75 IN

## 2024-01-18 DIAGNOSIS — K20.90 ESOPHAGITIS: ICD-10-CM

## 2024-01-18 DIAGNOSIS — R55 NEAR SYNCOPE: ICD-10-CM

## 2024-01-18 DIAGNOSIS — I47.29 NSVT (NONSUSTAINED VENTRICULAR TACHYCARDIA) (HCC): ICD-10-CM

## 2024-01-18 PROBLEM — Z45.02 ICD (IMPLANTABLE CARDIOVERTER-DEFIBRILLATOR) DISCHARGE: Status: ACTIVE | Noted: 2024-01-18

## 2024-01-18 LAB
ALBUMIN SERPL BCP-MCNC: 3.9 G/DL (ref 3.2–4.9)
ALBUMIN/GLOB SERPL: 1.4 G/DL
ALP SERPL-CCNC: 92 U/L (ref 30–99)
ALT SERPL-CCNC: 20 U/L (ref 2–50)
ANION GAP SERPL CALC-SCNC: 10 MMOL/L (ref 7–16)
AST SERPL-CCNC: 19 U/L (ref 12–45)
BASOPHILS # BLD AUTO: 1 % (ref 0–1.8)
BASOPHILS # BLD: 0.05 K/UL (ref 0–0.12)
BILIRUB SERPL-MCNC: 0.4 MG/DL (ref 0.1–1.5)
BUN SERPL-MCNC: 16 MG/DL (ref 8–22)
CALCIUM ALBUM COR SERPL-MCNC: 9 MG/DL (ref 8.5–10.5)
CALCIUM SERPL-MCNC: 8.9 MG/DL (ref 8.5–10.5)
CHLORIDE SERPL-SCNC: 104 MMOL/L (ref 96–112)
CO2 SERPL-SCNC: 24 MMOL/L (ref 20–33)
CREAT SERPL-MCNC: 1.1 MG/DL (ref 0.5–1.4)
EKG IMPRESSION: NORMAL
EOSINOPHIL # BLD AUTO: 0.29 K/UL (ref 0–0.51)
EOSINOPHIL NFR BLD: 5.8 % (ref 0–6.9)
ERYTHROCYTE [DISTWIDTH] IN BLOOD BY AUTOMATED COUNT: 40.7 FL (ref 35.9–50)
GFR SERPLBLD CREATININE-BSD FMLA CKD-EPI: 81 ML/MIN/1.73 M 2
GLOBULIN SER CALC-MCNC: 2.8 G/DL (ref 1.9–3.5)
GLUCOSE SERPL-MCNC: 124 MG/DL (ref 65–99)
HCT VFR BLD AUTO: 41.3 % (ref 42–52)
HGB BLD-MCNC: 13.8 G/DL (ref 14–18)
IMM GRANULOCYTES # BLD AUTO: 0.01 K/UL (ref 0–0.11)
IMM GRANULOCYTES NFR BLD AUTO: 0.2 % (ref 0–0.9)
LYMPHOCYTES # BLD AUTO: 1.56 K/UL (ref 1–4.8)
LYMPHOCYTES NFR BLD: 31.5 % (ref 22–41)
MCH RBC QN AUTO: 27.2 PG (ref 27–33)
MCHC RBC AUTO-ENTMCNC: 33.4 G/DL (ref 32.3–36.5)
MCV RBC AUTO: 81.3 FL (ref 81.4–97.8)
MONOCYTES # BLD AUTO: 0.52 K/UL (ref 0–0.85)
MONOCYTES NFR BLD AUTO: 10.5 % (ref 0–13.4)
NEUTROPHILS # BLD AUTO: 2.53 K/UL (ref 1.82–7.42)
NEUTROPHILS NFR BLD: 51 % (ref 44–72)
NRBC # BLD AUTO: 0 K/UL
NRBC BLD-RTO: 0 /100 WBC (ref 0–0.2)
PLATELET # BLD AUTO: 302 K/UL (ref 164–446)
PMV BLD AUTO: 9.2 FL (ref 9–12.9)
POTASSIUM SERPL-SCNC: 4.2 MMOL/L (ref 3.6–5.5)
PROT SERPL-MCNC: 6.7 G/DL (ref 6–8.2)
RBC # BLD AUTO: 5.08 M/UL (ref 4.7–6.1)
SODIUM SERPL-SCNC: 138 MMOL/L (ref 135–145)
TROPONIN T SERPL-MCNC: 14 NG/L (ref 6–19)
WBC # BLD AUTO: 5 K/UL (ref 4.8–10.8)

## 2024-01-18 PROCEDURE — 93005 ELECTROCARDIOGRAM TRACING: CPT

## 2024-01-18 PROCEDURE — 93005 ELECTROCARDIOGRAM TRACING: CPT | Performed by: EMERGENCY MEDICINE

## 2024-01-18 PROCEDURE — 85025 COMPLETE CBC W/AUTO DIFF WBC: CPT

## 2024-01-18 PROCEDURE — 71045 X-RAY EXAM CHEST 1 VIEW: CPT

## 2024-01-18 PROCEDURE — 99284 EMERGENCY DEPT VISIT MOD MDM: CPT

## 2024-01-18 PROCEDURE — 80053 COMPREHEN METABOLIC PANEL: CPT

## 2024-01-18 PROCEDURE — 99284 EMERGENCY DEPT VISIT MOD MDM: CPT | Performed by: INTERNAL MEDICINE

## 2024-01-18 PROCEDURE — 36415 COLL VENOUS BLD VENIPUNCTURE: CPT

## 2024-01-18 PROCEDURE — 84484 ASSAY OF TROPONIN QUANT: CPT

## 2024-01-18 RX ORDER — CARVEDILOL 12.5 MG/1
12.5 TABLET ORAL 2 TIMES DAILY WITH MEALS
Qty: 60 TABLET | Refills: 0 | Status: SHIPPED | OUTPATIENT
Start: 2024-01-18

## 2024-01-18 RX ORDER — OMEPRAZOLE 20 MG/1
20 CAPSULE, DELAYED RELEASE ORAL DAILY
Qty: 30 CAPSULE | Refills: 0 | Status: SHIPPED | OUTPATIENT
Start: 2024-01-18

## 2024-01-18 ASSESSMENT — FIBROSIS 4 INDEX: FIB4 SCORE: 0.71

## 2024-01-18 NOTE — ED PROVIDER NOTES
ED PHYSICIAN NOTE    CHIEF COMPLAINT  Chief Complaint   Patient presents with    Near Syncopal     Episode of near syncope earlier today. Everything went black and he caught himself.   Reports not feeling well x a few days. Prior to episode he lost his breath. Reports he's always short of breath but that was worse.   Hx of CHF. Has a pacemaker.        EXTERNAL RECORDS REVIEWED  Outpatient Notes ED encounter in August 2020.  Patient had esophageal meat impaction.  Upper endoscopy at that time showed meat bolus.  He had moderate ulcerative esophagitis with probable ringed esophagus    HPI/ROS      Trevor Gillis Jr. is a 53 y.o. male who presents with chest pain after eating.  He says that at least over the last few weeks he has had worsening pain when he swallows.  He feels like things get hung up.  He has a fullness sensation in the middle of his chest.  This resolves after a few minutes.  He does not feel like he has an impaction at this time but he is having more frequent occurrences of these events.  He has not had endoscopy in a couple years.  He was previously followed by Dr. Nallely wong.  He denies nausea or vomiting.  He says that today he was at work.  He works a physical job at a fabrication plant.  He had been standing working for about 2 hours.  He felt very lightheaded felt like things were going black.  He had to sit down.  He denies feeling any chest pain or shortness of breath.  No palpitations.  No pleuritic symptoms.  No focal weakness numbness neurologic symptoms otherwise.  Does not have a headache.    PAST MEDICAL HISTORY  Past Medical History:   Diagnosis Date    Congestive heart failure (HCC)     cardiomyopathy    Dyslipidemia     Fracture of left clavicle     Heart burn     Heart valve disease     hypertrophic cardiomyopathy (inherited)    Hernia     Hiatus hernia syndrome     possible    HTN (hypertension)     no meds    Hypertrophic cardiomyopathy (HCC)     JEREMIAH (obstructive sleep  "apnea)     \"does not qualify for device\"    Pacemaker     followed by Dr Gillis    Psychiatric problem     Snoring     Syncope 11/19/2012       SOCIAL HISTORY  Social History     Tobacco Use    Smoking status: Some Days     Types: Cigarettes    Smokeless tobacco: Never   Vaping Use    Vaping Use: Never used   Substance Use Topics    Alcohol use: Not Currently     Comment: used to be heavy alcoholism (18 years ago),     Drug use: No       CURRENT MEDICATIONS  Home Medications       Reviewed by Petors Badillo R.N. (Registered Nurse) on 01/18/24 at 1254  Med List Status: Partial     Medication Last Dose Status        Patient Byron Taking any Medications                           ALLERGIES  Allergies   Allergen Reactions    Tomato Hives and Vomiting       PHYSICAL EXAM  VITAL SIGNS: /68   Pulse 85   Temp 36.4 °C (97.5 °F) (Temporal)   Resp 18   Ht 1.905 m (6' 3\")   Wt (!) 129 kg (284 lb 6.3 oz)   SpO2 93%   BMI 35.55 kg/m²    Constitutional: Awake and alert  HENT: Normal inspection  Eyes: Normal inspection  Neck: Grossly normal range of motion.  Cardiovascular: Normal heart rate, Normal rhythm.  Symmetric peripheral pulses.   Thorax & Lungs: No respiratory distress, No wheezing, No rales, No rhonchi, No chest tenderness.   Abdomen: Bowel sounds normal, soft, non-distended, nontender, no mass  Skin: No obvious rash.  Back: No tenderness, No CVA tenderness.   Extremities: No clubbing, cyanosis, edema, no Homans or cords.  Neurologic: Grossly normal   Psychiatric: Normal for situation     DIAGNOSTIC STUDIES / PROCEDURES  LABS/EKG  Results for orders placed or performed during the hospital encounter of 01/18/24   CBC with Differential   Result Value Ref Range    WBC 5.0 4.8 - 10.8 K/uL    RBC 5.08 4.70 - 6.10 M/uL    Hemoglobin 13.8 (L) 14.0 - 18.0 g/dL    Hematocrit 41.3 (L) 42.0 - 52.0 %    MCV 81.3 (L) 81.4 - 97.8 fL    MCH 27.2 27.0 - 33.0 pg    MCHC 33.4 32.3 - 36.5 g/dL    RDW 40.7 35.9 - 50.0 fL    " Platelet Count 302 164 - 446 K/uL    MPV 9.2 9.0 - 12.9 fL    Neutrophils-Polys 51.00 44.00 - 72.00 %    Lymphocytes 31.50 22.00 - 41.00 %    Monocytes 10.50 0.00 - 13.40 %    Eosinophils 5.80 0.00 - 6.90 %    Basophils 1.00 0.00 - 1.80 %    Immature Granulocytes 0.20 0.00 - 0.90 %    Nucleated RBC 0.00 0.00 - 0.20 /100 WBC    Neutrophils (Absolute) 2.53 1.82 - 7.42 K/uL    Lymphs (Absolute) 1.56 1.00 - 4.80 K/uL    Monos (Absolute) 0.52 0.00 - 0.85 K/uL    Eos (Absolute) 0.29 0.00 - 0.51 K/uL    Baso (Absolute) 0.05 0.00 - 0.12 K/uL    Immature Granulocytes (abs) 0.01 0.00 - 0.11 K/uL    NRBC (Absolute) 0.00 K/uL   Complete Metabolic Panel (CMP)   Result Value Ref Range    Sodium 138 135 - 145 mmol/L    Potassium 4.2 3.6 - 5.5 mmol/L    Chloride 104 96 - 112 mmol/L    Co2 24 20 - 33 mmol/L    Anion Gap 10.0 7.0 - 16.0    Glucose 124 (H) 65 - 99 mg/dL    Bun 16 8 - 22 mg/dL    Creatinine 1.10 0.50 - 1.40 mg/dL    Calcium 8.9 8.5 - 10.5 mg/dL    Correct Calcium 9.0 8.5 - 10.5 mg/dL    AST(SGOT) 19 12 - 45 U/L    ALT(SGPT) 20 2 - 50 U/L    Alkaline Phosphatase 92 30 - 99 U/L    Total Bilirubin 0.4 0.1 - 1.5 mg/dL    Albumin 3.9 3.2 - 4.9 g/dL    Total Protein 6.7 6.0 - 8.2 g/dL    Globulin 2.8 1.9 - 3.5 g/dL    A-G Ratio 1.4 g/dL   Troponins NOW   Result Value Ref Range    Troponin T 14 6 - 19 ng/L   ESTIMATED GFR   Result Value Ref Range    GFR (CKD-EPI) 81 >60 mL/min/1.73 m 2   EKG   Result Value Ref Range    Report       [facility] Emergency Dept.    Test Date:  2024  Pt Name:    ROCK MELISSA                Department: ER  MRN:        [id number]                      Room:  Gender:     Male                         Technician: [id number]  :        1970                   Requested By:ER TRIAGE PROTOCOL  Order #:    [id number]                    Reading MD: SHE MAY MD    Measurements  Intervals                                Axis  Rate:       89                           P:           0  ND:         197                          QRS:        230  QRSD:       76                           T:          125  QT:         357  QTc:        435    Interpretive Statements  Atrial-sensed ventricular-paced rhythm  No further analysis attempted due to paced rhythm  Compared to ECG 09/02/2022 14:00:41  Sinus rhythm no longer present  T-wave abnormality no longer present  Possible ischemia no longer present  Electronically Signed On 01- 14:16:37 PST by SHE MAY MD        I have independently interpreted this EKG as documented above  Rhythm strip interpretation-atrial paced rhythm      Interrogated pacemaker.  He has had 26 episodes of nonsustained VT.  No episodes requiring treatment.  No other arrhythmia.    January 18 at 7:57 AM patient had 2-second episode of nonsustained VT.    RADIOLOGY  I have independently interpreted the diagnostic imaging associated with this visit and am waiting the final reading from the radiologist.   My preliminary interpretation is as follows: Chest x-ray without focal infiltrate    COURSE & MEDICAL DECISION MAKING    INITIAL ASSESSMENT, COURSE AND PLAN  Care Narrative: Patient presents with 2 complaints.  First complaint is of pain with swallowing.  He has a history of esophageal meat impaction and erosive esophagitis.  He does not have a current food impaction.  He is able to swallow he has no pain.  He does not have any exertional chest pain shortness of breath or symptoms of ACS.  His EKG does not show ischemia.  His troponin is stable.  He is followed Dr. Ramsey.  Has not had upper endoscopy in quite some time.  I discussed that he should start Prilosec and take this every day.  He needs to take a bland diet.  Second complaint of near syncope.  He has a pacemaker.  He does not have electrolyte disturbance.  No symptoms of pulmonary embolism, GI bleeding, acute neurologic insult.  Interrogated pacemaker.  The patient did in fact have a very short episode of VT that  "did not require treatment.  Consulted Dr. Milligan, cardiologist.  Patient was evaluated in the emergency department.  Dr. Milligan has recommended initiation of carvedilol 12.5 mg twice daily.  He will follow-up in the office with cardiology.  I placed appropriate referrals.  Patient was given precautions to return to the ER if he has any dizziness recurrent lightheadedness, neurologic symptoms, chest pain, shortness of breath or concern        ADDITIONAL PROBLEM LIST  Past Medical History:   Diagnosis Date    Congestive heart failure (HCC)     cardiomyopathy    Dyslipidemia     Fracture of left clavicle     Heart burn     Heart valve disease     hypertrophic cardiomyopathy (inherited)    Hernia     Hiatus hernia syndrome     possible    HTN (hypertension)     no meds    Hypertrophic cardiomyopathy (HCC)     JEREMIAH (obstructive sleep apnea)     \"does not qualify for device\"    Pacemaker     followed by Dr Gillis    Psychiatric problem     Snoring     Syncope 11/19/2012       DISPOSITION AND DISCUSSIONS  I have discussed management of the patient with the following physicians and KATY's: Dr. Macias, cardiology      Escalation of care considered, and ultimately not performed:acute inpatient care management, however at this time, the patient is most appropriate for outpatient management      Prescription drugs considered and/or prescribed: Carvedilol, Prilosec prescribed    FINAL IMPRESSION  1.  Near syncope  2.  Nonsustained ventricular tachycardia  3.  Esophagitis    This dictation was created using voice recognition software. The accuracy of the dictation is limited to the abilities of the software. I expect there may be some errors of grammar and possibly content. The nursing notes were reviewed and certain aspects of this information were incorporated into this note.    Electronically signed by: Daniel Das M.D., 1/18/2024      "

## 2024-01-18 NOTE — ED TRIAGE NOTES
"Chief Complaint   Patient presents with    Near Syncopal     Episode of near syncope earlier today. Everything went black and he caught himself.   Reports not feeling well x a few days. Prior to episode he lost his breath. Reports he's always short of breath but that was worse.   Hx of CHF. Has a pacemaker.      Ambulatory to triage for above. Not currently on any medications.  Last pacemaker check was May 2022.   Protocol ordered.     /79   Pulse 97   Temp 36.4 °C (97.5 °F) (Temporal)   Resp 17   Ht 1.905 m (6' 3\")   Wt (!) 129 kg (284 lb 6.3 oz)   SpO2 99%   BMI 35.55 kg/m²     "

## 2024-01-19 NOTE — CONSULTS
Cardiology Consult Note:    Anahi Milligan M.D.  Date & Time note created:    1/18/2024   4:21 PM     Referring MD:  Dr. Das    Patient ID:   Name:             Trevor Gillis   YOB: 1970  Age:                 53 y.o.  male   MRN:               [id number]                                                             Chief Complaint / Reason for consult:  ICD discharge.    History of Present Illness:    This is a 53 years old man with prior history of hypertrophic cardiomyopathy status post implantable defibrillator.  Patient is not currently on any medications.  He presented to the ER with ICD discharge.  He felt lightheaded at the time.  No syncopal episodes.    He does not have regular follow-up with cardiology.  He last seen Dr. Gillis in 2020.    Review of Systems:      Constitutional: Denies fevers, Denies weight changes  Eyes: Denies changes in vision, no eye pain  Ears/Nose/Throat/Mouth: Denies nasal congestion or sore throat   Cardiovascular: no chest pain, no palpitations   Respiratory: no shortness of breath , Denies cough  Gastrointestinal/Hepatic: Denies abdominal pain, nausea, vomiting, diarrhea, constipation or GI bleeding   Genitourinary: Denies dysuria or frequency  Musculoskeletal/Rheum: Denies  joint pain and swelling   Skin: Denies rash  Neurological: Denies headache, confusion, memory loss or focal weakness/parasthesias  Psychiatric: denies mood disorder   Endocrine: Luli thyroid problems  Heme/Oncology/Lymph Nodes: Denies enlarged lymph nodes, denies brusing or known bleeding disorder  All other systems were reviewed and are negative (AMA/CMS criteria)                Past Medical History:   Past Medical History:   Diagnosis Date    Congestive heart failure (HCC)     cardiomyopathy    Dyslipidemia     Fracture of left clavicle     Heart burn     Heart valve disease     hypertrophic cardiomyopathy (inherited)    Hernia     Hiatus hernia syndrome     possible    HTN  "(hypertension)     no meds    Hypertrophic cardiomyopathy (HCC)     ICD (implantable cardioverter-defibrillator) discharge 01/18/2024    JEREMIAH (obstructive sleep apnea)     \"does not qualify for device\"    Pacemaker     followed by Dr Gillis    Psychiatric problem     Snoring     Syncope 11/19/2012     Active Hospital Problems    Diagnosis     ICD (implantable cardioverter-defibrillator) discharge [Z45.02]     Hypertension [I10]     Hypertrophic cardiomyopathy (HCC) [I42.2]        Past Surgical History:  Past Surgical History:   Procedure Laterality Date    PB REPAIR OF BICEPS TENDON AT ELBOW Right 9/6/2022    Procedure: Right open distal biceps repair;  Surgeon: Nathan Higuera M.D.;  Location: SURGERY [facility];  Service: Orthopedics    TN UPPER GI ENDOSCOPY,REMOV F.B.  8/5/2020    Procedure: GASTROSCOPY, WITH FOREIGN BODY REMOVAL;  Surgeon: Martin Townsend M.D.;  Location: ENDOSCOPY [facility];  Service: Gastroenterology    IRRIGATION & DEBRIDEMENT GENERAL Left 1/16/2017    Procedure: IRRIGATION & DEBRIDEMENT GENERAL-INDEX FINGER;  Surgeon: Kia Aldridge M.D.;  Location: SURGERY [facility];  Service:     INGUINAL HERNIA REPAIR  9/10/2014    Performed by Christie Ott M.D. at SURGERY SAME DAY [facility]    RECOVERY  7/30/2014    Performed by Cath-Recovery Surgery at SURGERY SAME DAY [facility]    OTHER CARDIAC SURGERY  7/25/14    [facility]    RECOVERY  7/25/2014    Performed by Cath-Recovery Surgery at SURGERY SAME DAY [facility]    APPENDECTOMY      TONSILLECTOMY         Hospital Medications:  No current facility-administered medications for this encounter.    Current Outpatient Medications:     carvedilol (COREG) 12.5 MG Tab, Take 1 Tablet by mouth 2 times a day with meals., Disp: 60 Tablet, Rfl: 0    omeprazole (PRILOSEC) 20 MG delayed-release capsule, Take 1 Capsule by mouth every day., Disp: 30 Capsule, Rfl: 0    Current Outpatient Medications:  (Not in a hospital " "admission)      Medication Allergy:  Allergies   Allergen Reactions    Tomato Hives and Vomiting       Family History:  Family History   Problem Relation Age of Onset    Other Mother         Accident    Stroke Father     Heart Attack Father         MI at age 24?    Heart Disease Paternal Grandfather     Hypertension Paternal Grandmother     Diabetes Paternal Grandmother        Social History:  Social History     Socioeconomic History    Marital status: Single     Spouse name: Not on file    Number of children: Not on file    Years of education: Not on file    Highest education level: Not on file   Occupational History    Not on file   Tobacco Use    Smoking status: Some Days     Types: Cigarettes    Smokeless tobacco: Never   Vaping Use    Vaping Use: Never used   Substance and Sexual Activity    Alcohol use: Not Currently     Comment: used to be heavy alcoholism (18 years ago),     Drug use: No    Sexual activity: Not on file   Other Topics Concern    Not on file   Social History Narrative    Not on file     Social Determinants of Health     Financial Resource Strain: Not on file   Food Insecurity: Not on file   Transportation Needs: Not on file   Physical Activity: Not on file   Stress: Not on file   Social Connections: Not on file   Intimate Partner Violence: Not on file   Housing Stability: Not on file         Physical Exam:  Vitals/ General Appearance:   Weight/BMI: Body mass index is 35.55 kg/m².  /72   Pulse 86   Temp 36.4 °C (97.5 °F) (Temporal)   Resp 18   Ht 1.905 m (6' 3\")   Wt (!) 129 kg (284 lb 6.3 oz)   SpO2 93%   Vitals:    01/18/24 1213 01/18/24 1251 01/18/24 1402 01/18/24 1500   BP: 118/79  114/68 128/72   Pulse: 97  85 86   Resp: 17  18 18   Temp: 36.4 °C (97.5 °F)      TempSrc: Temporal      SpO2: 99%  93% 93%   Weight:  (!) 129 kg (284 lb 6.3 oz)     Height:  1.905 m (6' 3\")       Oxygen Therapy:  Pulse Oximetry: 93 %, O2 Delivery Device: None - Room Air    Constitutional:   No acute " distress  HENMT:  Normocephalic, Atraumatic.  Eyes:  EOMI, No discharge.  Neck:  no JVD.  Cardiovascular:  Normal heart rate, Normal rhythm.  Extremitites with intact distal pulses, no cyanosis, or edema.  Lungs:  No respiratory distress.  Abdomen: Soft, No tenderness, No guarding, No rebound.  Skin: No significant rash.  Neurologic: Alert & oriented x 3, No focal deficits noted, cranial nerves II through X are intact.  Psychiatric: Affect normal, Judgment normal, Mood normal.      MDM (Data Review):     Records reviewed and summarized in current documentation    Lab Data Review:  Recent Results (from the past 24 hour(s))   EKG    Collection Time: 24 12:15 PM   Result Value Ref Range    Report       [facility] Emergency Dept.    Test Date:  2024  Pt Name:    ROCK MELISSA                Department: ER  MRN:        [id number]                      Room:  Gender:     Male                         Technician: [id number]  :        1970                   Requested By:ER TRIAGE PROTOCOL  Order #:    [id number]                    Reading MD: SHE MAY MD    Measurements  Intervals                                Axis  Rate:       89                           P:          0  TN:         197                          QRS:        230  QRSD:       76                           T:          125  QT:         357  QTc:        435    Interpretive Statements  Atrial-sensed ventricular-paced rhythm  No further analysis attempted due to paced rhythm  Compared to ECG 2022 14:00:41  Sinus rhythm no longer present  T-wave abnormality no longer present  Possible ischemia no longer present  Electronically Signed On 2024 14:16:37 PST by SHE MAY MD     CBC with Differential    Collection Time: 24 12:58 PM   Result Value Ref Range    WBC 5.0 4.8 - 10.8 K/uL    RBC 5.08 4.70 - 6.10 M/uL    Hemoglobin 13.8 (L) 14.0 - 18.0 g/dL    Hematocrit 41.3 (L) 42.0 - 52.0 %    MCV 81.3 (L) 81.4 -  97.8 fL    MCH 27.2 27.0 - 33.0 pg    MCHC 33.4 32.3 - 36.5 g/dL    RDW 40.7 35.9 - 50.0 fL    Platelet Count 302 164 - 446 K/uL    MPV 9.2 9.0 - 12.9 fL    Neutrophils-Polys 51.00 44.00 - 72.00 %    Lymphocytes 31.50 22.00 - 41.00 %    Monocytes 10.50 0.00 - 13.40 %    Eosinophils 5.80 0.00 - 6.90 %    Basophils 1.00 0.00 - 1.80 %    Immature Granulocytes 0.20 0.00 - 0.90 %    Nucleated RBC 0.00 0.00 - 0.20 /100 WBC    Neutrophils (Absolute) 2.53 1.82 - 7.42 K/uL    Lymphs (Absolute) 1.56 1.00 - 4.80 K/uL    Monos (Absolute) 0.52 0.00 - 0.85 K/uL    Eos (Absolute) 0.29 0.00 - 0.51 K/uL    Baso (Absolute) 0.05 0.00 - 0.12 K/uL    Immature Granulocytes (abs) 0.01 0.00 - 0.11 K/uL    NRBC (Absolute) 0.00 K/uL   Complete Metabolic Panel (CMP)    Collection Time: 01/18/24 12:58 PM   Result Value Ref Range    Sodium 138 135 - 145 mmol/L    Potassium 4.2 3.6 - 5.5 mmol/L    Chloride 104 96 - 112 mmol/L    Co2 24 20 - 33 mmol/L    Anion Gap 10.0 7.0 - 16.0    Glucose 124 (H) 65 - 99 mg/dL    Bun 16 8 - 22 mg/dL    Creatinine 1.10 0.50 - 1.40 mg/dL    Calcium 8.9 8.5 - 10.5 mg/dL    Correct Calcium 9.0 8.5 - 10.5 mg/dL    AST(SGOT) 19 12 - 45 U/L    ALT(SGPT) 20 2 - 50 U/L    Alkaline Phosphatase 92 30 - 99 U/L    Total Bilirubin 0.4 0.1 - 1.5 mg/dL    Albumin 3.9 3.2 - 4.9 g/dL    Total Protein 6.7 6.0 - 8.2 g/dL    Globulin 2.8 1.9 - 3.5 g/dL    A-G Ratio 1.4 g/dL   Troponins NOW    Collection Time: 01/18/24 12:58 PM   Result Value Ref Range    Troponin T 14 6 - 19 ng/L   ESTIMATED GFR    Collection Time: 01/18/24 12:58 PM   Result Value Ref Range    GFR (CKD-EPI) 81 >60 mL/min/1.73 m 2       Imaging/Procedures Review:    Chest Xray:  Reviewed    EKG:   As in HPI.     MDM (Assessment and Plan):     Active Hospital Problems    Diagnosis     ICD (implantable cardioverter-defibrillator) discharge [Z45.02]     Hypertension [I10]     Hypertrophic cardiomyopathy (HCC) [I42.2]        Defibrillator is working well.  Will start  patient on carvedilol 12.5 mg p.o. twice a day.  Okay to be discharged from ER.  Patient is stable.  Will follow patient in outpatient clinic for further optimization.      Anahi Milligan MD.   Cardiology Inpatient Service.  [facility].  [phone].  [place], [place].

## 2024-02-07 ENCOUNTER — TELEPHONE (OUTPATIENT)
Dept: HEALTH INFORMATION MANAGEMENT | Facility: OTHER | Age: 54
End: 2024-02-07
Payer: OTHER MISCELLANEOUS

## 2024-03-04 ENCOUNTER — TELEPHONE (OUTPATIENT)
Dept: CARDIOLOGY | Facility: MEDICAL CENTER | Age: 54
End: 2024-03-04
Payer: OTHER MISCELLANEOUS

## 2024-03-04 NOTE — TELEPHONE ENCOUNTER
Power went down on 3/1 and pt had to be reschedule. Pt did show up for appt on time. I called pt this morning and magdalena appt for 4/5. ADARSH

## 2024-04-05 ENCOUNTER — OFFICE VISIT (OUTPATIENT)
Dept: CARDIOLOGY | Facility: MEDICAL CENTER | Age: 54
End: 2024-04-05
Attending: INTERNAL MEDICINE
Payer: COMMERCIAL

## 2024-04-05 ENCOUNTER — TELEPHONE (OUTPATIENT)
Dept: CARDIOLOGY | Facility: MEDICAL CENTER | Age: 54
End: 2024-04-05
Payer: COMMERCIAL

## 2024-04-05 VITALS
DIASTOLIC BLOOD PRESSURE: 100 MMHG | BODY MASS INDEX: 34.47 KG/M2 | WEIGHT: 277.2 LBS | SYSTOLIC BLOOD PRESSURE: 138 MMHG | HEIGHT: 75 IN | RESPIRATION RATE: 16 BRPM | HEART RATE: 87 BPM | OXYGEN SATURATION: 95 %

## 2024-04-05 DIAGNOSIS — Z95.810 DUAL ICD (IMPLANTABLE CARDIOVERTER-DEFIBRILLATOR) IN PLACE: ICD-10-CM

## 2024-04-05 DIAGNOSIS — Z45.02 ICD (IMPLANTABLE CARDIOVERTER-DEFIBRILLATOR) DISCHARGE: ICD-10-CM

## 2024-04-05 DIAGNOSIS — E78.5 DYSLIPIDEMIA: ICD-10-CM

## 2024-04-05 DIAGNOSIS — R55 NEAR SYNCOPE: ICD-10-CM

## 2024-04-05 DIAGNOSIS — I47.29 NSVT (NONSUSTAINED VENTRICULAR TACHYCARDIA) (HCC): ICD-10-CM

## 2024-04-05 DIAGNOSIS — I42.2 HYPERTROPHIC CARDIOMYOPATHY (HCC): ICD-10-CM

## 2024-04-05 DIAGNOSIS — Z72.0 TOBACCO ABUSE: ICD-10-CM

## 2024-04-05 PROCEDURE — 93283 PRGRMG EVAL IMPLANTABLE DFB: CPT | Mod: 26 | Performed by: INTERNAL MEDICINE

## 2024-04-05 PROCEDURE — 3075F SYST BP GE 130 - 139MM HG: CPT | Performed by: INTERNAL MEDICINE

## 2024-04-05 PROCEDURE — 3080F DIAST BP >= 90 MM HG: CPT | Performed by: INTERNAL MEDICINE

## 2024-04-05 PROCEDURE — 93283 PRGRMG EVAL IMPLANTABLE DFB: CPT | Performed by: INTERNAL MEDICINE

## 2024-04-05 PROCEDURE — 99212 OFFICE O/P EST SF 10 MIN: CPT | Performed by: INTERNAL MEDICINE

## 2024-04-05 PROCEDURE — 99211 OFF/OP EST MAY X REQ PHY/QHP: CPT | Performed by: INTERNAL MEDICINE

## 2024-04-05 PROCEDURE — 99204 OFFICE O/P NEW MOD 45 MIN: CPT | Mod: 25 | Performed by: INTERNAL MEDICINE

## 2024-04-05 RX ORDER — CARVEDILOL 12.5 MG/1
12.5 TABLET ORAL 2 TIMES DAILY WITH MEALS
Qty: 180 TABLET | Refills: 3 | Status: SHIPPED | OUTPATIENT
Start: 2024-04-05

## 2024-04-05 ASSESSMENT — FIBROSIS 4 INDEX: FIB4 SCORE: 0.72

## 2024-04-05 NOTE — PROGRESS NOTES
"Chief Complaint   Patient presents with    Ventricular Tachycardia     F/V Dx: NSVT (nonsustained ventricular tachycardia) ([facility])         Subjective     Trevor Gillis Jr. is a 53 y.o. male who presents today with history of nonsustained VT and hypertrophic cardiomyopathy.  Never got genetics.  Has 1 daughter.  Works full-time.  No shocks from the device.  Here for follow-up.  Previously seen by me in 2020.  No chest pain or shortness of breath.  Recently placed on carvedilol.    Past Medical History:   Diagnosis Date    Congestive heart failure (HCC)     cardiomyopathy    Dyslipidemia     Fracture of left clavicle     Heart burn     Heart valve disease     hypertrophic cardiomyopathy (inherited)    Hernia     Hiatus hernia syndrome     possible    HTN (hypertension)     no meds    Hypertrophic cardiomyopathy (HCC)     ICD (implantable cardioverter-defibrillator) discharge 01/18/2024    JEREMIAH (obstructive sleep apnea)     \"does not qualify for device\"    Pacemaker     followed by Dr Gillis    Psychiatric problem     Snoring     Syncope 11/19/2012     Past Surgical History:   Procedure Laterality Date    PB REPAIR OF BICEPS TENDON AT ELBOW Right 9/6/2022    Procedure: Right open distal biceps repair;  Surgeon: Nathan Higuera M.D.;  Location: SURGERY [facility];  Service: Orthopedics    [place] UPPER GI ENDOSCOPY,REMOV F.B.  8/5/2020    Procedure: GASTROSCOPY, WITH FOREIGN BODY REMOVAL;  Surgeon: Martin Townsend M.D.;  Location: ENDOSCOPY [facility];  Service: Gastroenterology    IRRIGATION & DEBRIDEMENT GENERAL Left 1/16/2017    Procedure: IRRIGATION & DEBRIDEMENT GENERAL-INDEX FINGER;  Surgeon: Kia Aldridge M.D.;  Location: SURGERY [facility];  Service:     INGUINAL HERNIA REPAIR  9/10/2014    Performed by Christie Ott M.D. at SURGERY SAME DAY [facility]    RECOVERY  7/30/2014    Performed by Cath-Recovery Surgery at SURGERY SAME DAY [facility]    OTHER CARDIAC SURGERY  7/25/14    " AICD    RECOVERY  7/25/2014    Performed by Cath-Recovery Surgery at SURGERY SAME DAY [facility]    APPENDECTOMY      TONSILLECTOMY       Family History   Problem Relation Age of Onset    Other Mother         Accident    Stroke Father     Heart Attack Father         MI at age 24?    Heart Disease Paternal Grandfather     Hypertension Paternal Grandmother     Diabetes Paternal Grandmother      Social History     Socioeconomic History    Marital status: Single     Spouse name: Not on file    Number of children: Not on file    Years of education: Not on file    Highest education level: Not on file   Occupational History    Not on file   Tobacco Use    Smoking status: Some Days     Types: Cigarettes    Smokeless tobacco: Never   Vaping Use    Vaping Use: Never used   Substance and Sexual Activity    Alcohol use: Not Currently     Comment: used to be heavy alcoholism (18 years ago),     Drug use: No    Sexual activity: Not on file   Other Topics Concern    Not on file   Social History Narrative    Not on file     Social Determinants of Health     Financial Resource Strain: Not on file   Food Insecurity: Not on file   Transportation Needs: Not on file   Physical Activity: Not on file   Stress: Not on file   Social Connections: Not on file   Intimate Partner Violence: Not on file   Housing Stability: Not on file     Allergies   Allergen Reactions    Tomato Hives and Vomiting     Outpatient Encounter Medications as of 4/5/2024   Medication Sig Dispense Refill    carvedilol (COREG) 12.5 MG Tab Take 1 Tablet by mouth 2 times a day with meals. 180 Tablet 3    omeprazole (PRILOSEC) 20 MG delayed-release capsule Take 1 Capsule by mouth every day. 30 Capsule 0    [DISCONTINUED] carvedilol (COREG) 12.5 MG Tab Take 1 Tablet by mouth 2 times a day with meals. 60 Tablet 0     No facility-administered encounter medications on file as of 4/5/2024.     ROS           Objective     BP (!) 138/100 (BP Location: Left arm, Patient  "Position: Sitting, BP Cuff Size: Adult)   Pulse 87   Resp 16   Ht 1.905 m (6' 3\")   Wt (!) 126 kg (277 lb 3.2 oz)   SpO2 95%   BMI 34.65 kg/m²     Physical Exam  Constitutional:       Appearance: He is well-developed.   HENT:      Head: Normocephalic and atraumatic.   Cardiovascular:      Rate and Rhythm: Normal rate and regular rhythm.      Heart sounds: No murmur heard.     No friction rub. No gallop.   Pulmonary:      Effort: Pulmonary effort is normal.      Breath sounds: Normal breath sounds.   Abdominal:      Palpations: Abdomen is soft.   Musculoskeletal:         General: Normal range of motion.      Cervical back: Normal range of motion and neck supple.   Skin:     General: Skin is warm and dry.   Neurological:      Mental Status: He is alert and oriented to person, place, and time.   Psychiatric:         Behavior: Behavior normal.         Thought Content: Thought content normal.         Judgment: Judgment normal.                Assessment & Plan     1. Dual ICD (implantable cardioverter-defibrillator) in place        2. NSVT (nonsustained ventricular tachycardia) (HCC)        3. ICD (implantable cardioverter-defibrillator) discharge        4. Tobacco abuse        5. Hypertrophic cardiomyopathy (HCC)        6. Dyslipidemia  Comp Metabolic Panel    Lipid Profile      7. Near syncope  carvedilol (COREG) 12.5 MG Tab          Medical Decision Making: Today's Assessment/Status/Plan:   1.  Hypertrophic cardiomyopathy continue beta-blockers.  2.  Nonsustained VT as above.  3.  AICD normal function.  4.  Referral to general cardiology.  5.  Follow-up with me in 6 months.  6.  Hyperlipidemia check labs.  7.  Discussed genetic testing patient deferred.                     "

## 2024-04-11 ENCOUNTER — TELEPHONE (OUTPATIENT)
Dept: CARDIOLOGY | Facility: MEDICAL CENTER | Age: 54
End: 2024-04-11
Payer: COMMERCIAL

## 2024-04-11 NOTE — TELEPHONE ENCOUNTER
Called patient back  and advised that DS requested we order a home monitor so that if he has any episodes or if there are any potential issues with his device we will get those in the transmissions and can call him with any recommendations. I also confirmed he will not need a phone line in order to set it up but he should have received a manual as well which has the setup instructions in it and that if he has any issues or questions while setting up he is okay to give us a call and we can assist further. Patient understood, had no further questions and was appreciative of call.

## 2024-04-11 NOTE — TELEPHONE ENCOUNTER
Caller: Trevor Gillis Jr.    Topic/issue: Patient has an implanted pacemaker and he just received a device to check the pacemaker from home in the mail. He is unsure if this was sent to him by accident - he is unsure of what to do with this device. Please advise.     Callback Number: [phone]    Thank you,  Sierra BALDERAS

## 2024-08-12 ENCOUNTER — HOSPITAL ENCOUNTER (EMERGENCY)
Facility: MEDICAL CENTER | Age: 54
End: 2024-08-12
Attending: EMERGENCY MEDICINE
Payer: COMMERCIAL

## 2024-08-12 VITALS
BODY MASS INDEX: 33.26 KG/M2 | WEIGHT: 266.1 LBS | RESPIRATION RATE: 16 BRPM | TEMPERATURE: 98 F | HEART RATE: 64 BPM | DIASTOLIC BLOOD PRESSURE: 78 MMHG | OXYGEN SATURATION: 99 % | SYSTOLIC BLOOD PRESSURE: 133 MMHG

## 2024-08-12 DIAGNOSIS — R19.7 DIARRHEA, UNSPECIFIED TYPE: ICD-10-CM

## 2024-08-12 LAB
ALBUMIN SERPL BCP-MCNC: 3.9 G/DL (ref 3.2–4.9)
ALBUMIN/GLOB SERPL: 1.4 G/DL
ALP SERPL-CCNC: 82 U/L (ref 30–99)
ALT SERPL-CCNC: 10 U/L (ref 2–50)
ANION GAP SERPL CALC-SCNC: 11 MMOL/L (ref 7–16)
AST SERPL-CCNC: 13 U/L (ref 12–45)
BASOPHILS # BLD AUTO: 1.1 % (ref 0–1.8)
BASOPHILS # BLD: 0.04 K/UL (ref 0–0.12)
BILIRUB SERPL-MCNC: 0.3 MG/DL (ref 0.1–1.5)
BUN SERPL-MCNC: 19 MG/DL (ref 8–22)
CALCIUM ALBUM COR SERPL-MCNC: 9 MG/DL (ref 8.5–10.5)
CALCIUM SERPL-MCNC: 8.9 MG/DL (ref 8.5–10.5)
CHLORIDE SERPL-SCNC: 109 MMOL/L (ref 96–112)
CO2 SERPL-SCNC: 21 MMOL/L (ref 20–33)
CREAT SERPL-MCNC: 1.02 MG/DL (ref 0.5–1.4)
EOSINOPHIL # BLD AUTO: 0.37 K/UL (ref 0–0.51)
EOSINOPHIL NFR BLD: 10.2 % (ref 0–6.9)
ERYTHROCYTE [DISTWIDTH] IN BLOOD BY AUTOMATED COUNT: 45.1 FL (ref 35.9–50)
GFR SERPLBLD CREATININE-BSD FMLA CKD-EPI: 88 ML/MIN/1.73 M 2
GLOBULIN SER CALC-MCNC: 2.7 G/DL (ref 1.9–3.5)
GLUCOSE SERPL-MCNC: 97 MG/DL (ref 65–99)
HCT VFR BLD AUTO: 38.4 % (ref 42–52)
HGB BLD-MCNC: 12.8 G/DL (ref 14–18)
IMM GRANULOCYTES # BLD AUTO: 0.02 K/UL (ref 0–0.11)
IMM GRANULOCYTES NFR BLD AUTO: 0.6 % (ref 0–0.9)
LYMPHOCYTES # BLD AUTO: 1.11 K/UL (ref 1–4.8)
LYMPHOCYTES NFR BLD: 30.6 % (ref 22–41)
MCH RBC QN AUTO: 26.4 PG (ref 27–33)
MCHC RBC AUTO-ENTMCNC: 33.3 G/DL (ref 32.3–36.5)
MCV RBC AUTO: 79.2 FL (ref 81.4–97.8)
MONOCYTES # BLD AUTO: 0.34 K/UL (ref 0–0.85)
MONOCYTES NFR BLD AUTO: 9.4 % (ref 0–13.4)
NEUTROPHILS # BLD AUTO: 1.75 K/UL (ref 1.82–7.42)
NEUTROPHILS NFR BLD: 48.1 % (ref 44–72)
NRBC # BLD AUTO: 0 K/UL
NRBC BLD-RTO: 0 /100 WBC (ref 0–0.2)
PLATELET # BLD AUTO: 219 K/UL (ref 164–446)
PMV BLD AUTO: 9.5 FL (ref 9–12.9)
POTASSIUM SERPL-SCNC: 4.6 MMOL/L (ref 3.6–5.5)
PROT SERPL-MCNC: 6.6 G/DL (ref 6–8.2)
RBC # BLD AUTO: 4.85 M/UL (ref 4.7–6.1)
SODIUM SERPL-SCNC: 141 MMOL/L (ref 135–145)
WBC # BLD AUTO: 3.6 K/UL (ref 4.8–10.8)

## 2024-08-12 PROCEDURE — 80053 COMPREHEN METABOLIC PANEL: CPT

## 2024-08-12 PROCEDURE — 36415 COLL VENOUS BLD VENIPUNCTURE: CPT

## 2024-08-12 PROCEDURE — 85025 COMPLETE CBC W/AUTO DIFF WBC: CPT

## 2024-08-12 PROCEDURE — 700105 HCHG RX REV CODE 258: Performed by: EMERGENCY MEDICINE

## 2024-08-12 PROCEDURE — 700111 HCHG RX REV CODE 636 W/ 250 OVERRIDE (IP): Mod: JZ | Performed by: EMERGENCY MEDICINE

## 2024-08-12 PROCEDURE — 96374 THER/PROPH/DIAG INJ IV PUSH: CPT

## 2024-08-12 PROCEDURE — 99284 EMERGENCY DEPT VISIT MOD MDM: CPT

## 2024-08-12 RX ORDER — SODIUM CHLORIDE 9 MG/ML
500 INJECTION, SOLUTION INTRAVENOUS ONCE
Status: COMPLETED | OUTPATIENT
Start: 2024-08-12 | End: 2024-08-12

## 2024-08-12 RX ORDER — ONDANSETRON 2 MG/ML
4 INJECTION INTRAMUSCULAR; INTRAVENOUS ONCE
Status: COMPLETED | OUTPATIENT
Start: 2024-08-12 | End: 2024-08-12

## 2024-08-12 RX ADMIN — SODIUM CHLORIDE 500 ML: 9 INJECTION, SOLUTION INTRAVENOUS at 09:03

## 2024-08-12 RX ADMIN — ONDANSETRON 4 MG: 2 INJECTION INTRAMUSCULAR; INTRAVENOUS at 09:02

## 2024-08-12 ASSESSMENT — FIBROSIS 4 INDEX: FIB4 SCORE: 0.73

## 2024-08-12 NOTE — ED NOTES
Pt ambulated to [facility] from the lobby with a steady gait. Pt changed into a gown and placed on the monitor for simple vitals and cardiac monitoring. Agree with triage note. Chart up for ERP.

## 2024-08-12 NOTE — ED NOTES
Pt was discharged Home , Instruction was given and pt was advise to return to ED as needed or if symptoms worsen, Pt A&ox4 ambulatory with no gait instability, No acute distress noted.

## 2024-08-12 NOTE — ED PROVIDER NOTES
"ED Provider Note    CHIEF COMPLAINT  Chief Complaint   Patient presents with    Diarrhea    Fatigue     Pt states hx CHF and has had dehydration and diarrhea.  States feeling fatigue and weak       EXTERNAL RECORDS REVIEWED  Reviewed medication list    HPI/ROS  LIMITATION TO HISTORY   None  OUTSIDE HISTORIAN(S):      Trevor Gillis Jr. is a 54 y.o. male who presents for evaluation of loose nonbloody diarrhea for around a week.  The patient has a known history of CHF.  He is currently noncompliant with any of his medications.  He has previous history of hernia surgery.  He denies any recent antibiotic use or exotic or foreign travel.  He denies hematemesis hematochezia or melena.  He reports mild nausea but no vomiting.  He has no abdominal pain.  He reports that \"everything is going through him.  \"No report of high fevers or chills    PAST MEDICAL HISTORY   has a past medical history of Congestive heart failure (HCC), Dyslipidemia, Fracture of left clavicle, Heart burn, Heart valve disease, Hernia, Hiatus hernia syndrome, HTN (hypertension), Hypertrophic cardiomyopathy (HCC), ICD (implantable cardioverter-defibrillator) discharge (01/18/2024), JEREMIAH (obstructive sleep apnea), Pacemaker, Psychiatric problem, Snoring, and Syncope (11/19/2012).    SURGICAL HISTORY   has a past surgical history that includes tonsillectomy; other cardiac surgery (7/25/14); recovery (7/25/2014); recovery (7/30/2014); inguinal hernia repair (9/10/2014); appendectomy; irrigation & debridement general (Left, 1/16/2017); upper gi endoscopy,remov f.b. (8/5/2020); and repair of biceps tendon at elbow (Right, 9/6/2022).    FAMILY HISTORY  Family History   Problem Relation Age of Onset    Other Mother         Accident    Stroke Father     Heart Attack Father         MI at age 24?    Heart Disease Paternal Grandfather     Hypertension Paternal Grandmother     Diabetes Paternal Grandmother        SOCIAL HISTORY  Social History     Tobacco Use    " Smoking status: Some Days     Types: Cigarettes    Smokeless tobacco: Never   Vaping Use    Vaping status: Never Used   Substance and Sexual Activity    Alcohol use: Not Currently     Comment: used to be heavy alcoholism (18 years ago),     Drug use: No    Sexual activity: Not on file       CURRENT MEDICATIONS  Home Medications    **Home medications have not yet been reviewed for this encounter**         ALLERGIES  Allergies   Allergen Reactions    Tomato Hives and Vomiting       PHYSICAL EXAM  VITAL SIGNS: /87   Pulse 89   Temp 36.7 °C (98 °F) (Oral)   Resp 16   Wt 121 kg (266 lb 1.5 oz)   SpO2 98%   BMI 33.26 kg/m²    Pulse ox interpretation: I interpret this pulse ox as normal.  Constitutional: Alert and oriented x 3, no acute Distress  HEENT: Atraumatic normocephalic, pupils are equal round reactive to light extraocular movements are intact. The nares is clear, external ears are normal, mouth shows moist mucous membranes normal dentition for age  Neck: Supple, no JVD no tracheal deviation  Cardiovascular: Regular rate and rhythm no murmur rub or gallop 2+ pulses peripherally x4  Thorax & Lungs: No respiratory distress, no wheezes rales or rhonchi, No chest tenderness.   GI: Soft nontender nondistended positive bowel sounds, no peritoneal signs no rebound guarding or rigidity  Skin: Warm dry no acute rash or lesion  Musculoskeletal: Moving all extremities with full range and 5 of 5 strength no acute  deformity  Neurologic: Cranial nerves III through XII are grossly intact no sensory deficit no cerebellar dysfunction   Psychiatric: Appropriate affect for situation at this time          EKG/LABS  Results for orders placed or performed during the hospital encounter of 08/12/24   CBC WITH DIFFERENTIAL   Result Value Ref Range    WBC 3.6 (L) 4.8 - 10.8 K/uL    RBC 4.85 4.70 - 6.10 M/uL    Hemoglobin 12.8 (L) 14.0 - 18.0 g/dL    Hematocrit 38.4 (L) 42.0 - 52.0 %    MCV 79.2 (L) 81.4 - 97.8 fL    MCH 26.4  (L) 27.0 - 33.0 pg    MCHC 33.3 32.3 - 36.5 g/dL    RDW 45.1 35.9 - 50.0 fL    Platelet Count 219 164 - 446 K/uL    MPV 9.5 9.0 - 12.9 fL    Neutrophils-Polys 48.10 44.00 - 72.00 %    Lymphocytes 30.60 22.00 - 41.00 %    Monocytes 9.40 0.00 - 13.40 %    Eosinophils 10.20 (H) 0.00 - 6.90 %    Basophils 1.10 0.00 - 1.80 %    Immature Granulocytes 0.60 0.00 - 0.90 %    Nucleated RBC 0.00 0.00 - 0.20 /100 WBC    Neutrophils (Absolute) 1.75 (L) 1.82 - 7.42 K/uL    Lymphs (Absolute) 1.11 1.00 - 4.80 K/uL    Monos (Absolute) 0.34 0.00 - 0.85 K/uL    Eos (Absolute) 0.37 0.00 - 0.51 K/uL    Baso (Absolute) 0.04 0.00 - 0.12 K/uL    Immature Granulocytes (abs) 0.02 0.00 - 0.11 K/uL    NRBC (Absolute) 0.00 K/uL   Comp Metabolic Panel   Result Value Ref Range    Sodium 141 135 - 145 mmol/L    Potassium 4.6 3.6 - 5.5 mmol/L    Chloride 109 96 - 112 mmol/L    Co2 21 20 - 33 mmol/L    Anion Gap 11.0 7.0 - 16.0    Glucose 97 65 - 99 mg/dL    Bun 19 8 - 22 mg/dL    Creatinine 1.02 0.50 - 1.40 mg/dL    Calcium 8.9 8.5 - 10.5 mg/dL    Correct Calcium 9.0 8.5 - 10.5 mg/dL    AST(SGOT) 13 12 - 45 U/L    ALT(SGPT) 10 2 - 50 U/L    Alkaline Phosphatase 82 30 - 99 U/L    Total Bilirubin 0.3 0.1 - 1.5 mg/dL    Albumin 3.9 3.2 - 4.9 g/dL    Total Protein 6.6 6.0 - 8.2 g/dL    Globulin 2.7 1.9 - 3.5 g/dL    A-G Ratio 1.4 g/dL   ESTIMATED GFR   Result Value Ref Range    GFR (CKD-EPI) 88 >60 mL/min/1.73 m 2       I have independently interpreted this EKG    RADIOLOGY/PROCEDURES   None performed  COURSE & MEDICAL DECISION MAKING    ASSESSMENT, COURSE AND PLAN  Care Narrative:     This is a very pleasant otherwise healthy 54-year-old gentleman presents here with nonbloody diarrhea for around 4 to 5 days.  He has no high risk features such as recent or foreign or exotic travel antibiotic use or drinking untreated water.  He has no fever hypotension tachycardia or signs of systemic toxicity.  I performed serial abdominal exams and he had no pain or  leukocytosis to suggest high likelihood of surgical pathology therefore CT scan was not performed.  He was given IV fluids as well as antiemetics and was taking clear liquids.  He feels much better after treatment.  I suspect he could have viral enteritis.  There is not available and cost effective testing for this but counseled him it is most likely viral process.  Antibiotics were considered but not clinically indicated.  I counseled the patient to take a clear liquid diet and return as needed if symptoms progress or worsen    Hydration: Based on the patient's presentation of Dehydration the patient was given IV fluids. IV Hydration was used because oral hydration was not adequate alone. Upon recheck following hydration, the patient was improved.          ADDITIONAL PROBLEMS MANAGED      DISPOSITION AND DISCUSSIONS  I have discussed management of the patient with the following physicians and KATY's: None    Discussion of management with other QHP or appropriate source(s): None    Escalation of care considered, and ultimately not performed: Considered CT scan of the abdomen and pelvis none    Barriers to care at this time, including but not limited to: No PCP    Decision tools and prescription drugs considered including, but not limited to: None    FINAL DIAGNOSIS  1. Diarrhea, unspecified type               Electronically signed by: Josh Rios M.D., 8/12/2024 8:43 AM

## 2024-08-12 NOTE — ED TRIAGE NOTES
Chief Complaint   Patient presents with    Diarrhea    Fatigue     Pt states hx CHF and has had dehydration and diarrhea.  States feeling fatigue and weak

## 2024-08-23 ENCOUNTER — APPOINTMENT (OUTPATIENT)
Dept: LAB | Facility: MEDICAL CENTER | Age: 54
End: 2024-08-23
Payer: COMMERCIAL

## 2024-08-23 ENCOUNTER — OFFICE VISIT (OUTPATIENT)
Dept: CARDIOLOGY | Facility: MEDICAL CENTER | Age: 54
End: 2024-08-23
Attending: INTERNAL MEDICINE
Payer: COMMERCIAL

## 2024-08-23 VITALS
DIASTOLIC BLOOD PRESSURE: 84 MMHG | HEART RATE: 87 BPM | WEIGHT: 269 LBS | OXYGEN SATURATION: 94 % | SYSTOLIC BLOOD PRESSURE: 132 MMHG | BODY MASS INDEX: 33.45 KG/M2 | RESPIRATION RATE: 18 BRPM | HEIGHT: 75 IN

## 2024-08-23 DIAGNOSIS — Z95.810 ICD (IMPLANTABLE CARDIOVERTER-DEFIBRILLATOR) IN PLACE: ICD-10-CM

## 2024-08-23 DIAGNOSIS — I42.1 HOCM (HYPERTROPHIC OBSTRUCTIVE CARDIOMYOPATHY) (HCC): ICD-10-CM

## 2024-08-23 DIAGNOSIS — I47.29 NSVT (NONSUSTAINED VENTRICULAR TACHYCARDIA) (HCC): ICD-10-CM

## 2024-08-23 DIAGNOSIS — Z82.41 FAMILY HISTORY OF SUDDEN CARDIAC DEATH (SCD): ICD-10-CM

## 2024-08-23 PROCEDURE — 99211 OFF/OP EST MAY X REQ PHY/QHP: CPT | Performed by: INTERNAL MEDICINE

## 2024-08-23 RX ORDER — BISOPROLOL FUMARATE 5 MG/1
5 TABLET, FILM COATED ORAL DAILY
Qty: 100 TABLET | Refills: 3 | Status: SHIPPED | OUTPATIENT
Start: 2024-08-23

## 2024-08-23 ASSESSMENT — FIBROSIS 4 INDEX: FIB4 SCORE: 0.98

## 2024-08-23 NOTE — PROGRESS NOTES
"CARDIOLOGY NEW PATIENT CONSULTATION    PCP: Pcp Pt States None    1. HOCM (hypertrophic obstructive cardiomyopathy) (HCC)    2. ICD (implantable cardioverter-defibrillator) in place    3. NSVT (nonsustained ventricular tachycardia) (HCC)    4. FH: sudden cardiac death (SCD)        Trevor Gillis Jr. has class II-III symptoms related to obstructive HCM.  I recommended initiation of bisoprolol and updating an echocardiogram.    We did discuss genetic profiling and is not interested.    Follow up: 3 months      History: Trevor Gillis Jr. is a 54 y.o. male with history of HOCM, NSVT, defibrillator, sleep apnea presenting for assessment of HOCM.  He has been regularly followed by Dr. Gillis.    Trevor reports being diagnosed with this condition at the age of 43.  His daughter has been uninterested in getting tested though several of his cousins have any is found the diagnosis and some of them as well as his grandfather.    He has a dual-chamber ICD in place, 92% ventricular paced, no atrial or ventricular high rate episodes.    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  Frequently gets winded and works around the symptoms.      PE:  /84 (BP Location: Left arm, Patient Position: Sitting, BP Cuff Size: Adult)   Pulse 87   Resp 18   Ht 1.905 m (6' 3\")   Wt 122 kg (269 lb)   SpO2 94%   BMI 33.62 kg/m²   GEN: NAD  CARDIAC: Regular. Normal S1, S2 and augmented by squat to stand   VASCULATURE: Normal carotid upstroke  RESP: Clear to auscultation bilaterally  ABD: Soft, non-tender, non-distended  EXT: No edema  NEURO: No overt focal deficits    Studies interpreted by me: Device interrogation: Frequent ventricular pacing, no high rate episodes-  () Today's E/M visit is associated with medical care services that serve as the continuing focal point for all needed health care services and/or with medical care services that  are part of ongoing care related to a patient's single, serious condition, or a " complex condition: This includes  furnishing services to patients on an ongoing basis that result in care that is personalized  to the patient. The services result in a comprehensive, longitudinal, and continuous  relationship with the patient and involve delivery of team-based care that is accessible, coordinated with other practitioners and providers, and integrated with the broader health  care landscape.     The ASCVD Risk score (Nasima BANKS, et al., 2019) failed to calculate.    Studies  Lab Results   Component Value Date/Time    CHOLSTRLTOT 178 06/05/2020 10:45 AM     (H) 06/05/2020 10:45 AM    HDL 37 (A) 06/05/2020 10:45 AM    TRIGLYCERIDE 107 06/05/2020 10:45 AM       Lab Results   Component Value Date/Time    SODIUM 141 08/12/2024 09:08 AM    POTASSIUM 4.6 08/12/2024 09:08 AM    CHLORIDE 109 08/12/2024 09:08 AM    CO2 21 08/12/2024 09:08 AM    GLUCOSE 97 08/12/2024 09:08 AM    BUN 19 08/12/2024 09:08 AM    CREATININE 1.02 08/12/2024 09:08 AM    BUNCREATRAT 10.0 01/09/2023 09:51 AM      Lab Results   Component Value Date/Time    PROTHROMBTM 13.0 06/04/2020 05:05 PM    INR 0.96 06/04/2020 05:05 PM      Lab Results   Component Value Date/Time    WBC 3.6 (L) 08/12/2024 09:08 AM    RBC 4.85 08/12/2024 09:08 AM    HEMOGLOBIN 12.8 (L) 08/12/2024 09:08 AM    HEMATOCRIT 38.4 (L) 08/12/2024 09:08 AM    MCV 79.2 (L) 08/12/2024 09:08 AM    MCH 26.4 (L) 08/12/2024 09:08 AM    MCHC 33.3 08/12/2024 09:08 AM    MPV 9.5 08/12/2024 09:08 AM    NEUTSPOLYS 48.10 08/12/2024 09:08 AM    LYMPHOCYTES 30.60 08/12/2024 09:08 AM    MONOCYTES 9.40 08/12/2024 09:08 AM    EOSINOPHILS 10.20 (H) 08/12/2024 09:08 AM    BASOPHILS 1.10 08/12/2024 09:08 AM        Past Medical History:   Diagnosis Date    Congestive heart failure (HCC)     cardiomyopathy    Dyslipidemia     Fracture of left clavicle     Heart burn     Heart valve disease     hypertrophic cardiomyopathy (inherited)    Hernia     Hiatus hernia syndrome     possible    HTN  "(hypertension)     no meds    Hypertrophic cardiomyopathy (HCC)     ICD (implantable cardioverter-defibrillator) discharge 01/18/2024    JEREMIAH (obstructive sleep apnea)     \"does not qualify for device\"    Pacemaker     followed by Dr Gillis    Psychiatric problem     Snoring     Syncope 11/19/2012     Past Surgical History:   Procedure Laterality Date    PB REPAIR OF BICEPS TENDON AT ELBOW Right 9/6/2022    Procedure: Right open distal biceps repair;  Surgeon: Nathan Higuera M.D.;  Location: SURGERY [facility];  Service: Orthopedics    [place] UPPER GI ENDOSCOPY,REMOV F.B.  8/5/2020    Procedure: GASTROSCOPY, WITH FOREIGN BODY REMOVAL;  Surgeon: Martin Townsend M.D.;  Location: ENDOSCOPY [facility];  Service: Gastroenterology    IRRIGATION & DEBRIDEMENT GENERAL Left 1/16/2017    Procedure: IRRIGATION & DEBRIDEMENT GENERAL-INDEX FINGER;  Surgeon: Kia Aldridge M.D.;  Location: SURGERY [facility];  Service:     INGUINAL HERNIA REPAIR  9/10/2014    Performed by Christie Ott M.D. at SURGERY SAME DAY [facility]    RECOVERY  7/30/2014    Performed by Cath-Recovery Surgery at SURGERY SAME DAY [facility]    OTHER CARDIAC SURGERY  7/25/14    AICD    RECOVERY  7/25/2014    Performed by Cath-Recovery Surgery at SURGERY SAME DAY [facility]    APPENDECTOMY      TONSILLECTOMY       Allergies   Allergen Reactions    Tomato Hives and Vomiting     Outpatient Encounter Medications as of 8/23/2024   Medication Sig Dispense Refill    bisoprolol (ZEBETA) 5 MG Tab Take 1 Tablet by mouth every day. 100 Tablet 3    [DISCONTINUED] carvedilol (COREG) 12.5 MG Tab Take 1 Tablet by mouth 2 times a day with meals. (Patient not taking: Reported on 8/23/2024) 180 Tablet 3    [DISCONTINUED] omeprazole (PRILOSEC) 20 MG delayed-release capsule Take 1 Capsule by mouth every day. (Patient not taking: Reported on 8/23/2024) 30 Capsule 0     No facility-administered encounter medications on file as of 8/23/2024.     Social " History     Socioeconomic History    Marital status: Single     Spouse name: Not on file    Number of children: Not on file    Years of education: Not on file    Highest education level: Not on file   Occupational History    Not on file   Tobacco Use    Smoking status: Some Days     Types: Cigarettes    Smokeless tobacco: Never   Vaping Use    Vaping status: Never Used   Substance and Sexual Activity    Alcohol use: Not Currently     Comment: used to be heavy alcoholism (18 years ago),     Drug use: No    Sexual activity: Not on file   Other Topics Concern    Not on file   Social History Narrative    Not on file     Social Determinants of Health     Financial Resource Strain: Not on file   Food Insecurity: Not on file   Transportation Needs: Not on file   Physical Activity: Not on file   Stress: Not on file   Social Connections: Not on file   Intimate Partner Violence: Not on file   Housing Stability: Not on file     Family History   Problem Relation Age of Onset    Other Mother         Accident    Stroke Father     Heart Attack Father         MI at age 24?    Heart Disease Paternal Grandfather     Hypertension Paternal Grandmother     Diabetes Paternal Grandmother        Chief Complaint   Patient presents with    Follow-Up     F/v Dx: Dual ICD (implantable cardioverter-defibrillator) in place    Supraventricular Tachycardia (SVT)     NSVT (nonsustained ventricular tachycardia) (HCC)     Hypertension       ROS:   10 point review systems is otherwise negative except as per the HPI

## 2024-08-24 ENCOUNTER — HOSPITAL ENCOUNTER (OUTPATIENT)
Dept: LAB | Facility: MEDICAL CENTER | Age: 54
End: 2024-08-24
Attending: INTERNAL MEDICINE
Payer: COMMERCIAL

## 2024-08-24 DIAGNOSIS — E78.5 DYSLIPIDEMIA: ICD-10-CM

## 2024-08-24 LAB
ALBUMIN SERPL BCP-MCNC: 4 G/DL (ref 3.2–4.9)
ALBUMIN/GLOB SERPL: 1.5 G/DL
ALP SERPL-CCNC: 85 U/L (ref 30–99)
ALT SERPL-CCNC: 8 U/L (ref 2–50)
ANION GAP SERPL CALC-SCNC: 10 MMOL/L (ref 7–16)
AST SERPL-CCNC: 14 U/L (ref 12–45)
BILIRUB SERPL-MCNC: 0.3 MG/DL (ref 0.1–1.5)
BUN SERPL-MCNC: 18 MG/DL (ref 8–22)
CALCIUM ALBUM COR SERPL-MCNC: 9.4 MG/DL (ref 8.5–10.5)
CALCIUM SERPL-MCNC: 9.4 MG/DL (ref 8.5–10.5)
CHLORIDE SERPL-SCNC: 105 MMOL/L (ref 96–112)
CHOLEST SERPL-MCNC: 181 MG/DL (ref 100–199)
CO2 SERPL-SCNC: 24 MMOL/L (ref 20–33)
CREAT SERPL-MCNC: 1.04 MG/DL (ref 0.5–1.4)
GFR SERPLBLD CREATININE-BSD FMLA CKD-EPI: 86 ML/MIN/1.73 M 2
GLOBULIN SER CALC-MCNC: 2.7 G/DL (ref 1.9–3.5)
GLUCOSE SERPL-MCNC: 104 MG/DL (ref 65–99)
HDLC SERPL-MCNC: 40 MG/DL
LDLC SERPL CALC-MCNC: 125 MG/DL
POTASSIUM SERPL-SCNC: 4.8 MMOL/L (ref 3.6–5.5)
PROT SERPL-MCNC: 6.7 G/DL (ref 6–8.2)
SODIUM SERPL-SCNC: 139 MMOL/L (ref 135–145)
TRIGL SERPL-MCNC: 78 MG/DL (ref 0–149)

## 2024-08-24 PROCEDURE — 80053 COMPREHEN METABOLIC PANEL: CPT

## 2024-08-24 PROCEDURE — 36415 COLL VENOUS BLD VENIPUNCTURE: CPT

## 2024-08-24 PROCEDURE — 80061 LIPID PANEL: CPT

## 2024-08-26 ENCOUNTER — TELEPHONE (OUTPATIENT)
Dept: CARDIOLOGY | Facility: MEDICAL CENTER | Age: 54
End: 2024-08-26
Payer: COMMERCIAL

## 2024-08-26 NOTE — TELEPHONE ENCOUNTER
Phone Number Called: [phone]    Call outcome: Spoke to patient regarding message below.    Message: Called to inform patient that per DS labs look okay and no changes recommended. Patient verbalized understanding.

## 2024-09-09 ENCOUNTER — OFFICE VISIT (OUTPATIENT)
Dept: URGENT CARE | Facility: PHYSICIAN GROUP | Age: 54
End: 2024-09-09
Payer: COMMERCIAL

## 2024-09-09 VITALS
OXYGEN SATURATION: 96 % | HEART RATE: 83 BPM | HEIGHT: 75 IN | DIASTOLIC BLOOD PRESSURE: 88 MMHG | SYSTOLIC BLOOD PRESSURE: 110 MMHG | BODY MASS INDEX: 32.45 KG/M2 | RESPIRATION RATE: 16 BRPM | WEIGHT: 261 LBS | TEMPERATURE: 97.8 F

## 2024-09-09 DIAGNOSIS — K40.90 RIGHT INGUINAL HERNIA: ICD-10-CM

## 2024-09-09 PROCEDURE — 3074F SYST BP LT 130 MM HG: CPT | Performed by: FAMILY MEDICINE

## 2024-09-09 PROCEDURE — 99213 OFFICE O/P EST LOW 20 MIN: CPT | Performed by: FAMILY MEDICINE

## 2024-09-09 PROCEDURE — 1125F AMNT PAIN NOTED PAIN PRSNT: CPT | Performed by: FAMILY MEDICINE

## 2024-09-09 PROCEDURE — 3079F DIAST BP 80-89 MM HG: CPT | Performed by: FAMILY MEDICINE

## 2024-09-09 ASSESSMENT — ENCOUNTER SYMPTOMS: ABDOMINAL PAIN: 1

## 2024-09-09 ASSESSMENT — FIBROSIS 4 INDEX: FIB4 SCORE: 1.18

## 2024-09-09 ASSESSMENT — PAIN SCALES - GENERAL: PAINLEVEL: 2=MINIMAL-SLIGHT

## 2024-09-09 NOTE — PROGRESS NOTES
"Subjective:   Trevor Gillis Jr. is a 54 y.o. male who presents for Abdominal Pain (RLQ 4 days) and LLQ Pain      Right inguinal bulge, reducible    Abdominal Pain    LLQ Pain        Review of Systems   Gastrointestinal:  Positive for abdominal pain.       Medications, Allergies, and current problem list reviewed today in Epic.     Objective:     /88 (BP Location: Left arm, Patient Position: Sitting, BP Cuff Size: Adult)   Pulse 83   Temp 36.6 °C (97.8 °F) (Temporal)   Resp 16   Ht 1.905 m (6' 3\")   Wt 118 kg (261 lb)   SpO2 96%     Physical Exam  Vitals and nursing note reviewed.   Constitutional:       Appearance: Normal appearance.   HENT:      Head: Normocephalic and atraumatic.      Right Ear: Tympanic membrane normal.      Left Ear: Tympanic membrane normal.      Nose: Nose normal.   Cardiovascular:      Rate and Rhythm: Normal rate and regular rhythm.      Pulses: Normal pulses.      Heart sounds: Normal heart sounds.   Pulmonary:      Effort: Pulmonary effort is normal.      Breath sounds: Normal breath sounds.   Abdominal:      General: Abdomen is flat. Bowel sounds are normal.      Palpations: Abdomen is soft.      Hernia: A hernia is present.   Neurological:      Mental Status: He is alert.         Assessment/Plan:     Diagnosis and associated orders:     1. Right inguinal hernia  Referral to General Surgery         Comments/MDM:              Differential diagnosis, natural history, supportive care, and indications for immediate follow-up discussed.    Advised the patient to follow-up with the primary care physician for recheck, reevaluation, and consideration of further management.    Please note that this dictation was created using voice recognition software. I have made a reasonable attempt to correct obvious errors, but I expect that there are errors of grammar and possibly content that I did not discover before finalizing the note.    This note was electronically signed by Martin PATE" AQUILINO Darby.

## 2024-10-11 ENCOUNTER — OFFICE VISIT (OUTPATIENT)
Dept: CARDIOLOGY | Facility: MEDICAL CENTER | Age: 54
End: 2024-10-11
Attending: INTERNAL MEDICINE
Payer: COMMERCIAL

## 2024-10-11 VITALS
SYSTOLIC BLOOD PRESSURE: 112 MMHG | HEART RATE: 94 BPM | WEIGHT: 263 LBS | BODY MASS INDEX: 32.7 KG/M2 | RESPIRATION RATE: 18 BRPM | OXYGEN SATURATION: 94 % | HEIGHT: 75 IN | DIASTOLIC BLOOD PRESSURE: 74 MMHG

## 2024-10-11 DIAGNOSIS — I47.29 NSVT (NONSUSTAINED VENTRICULAR TACHYCARDIA) (HCC): ICD-10-CM

## 2024-10-11 DIAGNOSIS — I42.1 HOCM (HYPERTROPHIC OBSTRUCTIVE CARDIOMYOPATHY) (HCC): ICD-10-CM

## 2024-10-11 DIAGNOSIS — Z95.810 ICD (IMPLANTABLE CARDIOVERTER-DEFIBRILLATOR) IN PLACE: ICD-10-CM

## 2024-10-11 PROCEDURE — 3074F SYST BP LT 130 MM HG: CPT | Performed by: INTERNAL MEDICINE

## 2024-10-11 PROCEDURE — 99211 OFF/OP EST MAY X REQ PHY/QHP: CPT | Performed by: INTERNAL MEDICINE

## 2024-10-11 PROCEDURE — 93283 PRGRMG EVAL IMPLANTABLE DFB: CPT | Mod: 26 | Performed by: INTERNAL MEDICINE

## 2024-10-11 PROCEDURE — 99214 OFFICE O/P EST MOD 30 MIN: CPT | Performed by: INTERNAL MEDICINE

## 2024-10-11 PROCEDURE — 3078F DIAST BP <80 MM HG: CPT | Performed by: INTERNAL MEDICINE

## 2024-10-11 PROCEDURE — 93283 PRGRMG EVAL IMPLANTABLE DFB: CPT | Performed by: INTERNAL MEDICINE

## 2024-10-11 ASSESSMENT — FIBROSIS 4 INDEX: FIB4 SCORE: 1.18

## 2024-10-14 ENCOUNTER — OFFICE VISIT (OUTPATIENT)
Dept: SURGICAL ONCOLOGY | Facility: MEDICAL CENTER | Age: 54
End: 2024-10-14
Payer: COMMERCIAL

## 2024-10-14 VITALS
BODY MASS INDEX: 32.95 KG/M2 | OXYGEN SATURATION: 93 % | WEIGHT: 265 LBS | TEMPERATURE: 97.7 F | HEART RATE: 85 BPM | SYSTOLIC BLOOD PRESSURE: 130 MMHG | DIASTOLIC BLOOD PRESSURE: 84 MMHG | HEIGHT: 75 IN

## 2024-10-14 DIAGNOSIS — Z87.19 HISTORY OF LEFT INGUINAL HERNIA REPAIR: ICD-10-CM

## 2024-10-14 DIAGNOSIS — Z98.890 HISTORY OF LEFT INGUINAL HERNIA REPAIR: ICD-10-CM

## 2024-10-14 DIAGNOSIS — K40.90 RIGHT INGUINAL HERNIA: ICD-10-CM

## 2024-10-14 PROCEDURE — 3075F SYST BP GE 130 - 139MM HG: CPT | Performed by: SURGERY

## 2024-10-14 PROCEDURE — 99213 OFFICE O/P EST LOW 20 MIN: CPT | Performed by: SURGERY

## 2024-10-14 PROCEDURE — 3079F DIAST BP 80-89 MM HG: CPT | Performed by: SURGERY

## 2024-10-14 ASSESSMENT — FIBROSIS 4 INDEX: FIB4 SCORE: 1.18

## 2024-10-15 ENCOUNTER — TELEPHONE (OUTPATIENT)
Dept: SURGERY | Facility: MEDICAL CENTER | Age: 54
End: 2024-10-15
Payer: COMMERCIAL

## 2024-10-16 ENCOUNTER — TELEPHONE (OUTPATIENT)
Dept: SURGERY | Facility: MEDICAL CENTER | Age: 54
End: 2024-10-16
Payer: COMMERCIAL

## 2024-10-17 ENCOUNTER — TELEPHONE (OUTPATIENT)
Dept: SURGERY | Facility: MEDICAL CENTER | Age: 54
End: 2024-10-17
Payer: COMMERCIAL

## 2024-10-28 ENCOUNTER — HOSPITAL ENCOUNTER (OUTPATIENT)
Facility: MEDICAL CENTER | Age: 54
End: 2024-10-28
Attending: SURGERY | Admitting: SURGERY
Payer: COMMERCIAL

## 2024-12-20 ENCOUNTER — HOSPITAL ENCOUNTER (OUTPATIENT)
Dept: CARDIOLOGY | Facility: MEDICAL CENTER | Age: 54
End: 2024-12-20
Attending: INTERNAL MEDICINE
Payer: COMMERCIAL

## 2024-12-20 DIAGNOSIS — I42.1 HOCM (HYPERTROPHIC OBSTRUCTIVE CARDIOMYOPATHY) (HCC): ICD-10-CM

## 2024-12-20 PROCEDURE — 93306 TTE W/DOPPLER COMPLETE: CPT

## 2024-12-22 LAB
LV EJECT FRACT MOD 2C 99903: 56.33
LV EJECT FRACT MOD 4C 99902: 74.18
LV EJECT FRACT MOD BP 99901: 66.96

## 2024-12-22 PROCEDURE — 93306 TTE W/DOPPLER COMPLETE: CPT | Mod: 26 | Performed by: INTERNAL MEDICINE

## 2024-12-23 ENCOUNTER — TELEPHONE (OUTPATIENT)
Dept: CARDIOLOGY | Facility: MEDICAL CENTER | Age: 54
End: 2024-12-23
Payer: COMMERCIAL

## 2024-12-23 NOTE — TELEPHONE ENCOUNTER
Phone Number Called: [phone]    Call outcome: Did not leave a detailed message. Requested patient to call back.

## 2024-12-24 NOTE — TELEPHONE ENCOUNTER
Phone Number Called: [phone]    Call outcome: Did not leave a detailed message. Requested patient to call back.

## 2025-01-06 NOTE — ED NOTES
"Physical Therapy Treatment    Patient Name: Kali Montelongo  MRN: [id number]  Today's Date: 1/6/2025  Time Calculation  Start Time: 1301  Stop Time: 1345  Time Calculation (min): 44 min  PT Therapeutic Procedures Time Entry  Therapeutic Exercise Time Entry: 42    Insurance:  Visit number: 3 of 10  Authorization info: 12/16/24:  EVAL ONLY - [employer] MEDICARE - AUTH THRU [employer] / $4500 OOP not met / $40 COPAY / AVAILITY [id number] / ds    Insurance Type: Payor: [employer] MEDICARE / Plan: [employer] GOLD CHOICE / Product Type: *No Product type* /     Current Problem   1. Bilateral shoulder pain, unspecified chronicity  Follow Up In Physical Therapy      2. Bilateral shoulder bursitis  Follow Up In Physical Therapy      3. Primary osteoarthritis of both shoulders  Follow Up In Physical Therapy          Subjective   General   Reason for Referral: Bilateral shoulder pain M25.511, M25.512     Relevant Orders    Follow Up In Physical Therapy    Bilateral shoulder bursitis M75.51, M75.52    Relevant Orders    Follow Up In Physical Therapy    Primary osteoarthritis of both shoulders M19.011, M19.012  Referred By: Jose C Noble MD  General Comment: Pt reportsincreased pain after last visit, did not perform HEP.  Precautions:  Precautions  Precautions Comment: None  Pain   0-10 (Numeric) Pain Score: 5 - Moderate pain  Pain Type: Chronic pain  Pain Location: Shoulder  Pain Orientation: Right, Left  Pain Descriptors: Aching  Post Treatment Pain Level 5/10    Objective   AROM R shoulder flex 154, abduction 138  L shoulder flex 140, abduction 119    Treatments:  Therapeutic Exercise:  Therapeutic Exercise  Therapeutic Exercise Performed: Yes  Therapeutic Exercise Activity 1: UBE BWD/FWD 2' each  Therapeutic Exercise Activity 2: Pulleys flexion 5\" hold x20 L/R each  Therapeutic Exercise Activity 3: Scap retraction 5\" hold 2x10  Therapeutic Exercise Activity 4: Rows L3 GTB 2x10  Therapeutic Exercise Activity 5: Sh extension L3 GTB " Pt ambulated to Blue 19 from the lobby with a steady gait. Pt changed into a gown and placed on the monitor. Agree with triage note. Chart up for ERP.      "2x10  Therapeutic Exercise Activity 6: Supine AAROM shoulder flexion with wand 2x10  Therapeutic Exercise Activity 7: Sideslying ER 2x10 L/R each  Therapeutic Exercise Activity 8: Wall slides 2x10 L/R each  Therapeutic Exercise Activity 9: Supine horizontal abduction, OTB, 2x10  Therapeutic Exercise Activity 10: Upper trap [id number] 20\"x3 L/R each      Assessment   Assessment:   PT Assessment  PT Assessment Results:  (abnormal or restricted ROM, activity intolerance, impaired physical strength, lacks appropriate home exercise program and pain with function)  Rehab Prognosis: Fair  Evaluation/Treatment Tolerance: Patient tolerated treatment well    Plan:   OP PT Plan  Treatment/Interventions:  (body mechanics training, flexibility, functional ROM exercises, home exercise program, postural training, strengthening, stretching and therapeutic activities  Frequency: 2x week)  PT Plan: Skilled PT  PT Frequency: 2 times per week  Duration: 5 weeks  Number of Treatments Authorized: 10  Rehab Potential: Fair  Plan of Care Agreement: Patient    OP EDUCATION:  Outpatient Education  Individual(s) Educated: Patient  Education Provided: Anatomy, Body Mechanics  Patient/Caregiver Demonstrated Understanding: yes  Patient Response to Education: Patient/Caregiver Verbalized Understanding of Information, Patient/Caregiver Performed Return Demonstration of Exercises/Activities, Patient/Caregiver Asked Appropriate Questions    Goals:    SHORT TERM GOALS:  Patient will report decrease in pain from **/10 to </= **/10 to improve quality of life.   Patient will improve CERV/UE AROM to WFL in order to improve Cerv/Shldr/Scap functional mobility.  Patient will demonstrate OH reaching/lifting/behind back x 10 without compensation to demonstrate improved UE strength and self care independence  Patient will improve UE functional score to 40-50%  of normal limits (affected vs unaffected)  Patient independent with prescribed HEP  Pt Education - " Independent postural and  awareness and joint protection program  LONG TERM GOALS:  Patient will be independent with HEP to promote self management of condition  Patient will improve WNL AROM to within 90% pre injury/pre surgery status in order to improve control and regain functional mobility without restrictions.  Patient will perform self care, return to work/hobbies without restriction to demonstrate improved UE strength.   Patient will improve UE functional score to 60-70%  of normal limits (affected vs unaffected) - Functional Outcome Tool  Functional Level - reported % (hobbies/work/recreation)

## 2025-03-04 ENCOUNTER — HOSPITAL ENCOUNTER (EMERGENCY)
Facility: MEDICAL CENTER | Age: 55
End: 2025-03-04
Attending: EMERGENCY MEDICINE
Payer: COMMERCIAL

## 2025-03-04 VITALS
SYSTOLIC BLOOD PRESSURE: 110 MMHG | TEMPERATURE: 97.2 F | HEART RATE: 79 BPM | BODY MASS INDEX: 31.39 KG/M2 | DIASTOLIC BLOOD PRESSURE: 74 MMHG | HEIGHT: 75 IN | OXYGEN SATURATION: 95 % | WEIGHT: 252.43 LBS | RESPIRATION RATE: 13 BRPM

## 2025-03-04 DIAGNOSIS — E86.0 DEHYDRATION: ICD-10-CM

## 2025-03-04 DIAGNOSIS — J11.1 INFLUENZA: ICD-10-CM

## 2025-03-04 LAB
ALBUMIN SERPL BCP-MCNC: 4.2 G/DL (ref 3.2–4.9)
ALBUMIN/GLOB SERPL: 1.2 G/DL
ALP SERPL-CCNC: 95 U/L (ref 30–99)
ALT SERPL-CCNC: 13 U/L (ref 2–50)
ANION GAP SERPL CALC-SCNC: 13 MMOL/L (ref 7–16)
AST SERPL-CCNC: 18 U/L (ref 12–45)
BASOPHILS # BLD AUTO: 0.8 % (ref 0–1.8)
BASOPHILS # BLD: 0.02 K/UL (ref 0–0.12)
BILIRUB SERPL-MCNC: 0.6 MG/DL (ref 0.1–1.5)
BUN SERPL-MCNC: 27 MG/DL (ref 8–22)
CALCIUM ALBUM COR SERPL-MCNC: 8.9 MG/DL (ref 8.5–10.5)
CALCIUM SERPL-MCNC: 9.1 MG/DL (ref 8.5–10.5)
CHLORIDE SERPL-SCNC: 99 MMOL/L (ref 96–112)
CO2 SERPL-SCNC: 24 MMOL/L (ref 20–33)
CREAT SERPL-MCNC: 1.5 MG/DL (ref 0.5–1.4)
EKG IMPRESSION: NORMAL
EOSINOPHIL # BLD AUTO: 0.04 K/UL (ref 0–0.51)
EOSINOPHIL NFR BLD: 1.6 % (ref 0–6.9)
ERYTHROCYTE [DISTWIDTH] IN BLOOD BY AUTOMATED COUNT: 42.5 FL (ref 35.9–50)
FLUAV RNA SPEC QL NAA+PROBE: POSITIVE
FLUBV RNA SPEC QL NAA+PROBE: NEGATIVE
GFR SERPLBLD CREATININE-BSD FMLA CKD-EPI: 55 ML/MIN/1.73 M 2
GLOBULIN SER CALC-MCNC: 3.4 G/DL (ref 1.9–3.5)
GLUCOSE SERPL-MCNC: 180 MG/DL (ref 65–99)
HCT VFR BLD AUTO: 48.8 % (ref 42–52)
HGB BLD-MCNC: 15.8 G/DL (ref 14–18)
IMM GRANULOCYTES # BLD AUTO: 0.01 K/UL (ref 0–0.11)
IMM GRANULOCYTES NFR BLD AUTO: 0.4 % (ref 0–0.9)
LYMPHOCYTES # BLD AUTO: 0.91 K/UL (ref 1–4.8)
LYMPHOCYTES NFR BLD: 35.4 % (ref 22–41)
MCH RBC QN AUTO: 26.3 PG (ref 27–33)
MCHC RBC AUTO-ENTMCNC: 32.4 G/DL (ref 32.3–36.5)
MCV RBC AUTO: 81.3 FL (ref 81.4–97.8)
MONOCYTES # BLD AUTO: 0.35 K/UL (ref 0–0.85)
MONOCYTES NFR BLD AUTO: 13.6 % (ref 0–13.4)
NEUTROPHILS # BLD AUTO: 1.24 K/UL (ref 1.82–7.42)
NEUTROPHILS NFR BLD: 48.2 % (ref 44–72)
NRBC # BLD AUTO: 0 K/UL
NRBC BLD-RTO: 0 /100 WBC (ref 0–0.2)
PLATELET # BLD AUTO: 258 K/UL (ref 164–446)
PMV BLD AUTO: 9.4 FL (ref 9–12.9)
POTASSIUM SERPL-SCNC: 4.4 MMOL/L (ref 3.6–5.5)
PROT SERPL-MCNC: 7.6 G/DL (ref 6–8.2)
RBC # BLD AUTO: 6 M/UL (ref 4.7–6.1)
RSV RNA SPEC QL NAA+PROBE: NEGATIVE
SARS-COV-2 RNA RESP QL NAA+PROBE: NOTDETECTED
SODIUM SERPL-SCNC: 136 MMOL/L (ref 135–145)
WBC # BLD AUTO: 2.6 K/UL (ref 4.8–10.8)

## 2025-03-04 PROCEDURE — 93005 ELECTROCARDIOGRAM TRACING: CPT | Mod: TC | Performed by: EMERGENCY MEDICINE

## 2025-03-04 PROCEDURE — 700105 HCHG RX REV CODE 258: Performed by: EMERGENCY MEDICINE

## 2025-03-04 PROCEDURE — 36415 COLL VENOUS BLD VENIPUNCTURE: CPT

## 2025-03-04 PROCEDURE — 99284 EMERGENCY DEPT VISIT MOD MDM: CPT

## 2025-03-04 PROCEDURE — 0241U HCHG SARS-COV-2 COVID-19 NFCT DS RESP RNA 4 TRGT ED POC: CPT

## 2025-03-04 PROCEDURE — 85025 COMPLETE CBC W/AUTO DIFF WBC: CPT

## 2025-03-04 PROCEDURE — 80053 COMPREHEN METABOLIC PANEL: CPT

## 2025-03-04 RX ORDER — SODIUM CHLORIDE 9 MG/ML
500 INJECTION, SOLUTION INTRAVENOUS ONCE
Status: COMPLETED | OUTPATIENT
Start: 2025-03-04 | End: 2025-03-04

## 2025-03-04 RX ORDER — ONDANSETRON 4 MG/1
4 TABLET, ORALLY DISINTEGRATING ORAL EVERY 6 HOURS PRN
Qty: 10 TABLET | Refills: 0 | Status: SHIPPED | OUTPATIENT
Start: 2025-03-04

## 2025-03-04 RX ADMIN — SODIUM CHLORIDE 500 ML: 9 INJECTION, SOLUTION INTRAVENOUS at 06:46

## 2025-03-04 ASSESSMENT — FIBROSIS 4 INDEX: FIB4 SCORE: 1.18

## 2025-03-04 NOTE — ED TRIAGE NOTES
"Chief Complaint   Patient presents with    Dehydration     Px says he has felt bad since Friday after work. Px feels he is dehydrated; says his urine looks \"really dark\". C/o headache.   Px has pacemaker and ICD       55 yo male to triage for above complaint. Ambulatory. Covid/flu obtained in triage.     Pt is alert and oriented, speaking in full sentences, follows commands and responds appropriately to questions.     Patient placed back in lobby and educated on triage process. Asked to inform RN of any changes.    BP 95/79   Pulse 97   Temp 35.8 °C (96.5 °F) (Temporal)   Resp 18   Ht 1.905 m (6' 3\")   Wt 115 kg (252 lb 6.8 oz)   SpO2 98%   BMI 31.55 kg/m²     "

## 2025-03-04 NOTE — DISCHARGE INSTRUCTIONS
Take the Zofran as needed for your appetite.  Make sure you are drinking plenty of fluids.  I would hold off on taking your bisoprolol for the next 5 days while you are fighting this infection.  If your symptoms continue to worsen despite this please return to the emergency department.

## 2025-03-04 NOTE — ED NOTES
Bedside report to Melody Canales RN. Patient on RA. VSS. NAD. Patient denies needs at this time.

## 2025-03-04 NOTE — ED PROVIDER NOTES
"ED Provider Note    CHIEF COMPLAINT  Chief Complaint   Patient presents with    Dehydration     Px says he has felt bad since Friday after work. Px feels he is dehydrated; says his urine looks \"really dark\". C/o headache.   Px has pacemaker and ICD       EXTERNAL RECORDS REVIEWED  10/11/2024 visit to cardiology, patient with history of nonsustained V. tach and therefore has an ICD in place.  Patient also with a history of hokum    HPI/ROS      Trevor Gillis Jr. is a 54 y.o. male who presents for concern that he may be dehydrated.  Patient states that he has simply not felt well over the last few days.  He reports that when he stands up he feels dizzy, but denies any significant difficulty with ambulating.  Patient denies any focal weakness or numbness.  He denies any associated neck pain.  Patient denies any significant shortness of breath on exertion.  He reports an occasional mild cough, and a mild frontal headache.  He denies any significant orthopnea or lower extremity edema.  Patient does have a history of heart failure and has not AICD in place with a known history of hypertrophic cardiomyopathy.  Patient denies any associated fevers or chills.  He denies any associated chest pain or abdominal pain.  He denies any dysuria urgency or frequency.  Patient reports some mild bodyaches without significant focality.  Patient takes bisoprolol.  He denies any other medications.  She denies any recent trauma.  Patient denies any associated diaphoresis.  She reports a poor appetite over the last 4 days but denies kandi nausea or vomiting.    PAST MEDICAL HISTORY   has a past medical history of Congestive heart failure (HCC), Dyslipidemia, Fracture of left clavicle, Heart burn, Heart valve disease, Hernia, Hiatus hernia syndrome, HTN (hypertension), Hypertrophic cardiomyopathy (HCC), ICD (implantable cardioverter-defibrillator) discharge (01/18/2024), JEREMIAH (obstructive sleep apnea), Pacemaker, Psychiatric problem, " "Snoring, and Syncope (11/19/2012).    SURGICAL HISTORY   has a past surgical history that includes tonsillectomy; other cardiac surgery (7/25/14); recovery (7/25/2014); recovery (7/30/2014); inguinal hernia repair (9/10/2014); appendectomy; irrigation & debridement general (Left, 1/16/2017); upper gi endoscopy,remov f.b. (8/5/2020); and repair of biceps tendon at elbow (Right, 9/6/2022).    FAMILY HISTORY  Family History   Problem Relation Age of Onset    Other Mother         Accident    Stroke Father     Heart Attack Father         MI at age 24?    Heart Disease Paternal Grandfather     Hypertension Paternal Grandmother     Diabetes Paternal Grandmother        SOCIAL HISTORY  Social History     Tobacco Use    Smoking status: Some Days     Types: Cigarettes    Smokeless tobacco: Never   Vaping Use    Vaping status: Never Used   Substance and Sexual Activity    Alcohol use: Not Currently     Comment: used to be heavy alcoholism (18 years ago),     Drug use: No    Sexual activity: Yes       CURRENT MEDICATIONS  Home Medications       Reviewed by Arlene Barrios R.N. (Registered Nurse) on 03/04/25 at 0551  Med List Status: Partial     Medication Last Dose Status   bisoprolol (ZEBETA) 5 MG Tab  Active                    ALLERGIES  Allergies   Allergen Reactions    Tomato Hives and Vomiting       PHYSICAL EXAM  VITAL SIGNS: /74   Pulse 79   Temp 36.2 °C (97.2 °F) (Temporal)   Resp 13   Ht 1.905 m (6' 3\")   Wt 115 kg (252 lb 6.8 oz)   SpO2 95%   BMI 31.55 kg/m²    Physical Exam  Constitutional:       Appearance: Normal appearance.   HENT:      Head: Normocephalic.      Right Ear: Tympanic membrane normal.      Left Ear: Tympanic membrane normal.      Nose: Nose normal.      Mouth/Throat:      Mouth: Mucous membranes are moist.   Eyes:      Extraocular Movements: Extraocular movements intact.      Pupils: Pupils are equal, round, and reactive to light.   Cardiovascular:      Rate and Rhythm: Normal rate and " regular rhythm.   Pulmonary:      Effort: Pulmonary effort is normal. No respiratory distress.      Breath sounds: Normal breath sounds. No stridor. No wheezing or rales.   Chest:      Chest wall: No tenderness.   Abdominal:      General: Abdomen is flat. There is no distension.      Palpations: Abdomen is soft. There is no mass.      Tenderness: There is no abdominal tenderness.   Musculoskeletal:      Cervical back: Normal range of motion.   Skin:     General: Skin is warm.      Capillary Refill: Capillary refill takes less than 2 seconds.   Neurological:      General: No focal deficit present.      Mental Status: He is alert and oriented to person, place, and time.      Comments: Distal and proximal strength 5/5 in upper and lower extremities. Normal gait. No dysmetria. No sensation deficits. No visual field deficits. Cranial nerves intact.        Psychiatric:         Mood and Affect: Mood normal.           EKG/LABS  Results for orders placed or performed during the hospital encounter of 03/04/25   POC CoV-2, FLU A/B, RSV by PCR    Collection Time: 03/04/25  6:00 AM   Result Value Ref Range    POC Influenza A RNA, PCR POSITIVE (A) Negative    POC Influenza B RNA, PCR Negative Negative    POC RSV, by PCR Negative Negative    POC SARS-CoV-2, PCR NotDetected NotDetected   CBC WITH DIFFERENTIAL    Collection Time: 03/04/25  6:20 AM   Result Value Ref Range    WBC 2.6 (L) 4.8 - 10.8 K/uL    RBC 6.00 4.70 - 6.10 M/uL    Hemoglobin 15.8 14.0 - 18.0 g/dL    Hematocrit 48.8 42.0 - 52.0 %    MCV 81.3 (L) 81.4 - 97.8 fL    MCH 26.3 (L) 27.0 - 33.0 pg    MCHC 32.4 32.3 - 36.5 g/dL    RDW 42.5 35.9 - 50.0 fL    Platelet Count 258 164 - 446 K/uL    MPV 9.4 9.0 - 12.9 fL    Neutrophils-Polys 48.20 44.00 - 72.00 %    Lymphocytes 35.40 22.00 - 41.00 %    Monocytes 13.60 (H) 0.00 - 13.40 %    Eosinophils 1.60 0.00 - 6.90 %    Basophils 0.80 0.00 - 1.80 %    Immature Granulocytes 0.40 0.00 - 0.90 %    Nucleated RBC 0.00 0.00 - 0.20  /100 WBC    Neutrophils (Absolute) 1.24 (L) 1.82 - 7.42 K/uL    Lymphs (Absolute) 0.91 (L) 1.00 - 4.80 K/uL    Monos (Absolute) 0.35 0.00 - 0.85 K/uL    Eos (Absolute) 0.04 0.00 - 0.51 K/uL    Baso (Absolute) 0.02 0.00 - 0.12 K/uL    Immature Granulocytes (abs) 0.01 0.00 - 0.11 K/uL    NRBC (Absolute) 0.00 K/uL   CMP    Collection Time: 25  6:20 AM   Result Value Ref Range    Sodium 136 135 - 145 mmol/L    Potassium 4.4 3.6 - 5.5 mmol/L    Chloride 99 96 - 112 mmol/L    Co2 24 20 - 33 mmol/L    Anion Gap 13.0 7.0 - 16.0    Glucose 180 (H) 65 - 99 mg/dL    Bun 27 (H) 8 - 22 mg/dL    Creatinine 1.50 (H) 0.50 - 1.40 mg/dL    Calcium 9.1 8.5 - 10.5 mg/dL    Correct Calcium 8.9 8.5 - 10.5 mg/dL    AST(SGOT) 18 12 - 45 U/L    ALT(SGPT) 13 2 - 50 U/L    Alkaline Phosphatase 95 30 - 99 U/L    Total Bilirubin 0.6 0.1 - 1.5 mg/dL    Albumin 4.2 3.2 - 4.9 g/dL    Total Protein 7.6 6.0 - 8.2 g/dL    Globulin 3.4 1.9 - 3.5 g/dL    A-G Ratio 1.2 g/dL   ESTIMATED GFR    Collection Time: 25  6:20 AM   Result Value Ref Range    GFR (CKD-EPI) 55 (A) >60 mL/min/1.73 m 2   EKG    Collection Time: 25  1:28 PM   Result Value Ref Range    Report       [facility] Emergency Dept.    Test Date:  2025  Pt Name:    ROCK MELISSA                Department: ER  MRN:        [id number]                      Room:       [facility]  Gender:     Male                         Technician: [id number]  :        1970                   Requested By:TONG KWONG  Order #:    [id number]                    Reading MD: Joel Boyce MD    Measurements  Intervals                                Axis  Rate:       87                           P:          62  ID:         [id number]                          QRS:        238  QRSD:       84                           T:          0  QT:         476  QTc:        573    Interpretive Statements  EKG is normal sinus rhythm, 2 inversions in the anterior leads, V3 T wave  inversions chronic,  normal axis normal intervals  Electronically Signed On 03- 13:28:08 PST by Joel Boyce MD             COURSE & MEDICAL DECISION MAKING    ASSESSMENT, COURSE AND PLAN  Care Narrative: Very well-appearing patient here with reassuring neurologic exam.  He has some dizziness upon standing is mildly hypotensive here.  Will check basic labs to ensure he has not developed a kidney injury or is severely anemic.  He is on a beta-blocker, and certainly may be mildly dehydrated, and the beta-blocker might be leading to some mild orthostasis.  Patient with possible viral syndrome given the sequelae of symptoms and will send, COVID flu and RSV, though certainly alternative viral etiology is possible here.  Will interrogate patient's device given his history of HCM though my suspicion that this is a malignant arrhythmia or serious complication of his hypertrophic cardiomyopathy is very low.  Will give gentle fluids this patient does not appear volume overloaded at this point.  Patient is influenza positive, this would explain his symptoms today.  Patient without any associated hypoxia, he has had symptoms for greater than 48 hours, Tamiflu of low utility at this point.  Will support patient's symptoms with Zofran.  I discussed the case with rep from [employer], there have been no episodes within the last 48 hours to suggest patient's dizziness, no evidence of V. tach              Discussion of management with other [facility] or appropriate source(s): Case discussed with representative from [employer], see above for conversation      Barriers to care at this time, including but not limited to: Patient does not have established PCP.         FINAL DIAGNOSIS  1. Dehydration  ondansetron (ZOFRAN ODT) 4 MG TABLET DISPERSIBLE      2. Influenza

## 2025-03-04 NOTE — ED NOTES
Rounded on pt. Pt is in bed, connected to monitors, with call light within reach. Pacemaker interrogated and pacemaker data is sent off via fax to pacemaker representative.

## 2025-03-04 NOTE — ED NOTES
Pt has discharge orders. Pt educated on discharge instructions and new prescriptions.  Pt verbalizes understanding and agrees with care plan. PIV removed. Pt ambulated to lobby with and is going home.

## 2025-03-04 NOTE — ED NOTES
Bedside report received from off going RN Maximino assumed care of patient.  POC discussed with patient. Call light within reach, all needs addressed at this time.       Fall risk interventions in place: Not Applicable (all applicable per [place] Fall risk assessment)   Continuous monitoring: Cardiac Leads, Pulse Ox, or Blood Pressure  IVF/IV medications: Infusion per MAR (List Med(s)) NS.  Oxygen: Room Air  Bedside sitter: Not Applicable   Isolation: Isolation precautions for Droplet (Isolation order)

## 2025-05-05 ENCOUNTER — APPOINTMENT (OUTPATIENT)
Dept: RADIOLOGY | Facility: MEDICAL CENTER | Age: 55
DRG: 481 | End: 2025-05-05
Attending: STUDENT IN AN ORGANIZED HEALTH CARE EDUCATION/TRAINING PROGRAM
Payer: OTHER MISCELLANEOUS

## 2025-05-05 ENCOUNTER — HOSPITAL ENCOUNTER (INPATIENT)
Facility: MEDICAL CENTER | Age: 55
LOS: 3 days | DRG: 481 | End: 2025-05-08
Attending: STUDENT IN AN ORGANIZED HEALTH CARE EDUCATION/TRAINING PROGRAM | Admitting: GENERAL PRACTICE
Payer: OTHER MISCELLANEOUS

## 2025-05-05 ENCOUNTER — NON-PROVIDER VISIT (OUTPATIENT)
Dept: OCCUPATIONAL MEDICINE | Facility: CLINIC | Age: 55
End: 2025-05-05
Payer: OTHER MISCELLANEOUS

## 2025-05-05 DIAGNOSIS — S72.002A CLOSED DISPLACED FRACTURE OF LEFT FEMORAL NECK (HCC): ICD-10-CM

## 2025-05-05 DIAGNOSIS — S72.002A CLOSED FRACTURE OF LEFT HIP, INITIAL ENCOUNTER (HCC): ICD-10-CM

## 2025-05-05 DIAGNOSIS — Z02.1 PRE-EMPLOYMENT DRUG SCREENING: ICD-10-CM

## 2025-05-05 DIAGNOSIS — Z02.83 ENCOUNTER FOR DRUG SCREENING: ICD-10-CM

## 2025-05-05 LAB
ALBUMIN SERPL BCP-MCNC: 4 G/DL (ref 3.2–4.9)
ALBUMIN/GLOB SERPL: 1.5 G/DL
ALP SERPL-CCNC: 88 U/L (ref 30–99)
ALT SERPL-CCNC: 13 U/L (ref 2–50)
ANION GAP SERPL CALC-SCNC: 8 MMOL/L (ref 7–16)
AST SERPL-CCNC: 20 U/L (ref 12–45)
BASOPHILS # BLD AUTO: 1.1 % (ref 0–1.8)
BASOPHILS # BLD: 0.05 K/UL (ref 0–0.12)
BILIRUB SERPL-MCNC: 0.4 MG/DL (ref 0.1–1.5)
BUN SERPL-MCNC: 18 MG/DL (ref 8–22)
CALCIUM ALBUM COR SERPL-MCNC: 9.3 MG/DL (ref 8.5–10.5)
CALCIUM SERPL-MCNC: 9.3 MG/DL (ref 8.5–10.5)
CHLORIDE SERPL-SCNC: 107 MMOL/L (ref 96–112)
CO2 SERPL-SCNC: 24 MMOL/L (ref 20–33)
CREAT SERPL-MCNC: 1.12 MG/DL (ref 0.5–1.4)
EKG IMPRESSION: NORMAL
EOSINOPHIL # BLD AUTO: 0.3 K/UL (ref 0–0.51)
EOSINOPHIL NFR BLD: 6.3 % (ref 0–6.9)
ERYTHROCYTE [DISTWIDTH] IN BLOOD BY AUTOMATED COUNT: 47 FL (ref 35.9–50)
GFR SERPLBLD CREATININE-BSD FMLA CKD-EPI: 79 ML/MIN/1.73 M 2
GLOBULIN SER CALC-MCNC: 2.7 G/DL (ref 1.9–3.5)
GLUCOSE SERPL-MCNC: 113 MG/DL (ref 65–99)
HCT VFR BLD AUTO: 41.4 % (ref 42–52)
HGB BLD-MCNC: 14.1 G/DL (ref 14–18)
IMM GRANULOCYTES # BLD AUTO: 0.02 K/UL (ref 0–0.11)
IMM GRANULOCYTES NFR BLD AUTO: 0.4 % (ref 0–0.9)
LYMPHOCYTES # BLD AUTO: 0.97 K/UL (ref 1–4.8)
LYMPHOCYTES NFR BLD: 20.5 % (ref 22–41)
MCH RBC QN AUTO: 27.6 PG (ref 27–33)
MCHC RBC AUTO-ENTMCNC: 34.1 G/DL (ref 32.3–36.5)
MCV RBC AUTO: 81.2 FL (ref 81.4–97.8)
MONOCYTES # BLD AUTO: 0.41 K/UL (ref 0–0.85)
MONOCYTES NFR BLD AUTO: 8.7 % (ref 0–13.4)
NEUTROPHILS # BLD AUTO: 2.98 K/UL (ref 1.82–7.42)
NEUTROPHILS NFR BLD: 63 % (ref 44–72)
NRBC # BLD AUTO: 0 K/UL
NRBC BLD-RTO: 0 /100 WBC (ref 0–0.2)
PLATELET # BLD AUTO: 238 K/UL (ref 164–446)
PMV BLD AUTO: 9.8 FL (ref 9–12.9)
POTASSIUM SERPL-SCNC: 4.6 MMOL/L (ref 3.6–5.5)
PROT SERPL-MCNC: 6.7 G/DL (ref 6–8.2)
RBC # BLD AUTO: 5.1 M/UL (ref 4.7–6.1)
SODIUM SERPL-SCNC: 139 MMOL/L (ref 135–145)
WBC # BLD AUTO: 4.7 K/UL (ref 4.8–10.8)

## 2025-05-05 PROCEDURE — 99406 BEHAV CHNG SMOKING 3-10 MIN: CPT | Performed by: GENERAL PRACTICE

## 2025-05-05 PROCEDURE — 71045 X-RAY EXAM CHEST 1 VIEW: CPT

## 2025-05-05 PROCEDURE — 80305 DRUG TEST PRSMV DIR OPT OBS: CPT | Performed by: NURSE PRACTITIONER

## 2025-05-05 PROCEDURE — 73700 CT LOWER EXTREMITY W/O DYE: CPT | Mod: LT

## 2025-05-05 PROCEDURE — 64447 NJX AA&/STRD FEMORAL NRV IMG: CPT

## 2025-05-05 PROCEDURE — 96374 THER/PROPH/DIAG INJ IV PUSH: CPT

## 2025-05-05 PROCEDURE — 72170 X-RAY EXAM OF PELVIS: CPT

## 2025-05-05 PROCEDURE — 36415 COLL VENOUS BLD VENIPUNCTURE: CPT

## 2025-05-05 PROCEDURE — 99223 1ST HOSP IP/OBS HIGH 75: CPT | Mod: 25 | Performed by: GENERAL PRACTICE

## 2025-05-05 PROCEDURE — 73552 X-RAY EXAM OF FEMUR 2/>: CPT | Mod: LT

## 2025-05-05 PROCEDURE — 80053 COMPREHEN METABOLIC PANEL: CPT

## 2025-05-05 PROCEDURE — 770001 HCHG ROOM/CARE - MED/SURG/GYN PRIV*

## 2025-05-05 PROCEDURE — A9270 NON-COVERED ITEM OR SERVICE: HCPCS | Performed by: GENERAL PRACTICE

## 2025-05-05 PROCEDURE — 85025 COMPLETE CBC W/AUTO DIFF WBC: CPT

## 2025-05-05 PROCEDURE — 700102 HCHG RX REV CODE 250 W/ 637 OVERRIDE(OP): Performed by: GENERAL PRACTICE

## 2025-05-05 PROCEDURE — 99026 IN-HOSPITAL ON CALL SERVICE: CPT | Performed by: NURSE PRACTITIONER

## 2025-05-05 PROCEDURE — 99285 EMERGENCY DEPT VISIT HI MDM: CPT

## 2025-05-05 PROCEDURE — 700101 HCHG RX REV CODE 250: Performed by: STUDENT IN AN ORGANIZED HEALTH CARE EDUCATION/TRAINING PROGRAM

## 2025-05-05 PROCEDURE — 99222 1ST HOSP IP/OBS MODERATE 55: CPT | Mod: 57 | Performed by: STUDENT IN AN ORGANIZED HEALTH CARE EDUCATION/TRAINING PROGRAM

## 2025-05-05 PROCEDURE — 700111 HCHG RX REV CODE 636 W/ 250 OVERRIDE (IP): Mod: JZ | Performed by: STUDENT IN AN ORGANIZED HEALTH CARE EDUCATION/TRAINING PROGRAM

## 2025-05-05 PROCEDURE — 93005 ELECTROCARDIOGRAM TRACING: CPT | Mod: TC | Performed by: STUDENT IN AN ORGANIZED HEALTH CARE EDUCATION/TRAINING PROGRAM

## 2025-05-05 RX ORDER — PROMETHAZINE HYDROCHLORIDE 25 MG/1
12.5-25 TABLET ORAL EVERY 4 HOURS PRN
Status: DISCONTINUED | OUTPATIENT
Start: 2025-05-05 | End: 2025-05-08 | Stop reason: HOSPADM

## 2025-05-05 RX ORDER — ONDANSETRON 2 MG/ML
4 INJECTION INTRAMUSCULAR; INTRAVENOUS EVERY 6 HOURS PRN
Status: DISCONTINUED | OUTPATIENT
Start: 2025-05-05 | End: 2025-05-05

## 2025-05-05 RX ORDER — PROMETHAZINE HYDROCHLORIDE 25 MG/1
12.5-25 SUPPOSITORY RECTAL EVERY 4 HOURS PRN
Status: DISCONTINUED | OUTPATIENT
Start: 2025-05-05 | End: 2025-05-08 | Stop reason: HOSPADM

## 2025-05-05 RX ORDER — AMOXICILLIN 250 MG
2 CAPSULE ORAL 2 TIMES DAILY
Status: DISCONTINUED | OUTPATIENT
Start: 2025-05-05 | End: 2025-05-08 | Stop reason: HOSPADM

## 2025-05-05 RX ORDER — HYDROMORPHONE HYDROCHLORIDE 1 MG/ML
0.5 INJECTION, SOLUTION INTRAMUSCULAR; INTRAVENOUS; SUBCUTANEOUS
Status: DISCONTINUED | OUTPATIENT
Start: 2025-05-05 | End: 2025-05-08 | Stop reason: HOSPADM

## 2025-05-05 RX ORDER — POLYETHYLENE GLYCOL 3350 17 G/17G
1 POWDER, FOR SOLUTION ORAL DAILY
Status: DISCONTINUED | OUTPATIENT
Start: 2025-05-06 | End: 2025-05-08 | Stop reason: HOSPADM

## 2025-05-05 RX ORDER — BISOPROLOL FUMARATE 5 MG/1
5 TABLET, FILM COATED ORAL EVERY EVENING
Status: DISCONTINUED | OUTPATIENT
Start: 2025-05-05 | End: 2025-05-08 | Stop reason: HOSPADM

## 2025-05-05 RX ORDER — ONDANSETRON 2 MG/ML
4 INJECTION INTRAMUSCULAR; INTRAVENOUS EVERY 4 HOURS PRN
Status: DISCONTINUED | OUTPATIENT
Start: 2025-05-05 | End: 2025-05-08 | Stop reason: HOSPADM

## 2025-05-05 RX ORDER — ACETAMINOPHEN 500 MG
1000 TABLET ORAL EVERY 6 HOURS
Status: DISCONTINUED | OUTPATIENT
Start: 2025-05-05 | End: 2025-05-08 | Stop reason: HOSPADM

## 2025-05-05 RX ORDER — PROCHLORPERAZINE EDISYLATE 5 MG/ML
5-10 INJECTION INTRAMUSCULAR; INTRAVENOUS EVERY 4 HOURS PRN
Status: DISCONTINUED | OUTPATIENT
Start: 2025-05-05 | End: 2025-05-08 | Stop reason: HOSPADM

## 2025-05-05 RX ORDER — OXYCODONE HYDROCHLORIDE 10 MG/1
10 TABLET ORAL
Refills: 0 | Status: DISCONTINUED | OUTPATIENT
Start: 2025-05-05 | End: 2025-05-08 | Stop reason: HOSPADM

## 2025-05-05 RX ORDER — HYDROMORPHONE HYDROCHLORIDE 1 MG/ML
1 INJECTION, SOLUTION INTRAMUSCULAR; INTRAVENOUS; SUBCUTANEOUS ONCE
Status: COMPLETED | OUTPATIENT
Start: 2025-05-05 | End: 2025-05-05

## 2025-05-05 RX ORDER — ONDANSETRON 4 MG/1
4 TABLET, ORALLY DISINTEGRATING ORAL EVERY 4 HOURS PRN
Status: DISCONTINUED | OUTPATIENT
Start: 2025-05-05 | End: 2025-05-08 | Stop reason: HOSPADM

## 2025-05-05 RX ORDER — OXYCODONE HYDROCHLORIDE 5 MG/1
5 TABLET ORAL
Refills: 0 | Status: DISCONTINUED | OUTPATIENT
Start: 2025-05-05 | End: 2025-05-08 | Stop reason: HOSPADM

## 2025-05-05 RX ORDER — BUPIVACAINE HYDROCHLORIDE AND EPINEPHRINE 5; 5 MG/ML; UG/ML
20 INJECTION, SOLUTION EPIDURAL; INTRACAUDAL; PERINEURAL ONCE
Status: COMPLETED | OUTPATIENT
Start: 2025-05-05 | End: 2025-05-05

## 2025-05-05 RX ORDER — ACETAMINOPHEN 500 MG
1000 TABLET ORAL EVERY 6 HOURS PRN
Status: DISCONTINUED | OUTPATIENT
Start: 2025-05-10 | End: 2025-05-08 | Stop reason: HOSPADM

## 2025-05-05 RX ORDER — HYDRALAZINE HYDROCHLORIDE 20 MG/ML
10 INJECTION INTRAMUSCULAR; INTRAVENOUS EVERY 6 HOURS PRN
Status: DISCONTINUED | OUTPATIENT
Start: 2025-05-05 | End: 2025-05-08 | Stop reason: HOSPADM

## 2025-05-05 RX ORDER — ENOXAPARIN SODIUM 100 MG/ML
40 INJECTION SUBCUTANEOUS DAILY
Status: DISCONTINUED | OUTPATIENT
Start: 2025-05-06 | End: 2025-05-08 | Stop reason: HOSPADM

## 2025-05-05 RX ORDER — MORPHINE SULFATE 4 MG/ML
4 INJECTION INTRAVENOUS
Status: DISCONTINUED | OUTPATIENT
Start: 2025-05-05 | End: 2025-05-05

## 2025-05-05 RX ADMIN — ACETAMINOPHEN 1000 MG: 500 TABLET ORAL at 12:28

## 2025-05-05 RX ADMIN — OXYCODONE HYDROCHLORIDE 10 MG: 10 TABLET ORAL at 21:17

## 2025-05-05 RX ADMIN — BUPIVACAINE HYDROCHLORIDE AND EPINEPHRINE BITARTRATE 20 ML: 5; .0091 INJECTION, SOLUTION EPIDURAL; INTRACAUDAL; PERINEURAL at 09:15

## 2025-05-05 RX ADMIN — BISOPROLOL FUMARATE 5 MG: 5 TABLET ORAL at 18:42

## 2025-05-05 RX ADMIN — ACETAMINOPHEN 1000 MG: 500 TABLET ORAL at 17:34

## 2025-05-05 RX ADMIN — HYDROMORPHONE HYDROCHLORIDE 1 MG: 1 INJECTION, SOLUTION INTRAMUSCULAR; INTRAVENOUS; SUBCUTANEOUS at 08:38

## 2025-05-05 ASSESSMENT — PAIN DESCRIPTION - PAIN TYPE
TYPE: ACUTE PAIN
TYPE: ACUTE PAIN;SURGICAL PAIN
TYPE: ACUTE PAIN

## 2025-05-05 ASSESSMENT — FIBROSIS 4 INDEX: FIB4 SCORE: 1.01

## 2025-05-05 NOTE — ED TRIAGE NOTES
Left hip pain and inability to bear weight to left leg after being struck to left hip by a pallet at work this am

## 2025-05-05 NOTE — HOSPITAL COURSE
This is a 54-year-old male with past medical history of HOCM, NSVT with AICD in place, inguinal hernia s/p repair who presented to the ED on 5/5 after sustaining a injury at work.  He works as a .    Patient was struck by a pallet at work, he is unable to bear weight on his left lower extremity.  Patient noted to be hypertensive and hypoxic on admission, requiring oxygen.  X-ray imaging revealed left femoral neck fracture.  A nerve block was performed by ED physician.  Orthopedic surgery was consulted, patient admitted for surgical management.    Patient underwent left hip open reduction and internal fixation of the femoral neck fracture on 5/6/2025.  He is to be TTWB LLE.  He will be switched to aspirin twice daily for 4 weeks for outpatient DVT prophylaxis when able.  He is to follow-up with orthopedic surgery clinic in 2 weeks to check wounds and remove sutures/staples.  PT and OT evaluated the patient afterwards.  PT anticipates no further needs after discharge.  OT recommends home health for continued OT services as long as friends can assist 24/7 and he has BSC.  Otherwise they recommend postacute placement.  Patient stated he has great support at home and would prefer home health.

## 2025-05-05 NOTE — ED PROVIDER NOTES
"CHIEF COMPLAINT  Chief Complaint   Patient presents with    Hip Pain     Left        LIMITATION TO HISTORY   Select: None    HPI    Trevor Gillis Jr. is a 54 y.o. male who presents to the Emergency Department presents for evaluation of left hip pain after he fell off a forklift.  Stated that he was on a crate on a forklift thinks he fell about 4 to 5 feet landing on that left hip.  There is no head strike no LOC.  Had immediate onset of left hip pain.  Denies any midline neck or back pain chest pain shortness of breath, pain to any other extremities.    OUTSIDE HISTORIAN(S):  Select: None    EXTERNAL RECORDS REVIEWED  Select: Other ED visit 3/4/2025 seen for evaluation of dehydration, [facility] department 8/31/2024 seen for ear pain, office visit 10/14/2024 seen for an inguinal hernia cardiology visit 10/11/2024 patient has a history of nonsustained ventricular tachycardia, hypertrophic obstructive cardiomyopathy, status post ICD echo obtained on 12/22/2024 does show a hypertrophic cardiomyopathy without left ventricular outflow tract obstruction, the ejection fraction was 67%      PAST MEDICAL HISTORY  Past Medical History:   Diagnosis Date    Congestive heart failure (HCC)     cardiomyopathy    Dyslipidemia     Fracture of left clavicle     Heart burn     Heart valve disease     hypertrophic cardiomyopathy (inherited)    Hernia     Hiatus hernia syndrome     possible    HTN (hypertension)     no meds    Hypertrophic cardiomyopathy (HCC)     ICD (implantable cardioverter-defibrillator) discharge 01/18/2024    JEREMIAH (obstructive sleep apnea)     \"does not qualify for device\"    Pacemaker     followed by Dr Gillis    Psychiatric problem     Snoring     Syncope 11/19/2012     .    SURGICAL HISTORY  Past Surgical History:   Procedure Laterality Date    PB REPAIR OF BICEPS TENDON AT ELBOW Right 9/6/2022    Procedure: Right open distal biceps repair;  Surgeon: Nathan Higuera M.D.;  [facility];  Service: Orthopedics    MS UPPER GI ENDOSCOPY,REMOV F.B.  8/5/2020    Procedure: GASTROSCOPY, WITH FOREIGN BODY REMOVAL;  Surgeon: Martin Townesnd M.D.;  Location: ENDOSCOPY [facility];  Service: Gastroenterology    WOUND IRRIGATION & DEBRIDEMENT Left 1/16/2017    Procedure: IRRIGATION & DEBRIDEMENT GENERAL-INDEX FINGER;  Surgeon: Kia Aldridge M.D.;  Location: SURGERY [facility];  Service:     INGUINAL HERNIA REPAIR  9/10/2014    Performed by Christie Ott M.D. at SURGERY SAME DAY [facility]    RECOVERY  7/30/2014    Performed by Cath-Recovery Surgery at SURGERY SAME DAY [facility]S    OTHER CARDIAC SURGERY  7/25/14    AICD    RECOVERY  7/25/2014    Performed by Cath-Recovery Surgery at SURGERY SAME DAY [facility] ORS    APPENDECTOMY      TONSILLECTOMY           FAMILY HISTORY  Family History   Problem Relation Age of Onset    Other Mother         Accident    Stroke Father     Heart Attack Father         MI at age 24?    Heart Disease Paternal Grandfather     Hypertension Paternal Grandmother     Diabetes Paternal Grandmother           SOCIAL HISTORY  Social History     Socioeconomic History    Marital status: Single     Spouse name: Not on file    Number of children: Not on file    Years of education: Not on file    Highest education level: Not on file   Occupational History    Not on file   Tobacco Use    Smoking status: Some Days     Types: Cigarettes    Smokeless tobacco: Never   Vaping Use    Vaping status: Never Used   Substance and Sexual Activity    Alcohol use: Not Currently     Comment: used to be heavy alcoholism (18 years ago),     Drug use: No    Sexual activity: Yes   Other Topics Concern    Not on file   Social History Narrative    Not on file     Social Drivers of Health     Financial Resource Strain: Not on file   Food Insecurity: Not on file   Transportation Needs: Not on file   Physical Activity: Not on file   Stress: Not on file   Social Connections:  "Not on file   Intimate Partner Violence: Not on file   Housing Stability: Not on file         CURRENT MEDICATIONS  No current facility-administered medications on file prior to encounter.     Current Outpatient Medications on File Prior to Encounter   Medication Sig Dispense Refill    bisoprolol (ZEBETA) 5 MG Tab Take 1 Tablet by mouth every day. 100 Tablet 3           ALLERGIES  Allergies   Allergen Reactions    Tomato Hives and Vomiting       PHYSICAL EXAM  VITAL SIGNS:BP (!) 163/107   Pulse 74   Temp 36.6 °C (97.8 °F) (Temporal)   Resp 13   Ht 1.905 m (6' 3\")   Wt 118 kg (260 lb)   SpO2 97%   BMI 32.50 kg/m²       VITALS - vital signs documented prior to this note have been reviewed and noted,  GENERAL - awake, alert, oriented, GCS 15, no apparent distress, non-toxic  appearing  HEENT - normocephalic, atraumatic, pupils equal, sclera anicteric, mucus  membranes moist  NECK - supple, no meningismus, full active range of motion, trachea midline  CARDIOVASCULAR - regular rate/rhythm, no murmurs/gallops/rubs  PULMONARY - no respiratory distress, speaking in full sentences, clear to  auscultation bilaterally, no wheezing/ronchi/rales, no accessory muscle use  GASTROINTESTINAL - soft, non-tender, non-distended, no rebound, guarding,  or peritonitis  GENITOURINARY - Deferred  NEUROLOGIC - Awake alert, normal mental status, speech fluid, cognition  normal, moves all extremities  MUSCULOSKELETAL -he has shortening and rotation of his left foot 2+ DP PT pulse he has tenderness with palpation of his left hip and pain with internal/external rotation  EXTREMITIES - warm, well-perfused, no cyanosis or significant edema  DERMATOLOGIC - warm, dry, no rashes, no jaundice  PSYCHIATRIC - normal affect, normal insight, normal concentration    DIAGNOSTIC STUDIES / PROCEDURES  EKG  I have independently interpreted this EKG  Interpreted below by myself as EKG does show deeply inverted T waves in V2 and V3, with LVH criteria, T " wave inversions appear chronic, no acute changes.  Interpretation is a sinus rhythm with LVH  LABS  Labs Reviewed   CBC WITH DIFFERENTIAL - Abnormal; Notable for the following components:       Result Value    WBC 4.7 (*)     Hematocrit 41.4 (*)     MCV 81.2 (*)     Lymphocytes 20.50 (*)     Lymphs (Absolute) 0.97 (*)     All other components within normal limits   COMP METABOLIC PANEL - Abnormal; Notable for the following components:    Glucose 113 (*)     All other components within normal limits   ESTIMATED GFR       There is no leukocytosis or anemia  RADIOLOGY  I have independently interpreted the diagnostic imaging associated with this visit and am waiting the final reading from the radiologist.   My preliminary interpretation is as follows: Left moral neck fracture      Radiologist interpretation:   DX-CHEST-LIMITED (1 VIEW)   Final Result      No acute cardiopulmonary abnormality.      DX-FEMUR-2+ LEFT   Final Result      Left femoral neck fracture.      DX-PELVIS-1 OR 2 VIEWS   Final Result      Acute fracture of the left femoral neck.      CT-HIP W/O PLUS RECONS LEFT    (Results Pending)        COURSE & MEDICAL DECISION MAKING    ED COURSE:        INTERVENTIONS BY ME:  Medications   Pharmacy Consult Request ...Pain Management Review 1 Each (has no administration in time range)   enoxaparin (Lovenox) inj 40 mg (has no administration in time range)   acetaminophen (Tylenol) tablet 1,000 mg (has no administration in time range)     Followed by   acetaminophen (Tylenol) tablet 1,000 mg (has no administration in time range)   oxyCODONE immediate-release (Roxicodone) tablet 5 mg (has no administration in time range)     Or   oxyCODONE immediate-release (Roxicodone) tablet 10 mg (has no administration in time range)     Or   HYDROmorphone (Dilaudid) injection 0.5 mg (has no administration in time range)   ondansetron (Zofran) syringe/vial injection 4 mg (has no administration in time range)   ondansetron (Zofran  "ODT) dispertab 4 mg (has no administration in time range)   promethazine (Phenergan) tablet 12.5-25 mg (has no administration in time range)   promethazine (Phenergan) suppository 12.5-25 mg (has no administration in time range)   prochlorperazine (Compazine) injection 5-10 mg (has no administration in time range)   hydrALAZINE (Apresoline) injection 10 mg (has no administration in time range)   senna-docusate (Pericolace Or Senokot S) 8.6-50 MG per tablet 2 Tablet (has no administration in time range)     And   polyethylene glycol/lytes (Miralax) Packet 1 Packet (has no administration in time range)   bisoprolol (Zebeta) tablet 5 mg (has no administration in time range)   HYDROmorphone (Dilaudid) injection 1 mg (1 mg Intravenous Given 5/5/25 4730)   bupivacaine-0.5%-epinephrine 1:[id number] PF (Marcaine/Sensorcaine) injection 20 mL (20 mL Injection Given 5/5/25 0915)       PROCEDURE NOTE    Risks and benefits: risks, benefits and alternatives were discussed  Consent given by: patient and/or guardian  Patient understanding: patient/guardian states understanding of the procedure being performed  Patient consent: the patient/guardian's understanding of the procedure matches consent given  Patient identity confirmed: verbally with patient and arm band  Time out: Immediately prior to procedure a \"time out\" was called to verify the correct patient, procedure, equipment, support staff and site/side marked as required.  Location details: Left lateral femoral cutaneous nerve  Injection: using standard, sterile technique 10 cc of bupivacaine with epinephrine diluted with 10 cc of normal saline were injected under ultrasound guidance to perform a left femoral cutaneous nerve block  Complication: Tolerated well without complication.          Response on recheck: Pain improved after lateral femoral cutaneous nerve block.        INITIAL ASSESSMENT, COURSE AND PLAN  Care Narrative: Patient presented for evaluation of a left hip pain " after a falling approximately 4 feet on his left hip.  There is no head strike no LOC he denies any midline neck or back pain chest pain shortness of breath abdominal pain.  Pain seems to be isolated to his left hip.  The left hip is slightly shortened and internally rotated.  X-rays obtained did show femoral neck fracture.  He has no other complaints or injuries, it seems to be an isolated femoral neck fracture.  Nerve block was performed with improvement of his discomfort.  Orthopedic was consulted, Dr. Hinojosa, requested CT hip with plan for operative intervention.  Given the history of HOCM requested medicine admission.  He will be admitted to the medicine team for further care and evaluation.             ADDITIONAL PROBLEM LIST    DISPOSITION AND DISCUSSIONS  I have discussed management of the patient with the following physicians and KATY's: Orthopedist, hospitalist  FINAL DIAGNOSIS  1. Closed fracture of left hip, initial encounter (HCC)             Electronically signed by: Brandon Rosado DO ,11:01 AM 05/05/25

## 2025-05-05 NOTE — ED NOTES
Med rec complete per pt. NKDA      Anticoagulants (rivaroxaban, apixaban, edoxaban, dabigatran, enoxaparin) taken in the last 14 days? (Yes/No), Anticoagulant: No    Dispense history available in EPIC? (Yes/No)  Yes    Emy Saha, T

## 2025-05-05 NOTE — ED NOTES
Patient reports ate an egg sandwich this morning at 0400. Patient reports drinking cup of water at 0400.

## 2025-05-05 NOTE — LETTER
"  EMPLOYEE’S CLAIM FOR COMPENSATION/ REPORT OF INITIAL TREATMENT  FORM C-4  PLEASE TYPE OR PRINT    EMPLOYEE’S CLAIM - PROVIDE ALL INFORMATION REQUESTED   First Name                    LISA Murray                  Last Name  Carlin Birthdate                    1970                Sex  [x]Male Claim Number (Insurer’s Use Only)     Mailing Address  [address] Age  54 y.o. Height  1.905 m (6' 3\") Weight  118 kg (260 lb) Social Security Number     [facility] Zip  [address] Telephone  There are no phone numbers on file.   Email  [email or web address]    Primary Language Spoken  English    INSURER   THIRD-PARTY   [facility] [employer]   Employee's Occupation (Job Title) When Injury or Occupational Disease Occurred      Employer's Name/Company Name    [employer] Telephone  [phone]    Office Mail Address (Number and Street)  [address]     Date of Injury (if applicable) 5/5/2025               Hours Injury (if applicable)  7:30 AM am    pm Date Employer Notified  5/5/2025 Last Day of Work after Injury or Occupational Disease  5/5/2025 Supervisor to Whom Injury Reported  Eric   Address or Location of Accident (if applicable)  Work [1]   What were you doing at the time of accident? (if applicable)  moving pallet    How did this injury or occupational disease occur? (Be specific and answer in detail. Use additional sheet if necessary)  fall off with pallet   If you believe that you have an occupational disease, when did you first have knowledge of the disability and its relationship to your employment?  n/a Witnesses to the Accident (if applicable)  n/a      Nature of Injury or Occupational Disease  Crushing  Part(s) of Body Injured or Affected  Hip (L) N/A N/A    I CERTIFY THAT THE ABOVE IS TRUE AND CORRECT TO T HE BEST OF MY KNOWLEDGE AND THAT I HAVE " PROVIDED THIS INFORMATION IN ORDER TO OBTAIN THE BENEFITS OF [place], OR CHAPTER 617 [place]  I HEREBY AUTHORIZE ANY PHYSICIAN, CHIROPRACTOR, SURGEON, PRACTITIONER OR ANY OTHER PERSON, ANY HOSPITAL, INCLUDING [facility] OR [facility], ANY  MEDICAL SERVICE ORGANIZATION, ANY INSURANCE COMPANY, OR OTHER INSTITUTION OR ORGANIZATION TO RELEASE TO EACH OTHER, ANY MEDICAL OR OTHER INFORMATION, INCLUDING BENEFITS PAID OR PAYABLE, PERTINENT TO THIS INJURY OR DISEASE, EXCEPT INFORMATION RELATIVE TO DIAGNOSIS, TREATMENT AND/OR COUNSELING FOR AIDS, PSYCHOLOGICAL CONDITIONS, ALCOHOL OR CONTROLLED SUBSTANCES, FOR WHICH I MUST GIVE SPECIFIC AUTHORIZATION.  A PHOTOSTAT OF THIS AUTHORIZATION SHALL BE VALID AS THE ORIGINAL.     Date 05/08/25   [facility] Employee’s Original or  *Electronic Signature   THIS REPORT MUST BE COMPLETED AND MAILED WITHIN 3 WORKING DAYS OF TREATMENT   Place  Emergency Room Name of Facility   [facility]   Date 5/5/2025 Diagnosis and Description of Injury or Occupational Disease  (S72.002A) Closed fracture of left hip, initial encounter ([facility])  (S72.002A) Closed displaced fracture of left femoral neck (HCC)  Diagnoses of Closed fracture of left hip, initial encounter ([facility]) and Closed displaced fracture of left femoral neck ([facility]) were pertinent to this visit. Is there evidence that the injured employee was under the influence of alcohol and/or another controlled substance at the time of accident?  [x]No  [] Yes (if yes, please explain)   Hour 11:19AM  No   Treatment: Admission for left hip fracture, nerve block performed    Have you advised the patient to remain off work five days or more?   Yes  [x] Yes  If yes, indicate dates: From_ _                                                      To __ _  [] No   If no, is the injured employee capable of: [] full duty No   [] modified duty  No    If modified duty, specify any limitations / restrictions:__________________  ___Patient is nonweightbearing on the left hip until he is cleared by orthopedic surgery as he does have a left hip fracture___________________________     X-Ray Findings: Positive    From information given by the employee, together with medical evidence, can you directly connect this injury or occupational disease as job incurred?  [x]Yes   [] No Yes    Is additional medical care by a physician indicated? [x]Yes [] No  Yes    Do you know of any previous injury or disease contributing to this condition or occupational disease? []Yes [x] No (Explain if yes)                          No   Date  5/8/2025 Print Health Care Provider’s Name  Isela Traore certify that the employer’s copy of  this form was delivered to the employer on:   Address   [address] [place] [address] INSURER'S USE ONLY                       [facility]  Zip   [address] Provider’s Tax ID Number  [id number]    Telephone  Dept: [phone]    Health Care Provider’s Original or Electronic Signature      e-ISELA Briscoe D.O.    Degree (MD,DO, DC,PA-C,APRN)  MD  Choose (if applicable)      ORIGINAL - TREATING HEALTHCARE PROVIDER PAGE 2 - INSURER/TPA PAGE 3 - EMPLOYER PAGE 4 - EMPLOYEE             Form C-4 (rev.02/25)

## 2025-05-05 NOTE — ASSESSMENT & PLAN NOTE
Nicotine replacement protocol and cessation education provided for 4 minutes, discussed options of nicotine patch, acupuncture, medical treatment with wellbutrin and chantix. Discussed other options.     Patient does not want nicotine patch at this time

## 2025-05-05 NOTE — LETTER
"  EMPLOYEE’S CLAIM FOR COMPENSATION/ REPORT OF INITIAL TREATMENT  FORM C-4  PLEASE TYPE OR PRINT    EMPLOYEE’S CLAIM - PROVIDE ALL INFORMATION REQUESTED   First Name                    LISA Murray                  Last Name  Carlin Birthdate                    1970                Sex  [x]Male Claim Number (Insurer’s Use Only)     Mailing Address  [address] Age  54 y.o. Height  1.905 m (6' 3\") Weight  118 kg (260 lb) Social Security Number  [id number]   [facility] Zip  [address] Telephone  There are no phone numbers on file.   Email  [email or web address]    Primary Language Spoken  English    INSURER  *** THIRD-PARTY   Misc Workers Comp   Employee's Occupation (Job Title) When Injury or Occupational Disease Occurred      Employer's Name/Company Name     Telephone  [phone]    Office Mail Address (Number and Street)  [address]     Date of Injury (if applicable) 5/5/2025               Hours Injury (if applicable)  7:30 AM am    pm Date Employer Notified  5/5/2025 Last Day of Work after Injury or Occupational Disease  5/5/2025 Supervisor to Whom Injury Reported  Eric   Address or Location of Accident (if applicable)  Work [1]   What were you doing at the time of accident? (if applicable)  moving pallet    How did this injury or occupational disease occur? (Be specific and answer in detail. Use additional sheet if necessary)  fall off with pallet   If you believe that you have an occupational disease, when did you first have knowledge of the disability and its relationship to your employment?  n/a Witnesses to the Accident (if applicable)  n/a      Nature of Injury or Occupational Disease  Crushing  Part(s) of Body Injured or Affected  Hip (L) N/A N/A    I CERTIFY THAT THE ABOVE IS TRUE AND CORRECT TO T HE BEST OF MY KNOWLEDGE AND THAT I HAVE PROVIDED THIS INFORMATION " IN ORDER TO OBTAIN THE BENEFITS OF [place], OR [place] 617 [place]).  I HEREBY AUTHORIZE ANY PHYSICIAN, CHIROPRACTOR, SURGEON, PRACTITIONER OR ANY OTHER PERSON, ANY HOSPITAL, INCLUDING [facility] OR [facility], ANY  MEDICAL SERVICE ORGANIZATION, ANY INSURANCE COMPANY, OR OTHER INSTITUTION OR ORGANIZATION TO RELEASE TO EACH OTHER, ANY MEDICAL OR OTHER INFORMATION, INCLUDING BENEFITS PAID OR PAYABLE, PERTINENT TO THIS INJURY OR DISEASE, EXCEPT INFORMATION RELATIVE TO DIAGNOSIS, TREATMENT AND/OR COUNSELING FOR AIDS, PSYCHOLOGICAL CONDITIONS, ALCOHOL OR CONTROLLED SUBSTANCES, FOR WHICH I MUST GIVE SPECIFIC AUTHORIZATION.  A PHOTOSTAT OF THIS AUTHORIZATION SHALL BE VALID AS THE ORIGINAL.     Date   Place Employee’s Original or  *Electronic Signature   THIS REPORT MUST BE COMPLETED AND MAILED WITHIN 3 WORKING DAYS OF TREATMENT   Place  ORTHO/SPINE    Name of Facility      Date 5/5/2025 Diagnosis and Description of Injury or Occupational Disease  (S72.002A) Closed fracture of left hip, initial encounter ([facility])  (S72.002A) Closed displaced fracture of left femoral neck ([facility])  Diagnoses of Closed fracture of left hip, initial encounter ([facility]) and Closed displaced fracture of left femoral neck ([facility]) were pertinent to this visit. Is there evidence that the injured employee was under the influence of alcohol and/or another controlled substance at the time of accident?  []No  [] Yes (if yes, please explain)   Hour       Treatment:      Have you advised the patient to remain off work five days or more?      [] Yes  If yes, indicate dates: From_ _                                                      To __ _  [] No   If no, is the injured employee capable of: [] full duty     [] modified duty      If modified duty, specify any limitations / restrictions:__________________  ___ ___________________________     X-Ray Findings:       From information given by the employee, together with medical evidence, can you directly connect this injury or occupational disease as job incurred?  []Yes   [] No      Is additional medical care by a physician indicated? []Yes [] No       Do you know of any previous injury or disease contributing to this condition or occupational disease? []Yes [] No (Explain if yes)                              Date  5/8/2025 Print Health Care Provider’s Name  No name on file I certify that the employer’s copy of  this form was delivered to the employer on:   Address    INSURER'S USE ONLY                       City    State    Zip    Provider’s Tax ID Number      Telephone  Dept: [phone]    Health Care Provider’s Original or Electronic Signature           Degree (MD,DO, DC,PACheloC,APRN)  {Provider Degrees:[id number]}  Choose (if applicable)      ORIGINAL - TREATING HEALTHCARE PROVIDER PAGE 2 - INSURER/TPA PAGE 3 - EMPLOYER PAGE 4 - EMPLOYEE             Form C-4 (rev.02/25)

## 2025-05-05 NOTE — H&P
Hospital Medicine History & Physical Note    Date of Service  5/5/2025    Primary Care Physician  Pcp Pt States None    Consultants  orthopedics    Specialist Names: Dr. Patino    Code Status  Full Code    Chief Complaint  Chief Complaint   Patient presents with    Hip Pain     Left        History of Presenting Illness  This is a 54-year-old male with past medical history of HOCM, NSVT with AICD in place, inguinal hernia s/p repair who presented to the ED on 5/5 after sustaining a injury at work.    Patient was struck by a pallet at work, he is unable to bear weight on his left lower extremity.  Patient noted to be hypertensive and hypoxic on admission, requiring 2L oxygen.  X-ray imaging revealed left femoral neck fracture. ERP performed nerve block. Orthopedic surgery consulted, CT hip ordered and patient is likely for ORIF today.    I discussed the plan of care with patient, bedside RN, and ERP .    Review of Systems  Review of Systems   All other systems reviewed and are negative.      Past Medical History   has a past medical history of Congestive heart failure (HCC), Dyslipidemia, Fracture of left clavicle, Heart burn, Heart valve disease, Hernia, Hiatus hernia syndrome, HTN (hypertension), Hypertrophic cardiomyopathy (HCC), ICD (implantable cardioverter-defibrillator) discharge (01/18/2024), JEREMIAH (obstructive sleep apnea), Pacemaker, Psychiatric problem, Snoring, and Syncope (11/19/2012).    Surgical History   has a past surgical history that includes tonsillectomy; other cardiac surgery (7/25/14); recovery (7/25/2014); recovery (7/30/2014); inguinal hernia repair (9/10/2014); appendectomy; wound irrigation & debridement (Left, 1/16/2017); pr upper gi endoscopy,remov f.b. (8/5/2020); and pr repair of biceps tendon at elbow (Right, 9/6/2022).     Family History  family history includes Diabetes in his paternal grandmother; Heart Attack in his father; Heart Disease in his paternal grandfather; Hypertension  in his paternal grandmother; Other in his mother; Stroke in his father.   Family history reviewed with patient. There is no family history that is pertinent to the chief complaint.     Social History   reports that he has been smoking cigarettes. He has never used smokeless tobacco. He reports that he does not currently use alcohol. He reports that he does not use drugs.    Allergies  Allergies   Allergen Reactions    Tomato Hives and Vomiting       Medications  Prior to Admission Medications   Prescriptions Last Dose Informant Patient Reported? Taking?   bisoprolol (ZEBETA) 5 MG Tab 5/4/2025 Bedtime  No Yes   Sig: Take 1 Tablet by mouth every day.      Facility-Administered Medications: None       Physical Exam  Temp:  [36.6 °C (97.8 °F)] 36.6 °C (97.8 °F)  Pulse:  [63-74] 67  Resp:  [12-18] 18  BP: (143-172)/() 147/92  SpO2:  [86 %-99 %] 97 %  Blood Pressure: (!) 163/107   Temperature: 36.6 °C (97.8 °F)   Pulse: 74   Respiration: 13   Pulse Oximetry: 97 %       Physical Exam  Vitals and nursing note reviewed.   Constitutional:       General: He is not in acute distress.     Appearance: He is ill-appearing.   HENT:      Head: Normocephalic and atraumatic.   Eyes:      Extraocular Movements: Extraocular movements intact.      Conjunctiva/sclera: Conjunctivae normal.      Pupils: Pupils are equal, round, and reactive to light.   Cardiovascular:      Rate and Rhythm: Normal rate and regular rhythm.      Pulses: Normal pulses.      Heart sounds: No murmur heard.     No friction rub. No gallop.   Pulmonary:      Effort: Pulmonary effort is normal. No respiratory distress.      Breath sounds: Normal breath sounds. No wheezing, rhonchi or rales.   Abdominal:      General: Bowel sounds are normal. There is no distension.      Palpations: Abdomen is soft.      Tenderness: There is no abdominal tenderness.   Musculoskeletal:         General: No swelling or tenderness. Normal range of motion.      Cervical back: Normal  "range of motion and neck supple. No muscular tenderness.      Right lower leg: No edema.      Left lower leg: No edema.      Comments: LLE externally rotated   Skin:     General: Skin is warm and dry.      Capillary Refill: Capillary refill takes less than 2 seconds.      Findings: No bruising, erythema or rash.   Neurological:      General: No focal deficit present.      Mental Status: He is alert and oriented to person, place, and time.         Laboratory:  Recent Labs     05/05/25  0924   WBC 4.7*   RBC 5.10   HEMOGLOBIN 14.1   HEMATOCRIT 41.4*   MCV 81.2*   MCH 27.6   MCHC 34.1   RDW 47.0   PLATELETCT 238   MPV 9.8     Recent Labs     05/05/25  0924   SODIUM 139   POTASSIUM 4.6   CHLORIDE 107   CO2 24   GLUCOSE 113*   BUN 18   CREATININE 1.12   CALCIUM 9.3     Recent Labs     05/05/25  0924   ALTSGPT 13   ASTSGOT 20   ALKPHOSPHAT 88   TBILIRUBIN 0.4   GLUCOSE 113*         No results for input(s): \"NTPROBNP\" in the last 72 hours.      No results for input(s): \"TROPONINT\" in the last 72 hours.    Imaging:  CT-HIP W/O PLUS RECONS LEFT   Final Result      1.  Mildly displaced and angulated transcervical left femoral neck fracture.   2.  Small foci of air in the left groin/inguinal region. Correlate for recent intervention or infection.   3.  Focal thickening of the anterior right bladder wall. Correlate with urinalysis and direct visualization.   4.  Right greater than left fat-containing inguinal hernias.      DX-CHEST-LIMITED (1 VIEW)   Final Result      No acute cardiopulmonary abnormality.      DX-FEMUR-2+ LEFT   Final Result      Left femoral neck fracture.      DX-PELVIS-1 OR 2 VIEWS   Final Result      Acute fracture of the left femoral neck.          X-Ray:  I have personally reviewed the images and compared with prior images.  EKG:  I have personally reviewed the images and compared with prior images.    Assessment/Plan:  Justification for Admission Status  I anticipate this patient will require at least " two midnights for appropriate medical management, necessitating inpatient admission because will require surgical intervention, pain control    Patient will need a Med/Surg bed on ORTHO service .  The need is secondary to femoral fracture.    * Closed displaced fracture of left femoral neck ([facility])  Assessment & Plan  Left femoral neck fracture noted on x-ray imaging  CT imaging of the hip ordered  Nerve block performed by EVELYN   Orthopedic surgery aware  Pain control, bowel regimen  PT/OT postoperatively      Tobacco abuse- (present on admission)  Assessment & Plan  Nicotine replacement protocol and cessation education provided for 4 minutes, discussed options of nicotine patch, acupuncture, medical treatment with wellbutrin and chantix. Discussed other options.     Patient does not want nicotine patch at this time    HOCM (hypertrophic obstructive cardiomyopathy) ([facility])- (present on admission)  Assessment & Plan  hx    ICD (implantable cardioverter-defibrillator) in place- (present on admission)  Assessment & Plan  In place, functional  Follows with cardiology outpatient    NSVT (nonsustained ventricular tachycardia) ([facility])- (present on admission)  Assessment & Plan  Resume beta-blocker        VTE prophylaxis: SCDs/TEDs and enoxaparin ppx

## 2025-05-05 NOTE — CONSULTS
Orthopaedic Surgery Consult    Date: 5/5/2025     History Present Illness    Trevor Gillis . 54 y.o. male pmh HOCM s/p implanted defibrillator who was struck by a pallet and work leading to a fall onto his left hip. Diagnosed with displaced femoral neck fracture. Works as a     Past Medical History:  Active Ambulatory Problems     Diagnosis Date Noted    FH: sudden cardiac death (SCD) 07/16/2014    NSVT (nonsustained ventricular tachycardia) ([facility]) 07/16/2014    Dizzy spells 07/16/2014    ICD (implantable cardioverter-defibrillator) in place 07/26/2014    Inguinal hernia 09/10/2014    Shoulder pain 06/23/2015    JEREMIAH (obstructive sleep apnea) 05/05/2016    Hypothyroidism 05/05/2016    Hyperglycemia 05/06/2016    Prediabetes 07/12/2016    HOCM (hypertrophic obstructive cardiomyopathy) ([facility]) 10/24/2016    Dyslipidemia 10/24/2016    Tobacco abuse 11/03/2016    Hypertension 01/16/2017    Obesity (BMI 30-39.9) 08/08/2017    Need for influenza vaccination 12/26/2017    Pain of right hand 04/09/2018    Rupture of distal biceps tendon 08/31/2022    Hashimoto's thyroiditis 05/27/2015    Elevation of level of transaminase and lactic acid dehydrogenase (LDH) 11/12/2015    Dysphagia 10/15/2015    Acute pharyngitis 11/12/2015    ICD (implantable cardioverter-defibrillator) discharge 01/18/2024     Resolved Ambulatory Problems     Diagnosis Date Noted    Syncope 11/19/2012    Hypertrophic nonobstructive cardiomyopathy (HCC) 07/16/2014    Hypertrophic cardiomyopathy (HCC) 07/25/2014    Mechanical complication of cardiac pacemaker electrode 07/30/2014    Hypertrophic cardiomyopathy (HCC) 06/23/2015    Numbness in both hands 06/23/2015    Near syncope 06/23/2015    Chest pain 06/23/2015    Anemia 05/06/2016    SOB (shortness of breath) 10/24/2016    Bilateral hand numbness 10/24/2016    Hypertrophic cardiomyopathy (HCC) 10/30/2016    H/O wheezing 11/03/2016    Cellulitis of left hand 01/15/2017    Cat bite of hand  01/15/2017    Adiposity 08/07/2017    Bronchitis 11/20/2017     Past Medical History:   Diagnosis Date    Congestive heart failure (HCC)     Fracture of left clavicle     Heart burn     Heart valve disease     Hernia     Hiatus hernia syndrome     HTN (hypertension)     Pacemaker     Psychiatric problem     Snoring        Past Surgical History:  Past Surgical History:   Procedure Laterality Date    PB REPAIR OF BICEPS TENDON AT ELBOW Right 9/6/2022    Procedure: Right open distal biceps repair;  Surgeon: Nathan Higuera M.D.;  [facility]: [facility];  Service: Orthopedics    GA UPPER GI ENDOSCOPY,REMOV F.B.  8/5/2020    Procedure: GASTROSCOPY, WITH FOREIGN BODY REMOVAL;  Surgeon: Martin Townsend M.D.;  Location: ENDOSCOPY [facility];  Service: Gastroenterology    WOUND IRRIGATION & DEBRIDEMENT Left 1/16/2017    Procedure: IRRIGATION & DEBRIDEMENT GENERAL-INDEX FINGER;  Surgeon: Kia Aldridge M.D.;  Location: SURGERY [facility];  Service:     INGUINAL HERNIA REPAIR  9/10/2014    Performed by Christie Ott M.D. at SURGERY SAME DAY [facility]    RECOVERY  7/30/2014    Performed by Cath-Recovery Surgery at SURGERY SAME DAY [facility]    OTHER CARDIAC SURGERY  7/25/14    AICD    RECOVERY  7/25/2014    Performed by Cath-Recovery Surgery at SURGERY SAME DAY [facility]    APPENDECTOMY      TONSILLECTOMY         Medications:  (Not in a hospital admission)    Current Facility-Administered Medications   Medication Dose Route Frequency Provider Last Rate Last Admin    Pharmacy Consult Request ...Pain Management Review 1 Each  1 Each Other PHARMACY TO DOSE FRANCISCO ColeyO.        [START ON 5/6/2025] enoxaparin (Lovenox) inj 40 mg  40 mg Subcutaneous DAILY AT 1800 ANALI Coley.O.        acetaminophen (Tylenol) tablet 1,000 mg  1,000 mg Oral Q6HRS ANALI Coley.O.   1,000 mg at 05/05/25 1228    Followed by    [START ON 5/10/2025] acetaminophen (Tylenol) tablet 1,000 mg  1,000  mg Oral Q6HRS PRN Hui Goldy, D.O.        oxyCODONE immediate-release (Roxicodone) tablet 5 mg  5 mg Oral Q3HRS PRN Hui Panchoara, D.O.        Or    oxyCODONE immediate-release (Roxicodone) tablet 10 mg  10 mg Oral Q3HRS PRN Hui Goldy, D.O.        Or    HYDROmorphone (Dilaudid) injection 0.5 mg  0.5 mg Intravenous Q3HRS PRN Hui Winslow, D.O.        ondansetron (Zofran) syringe/vial injection 4 mg  4 mg Intravenous Q4HRS PRN Hui Winslow, D.O.        ondansetron (Zofran ODT) dispertab 4 mg  4 mg Oral Q4HRS PRN Hui Winslow, D.O.        promethazine (Phenergan) tablet 12.5-25 mg  12.5-25 mg Oral Q4HRS PRN Hui Winslow, D.O.        promethazine (Phenergan) suppository 12.5-25 mg  12.5-25 mg Rectal Q4HRS PRN Hui Winslow, D.O.        prochlorperazine (Compazine) injection 5-10 mg  5-10 mg Intravenous Q4HRS PRN Hui Winslow, D.O.        hydrALAZINE (Apresoline) injection 10 mg  10 mg Intravenous Q6HRS PRN Hui Winslow, D.O.        senna-docusate (Pericolace Or Senokot S) 8.6-50 MG per tablet 2 Tablet  2 Tablet Oral BID Hui Fabara, D.O.        And    [START ON 5/6/2025] polyethylene glycol/lytes (Miralax) Packet 1 Packet  1 Packet Oral DAILY Hui Fabara, D.O.        bisoprolol (Zebeta) tablet 5 mg  5 mg Oral Q EVENING Hui Fabara, D.O.         Current Outpatient Medications   Medication Sig Dispense Refill    bisoprolol (ZEBETA) 5 MG Tab Take 1 Tablet by mouth every day. 100 Tablet 3       Allergies:  Tomato    Family History:  Family History   Problem Relation Age of Onset    Other Mother         Accident    Stroke Father     Heart Attack Father         MI at age 24?    Heart Disease Paternal Grandfather     Hypertension Paternal Grandmother     Diabetes Paternal Grandmother        Social History:  Social History     Socioeconomic History    Marital status: Single   Tobacco Use    Smoking status: Some Days     Types: Cigarettes    Smokeless tobacco: Never   Vaping Use    Vaping status:  Never Used   Substance and Sexual Activity    Alcohol use: Not Currently     Comment: used to be heavy alcoholism (18 years ago),     Drug use: No    Sexual activity: Yes       ROS:  Complete ROS performed. ROS otherwise negative except for pertinent positives, negatives noted above in HPI.    Physical Exam     Vitals:    05/05/25 1602   BP: (!) 147/92   Pulse: 67   Resp: 18   Temp:    SpO2: 97%       Physical Exam  General: NAD    Lungs: No increased work of breathing  Heart: Regular rate by palpation of peripheral pulse    LLE:  Skin intact  Limb rotated  Pain with manipulation of limb  NVID    Labs, Imaging   Imaging:   CT-HIP W/O PLUS RECONS LEFT   Final Result      1.  Mildly displaced and angulated transcervical left femoral neck fracture.   2.  Small foci of air in the left groin/inguinal region. Correlate for recent intervention or infection.   3.  Focal thickening of the anterior right bladder wall. Correlate with urinalysis and direct visualization.   4.  Right greater than left fat-containing inguinal hernias.      DX-CHEST-LIMITED (1 VIEW)   Final Result      No acute cardiopulmonary abnormality.      DX-FEMUR-2+ LEFT   Final Result      Left femoral neck fracture.      DX-PELVIS-1 OR 2 VIEWS   Final Result      Acute fracture of the left femoral neck.          Laboratory Values  Recent Labs     05/05/25  0924   WBC 4.7*   RBC 5.10   HEMOGLOBIN 14.1   HEMATOCRIT 41.4*   MCV 81.2*   MCH 27.6   MCHC 34.1   RDW 47.0   PLATELETCT 238   MPV 9.8     Recent Labs     05/05/25  0924   SODIUM 139   POTASSIUM 4.6   CHLORIDE 107   CO2 24   GLUCOSE 113*   BUN 18           Assessment   Trevor Gillis Jr. 54 y.o. male presenting with Displaced Left femoral neck fracture    Plan       Discussed clinical, physical, and imaging findings with patient . Considering the significance of the injury, expected prognosis, and anticipated functional limitations with nonoperative management, the patient was indicated for  operative intervention. Surgery consists of ORIF Left femoral neck vs Left total hip replacement. I discussed the clinical, physical, and imaging findings with patient and I answered any questions.    Risks, benefits, and alternatives discussed with patient. Risks for surgery may include bleeding, infection, pain, nonunion, malunion, fracture, hardware failure, persistent dysfunction, damage to surrounding neurovascular structures, anesthesia complications, operative failure, stroke, DVT, or death. Patient agrees to proceed with surgery. Informed consent obtained.    OR likely tomorrow due to operating room availability    NPO at midnight      Signed:  Daniel Patino M.D., MD  5/5/2025 4:38 PM

## 2025-05-05 NOTE — ED NOTES
Pt wheeled to room, assisted to [place] and changed into gown. No obvious deformity noted. CMS intact in LLE.

## 2025-05-06 ENCOUNTER — ANESTHESIA (OUTPATIENT)
Dept: SURGERY | Facility: MEDICAL CENTER | Age: 55
DRG: 481 | End: 2025-05-06
Payer: OTHER MISCELLANEOUS

## 2025-05-06 ENCOUNTER — APPOINTMENT (OUTPATIENT)
Dept: RADIOLOGY | Facility: MEDICAL CENTER | Age: 55
DRG: 481 | End: 2025-05-06
Attending: STUDENT IN AN ORGANIZED HEALTH CARE EDUCATION/TRAINING PROGRAM
Payer: OTHER MISCELLANEOUS

## 2025-05-06 ENCOUNTER — SURGERY (OUTPATIENT)
Age: 55
End: 2025-05-06
Payer: COMMERCIAL

## 2025-05-06 PROBLEM — Z95.0 PACEMAKER: Status: ACTIVE | Noted: 2025-05-06

## 2025-05-06 LAB
ANION GAP SERPL CALC-SCNC: 8 MMOL/L (ref 7–16)
BUN SERPL-MCNC: 20 MG/DL (ref 8–22)
CALCIUM SERPL-MCNC: 9 MG/DL (ref 8.5–10.5)
CHLORIDE SERPL-SCNC: 103 MMOL/L (ref 96–112)
CO2 SERPL-SCNC: 26 MMOL/L (ref 20–33)
CREAT SERPL-MCNC: 1.25 MG/DL (ref 0.5–1.4)
ERYTHROCYTE [DISTWIDTH] IN BLOOD BY AUTOMATED COUNT: 49 FL (ref 35.9–50)
GFR SERPLBLD CREATININE-BSD FMLA CKD-EPI: 69 ML/MIN/1.73 M 2
GLUCOSE SERPL-MCNC: 114 MG/DL (ref 65–99)
HCT VFR BLD AUTO: 41.3 % (ref 42–52)
HGB BLD-MCNC: 14 G/DL (ref 14–18)
MAGNESIUM SERPL-MCNC: 1.6 MG/DL (ref 1.5–2.5)
MCH RBC QN AUTO: 28.2 PG (ref 27–33)
MCHC RBC AUTO-ENTMCNC: 33.9 G/DL (ref 32.3–36.5)
MCV RBC AUTO: 83.3 FL (ref 81.4–97.8)
PHOSPHATE SERPL-MCNC: 3.3 MG/DL (ref 2.5–4.5)
PLATELET # BLD AUTO: 215 K/UL (ref 164–446)
PMV BLD AUTO: 9.6 FL (ref 9–12.9)
POTASSIUM SERPL-SCNC: 4.1 MMOL/L (ref 3.6–5.5)
RBC # BLD AUTO: 4.96 M/UL (ref 4.7–6.1)
SODIUM SERPL-SCNC: 137 MMOL/L (ref 135–145)
WBC # BLD AUTO: 5.9 K/UL (ref 4.8–10.8)

## 2025-05-06 PROCEDURE — 700102 HCHG RX REV CODE 250 W/ 637 OVERRIDE(OP): Performed by: GENERAL PRACTICE

## 2025-05-06 PROCEDURE — 160035 HCHG PACU - 1ST 60 MINS PHASE I: Performed by: STUDENT IN AN ORGANIZED HEALTH CARE EDUCATION/TRAINING PROGRAM

## 2025-05-06 PROCEDURE — 80048 BASIC METABOLIC PNL TOTAL CA: CPT

## 2025-05-06 PROCEDURE — 99233 SBSQ HOSP IP/OBS HIGH 50: CPT

## 2025-05-06 PROCEDURE — 0QS704Z REPOSITION LEFT UPPER FEMUR WITH INTERNAL FIXATION DEVICE, OPEN APPROACH: ICD-10-PCS | Performed by: STUDENT IN AN ORGANIZED HEALTH CARE EDUCATION/TRAINING PROGRAM

## 2025-05-06 PROCEDURE — 160028 HCHG SURGERY MINUTES - 1ST 30 MINS LEVEL 3: Performed by: STUDENT IN AN ORGANIZED HEALTH CARE EDUCATION/TRAINING PROGRAM

## 2025-05-06 PROCEDURE — 73502 X-RAY EXAM HIP UNI 2-3 VIEWS: CPT | Mod: LT

## 2025-05-06 PROCEDURE — 770001 HCHG ROOM/CARE - MED/SURG/GYN PRIV*

## 2025-05-06 PROCEDURE — 160036 HCHG PACU - EA ADDL 30 MINS PHASE I: Performed by: STUDENT IN AN ORGANIZED HEALTH CARE EDUCATION/TRAINING PROGRAM

## 2025-05-06 PROCEDURE — 700111 HCHG RX REV CODE 636 W/ 250 OVERRIDE (IP): Performed by: STUDENT IN AN ORGANIZED HEALTH CARE EDUCATION/TRAINING PROGRAM

## 2025-05-06 PROCEDURE — 85027 COMPLETE CBC AUTOMATED: CPT

## 2025-05-06 PROCEDURE — 700101 HCHG RX REV CODE 250: Performed by: STUDENT IN AN ORGANIZED HEALTH CARE EDUCATION/TRAINING PROGRAM

## 2025-05-06 PROCEDURE — 700111 HCHG RX REV CODE 636 W/ 250 OVERRIDE (IP): Mod: JZ | Performed by: GENERAL PRACTICE

## 2025-05-06 PROCEDURE — 27236 TREAT THIGH FRACTURE: CPT | Mod: 80ROC,LT | Performed by: STUDENT IN AN ORGANIZED HEALTH CARE EDUCATION/TRAINING PROGRAM

## 2025-05-06 PROCEDURE — 160009 HCHG ANES TIME/MIN: Performed by: STUDENT IN AN ORGANIZED HEALTH CARE EDUCATION/TRAINING PROGRAM

## 2025-05-06 PROCEDURE — A9270 NON-COVERED ITEM OR SERVICE: HCPCS | Performed by: GENERAL PRACTICE

## 2025-05-06 PROCEDURE — A9270 NON-COVERED ITEM OR SERVICE: HCPCS | Performed by: STUDENT IN AN ORGANIZED HEALTH CARE EDUCATION/TRAINING PROGRAM

## 2025-05-06 PROCEDURE — C1713 ANCHOR/SCREW BN/BN,TIS/BN: HCPCS | Performed by: STUDENT IN AN ORGANIZED HEALTH CARE EDUCATION/TRAINING PROGRAM

## 2025-05-06 PROCEDURE — 700102 HCHG RX REV CODE 250 W/ 637 OVERRIDE(OP): Performed by: STUDENT IN AN ORGANIZED HEALTH CARE EDUCATION/TRAINING PROGRAM

## 2025-05-06 PROCEDURE — 700111 HCHG RX REV CODE 636 W/ 250 OVERRIDE (IP): Mod: JZ | Performed by: STUDENT IN AN ORGANIZED HEALTH CARE EDUCATION/TRAINING PROGRAM

## 2025-05-06 PROCEDURE — 36415 COLL VENOUS BLD VENIPUNCTURE: CPT

## 2025-05-06 PROCEDURE — 160048 HCHG OR STATISTICAL LEVEL 1-5: Performed by: STUDENT IN AN ORGANIZED HEALTH CARE EDUCATION/TRAINING PROGRAM

## 2025-05-06 PROCEDURE — 700105 HCHG RX REV CODE 258: Performed by: STUDENT IN AN ORGANIZED HEALTH CARE EDUCATION/TRAINING PROGRAM

## 2025-05-06 PROCEDURE — 84100 ASSAY OF PHOSPHORUS: CPT

## 2025-05-06 PROCEDURE — 160015 HCHG STAT PREOP MINUTES: Performed by: STUDENT IN AN ORGANIZED HEALTH CARE EDUCATION/TRAINING PROGRAM

## 2025-05-06 PROCEDURE — 27236 TREAT THIGH FRACTURE: CPT | Mod: LT | Performed by: STUDENT IN AN ORGANIZED HEALTH CARE EDUCATION/TRAINING PROGRAM

## 2025-05-06 PROCEDURE — 160039 HCHG SURGERY MINUTES - EA ADDL 1 MIN LEVEL 3: Performed by: STUDENT IN AN ORGANIZED HEALTH CARE EDUCATION/TRAINING PROGRAM

## 2025-05-06 PROCEDURE — 160002 HCHG RECOVERY MINUTES (STAT): Performed by: STUDENT IN AN ORGANIZED HEALTH CARE EDUCATION/TRAINING PROGRAM

## 2025-05-06 PROCEDURE — 83735 ASSAY OF MAGNESIUM: CPT

## 2025-05-06 DEVICE — IMPLANTABLE DEVICE: Type: IMPLANTABLE DEVICE | Site: FEMUR | Status: FUNCTIONAL

## 2025-05-06 RX ORDER — OXYCODONE AND ACETAMINOPHEN 5; 325 MG/1; MG/1
1 TABLET ORAL
Status: DISCONTINUED | OUTPATIENT
Start: 2025-05-06 | End: 2025-05-06

## 2025-05-06 RX ORDER — CEFAZOLIN SODIUM 1 G/3ML
INJECTION, POWDER, FOR SOLUTION INTRAMUSCULAR; INTRAVENOUS PRN
Status: DISCONTINUED | OUTPATIENT
Start: 2025-05-06 | End: 2025-05-06 | Stop reason: SURG

## 2025-05-06 RX ORDER — SODIUM CHLORIDE, SODIUM LACTATE, POTASSIUM CHLORIDE, CALCIUM CHLORIDE 600; 310; 30; 20 MG/100ML; MG/100ML; MG/100ML; MG/100ML
INJECTION, SOLUTION INTRAVENOUS
Status: DISCONTINUED | OUTPATIENT
Start: 2025-05-06 | End: 2025-05-06 | Stop reason: SURG

## 2025-05-06 RX ORDER — OXYCODONE HCL 5 MG/5 ML
5 SOLUTION, ORAL ORAL
Refills: 0 | Status: COMPLETED | OUTPATIENT
Start: 2025-05-06 | End: 2025-05-06

## 2025-05-06 RX ORDER — LIDOCAINE HYDROCHLORIDE 20 MG/ML
JELLY TOPICAL PRN
Status: DISCONTINUED | OUTPATIENT
Start: 2025-05-06 | End: 2025-05-06 | Stop reason: SURG

## 2025-05-06 RX ORDER — HYDROMORPHONE HYDROCHLORIDE 1 MG/ML
0.1 INJECTION, SOLUTION INTRAMUSCULAR; INTRAVENOUS; SUBCUTANEOUS
Status: DISCONTINUED | OUTPATIENT
Start: 2025-05-06 | End: 2025-05-06 | Stop reason: HOSPADM

## 2025-05-06 RX ORDER — PHENYLEPHRINE HYDROCHLORIDE 10 MG/ML
INJECTION, SOLUTION INTRAMUSCULAR; INTRAVENOUS; SUBCUTANEOUS PRN
Status: DISCONTINUED | OUTPATIENT
Start: 2025-05-06 | End: 2025-05-06 | Stop reason: HOSPADM

## 2025-05-06 RX ORDER — ONDANSETRON 2 MG/ML
INJECTION INTRAMUSCULAR; INTRAVENOUS PRN
Status: DISCONTINUED | OUTPATIENT
Start: 2025-05-06 | End: 2025-05-06 | Stop reason: SURG

## 2025-05-06 RX ORDER — HYDROMORPHONE HYDROCHLORIDE 1 MG/ML
0.4 INJECTION, SOLUTION INTRAMUSCULAR; INTRAVENOUS; SUBCUTANEOUS
Status: DISCONTINUED | OUTPATIENT
Start: 2025-05-06 | End: 2025-05-06 | Stop reason: HOSPADM

## 2025-05-06 RX ORDER — MEPERIDINE HYDROCHLORIDE 25 MG/ML
6.25 INJECTION INTRAMUSCULAR; INTRAVENOUS; SUBCUTANEOUS
Status: DISCONTINUED | OUTPATIENT
Start: 2025-05-06 | End: 2025-05-06 | Stop reason: HOSPADM

## 2025-05-06 RX ORDER — METOPROLOL TARTRATE 1 MG/ML
1 INJECTION, SOLUTION INTRAVENOUS
Status: DISCONTINUED | OUTPATIENT
Start: 2025-05-06 | End: 2025-05-06 | Stop reason: HOSPADM

## 2025-05-06 RX ORDER — ONDANSETRON 2 MG/ML
4 INJECTION INTRAMUSCULAR; INTRAVENOUS
Status: DISCONTINUED | OUTPATIENT
Start: 2025-05-06 | End: 2025-05-06 | Stop reason: HOSPADM

## 2025-05-06 RX ORDER — HYDRALAZINE HYDROCHLORIDE 20 MG/ML
5 INJECTION INTRAMUSCULAR; INTRAVENOUS
Status: DISCONTINUED | OUTPATIENT
Start: 2025-05-06 | End: 2025-05-06 | Stop reason: HOSPADM

## 2025-05-06 RX ORDER — NEOSTIGMINE METHYLSULFATE 1 MG/ML
INJECTION INTRAVENOUS PRN
Status: DISCONTINUED | OUTPATIENT
Start: 2025-05-06 | End: 2025-05-06 | Stop reason: HOSPADM

## 2025-05-06 RX ORDER — ROCURONIUM BROMIDE 10 MG/ML
INJECTION, SOLUTION INTRAVENOUS PRN
Status: DISCONTINUED | OUTPATIENT
Start: 2025-05-06 | End: 2025-05-06 | Stop reason: SURG

## 2025-05-06 RX ORDER — DEXAMETHASONE SODIUM PHOSPHATE 4 MG/ML
INJECTION, SOLUTION INTRA-ARTICULAR; INTRALESIONAL; INTRAMUSCULAR; INTRAVENOUS; SOFT TISSUE PRN
Status: DISCONTINUED | OUTPATIENT
Start: 2025-05-06 | End: 2025-05-06 | Stop reason: SURG

## 2025-05-06 RX ORDER — LABETALOL HYDROCHLORIDE 5 MG/ML
5 INJECTION, SOLUTION INTRAVENOUS
Status: DISCONTINUED | OUTPATIENT
Start: 2025-05-06 | End: 2025-05-06 | Stop reason: HOSPADM

## 2025-05-06 RX ORDER — EPHEDRINE SULFATE 50 MG/ML
5 INJECTION, SOLUTION INTRAVENOUS
Status: DISCONTINUED | OUTPATIENT
Start: 2025-05-06 | End: 2025-05-06 | Stop reason: HOSPADM

## 2025-05-06 RX ORDER — HYDROMORPHONE HYDROCHLORIDE 1 MG/ML
0.2 INJECTION, SOLUTION INTRAMUSCULAR; INTRAVENOUS; SUBCUTANEOUS
Status: DISCONTINUED | OUTPATIENT
Start: 2025-05-06 | End: 2025-05-06 | Stop reason: HOSPADM

## 2025-05-06 RX ORDER — MIDAZOLAM HYDROCHLORIDE 1 MG/ML
INJECTION INTRAMUSCULAR; INTRAVENOUS PRN
Status: DISCONTINUED | OUTPATIENT
Start: 2025-05-06 | End: 2025-05-06 | Stop reason: SURG

## 2025-05-06 RX ORDER — SODIUM CHLORIDE, SODIUM LACTATE, POTASSIUM CHLORIDE, CALCIUM CHLORIDE 600; 310; 30; 20 MG/100ML; MG/100ML; MG/100ML; MG/100ML
INJECTION, SOLUTION INTRAVENOUS CONTINUOUS
Status: DISCONTINUED | OUTPATIENT
Start: 2025-05-06 | End: 2025-05-06 | Stop reason: HOSPADM

## 2025-05-06 RX ORDER — OXYCODONE AND ACETAMINOPHEN 5; 325 MG/1; MG/1
2 TABLET ORAL
Status: DISCONTINUED | OUTPATIENT
Start: 2025-05-06 | End: 2025-05-06

## 2025-05-06 RX ORDER — DIPHENHYDRAMINE HYDROCHLORIDE 50 MG/ML
12.5 INJECTION, SOLUTION INTRAMUSCULAR; INTRAVENOUS
Status: DISCONTINUED | OUTPATIENT
Start: 2025-05-06 | End: 2025-05-06 | Stop reason: HOSPADM

## 2025-05-06 RX ORDER — GABAPENTIN 100 MG/1
100 CAPSULE ORAL 3 TIMES DAILY PRN
Status: DISCONTINUED | OUTPATIENT
Start: 2025-05-06 | End: 2025-05-08 | Stop reason: HOSPADM

## 2025-05-06 RX ORDER — ALBUTEROL SULFATE 5 MG/ML
2.5 SOLUTION RESPIRATORY (INHALATION)
Status: DISCONTINUED | OUTPATIENT
Start: 2025-05-06 | End: 2025-05-06 | Stop reason: HOSPADM

## 2025-05-06 RX ORDER — GLYCOPYRROLATE 0.2 MG/ML
INJECTION INTRAMUSCULAR; INTRAVENOUS PRN
Status: DISCONTINUED | OUTPATIENT
Start: 2025-05-06 | End: 2025-05-06 | Stop reason: SURG

## 2025-05-06 RX ORDER — METHOCARBAMOL 500 MG/1
500 TABLET, FILM COATED ORAL 4 TIMES DAILY PRN
Status: DISCONTINUED | OUTPATIENT
Start: 2025-05-06 | End: 2025-05-08 | Stop reason: HOSPADM

## 2025-05-06 RX ORDER — HALOPERIDOL 5 MG/ML
1 INJECTION INTRAMUSCULAR
Status: DISCONTINUED | OUTPATIENT
Start: 2025-05-06 | End: 2025-05-06 | Stop reason: HOSPADM

## 2025-05-06 RX ORDER — OXYCODONE HCL 5 MG/5 ML
10 SOLUTION, ORAL ORAL
Refills: 0 | Status: COMPLETED | OUTPATIENT
Start: 2025-05-06 | End: 2025-05-06

## 2025-05-06 RX ADMIN — ACETAMINOPHEN 1000 MG: 500 TABLET ORAL at 05:02

## 2025-05-06 RX ADMIN — ROCURONIUM BROMIDE 50 MG: 10 INJECTION INTRAVENOUS at 07:33

## 2025-05-06 RX ADMIN — PHENYLEPHRINE HYDROCHLORIDE 200 MCG: 10 INJECTION INTRAVENOUS at 07:46

## 2025-05-06 RX ADMIN — MIDAZOLAM HYDROCHLORIDE 2 MG: 1 INJECTION, SOLUTION INTRAMUSCULAR; INTRAVENOUS at 07:30

## 2025-05-06 RX ADMIN — SODIUM CHLORIDE, POTASSIUM CHLORIDE, SODIUM LACTATE AND CALCIUM CHLORIDE: 600; 310; 30; 20 INJECTION, SOLUTION INTRAVENOUS at 07:30

## 2025-05-06 RX ADMIN — HYDROMORPHONE HYDROCHLORIDE 0.2 MG: 1 INJECTION, SOLUTION INTRAMUSCULAR; INTRAVENOUS; SUBCUTANEOUS at 09:51

## 2025-05-06 RX ADMIN — FENTANYL CITRATE 50 MCG: 50 INJECTION, SOLUTION INTRAMUSCULAR; INTRAVENOUS at 08:33

## 2025-05-06 RX ADMIN — FENTANYL CITRATE 50 MCG: 50 INJECTION, SOLUTION INTRAMUSCULAR; INTRAVENOUS at 07:31

## 2025-05-06 RX ADMIN — OXYCODONE HYDROCHLORIDE 10 MG: 5 SOLUTION ORAL at 09:42

## 2025-05-06 RX ADMIN — PHENYLEPHRINE HYDROCHLORIDE 150 MCG: 10 INJECTION INTRAVENOUS at 07:48

## 2025-05-06 RX ADMIN — NEOSTIGMINE METHYLSULFATE 3 MG: 1 INJECTION INTRAVENOUS at 08:29

## 2025-05-06 RX ADMIN — PHENYLEPHRINE HYDROCHLORIDE 100 MCG: 10 INJECTION INTRAVENOUS at 08:06

## 2025-05-06 RX ADMIN — HYDROMORPHONE HYDROCHLORIDE 0.4 MG: 1 INJECTION, SOLUTION INTRAMUSCULAR; INTRAVENOUS; SUBCUTANEOUS at 09:45

## 2025-05-06 RX ADMIN — PHENYLEPHRINE HYDROCHLORIDE 150 MCG: 10 INJECTION INTRAVENOUS at 07:42

## 2025-05-06 RX ADMIN — ONDANSETRON 4 MG: 2 INJECTION INTRAMUSCULAR; INTRAVENOUS at 08:29

## 2025-05-06 RX ADMIN — BISOPROLOL FUMARATE 5 MG: 5 TABLET ORAL at 17:08

## 2025-05-06 RX ADMIN — GLYCOPYRROLATE 0.6 MG: 0.2 INJECTION INTRAMUSCULAR; INTRAVENOUS at 08:29

## 2025-05-06 RX ADMIN — PHENYLEPHRINE HYDROCHLORIDE 100 MCG: 10 INJECTION INTRAVENOUS at 07:59

## 2025-05-06 RX ADMIN — OXYCODONE HYDROCHLORIDE 10 MG: 10 TABLET ORAL at 05:02

## 2025-05-06 RX ADMIN — ACETAMINOPHEN 1000 MG: 500 TABLET ORAL at 01:06

## 2025-05-06 RX ADMIN — OXYCODONE HYDROCHLORIDE 10 MG: 10 TABLET ORAL at 01:07

## 2025-05-06 RX ADMIN — ONDANSETRON 4 MG: 2 INJECTION INTRAMUSCULAR; INTRAVENOUS at 17:09

## 2025-05-06 RX ADMIN — DEXAMETHASONE SODIUM PHOSPHATE 4 MG: 4 INJECTION INTRA-ARTICULAR; INTRALESIONAL; INTRAMUSCULAR; INTRAVENOUS; SOFT TISSUE at 07:38

## 2025-05-06 RX ADMIN — LIDOCAINE HYDROCHLORIDE 1 ML: 20 JELLY TOPICAL at 07:35

## 2025-05-06 RX ADMIN — FENTANYL CITRATE 50 MCG: 50 INJECTION, SOLUTION INTRAMUSCULAR; INTRAVENOUS at 08:29

## 2025-05-06 RX ADMIN — PHENYLEPHRINE HYDROCHLORIDE 200 MCG: 10 INJECTION INTRAVENOUS at 08:14

## 2025-05-06 RX ADMIN — PROPOFOL 150 MG: 10 INJECTION, EMULSION INTRAVENOUS at 07:32

## 2025-05-06 RX ADMIN — PHENYLEPHRINE HYDROCHLORIDE 200 MCG: 10 INJECTION INTRAVENOUS at 07:44

## 2025-05-06 RX ADMIN — PHENYLEPHRINE HYDROCHLORIDE 200 MCG: 10 INJECTION INTRAVENOUS at 08:35

## 2025-05-06 RX ADMIN — HYDROMORPHONE HYDROCHLORIDE 0.4 MG: 1 INJECTION, SOLUTION INTRAMUSCULAR; INTRAVENOUS; SUBCUTANEOUS at 09:23

## 2025-05-06 RX ADMIN — CEFAZOLIN 2 G: 1 INJECTION, POWDER, FOR SOLUTION INTRAMUSCULAR; INTRAVENOUS at 07:34

## 2025-05-06 SDOH — ECONOMIC STABILITY: TRANSPORTATION INSECURITY
IN THE PAST 12 MONTHS, HAS THE LACK OF TRANSPORTATION KEPT YOU FROM MEDICAL APPOINTMENTS OR FROM GETTING MEDICATIONS?: NO

## 2025-05-06 SDOH — ECONOMIC STABILITY: TRANSPORTATION INSECURITY
IN THE PAST 12 MONTHS, HAS LACK OF RELIABLE TRANSPORTATION KEPT YOU FROM MEDICAL APPOINTMENTS, MEETINGS, WORK OR FROM GETTING THINGS NEEDED FOR DAILY LIVING?: NO

## 2025-05-06 ASSESSMENT — PAIN DESCRIPTION - PAIN TYPE
TYPE: ACUTE PAIN
TYPE: ACUTE PAIN;SURGICAL PAIN
TYPE: SURGICAL PAIN
TYPE: SURGICAL PAIN
TYPE: ACUTE PAIN;SURGICAL PAIN
TYPE: ACUTE PAIN;SURGICAL PAIN
TYPE: SURGICAL PAIN
TYPE: ACUTE PAIN
TYPE: SURGICAL PAIN
TYPE: ACUTE PAIN;SURGICAL PAIN
TYPE: ACUTE PAIN

## 2025-05-06 ASSESSMENT — SOCIAL DETERMINANTS OF HEALTH (SDOH)
WITHIN THE LAST YEAR, HAVE YOU BEEN AFRAID OF YOUR PARTNER OR EX-PARTNER?: NO
WITHIN THE PAST 12 MONTHS, YOU WORRIED THAT YOUR FOOD WOULD RUN OUT BEFORE YOU GOT THE MONEY TO BUY MORE: NEVER TRUE
WITHIN THE PAST 12 MONTHS, THE FOOD YOU BOUGHT JUST DIDN'T LAST AND YOU DIDN'T HAVE MONEY TO GET MORE: NEVER TRUE
WITHIN THE LAST YEAR, HAVE YOU BEEN KICKED, HIT, SLAPPED, OR OTHERWISE PHYSICALLY HURT BY YOUR PARTNER OR EX-PARTNER?: NO
WITHIN THE LAST YEAR, HAVE YOU BEEN HUMILIATED OR EMOTIONALLY ABUSED IN OTHER WAYS BY YOUR PARTNER OR EX-PARTNER?: NO
IN THE PAST 12 MONTHS, HAS THE ELECTRIC, GAS, OIL, OR WATER COMPANY THREATENED TO SHUT OFF SERVICE IN YOUR HOME?: NO
WITHIN THE LAST YEAR, HAVE TO BEEN RAPED OR FORCED TO HAVE ANY KIND OF SEXUAL ACTIVITY BY YOUR PARTNER OR EX-PARTNER?: NO

## 2025-05-06 ASSESSMENT — COGNITIVE AND FUNCTIONAL STATUS - GENERAL
MOBILITY SCORE: 14
TOILETING: A LOT
SUGGESTED CMS G CODE MODIFIER MOBILITY: CL
DRESSING REGULAR UPPER BODY CLOTHING: A LITTLE
DAILY ACTIVITIY SCORE: 17
SUGGESTED CMS G CODE MODIFIER DAILY ACTIVITY: CK
CLIMB 3 TO 5 STEPS WITH RAILING: A LOT
PERSONAL GROOMING: A LITTLE
HELP NEEDED FOR BATHING: A LOT
STANDING UP FROM CHAIR USING ARMS: A LOT
WALKING IN HOSPITAL ROOM: A LOT
MOVING FROM LYING ON BACK TO SITTING ON SIDE OF FLAT BED: A LITTLE
MOVING TO AND FROM BED TO CHAIR: A LOT
TURNING FROM BACK TO SIDE WHILE IN FLAT BAD: A LITTLE
DRESSING REGULAR LOWER BODY CLOTHING: A LITTLE

## 2025-05-06 ASSESSMENT — LIFESTYLE VARIABLES
AVERAGE NUMBER OF DAYS PER WEEK YOU HAVE A DRINK CONTAINING ALCOHOL: 0
CONSUMPTION TOTAL: NEGATIVE
ALCOHOL_USE: NO
TOTAL SCORE: 0
HOW MANY TIMES IN THE PAST YEAR HAVE YOU HAD 5 OR MORE DRINKS IN A DAY: 0
DOES PATIENT WANT TO STOP DRINKING: NO
TOTAL SCORE: 0
HAVE PEOPLE ANNOYED YOU BY CRITICIZING YOUR DRINKING: NO
ON A TYPICAL DAY WHEN YOU DRINK ALCOHOL HOW MANY DRINKS DO YOU HAVE: 0
EVER HAD A DRINK FIRST THING IN THE MORNING TO STEADY YOUR NERVES TO GET RID OF A HANGOVER: NO
HAVE YOU EVER FELT YOU SHOULD CUT DOWN ON YOUR DRINKING: NO
EVER FELT BAD OR GUILTY ABOUT YOUR DRINKING: NO
TOTAL SCORE: 0

## 2025-05-06 ASSESSMENT — PATIENT HEALTH QUESTIONNAIRE - PHQ9
1. LITTLE INTEREST OR PLEASURE IN DOING THINGS: NOT AT ALL
2. FEELING DOWN, DEPRESSED, IRRITABLE, OR HOPELESS: NOT AT ALL
SUM OF ALL RESPONSES TO PHQ9 QUESTIONS 1 AND 2: 0

## 2025-05-06 ASSESSMENT — PAIN SCALES - GENERAL: PAIN_LEVEL: 2

## 2025-05-06 NOTE — ANESTHESIA PROCEDURE NOTES
Airway    Date/Time: 5/6/2025 7:35 AM    Performed by: Elan Ortiz M.D.  Authorized by: Elan Ortiz M.D.    Location:  OR  Urgency:  Elective  Difficult Airway: No    Indications for Airway Management:  Anesthesia      Spontaneous Ventilation: absent    Sedation Level:  Deep  Preoxygenated: Yes    Patient Position:  Sniffing  Mask Difficulty Assessment:  0 - not attempted  Final Airway Type:  Endotracheal airway  Final Endotracheal Airway:  ETT  Cuffed: Yes    Technique Used for Successful ETT Placement:  Video laryngoscopy    Insertion Site:  Oral  Blade Type:  Mayo  Laryngoscope Blade/Videolaryngoscope Blade Size:  4  ETT Size (mm):  6.5  Measured from:  Teeth  ETT to Teeth (cm):  22  Placement Verified by: auscultation and capnometry    Cormack-Lehane Classification:  Grade I - full view of glottis  Number of Attempts at Approach:  1

## 2025-05-06 NOTE — PROGRESS NOTES
4 Eyes Skin Assessment Completed by Brent RN and RICARDO Reardon.    Head WDL  Ears WDL  Nose WDL  Mouth WDL  Neck WDL  Breast/Chest WDL  Shoulder Blades WDL  Spine WDL  (R) Arm/Elbow/Hand WDL  (L) Arm/Elbow/Hand WDL  Abdomen WDL  Groin WDL  Scrotum/Coccyx/Buttocks WDL  (R) Leg WDL  (L) Leg Swelling - proximal thigh  (R) Heel/Foot/Toe WDL - dry  (L) Heel/Foot/Toe WDL - dry          Devices In Places Pulse Ox      Interventions In Place Pillows    Possible Skin Injury No    Pictures Uploaded Into Epic N/A  Wound Consult Placed N/A  RN Wound Prevention Protocol Ordered No

## 2025-05-06 NOTE — PROGRESS NOTES
Patient arrived to T314 from PACU. AxOx4, BP: 134/82, RR 17, HR 66, SpO2 96% on 2L of oxymask. Pedal pulse 2+ to LLE. Educated pt to call light, to call prior to getting up. Fall risk sign placed outside door, yellow wristband on, fall risk socks, bed alarm in place. Patient denies any pain at the moment, educated to PRN pain medication. Pt verbalizes understanding.     4 Eyes Skin Assessment Completed by RICARDO Hernandez and INOCENCIA Singh.     Head WDL  Ears WDL  Nose WDL  Mouth WDL  Neck WDL  Breast/Chest WDL  Shoulder Blades WDL  Spine WDL  (R) Arm/Elbow/Hand Redness, Blanching, and Bruising  (L) Arm/Elbow/Hand Redness, Blanching, and Bruising  Abdomen WDL  Groin WDL  Scrotum/Coccyx/Buttocks Redness and Blanching  (R) Leg Scab and Bruising  (L) Leg Swelling and Incision DIP: gauze and teg  (R) Heel/Foot/Toe Redness and Blanching  (L) Heel/Foot/Toe Redness and Blanching          Devices In Places Blood Pressure Cuff, Pulse Ox, SCD's, and Nasal Cannula      Interventions In Place TAP System, Pillows, and Heels Loaded W/Pillows    Possible Skin Injury No    Pictures Uploaded Into Epic N/A  Wound Consult Placed N/A  RN Wound Prevention Protocol Ordered No

## 2025-05-06 NOTE — PROGRESS NOTES
Pt arrived to unit via hospital bed. A&O x 4, pain 8/10, on 0L O2. His room was not clean yet, so he was placed in a goel bed. Room won't be cleaned for 1-2 hours. He has all belongings at bedside. Pt oriented to unit, call light and belongings within reach, educated to call for assistance.

## 2025-05-06 NOTE — PROGRESS NOTES
Fall at work  Left femoral neck fracture  Plan for fixation today  Discussed risks of nonunion, AVN, collapse,need for conversion to FAUSTO      Romero Gooden MD  Orthopedic Trauma Surgery

## 2025-05-06 NOTE — ANESTHESIA TIME REPORT
Anesthesia Start and Stop Event Times       Date Time Event    5/6/2025 0726 Ready for Procedure     0730 Anesthesia Start     0854 Anesthesia Stop          Responsible Staff  05/06/25      Name Role Begin End    Elan Ortiz M.D. Anesth 0730 0854          Overtime Reason:  no overtime (within assigned shift)    Comments:

## 2025-05-06 NOTE — ED NOTES
Pt transferred to hospital bed. Call light within reach, primary RN at bedside to give pain meds.

## 2025-05-06 NOTE — OR NURSING
Patient arrived in PACU, VSS. Left hip incisions closed with staples and covered with guaze and tegaderm, CDI.

## 2025-05-06 NOTE — ANESTHESIA PREPROCEDURE EVALUATION
Case: [id number] Date/Time: 05/06/25 0715    Procedure: ORIF, FRACTURE, FEMORAL NECK (Left: Leg Upper)    Anesthesia type: General    Pre-op diagnosis: LEFT FEMORAL NECK FRACTURE    Location: [facility] OR 16 / SURGERY [facility]    Surgeons: Romero Gooden M.D.            Relevant Problems   ANESTHESIA   (positive) JEREMIAH (obstructive sleep apnea)      CARDIAC   (positive) Hypertension   (positive) Pacemaker      ENDO   (positive) Hypothyroidism       Physical Exam    Airway   Mallampati: III  TM distance: >3 FB  Neck ROM: full       Cardiovascular - normal exam  Rhythm: regular  Rate: normal  (-) murmur     Dental - normal exam           Pulmonary - normal exam  Breath sounds clear to auscultation     Abdominal    Neurological - normal exam                   Anesthesia Plan    ASA 3   ASA physical status 3 criteria: other (comment)    Plan - general               Induction: intravenous    Postoperative Plan: Postoperative administration of opioids is intended.    Pertinent diagnostic labs and testing reviewed    Informed Consent:    Anesthetic plan and risks discussed with patient.    Use of blood products discussed with: patient whom consented to blood products.

## 2025-05-06 NOTE — PROGRESS NOTES
Hospital Medicine Daily Progress Note    Date of Service  5/6/2025    Chief Complaint  Trevor Gillis Jr. is a 54 y.o. male admitted 5/5/2025 with left lower leg pain    Hospital Course  This is a 54-year-old male with past medical history of HOCM, NSVT with AICD in place, inguinal hernia s/p repair who presented to the ED on 5/5 after sustaining a injury at work.  He works as a .    Patient was struck by a pallet at work, he is unable to bear weight on his left lower extremity.  Patient noted to be hypertensive and hypoxic on admission, requiring oxygen.  X-ray imaging revealed left femoral neck fracture.  A nerve block was performed by ED physician.  Orthopedic surgery was consulted, recommended operative intervention with ORIF left femoral neck versus left total hip replacement.      Interval Problem Update    5/6  Patient new to me today  NAEO.  HDS, on RA.  CBC and metabolic panel reviewed, overall unremarkable.  CT of the left hip showed mildly displaced and angulated transcervical left femoral neck fracture.  There is a small foci of air in the left groin/inguinal region and focal thickening of the anterior right bladder wall.  Right greater than left fat-containing inguinal hernias.  Patient underwent left hip open reduction internal fixation of the femoral neck fracture today.  Seen postop.  Patient reports pain is overall controlled, is a little sleepy.  No new complaints.   PT OT recs postop.  DVT prophylaxis with SCDs and Lovenox until mobilizing independently and then can be switched to aspirin for 4 weeks.  Patient will need to follow-up with orthopedic surgery in 2 weeks for wound check and suture/staple removal.    I have discussed this patient's plan of care and discharge plan at IDT rounds today with Case Management, Nursing, Nursing leadership, and other members of the IDT team.    Consultants/Specialty  orthopedics    Code Status  Full Code    Disposition  I have placed the  appropriate orders for post-discharge needs.    Review of Systems  ROS     Physical Exam  Temp:  [36.3 °C (97.3 °F)-36.9 °C (98.4 °F)] 36.7 °C (98.1 °F)  Pulse:  [60-77] 64  Resp:  [14-37] 15  BP: (109-151)/(57-99) 133/90  SpO2:  [92 %-98 %] 96 %    Physical Exam  Constitutional:       General: He is not in acute distress.     Appearance: He is not toxic-appearing.   Cardiovascular:      Rate and Rhythm: Normal rate and regular rhythm.      Pulses: Normal pulses.      Heart sounds: Normal heart sounds.   Pulmonary:      Effort: Pulmonary effort is normal.      Breath sounds: Normal breath sounds.   Abdominal:      General: Abdomen is flat. Bowel sounds are normal.      Palpations: Abdomen is soft.   Musculoskeletal:      Comments: Left hip surgical site clean and intact, no overt redness/swelling/purulent drainage.   Skin:     Capillary Refill: Capillary refill takes less than 2 seconds.         Fluids    Intake/Output Summary (Last 24 hours) at 5/6/2025 1502  Last data filed at 5/6/2025 0853  Gross per 24 hour   Intake 500 ml   Output --   Net 500 ml        Laboratory  Recent Labs     05/05/25  0924 05/06/25  0416   WBC 4.7* 5.9   RBC 5.10 4.96   HEMOGLOBIN 14.1 14.0   HEMATOCRIT 41.4* 41.3*   MCV 81.2* 83.3   MCH 27.6 28.2   MCHC 34.1 33.9   RDW 47.0 49.0   PLATELETCT 238 215   MPV 9.8 9.6     Recent Labs     05/05/25  0924 05/06/25  0416   SODIUM 139 137   POTASSIUM 4.6 4.1   CHLORIDE 107 103   CO2 24 26   GLUCOSE 113* 114*   BUN 18 20   CREATININE 1.12 1.25   CALCIUM 9.3 9.0                   Imaging  DX-PORTABLE FLUORO > 1 HOUR   Preliminary Result      Portable fluoroscopy utilized for 2 minutes 1 second.         INTERPRETING LOCATION: [address] [facility], [address]      DX-HIP-UNILATERAL-W/O PELVIS-2/3 VIEWS LEFT   Preliminary Result      Portable fluoroscopy utilized for 2 minutes 1 second.         INTERPRETING LOCATION: [address] [facility], [id number]      CT-HIP W/O PLUS RECONS LEFT   Final Result      1.   Mildly displaced and angulated transcervical left femoral neck fracture.   2.  Small foci of air in the left groin/inguinal region. Correlate for recent intervention or infection.   3.  Focal thickening of the anterior right bladder wall. Correlate with urinalysis and direct visualization.   4.  Right greater than left fat-containing inguinal hernias.      DX-CHEST-LIMITED (1 VIEW)   Final Result      No acute cardiopulmonary abnormality.      DX-FEMUR-2+ LEFT   Final Result      Left femoral neck fracture.      DX-PELVIS-1 OR 2 VIEWS   Final Result      Acute fracture of the left femoral neck.           Assessment/Plan  * Closed displaced fracture of left femoral neck (HCC)  Assessment & Plan  Left femoral neck fracture noted on x-ray imaging  CT of the left hip showed mildly displaced and angulated transcervical left femoral neck fracture.  There is a small foci of air in the left groin/inguinal region and focal thickening of the anterior right bladder wall.  Right greater than left fat-containing inguinal hernias.  Patient underwent left hip open reduction internal fixation of the femoral neck fracture today.  Seen postop.  Patient reports pain is overall controlled, is a little sleepy.  No new complaints.   PT OT recs postop.  DVT prophylaxis with SCDs and Lovenox until mobilizing independently and then can be switched to aspirin for 4 weeks.  Patient will need to follow-up with orthopedic surgery in 2 weeks for wound check and suture/staple removal.  Continue on IV Dilaudid and p.o. oxy as needed.  Multimodal pain regimen.Continue pulse oximetry monitoring to monitor for hypoxia and respiratory depression while receiving IV narcotic medications        Tobacco abuse- (present on admission)  Assessment & Plan  Nicotine replacement protocol and cessation education provided for 4 minutes, discussed options of nicotine patch, acupuncture, medical treatment with wellbutrin and chantix. Discussed other options.      Patient does not want nicotine patch at this time    HOCM (hypertrophic obstructive cardiomyopathy) ([facility])- (present on admission)  Assessment & Plan  hx    ICD (implantable cardioverter-defibrillator) in place- (present on admission)  Assessment & Plan  In place, functional  Follows with cardiology outpatient    NSVT (nonsustained ventricular tachycardia) ([facility])- (present on admission)  Assessment & Plan  Resume beta-blocker         VTE prophylaxis:    enoxaparin ppx      I have performed a physical exam and reviewed and updated ROS and Plan today (5/6/2025). In review of yesterday's note (5/5/2025), there are no changes except as documented above.    Greater than 51 minutes spent prepping to see patient (e.g. review of tests) obtaining and/or reviewing separately obtained history. Performing a medically appropriate examination and evaluation.  Counseling and educating the patient/family/caregiver.  Ordering medications, tests, or procedures.  Referring and communicating with other health care professionals.  Documenting clinical information in EPIC.  Independently interpreting results and communicating results to patient/family/caregiver.  Care coordination.

## 2025-05-06 NOTE — CARE PLAN
The patient is Stable - Low risk of patient condition declining or worsening    Shift Goals  Clinical Goals: Pain control, NPO @ MN, rest  Patient Goals: Pain control, sleep  Family Goals: N/A    Progress made toward(s) clinical / shift goals:        Problem: Pain - Standard  Goal: Alleviation of pain or a reduction in pain to the patient’s comfort goal  Outcome: Progressing     Problem: Knowledge Deficit - Standard  Goal: Patient and family/care givers will demonstrate understanding of plan of care, disease process/condition, diagnostic tests and medications  Outcome: Progressing     Pain being controlled with Prn pain meds. Patient prepared for surgery in morning.     Patient is not progressing towards the following goals:

## 2025-05-06 NOTE — PROGRESS NOTES
Virtual Nurse rounding and admission profile complete.    Round Needs: No needs at this time.    Place d a consult for case management. PDI score 21

## 2025-05-06 NOTE — ANESTHESIA POSTPROCEDURE EVALUATION
Patient: Trevor Gillis Jr.    Procedure Summary       Date: 05/06/25 Room / Location: [place] [address] / SURGERY [facility]    Anesthesia Start: 0730 Anesthesia Stop: 0854    Procedure: ORIF, FRACTURE, FEMORAL NECK (Left: Leg Upper) Diagnosis: (LEFT FEMORAL NECK FRACTURE)    Surgeons: Romero Gooden M.D. Responsible Provider: Elan Ortiz M.D.    Anesthesia Type: general ASA Status: 3            Final Anesthesia Type: general  Last vitals  BP   Blood Pressure: (!) 136/99    Temp   36.4 °C (97.5 °F)    Pulse   73   Resp   19    SpO2   94 %      Anesthesia Post Evaluation    Patient location during evaluation: PACU  Patient participation: complete - patient participated  Level of consciousness: sleepy but conscious  Pain score: 2    Airway patency: patent  Anesthetic complications: no  Cardiovascular status: hemodynamically stable  Respiratory status: face mask and oral airway  Hydration status: euvolemic    PONV: none          No notable events documented.     Nurse Pain Score: 8 (NPRS)

## 2025-05-06 NOTE — DISCHARGE PLANNING
Case Management Discharge Planning    Admission Date: 5/5/2025  GMLOS: 4.4  ALOS: 1    Patient was discussed in IDT rounds.  Patient went to OR today for ORIF L femoral neck fracture.  Patient is admitted under workman's comp.  Workman's comp intake form was completed on 5/5/25 and scanned into media.

## 2025-05-06 NOTE — OP REPORT
DATE OF OPERATION: 5/6/2025     PREOPERATIVE DIAGNOSIS: Displaced left femoral neck fracture    POSTOPERATIVE DIAGNOSIS: Same    PROCEDURE PERFORMED: Left hip open reduction internal fixation femoral neck fracture    SURGEON: Romero Gooden M.D.     ASSISTANT: Osmin Iqbal MD - ortho trauma fellow  The use of the fellow as a surgical assistant was necessary for assistance with exposure, retraction, fracture reduction, instrumentation, and closure.      ANESTHESIA: General    SPECIMEN: None    ESTIMATED BLOOD LOSS: 30 mL    IMPLANTS: Synthes femoral neck system, single screw plate, 90 mm barrel, Martita 6.5 mm fully threaded 90 mm screw      INDICATIONS: The patient is a 54 y.o. male who presented with above.  I discussed the risks and benefits of the procedure which include but are not limited to risks of infection, wound healing complication, neurovascular injury, pain, malunion, non-union, malrotation, and the medical risks of anesthesia including MI, stroke, and death.  Alternatives to surgery were also discussed, including non-operative management, which I did not recommend.  The patient was in agreement with the plan to proceed, and the informed consent was signed and documented.  I met with the patient pre-operatively and marked the operative extremity with their agreement.  We proceeded to the operating room.     DESCRIPTION OF PROCEDURE:  Patient was seen in the preoperative holding area on the day of surgery. The operative site was marked with my initials.  he was taken to the operating room and placed supine on the operative table.  Anesthesia was induced.  The operative extremity was prepped and draped in the normal sterile fashion.  Operative pause was conducted and the correct patient, site, side, procedure, and surgeon's initials on extremity were identified.  Combination axial traction adduction internal rotation were used to reduce the fracture to reasonable position.  We then made a small  incision laterally placed a bone hook inferior calcar fragment reducing the fracture.  We then held the provisionally to percutaneously placed K wires crossing the fracture.  Next we placed our starting guidewire for FNS system center center check in AP and lateral views.  This was then measured overdrilled and the implant was placed over the wire.  We then drilled a single shaft screw which had excellent purchase.  Next the wire was removed and we drilled and placed our antirotation screw.  This point time we had reasonable reduction and appropriate implant position.  A previously placed K wires were then removed we placed an additional K wire superior and anterior to the implant this was measured overdrilled and we placed a 6.5 mm additional screw across the fracture to assist with rotational control.  Final images were obtained and came Proby reduction implant position.  Approach withdrawal technique was utilized to ensure no intra-articular screw penetration.  The wound was irrigated with sterile saline closed in layered fashion.  Sterile dressings applied he was awoken taken to PACU in stable condition.    POSTOPERATIVE PLAN: Toe-touch weightbearing left lower extremity weight bearing.  Mobilize with physical and occupational therapies.  DVT prophylaxis with SCDs and Lovenox until mobilizing independently and then can be switched to aspirin for 4 weeks.  The patient will follow up in clinic in 2 weeks to check wounds and remove sutures/staples.      ____________________________________   Romero Gooden M.D.   DD: 5/6/2025  8:31 AM

## 2025-05-06 NOTE — CARE PLAN
The patient is Stable - Low risk of patient condition declining or worsening    Shift Goals  Clinical Goals: Mobilty, Pain control, PACU vs, Wean O2  Patient Goals: Pain control, Sleep  Family Goals: Updates to Plan of Care    Progress made toward(s) clinical / shift goals:        Problem: Pain - Standard  Goal: Alleviation of pain or a reduction in pain to the patient’s comfort goal  Outcome: Progressing  Flowsheets  Taken 5/6/2025 1200  Pain Rating Scale (NPRS): 0  Taken 5/6/2025 1142  Non Verbal Scale:   Calm   Unlabored Breathing     Problem: Knowledge Deficit - Standard  Goal: Patient and family/care givers will demonstrate understanding of plan of care, disease process/condition, diagnostic tests and medications  Outcome: Progressing     Problem: Fall Risk  Goal: Patient will remain free from falls  Outcome: Progressing     Problem: Communication  Goal: The ability to communicate needs accurately and effectively will improve  Outcome: Progressing  Flowsheets (Taken 5/6/2025 1551)  Communication: Oriented patient to call light     Problem: Respiratory  Goal: Patient will achieve/maintain optimum respiratory ventilation and gas exchange  Outcome: Progressing  Flowsheets  Taken 5/6/2025 1520 by Khai Pinto, C.N.A.  O2 Delivery Device: Mask  Taken 5/6/2025 1030 by Mary Trujillo, R.NPatrick  Deep Breathe and Cough: Performs Correctly     Problem: Urinary Elimination  Goal: Establish and maintain regular urinary output  Outcome: Progressing     Problem: Bowel Elimination  Goal: Establish and maintain regular bowel function  Outcome: Progressing  Flowsheets (Taken 5/6/2025 1030)  Last BM: (Pt reported PTA) 05/05/25     Problem: Mobility  Goal: Patient's capacity to carry out activities will improve  Outcome: Progressing  Flowsheets  Taken 5/6/2025 1551  Mobility:   Provided rest periods between activities   Provided assistive devices   Monitored for signs of activity intolerance   Administered pain management to  allow progressive mobilization  Taken 5/6/2025 1030  Level of Mobility: Level IV  Activity Performed: Sitting up in bed     Problem: Self Care  Goal: Patient will have the ability to perform ADLs independently or with assistance (bathe, groom, dress, toilet and feed)  Outcome: Progressing     Problem: Infection - Standard  Goal: Patient will remain free from infection  Outcome: Progressing  Flowsheets (Taken 5/6/2025 1551)  Standard Infection Interventions:   Implemented standard precautions   Assessed for signs and symptoms of infection   Instructed patient/family on signs and symptoms of infection   Provided education on proper hand hygiene and infection prevention measures   Assessed for removal IV, central lines, intra-arterial or urinary catheters     Problem: Wound/ / Incision Healing  Goal: Patient's wound/surgical incision will decrease in size and heals properly  Outcome: Progressing       Patient is not progressing towards the following goals:

## 2025-05-07 LAB
ANION GAP SERPL CALC-SCNC: 8 MMOL/L (ref 7–16)
BUN SERPL-MCNC: 23 MG/DL (ref 8–22)
CALCIUM SERPL-MCNC: 9 MG/DL (ref 8.5–10.5)
CHLORIDE SERPL-SCNC: 102 MMOL/L (ref 96–112)
CO2 SERPL-SCNC: 26 MMOL/L (ref 20–33)
CREAT SERPL-MCNC: 1.24 MG/DL (ref 0.5–1.4)
ERYTHROCYTE [DISTWIDTH] IN BLOOD BY AUTOMATED COUNT: 48.9 FL (ref 35.9–50)
GFR SERPLBLD CREATININE-BSD FMLA CKD-EPI: 70 ML/MIN/1.73 M 2
GLUCOSE SERPL-MCNC: 120 MG/DL (ref 65–99)
HCT VFR BLD AUTO: 41.1 % (ref 42–52)
HGB BLD-MCNC: 13.6 G/DL (ref 14–18)
MAGNESIUM SERPL-MCNC: 1.6 MG/DL (ref 1.5–2.5)
MCH RBC QN AUTO: 27.7 PG (ref 27–33)
MCHC RBC AUTO-ENTMCNC: 33.1 G/DL (ref 32.3–36.5)
MCV RBC AUTO: 83.7 FL (ref 81.4–97.8)
PHOSPHATE SERPL-MCNC: 4 MG/DL (ref 2.5–4.5)
PLATELET # BLD AUTO: 231 K/UL (ref 164–446)
PMV BLD AUTO: 9.5 FL (ref 9–12.9)
POTASSIUM SERPL-SCNC: 4.2 MMOL/L (ref 3.6–5.5)
RBC # BLD AUTO: 4.91 M/UL (ref 4.7–6.1)
SODIUM SERPL-SCNC: 136 MMOL/L (ref 135–145)
WBC # BLD AUTO: 8.1 K/UL (ref 4.8–10.8)

## 2025-05-07 PROCEDURE — 99233 SBSQ HOSP IP/OBS HIGH 50: CPT

## 2025-05-07 PROCEDURE — 97166 OT EVAL MOD COMPLEX 45 MIN: CPT

## 2025-05-07 PROCEDURE — 97116 GAIT TRAINING THERAPY: CPT

## 2025-05-07 PROCEDURE — 85027 COMPLETE CBC AUTOMATED: CPT

## 2025-05-07 PROCEDURE — 770001 HCHG ROOM/CARE - MED/SURG/GYN PRIV*

## 2025-05-07 PROCEDURE — 84100 ASSAY OF PHOSPHORUS: CPT

## 2025-05-07 PROCEDURE — 97535 SELF CARE MNGMENT TRAINING: CPT

## 2025-05-07 PROCEDURE — 700102 HCHG RX REV CODE 250 W/ 637 OVERRIDE(OP): Performed by: GENERAL PRACTICE

## 2025-05-07 PROCEDURE — 36415 COLL VENOUS BLD VENIPUNCTURE: CPT

## 2025-05-07 PROCEDURE — 83735 ASSAY OF MAGNESIUM: CPT

## 2025-05-07 PROCEDURE — A9270 NON-COVERED ITEM OR SERVICE: HCPCS | Performed by: GENERAL PRACTICE

## 2025-05-07 PROCEDURE — 80048 BASIC METABOLIC PNL TOTAL CA: CPT

## 2025-05-07 PROCEDURE — 97163 PT EVAL HIGH COMPLEX 45 MIN: CPT

## 2025-05-07 RX ADMIN — BISOPROLOL FUMARATE 5 MG: 5 TABLET ORAL at 16:10

## 2025-05-07 RX ADMIN — ACETAMINOPHEN 1000 MG: 500 TABLET ORAL at 16:09

## 2025-05-07 RX ADMIN — OXYCODONE HYDROCHLORIDE 10 MG: 10 TABLET ORAL at 20:03

## 2025-05-07 RX ADMIN — ACETAMINOPHEN 1000 MG: 500 TABLET ORAL at 04:46

## 2025-05-07 RX ADMIN — SENNOSIDES AND DOCUSATE SODIUM 2 TABLET: 50; 8.6 TABLET ORAL at 16:09

## 2025-05-07 ASSESSMENT — COGNITIVE AND FUNCTIONAL STATUS - GENERAL
TOILETING: A LITTLE
DAILY ACTIVITIY SCORE: 18
DRESSING REGULAR LOWER BODY CLOTHING: A LOT
MOVING TO AND FROM BED TO CHAIR: A LITTLE
PERSONAL GROOMING: A LITTLE
SUGGESTED CMS G CODE MODIFIER MOBILITY: CK
HELP NEEDED FOR BATHING: A LOT
WALKING IN HOSPITAL ROOM: A LITTLE
MOVING FROM LYING ON BACK TO SITTING ON SIDE OF FLAT BED: A LITTLE
TURNING FROM BACK TO SIDE WHILE IN FLAT BAD: A LITTLE
SUGGESTED CMS G CODE MODIFIER DAILY ACTIVITY: CK
STANDING UP FROM CHAIR USING ARMS: A LITTLE
MOBILITY SCORE: 18
CLIMB 3 TO 5 STEPS WITH RAILING: A LITTLE

## 2025-05-07 ASSESSMENT — GAIT ASSESSMENTS
DISTANCE (FEET): 20
ASSISTIVE DEVICE: FRONT WHEEL WALKER
DEVIATION: BRADYKINETIC
GAIT LEVEL OF ASSIST: STANDBY ASSIST

## 2025-05-07 ASSESSMENT — ACTIVITIES OF DAILY LIVING (ADL): TOILETING: INDEPENDENT

## 2025-05-07 ASSESSMENT — PAIN DESCRIPTION - PAIN TYPE
TYPE: ACUTE PAIN;SURGICAL PAIN
TYPE: ACUTE PAIN
TYPE: ACUTE PAIN;SURGICAL PAIN
TYPE: ACUTE PAIN
TYPE: ACUTE PAIN

## 2025-05-07 NOTE — PROGRESS NOTES
Hospital Medicine Daily Progress Note    Date of Service  5/7/2025    Chief Complaint  Trevor Gillis Jr. is a 54 y.o. male admitted 5/5/2025 with left lower leg pain    Hospital Course  This is a 54-year-old male with past medical history of HOCM, NSVT with AICD in place, inguinal hernia s/p repair who presented to the ED on 5/5 after sustaining a injury at work.  He works as a .    Patient was struck by a pallet at work, he is unable to bear weight on his left lower extremity.  Patient noted to be hypertensive and hypoxic on admission, requiring oxygen.  X-ray imaging revealed left femoral neck fracture.  A nerve block was performed by ED physician.  Orthopedic surgery was consulted, patient admitted for surgical management.    Patient underwent left hip open reduction and internal fixation of the femoral neck fracture on 5/6/2025.  He is to be TTWB LLE.  He will be switched to aspirin twice daily for 4 weeks for outpatient DVT prophylaxis when able.  He is to follow-up with orthopedic surgery clinic in 2 weeks to check wounds and remove sutures/staples.      Interval Problem Update    5/7  NAEO.  HDS, on RA to 2L NC overnight.  WBC 8.1, hemoglobin 13.6 slight decrease from 14 yesterday.  Platelet 231.  Metabolic panel reviewed, overall unremarkable.    Patient reports left hip pain controlled by current medications.  Denies any fevers, chills, SOB/chest pain.    Continue multimodal pain regimen.  PT OT recommendations.    I have discussed this patient's plan of care and discharge plan at IDT rounds today with Case Management, Nursing, Nursing leadership, and other members of the IDT team.    Consultants/Specialty  orthopedics    Code Status  Full Code    Disposition  I have placed the appropriate orders for post-discharge needs.    Review of Systems  ROS     Physical Exam  Temp:  [36.2 °C (97.2 °F)-36.7 °C (98.1 °F)] 36.2 °C (97.2 °F)  Pulse:  [60-67] 64  Resp:  [15-17] 16  BP:  (112-155)/() 133/67  SpO2:  [92 %-95 %] 95 %    Physical Exam  Constitutional:       General: He is not in acute distress.     Appearance: He is not toxic-appearing.   Cardiovascular:      Rate and Rhythm: Normal rate and regular rhythm.      Pulses: Normal pulses.      Heart sounds: Normal heart sounds.   Pulmonary:      Effort: Pulmonary effort is normal.      Breath sounds: Normal breath sounds.   Abdominal:      General: Abdomen is flat. Bowel sounds are normal.      Palpations: Abdomen is soft.   Musculoskeletal:      Comments: Left hip surgical site clean and intact, no overt redness/purulent drainage. Some mild swelling   Skin:     Capillary Refill: Capillary refill takes less than 2 seconds.         Fluids    Intake/Output Summary (Last 24 hours) at 5/7/2025 1502  Last data filed at 5/7/2025 0503  Gross per 24 hour   Intake --   Output 550 ml   Net -550 ml        Laboratory  Recent Labs     05/05/25  0924 05/06/25  0416 05/07/25  0355   WBC 4.7* 5.9 8.1   RBC 5.10 4.96 4.91   HEMOGLOBIN 14.1 14.0 13.6*   HEMATOCRIT 41.4* 41.3* 41.1*   MCV 81.2* 83.3 83.7   MCH 27.6 28.2 27.7   MCHC 34.1 33.9 33.1   RDW 47.0 49.0 48.9   PLATELETCT 238 215 231   MPV 9.8 9.6 9.5     Recent Labs     05/05/25  0924 05/06/25  0416 05/07/25  0355   SODIUM 139 137 136   POTASSIUM 4.6 4.1 4.2   CHLORIDE 107 103 102   CO2 24 26 26   GLUCOSE 113* 114* 120*   BUN 18 20 23*   CREATININE 1.12 1.25 1.24   CALCIUM 9.3 9.0 9.0                   Imaging  DX-PORTABLE FLUORO > 1 HOUR   Final Result      Portable fluoroscopy utilized for 2 minutes 1 second.         INTERPRETING LOCATION: [address] [facility], [address]      DX-HIP-UNILATERAL-W/O PELVIS-2/3 VIEWS LEFT   Final Result      Portable fluoroscopy utilized for 2 minutes 1 second.         INTERPRETING LOCATION: [address] [facility], [address]      CT-HIP W/O PLUS RECONS LEFT   Final Result      1.  Mildly displaced and angulated transcervical left femoral neck fracture.   2.  Small foci  of air in the left groin/inguinal region. Correlate for recent intervention or infection.   3.  Focal thickening of the anterior right bladder wall. Correlate with urinalysis and direct visualization.   4.  Right greater than left fat-containing inguinal hernias.      DX-CHEST-LIMITED (1 VIEW)   Final Result      No acute cardiopulmonary abnormality.      DX-FEMUR-2+ LEFT   Final Result      Left femoral neck fracture.      DX-PELVIS-1 OR 2 VIEWS   Final Result      Acute fracture of the left femoral neck.           Assessment/Plan  * Closed displaced fracture of left femoral neck (HCC)  Assessment & Plan  Left femoral neck fracture noted on x-ray imaging  CT of the left hip showed mildly displaced and angulated transcervical left femoral neck fracture.  There is a small foci of air in the left groin/inguinal region and focal thickening of the anterior right bladder wall.  Right greater than left fat-containing inguinal hernias.  Patient underwent left hip open reduction internal fixation of the femoral neck fracture 5/6.  PT OT recs postop.  DVT prophylaxis with SCDs and Lovenox until mobilizing independently and then can be switched to aspirin for 4 weeks.  Patient will need to follow-up with orthopedic surgery in 2 weeks for wound check and suture/staple removal.  Continue on IV Dilaudid and p.o. oxy as needed. Continue pulse oximetry monitoring to monitor for hypoxia and respiratory depression while receiving IV narcotic medications        Tobacco abuse- (present on admission)  Assessment & Plan  Nicotine replacement protocol and cessation education provided for 4 minutes, discussed options of nicotine patch, acupuncture, medical treatment with wellbutrin and chantix. Discussed other options.     Patient does not want nicotine patch at this time    HOCM (hypertrophic obstructive cardiomyopathy) ([facility])- (present on admission)  Assessment & Plan  hx    ICD (implantable cardioverter-defibrillator) in place- (present  on admission)  Assessment & Plan  In place, functional  Follows with cardiology outpatient    NSVT (nonsustained ventricular tachycardia) (HCC)- (present on admission)  Assessment & Plan  Resume beta-blocker         VTE prophylaxis:    enoxaparin ppx      I have performed a physical exam and reviewed and updated ROS and Plan today (5/7/2025). In review of yesterday's note (5/6/2025), there are no changes except as documented above.

## 2025-05-07 NOTE — THERAPY
Occupational Therapy   Initial Evaluation     Patient Name: Trevor Gillis Jr.  Age:  54 y.o., Sex:  male  Medical Record #: [id number]  Today's Date: 5/7/2025     Precautions  Precautions: Fall Risk, Toe Touch Weight Bearing Left Lower Extremity  Comments: HOCM s/p implanted defibrillator; difficulty maintaining TTWB    Assessment  Patient is 54 y.o. male admitted after work accident found to have L femur fx s/p ORIF. Req mod v/c to maintain TTWB LLE; may benefit from w/c, but will defer to PT recommendations. PMHx of Tobacco use, HOCM s/p implanted defibrillator, NSVT, and bicep tendon rupture (also at work). Pt reports lives with his friend and dtr's godmother; reports has lots of social support to assistance. Pt is eager to return home. Currently limited by decreased functional mobility, activity tolerance, cognition, strength, balance, adherence to precautions, and pain which are currently affecting pt's ability to complete ADLs/txfs/IADLs at baseline. Will continue to follow.     Plan    Occupational Therapy Initial Treatment Plan   Treatment Interventions: Self Care / Activities of Daily Living, Adaptive Equipment, Neuro Re-Education / Balance, Therapeutic Exercises, Therapeutic Activity, Family / Caregiver Training  Treatment Frequency: 4 Times per Week  Duration: Until Therapy Goals Met    DC Equipment Recommendations: Tub Transfer Bench, Reacher, Bed Side Commode  Discharge Recommendations: Recommend home health for continued occupational therapy services (as long as friends can assist 24/7 and he has BSC. Otherwise recommend post acute placement.)      Objective     05/07/25 1053   Prior Living Situation   Prior Services Home-Independent   Housing / Facility 1 Story House   Steps Into Home 3   Bathroom Set up Bathtub / Shower Combination;Shower Chair   Equipment Owned Front-Wheel Walker;Tub / Shower Seat   Lives with - Patient's Self Care Capacity Unrelated Adult   Comments Pt reports lives with his  friend and dtr's godmother; reports has lots of social support to assist.   Prior Level of ADL Function   Self Feeding Independent   Grooming / Hygiene Independent   Bathing Independent   Dressing Independent   Toileting Independent   Prior Level of IADL Function   Medication Management Independent   Laundry Independent   Kitchen Mobility Independent   Finances Independent   Home Management Independent   Shopping Independent   Prior Level Of Mobility Independent Without Device in Community;Independent Without Device in Home   Driving / Transportation Driving Independent   Occupation (Pre-Hospital Vocational) Employed Full Time;Requires Physical Labor  (warehouse)   History of Falls   History of Falls Yes   Date of Last Fall 05/05/25  (reason for admit)   Precautions   Precautions Fall Risk;Toe Touch Weight Bearing Left Lower Extremity   Comments HOCM s/p implanted defibrillator; difficulty maintaining TTWB   Vitals   O2 Delivery Device Room air w/o2 available   Vitals Comments SpO2 >90%   Pain 0 - 10 Group   Location Leg;Hip   Location Orientation Left   Therapist Pain Assessment Post Activity;During Activity;Nurse Notified  (not quantified)   Cognition    Cognition / Consciousness X   Level of Consciousness Alert   Safety Awareness Impaired   New Learning Impaired   Comments very pleasant and cooperative; receptive to education, but questionable retention, recommend reinforcement   Passive ROM Upper Body   Passive ROM Upper Body WDL   Active ROM Upper Body   Active ROM Upper Body  WDL   Strength Upper Body   Upper Body Strength  WDL   Balance Assessment   Sitting Balance (Static) Fair +   Sitting Balance (Dynamic) Fair   Standing Balance (Static) Fair   Standing Balance (Dynamic) Fair -   Weight Shift Sitting Good   Weight Shift Standing Absent   Comments w/FWW; req mod v/cs to maintain TTWB LLE   Bed Mobility    Supine to Sit Contact Guard Assist   Sit to Supine Contact Guard Assist   Scooting Supervised    Comments HOB elevated   ADL Assessment   Eating Supervision   Grooming   (declined need)   Lower Body Dressing Maximal Assist   Toileting Minimal Assist   Comments Educated on TTWB, hook technique, energy conservation techniques, adaptive techniques for ADL/txfs, DME for home (tub txf bench/BSC), home safety, pathology of bedrest, and importance of continued OOB activity.   Functional Mobility   Sit to Stand Contact Guard Assist   Bed, Chair, Wheelchair Transfer Contact Guard Assist   Toilet Transfers Minimal Assist  (heavy reliance on GB)   Transfer Method Stand Step   Mobility w/FWW; bed<>toilet. mod v/cs to maintain TTWB   Edema / Skin Assessment   Edema / Skin  Not Assessed   Activity Tolerance   Comments limited by pain, fatigue, and cognition   Patient / Family Goals   Patient / Family Goal #1 to go home to his dogs   Short Term Goals   Short Term Goal # 1 LB dressing with SPV using AE PRN   Short Term Goal # 2 toilet txf with SPV   Short Term Goal # 3 seated g/h at sink   Education Group   Education Provided Role of Occupational Therapist;Weight Bearing Precautions   Role of Occupational Therapist Patient Response Patient;Acceptance;Explanation;Verbal Demonstration;Reinforcement Needed   Weight Bearing Precautions Patient Response Patient;Acceptance;Explanation;Demonstration;Action Demonstration;Reinforcement Needed   Occupational Therapy Initial Treatment Plan    Treatment Interventions Self Care / Activities of Daily Living;Adaptive Equipment;Neuro Re-Education / Balance;Therapeutic Exercises;Therapeutic Activity;Family / Caregiver Training   Treatment Frequency 4 Times per Week   Duration Until Therapy Goals Met   Problem List   Problem List Decreased Active Daily Living Skills;Decreased Homemaking Skills;Decreased Functional Mobility;Decreased Activity Tolerance;Safety Awareness Deficits / Cognition;Impaired Postural Control / Balance;Limited Knowledge of Post Op Precautions

## 2025-05-07 NOTE — PROGRESS NOTES
Virtual Nurse rounding complete.    Round Needs: Patient resting comfortably. No needs at this time.

## 2025-05-07 NOTE — PROGRESS NOTES
"      Orthopaedic Progress Note    Interval changes:  Patient doing well post op  LLE dressings are CDI  Cleared for DC to home by ortho pending medicine clearance    ROS - Patient denies any new issues.  Pain well controlled.    /67   Pulse 64   Temp 36.2 °C (97.2 °F) (Temporal)   Resp 16   Ht 1.905 m (6' 3\")   Wt 118 kg (260 lb)   SpO2 95%     Patient seen and examined  No acute distress  Breathing non labored  RRR  LLE dressings CDI, DNVI, moves all toes, cap refill <2 sec.      Recent Labs     05/05/25  0924 05/06/25  0416 05/07/25  0355   WBC 4.7* 5.9 8.1   RBC 5.10 4.96 4.91   HEMOGLOBIN 14.1 14.0 13.6*   HEMATOCRIT 41.4* 41.3* 41.1*   MCV 81.2* 83.3 83.7   MCH 27.6 28.2 27.7   MCHC 34.1 33.9 33.1   RDW 47.0 49.0 48.9   PLATELETCT 238 215 231   MPV 9.8 9.6 9.5       Active Hospital Problems    Diagnosis     HOCM (hypertrophic obstructive cardiomyopathy) ([facility]) [I42.1]      Priority: High    Pacemaker [Z95.0]     Closed displaced fracture of left femoral neck ([facility]) [S72.002A]     Tobacco abuse [Z72.0]     ICD (implantable cardioverter-defibrillator) in place [Z95.810]     NSVT (nonsustained ventricular tachycardia) ([facility]) [I47.29]        Assessment/Plan:  Patient doing well post op  LLE dressings are CDI  Cleared for DC to home by ortho pending medicine clearance  POD#1 S/P Left hip open reduction internal fixation femoral neck fracture  Wt bearing status - TTWB LLE  Wound care/Drains - Dressings to be changed every other day by nursing. Or PRN for saturation    Future Procedures - none planned   Lovenox: Start 5/6, Duration-until ambulatory > 150'  Sutures/Staples out- 14-21 days post operatively. Removal by ortho mid levels only.  PT/OT-initiated  Antibiotics: Perioperative completed  DVT Prophylaxis- TEDS/SCDs/Foot pumps  Calvin-not needed per ortho  Case Coordination for Discharge Planning - Disposition per therapy recs.    "

## 2025-05-07 NOTE — CARE PLAN
The patient is Stable - Low risk of patient condition declining or worsening    Shift Goals  Clinical Goals: PT/OT, safety, mobility, comfort  Patient Goals: rest, comfort, food  Family Goals: NA    Progress made toward(s) clinical / shift goals:    Problem: Pain - Standard  Goal: Alleviation of pain or a reduction in pain to the patient’s comfort goal  Outcome: Progressing  Note: Paitient educated on 0 to 10 pain scale. Pain managed with pharmacologic and non-pharmacological interventions. See MAR. Pt verbilized understanding of interventions.      Problem: Knowledge Deficit - Standard  Goal: Patient and family/care givers will demonstrate understanding of plan of care, disease process/condition, diagnostic tests and medications  Outcome: Progressing     Problem: Fall Risk  Goal: Patient will remain free from falls  Outcome: Progressing  Note: Bed alarm on, bed locked and in lowest position. DME out of site. Pt educated on use of call light for assistance. Pt verbalizes understanding.     Problem: Communication  Goal: The ability to communicate needs accurately and effectively will improve  Outcome: Progressing     Problem: Respiratory  Goal: Patient will achieve/maintain optimum respiratory ventilation and gas exchange  Outcome: Progressing     Problem: Urinary Elimination  Goal: Establish and maintain regular urinary output  Outcome: Progressing     Problem: Bowel Elimination  Goal: Establish and maintain regular bowel function  Outcome: Progressing     Problem: Mobility  Goal: Patient's capacity to carry out activities will improve  Outcome: Progressing  Note: Pt educated on early ambulation post surgery and use of call light for assistance. Pt was assessed for proper DME usage and amount of assistance. Pt verbalized understanding.     Problem: Self Care  Goal: Patient will have the ability to perform ADLs independently or with assistance (bathe, groom, dress, toilet and feed)  Outcome: Progressing     Problem:  Infection - Standard  Goal: Patient will remain free from infection  Outcome: Progressing     Problem: Wound/ / Incision Healing  Goal: Patient's wound/surgical incision will decrease in size and heals properly  Outcome: Progressing       Patient is not progressing towards the following goals:

## 2025-05-07 NOTE — CARE PLAN
The patient is Stable - Low risk of patient condition declining or worsening    Shift Goals  Clinical Goals: Mobility, safety, pain control  Patient Goals: Rest  Family Goals: comfort    Progress made toward(s) clinical / shift goals:  Yes      Problem: Pain - Standard  Goal: Alleviation of pain or a reduction in pain to the patient’s comfort goal  Outcome: Progressing     Problem: Knowledge Deficit - Standard  Goal: Patient and family/care givers will demonstrate understanding of plan of care, disease process/condition, diagnostic tests and medications  Outcome: Progressing     Problem: Fall Risk  Goal: Patient will remain free from falls  Outcome: Progressing     Problem: Respiratory  Goal: Patient will achieve/maintain optimum respiratory ventilation and gas exchange  Outcome: Progressing     Problem: Mobility  Goal: Patient's capacity to carry out activities will improve  Outcome: Progressing     Problem: Self Care  Goal: Patient will have the ability to perform ADLs independently or with assistance (bathe, groom, dress, toilet and feed)  Outcome: Progressing     Problem: Infection - Standard  Goal: Patient will remain free from infection  Outcome: Progressing     Problem: Wound/ / Incision Healing  Goal: Patient's wound/surgical incision will decrease in size and heals properly  Outcome: Progressing

## 2025-05-08 ENCOUNTER — PHARMACY VISIT (OUTPATIENT)
Dept: PHARMACY | Facility: MEDICAL CENTER | Age: 55
End: 2025-05-08
Payer: COMMERCIAL

## 2025-05-08 VITALS
HEIGHT: 75 IN | TEMPERATURE: 97.2 F | RESPIRATION RATE: 16 BRPM | WEIGHT: 260 LBS | HEART RATE: 75 BPM | OXYGEN SATURATION: 94 % | SYSTOLIC BLOOD PRESSURE: 118 MMHG | BODY MASS INDEX: 32.33 KG/M2 | DIASTOLIC BLOOD PRESSURE: 81 MMHG

## 2025-05-08 PROBLEM — Z98.890 S/P ORIF (OPEN REDUCTION INTERNAL FIXATION) FRACTURE: Status: ACTIVE | Noted: 2025-05-08

## 2025-05-08 PROBLEM — Z87.81 S/P ORIF (OPEN REDUCTION INTERNAL FIXATION) FRACTURE: Status: ACTIVE | Noted: 2025-05-08

## 2025-05-08 PROBLEM — S72.002A CLOSED DISPLACED FRACTURE OF LEFT FEMORAL NECK (HCC): Status: RESOLVED | Noted: 2025-05-05 | Resolved: 2025-05-08

## 2025-05-08 LAB
ERYTHROCYTE [DISTWIDTH] IN BLOOD BY AUTOMATED COUNT: 48.8 FL (ref 35.9–50)
HCT VFR BLD AUTO: 42.1 % (ref 42–52)
HGB BLD-MCNC: 13.8 G/DL (ref 14–18)
MCH RBC QN AUTO: 27.5 PG (ref 27–33)
MCHC RBC AUTO-ENTMCNC: 32.8 G/DL (ref 32.3–36.5)
MCV RBC AUTO: 83.9 FL (ref 81.4–97.8)
PLATELET # BLD AUTO: 233 K/UL (ref 164–446)
PMV BLD AUTO: 9.5 FL (ref 9–12.9)
RBC # BLD AUTO: 5.02 M/UL (ref 4.7–6.1)
WBC # BLD AUTO: 6.7 K/UL (ref 4.8–10.8)

## 2025-05-08 PROCEDURE — RXMED WILLOW AMBULATORY MEDICATION CHARGE

## 2025-05-08 PROCEDURE — 97535 SELF CARE MNGMENT TRAINING: CPT

## 2025-05-08 PROCEDURE — 36415 COLL VENOUS BLD VENIPUNCTURE: CPT

## 2025-05-08 PROCEDURE — 700102 HCHG RX REV CODE 250 W/ 637 OVERRIDE(OP): Performed by: GENERAL PRACTICE

## 2025-05-08 PROCEDURE — 99239 HOSP IP/OBS DSCHRG MGMT >30: CPT

## 2025-05-08 PROCEDURE — 85027 COMPLETE CBC AUTOMATED: CPT

## 2025-05-08 PROCEDURE — A9270 NON-COVERED ITEM OR SERVICE: HCPCS | Performed by: GENERAL PRACTICE

## 2025-05-08 RX ORDER — POLYETHYLENE GLYCOL 3350 17 G/17G
17 POWDER, FOR SOLUTION ORAL 2 TIMES DAILY PRN
Qty: 30 EACH | Refills: 1 | Status: SHIPPED | OUTPATIENT
Start: 2025-05-08 | End: 2025-05-08

## 2025-05-08 RX ORDER — ASPIRIN 81 MG/1
81 TABLET, CHEWABLE ORAL
Qty: 56 TABLET | Refills: 0 | Status: SHIPPED | OUTPATIENT
Start: 2025-05-08 | End: 2025-05-08

## 2025-05-08 RX ORDER — ACETAMINOPHEN 500 MG
500-1000 TABLET ORAL 3 TIMES DAILY PRN
COMMUNITY
Start: 2025-05-08 | End: 2025-05-08

## 2025-05-08 RX ORDER — GABAPENTIN 100 MG/1
100 CAPSULE ORAL 3 TIMES DAILY PRN
Qty: 90 CAPSULE | Refills: 1 | Status: SHIPPED | OUTPATIENT
Start: 2025-05-08

## 2025-05-08 RX ORDER — METHOCARBAMOL 500 MG/1
500 TABLET, FILM COATED ORAL 4 TIMES DAILY PRN
Qty: 90 TABLET | Refills: 1 | Status: SHIPPED | OUTPATIENT
Start: 2025-05-08 | End: 2025-05-08

## 2025-05-08 RX ORDER — AMOXICILLIN 250 MG
2 CAPSULE ORAL 2 TIMES DAILY
Qty: 30 TABLET | Refills: 0 | Status: SHIPPED | OUTPATIENT
Start: 2025-05-08

## 2025-05-08 RX ORDER — ASPIRIN 81 MG/1
81 TABLET, CHEWABLE ORAL
Qty: 56 TABLET | Refills: 0 | Status: SHIPPED | OUTPATIENT
Start: 2025-05-08 | End: 2025-06-05

## 2025-05-08 RX ORDER — AMOXICILLIN 250 MG
2 CAPSULE ORAL 2 TIMES DAILY
Qty: 30 TABLET | Refills: 0 | Status: SHIPPED | OUTPATIENT
Start: 2025-05-08 | End: 2025-05-08

## 2025-05-08 RX ORDER — GABAPENTIN 100 MG/1
100 CAPSULE ORAL 3 TIMES DAILY PRN
Qty: 90 CAPSULE | Refills: 1 | Status: SHIPPED | OUTPATIENT
Start: 2025-05-08 | End: 2025-05-08

## 2025-05-08 RX ORDER — ACETAMINOPHEN 500 MG
500-1000 TABLET ORAL 3 TIMES DAILY PRN
COMMUNITY
Start: 2025-05-08

## 2025-05-08 RX ORDER — OXYCODONE HYDROCHLORIDE 10 MG/1
10 TABLET ORAL EVERY 6 HOURS PRN
Qty: 25 TABLET | Refills: 0 | Status: SHIPPED | OUTPATIENT
Start: 2025-05-08 | End: 2025-05-15

## 2025-05-08 RX ORDER — POLYETHYLENE GLYCOL 3350 17 G/17G
17 POWDER, FOR SOLUTION ORAL 2 TIMES DAILY PRN
Qty: 30 EACH | Refills: 1 | Status: SHIPPED | OUTPATIENT
Start: 2025-05-08

## 2025-05-08 RX ORDER — METHOCARBAMOL 500 MG/1
500 TABLET, FILM COATED ORAL 4 TIMES DAILY PRN
Qty: 90 TABLET | Refills: 1 | Status: SHIPPED | OUTPATIENT
Start: 2025-05-08

## 2025-05-08 RX ADMIN — OXYCODONE HYDROCHLORIDE 10 MG: 10 TABLET ORAL at 05:04

## 2025-05-08 RX ADMIN — ACETAMINOPHEN 1000 MG: 500 TABLET ORAL at 11:10

## 2025-05-08 RX ADMIN — ACETAMINOPHEN 1000 MG: 500 TABLET ORAL at 05:01

## 2025-05-08 ASSESSMENT — PAIN DESCRIPTION - PAIN TYPE
TYPE: ACUTE PAIN
TYPE: ACUTE PAIN
TYPE: SURGICAL PAIN
TYPE: ACUTE PAIN

## 2025-05-08 ASSESSMENT — COGNITIVE AND FUNCTIONAL STATUS - GENERAL
DRESSING REGULAR UPPER BODY CLOTHING: A LITTLE
DRESSING REGULAR LOWER BODY CLOTHING: A LOT
SUGGESTED CMS G CODE MODIFIER DAILY ACTIVITY: CK
DAILY ACTIVITIY SCORE: 18
HELP NEEDED FOR BATHING: A LOT
TOILETING: A LITTLE

## 2025-05-08 NOTE — THERAPY
Occupational Therapy  Daily Treatment     Patient Name: Trevor Gillis Jr.  Age:  54 y.o., Sex:  male  Medical Record #: [id number]  Today's Date: 5/8/2025     Precautions  Precautions: Fall Risk, Toe Touch Weight Bearing Left Lower Extremity  Comments: HOCM s/p implanted defibrillator; difficulty maintaining TTWB    Assessment    Pt was able to don pants with Heber for LLE mgmt and SBA to pull over hips in standing. Pt is unable to reach his feet to don socks. Education provided on use of a sock aid however his feet are too wide for a standard one and he will require a wide version; pt verbalized understanding of use. Pt reports he's been walking to the toilet with nursing staff. Recommend home health if he has assistance from family/friends for ADLs and OOB activity.     Plan    Treatment Plan Status: Continue Current Treatment Plan  Type of Treatment: Self Care / Activities of Daily Living, Adaptive Equipment, Neuro Re-Education / Balance, Therapeutic Exercises, Therapeutic Activity, Family / Caregiver Training  Treatment Frequency: 4 Times per Week  Treatment Duration: Until Therapy Goals Met    DC Equipment Recommendations: Tub Transfer Bench, Bed Side Commode, Sock Aide, Reacher  Discharge Recommendations: Recommend home health for continued occupational therapy services (if pt's friends/family can assist for OOB activity)    Subjective    Pt agreeable to OT session.      Objective     05/08/25 1137   Pain   Intervention Repositioned;Rest   Pain 0 - 10 Group   Location Leg   Location Orientation Left   Pain Rating Scale (NPRS)   (did not quantify)   Non Verbal Descriptors   Non Verbal Scale  Calm   Cognition    Cognition / Consciousness WDL   Comments Pleasant and cooperative   Balance   Sitting Balance (Static) Fair +   Sitting Balance (Dynamic) Fair +   Standing Balance (Static) Fair   Standing Balance (Dynamic) Fair -   Weight Shift Sitting Fair   Weight Shift Standing Fair  (TTWB)   Comments FWW   Bed  Mobility    Supine to Sit Standby Assist   Scooting Standby Assist   Comments HOBE   Activities of Daily Living   Lower Body Dressing Moderate Assist  (Heber to don pants, totalA for socks. Attempted education and use of sock aid however pt will require a wide one)   Toileting   (declined need)   Skilled Intervention Compensatory Strategies;Verbal Cuing;Sequencing   How much help from another person does the patient currently need...   Putting on and taking off regular lower body clothing? 2   Bathing (including washing, rinsing, and drying)? 2   Toileting, which includes using a toilet, bedpan, or urinal? 3   Putting on and taking off regular upper body clothing? 3   Taking care of personal grooming such as brushing teeth? 4   Eating meals? 4   6 Clicks Daily Activity Score 18   Functional Mobility   Sit to Stand Standby Assist   Bed, Chair, Wheelchair Transfer Standby Assist   Transfer Method Stand Step   Mobility EOB>chair with FWW   Activity Tolerance   Sitting in Chair post   Sitting Edge of Bed 8 min   Standing 2 min   Patient / Family Goals   Patient / Family Goal #1 to go home to his dogs   Goal #1 Outcome Progressing as expected   Short Term Goals   Short Term Goal # 1 LB dressing with SPV using AE PRN   Goal Outcome # 1 Progressing as expected   Short Term Goal # 2 toilet txf with SPV   Goal Outcome # 2 Goal not met   Short Term Goal # 3 seated g/h at sink   Goal Outcome # 3 Goal not met   Education Group   Education Provided Activities of Daily Living;Adaptive Equipment   ADL Patient Response Patient;Acceptance;Explanation;Verbal Demonstration;Action Demonstration;Reinforcement Needed   Adaptive Equipment Patient Response Patient;Acceptance;Explanation;Demonstration;Verbal Demonstration   Occupational Therapy Treatment Plan    O.T. Treatment Plan Continue Current Treatment Plan   Anticipated Discharge Equipment and Recommendations   DC Equipment Recommendations Tub Transfer Bench;Bed Side Commode;Sock  Aide;Reacher   Discharge Recommendations Recommend home health for continued occupational therapy services  (if pt's friends/family can assist for OOB activity)   Interdisciplinary Plan of Care Collaboration   IDT Collaboration with  Nursing   Patient Position at End of Therapy Seated;Chair Alarm On;Call Light within Reach;Tray Table within Reach;Phone within Reach   Collaboration Comments RN aware   Session Information   Date / Session Number  5/8 #2 (2/4, 5/13)

## 2025-05-08 NOTE — THERAPY
Physical Therapy   Initial Evaluation     Patient Name: Trevor Gillis Jr.  Age:  54 y.o., Sex:  male  Medical Record #: [id number]  Today's Date: 5/7/2025     Precautions  Precautions: Fall Risk;Toe Touch Weight Bearing Left Lower Extremity  Comments: HOCM s/p implanted defibrillator; difficulty maintaining TTWB    Assessment  Patient is 54 y.o. male who presented to the ED on 5/5 after sustaining a injury at work where struck by a pallet. Dx'd with Displaced left femoral neck fracture, hypoxic and hypertensive. s/p ORIF.  Hx of HOCM, NSVT with AICD in place, inguinal hernia s/p repair, tobacco use.     Pt presents with the following Problems: Pain, Impaired Bed Mobility, Impaired Transfers, Impaired Ambulation, Decreased Activity Tolerance, Functional Strength Deficit, Impaired Balance. Please see flowsheet below for information. Will continue to follow. Pt is currently below their functional baseline and anticipate that pt will benefit from home with family assistance upon DC from hospital. Pt will benefit from nursing to assist pt with mobility to include the following: ambulate to bathroom as needed in order to decrease hospital acquired weakness.      Plan    Physical Therapy Initial Treatment Plan   Treatment Plan : Bed Mobility, Gait Training, Neuro Re-Education / Balance, Therapeutic Activities, Therapeutic Exercise, Stair Training  Treatment Frequency: 4 Times per Week  Duration: Until Therapy Goals Met    DC Equipment Recommendations: Crutches (Crutches may be beneficial for stairs- order initiated)  Discharge Recommendations: Anticipate that the patient will have no further physical therapy needs after discharge from the hospital       Subjective    Pt agreed to PT.      Objective       05/07/25 1427   Time In/Time Out   Therapy Start Time 1348   Therapy End Time 1427   Total Therapy Time 39   Initial Contact Note    Initial Contact Note Order Received and Verified, Physical Therapy Evaluation in  "Progress with Full Report to Follow.   Precautions   Precautions Fall Risk;Toe Touch Weight Bearing Left Lower Extremity   Vitals   Vitals Comments no s/s distress   Pain 0 - 10 Group   Location Leg;Hip   Location Orientation Left   Therapist Pain Assessment During Activity;Post Activity Pain Same as Prior to Activity  (increased pain with bed mobility, decreased pain when provided assistance with L LE for bed mobility)   Prior Living Situation   Prior Services Home-Independent   Housing / Facility 1 Story House   Steps Into Home 3   Steps In Home 3   Rail Both Rail (Steps into Home);Both Rail (Steps in Home)  (pt able to reach both rails at both locations)   Bathroom Set up Bathtub / Shower Combination;Shower Chair   Equipment Owned Front-Wheel Walker;Tub / Shower Seat   Lives with - Patient's Self Care Capacity Unrelated Adult;Adult Children   Comments Pt reports lives with his friend and dtr's godmother; reports has lots of social support to assist.   History of Falls   History of Falls Yes   Date of Last Fall 05/05/25  (struck by a pallet at work and fell- femur fx)   Cognition    Cognition / Consciousness WDL   Level of Consciousness Alert   Comments pleasant, cooperative, more attentive vs OT session \"She woke me up. I wasn't awake enough yet.\"   Active ROM Lower Body    Active ROM Lower Body  X   Comments limted due to pain   Strength Lower Body   Lower Body Strength  X   Comments limted due to pain, required assistance to lift on/off bed   Coordination Lower Body    Coordination Lower Body  WDL   Vision   Vision Comments wears glasses   Balance Assessment   Sitting Balance (Static) Fair +   Sitting Balance (Dynamic) Fair   Standing Balance (Static) Fair   Standing Balance (Dynamic) Fair   Weight Shift Sitting Good   Weight Shift Standing   (pt able to weight shift with walker and maintain TTWB L LE)   Comments standing with FWW   Bed Mobility    Supine to Sit Contact Guard Assist   Sit to Supine Contact " Guard Assist   Scooting Supervised   Rolling Supervised   Comments HOB flat, out of bed to right side, assistance with L LE for comfort   Gait Analysis   Gait Level Of Assist Standby Assist   Assistive Device Front Wheel Walker   Distance (Feet) 20   # of Times Distance was Traveled 2   Deviation Bradykinetic  (able to maintain TTWB L LE)   # of Stairs Climbed 5   Level of Assist with Stairs Contact Guard Assist  (bilat rails, VCs for proper sequence and TTWB L LE)   Weight Bearing Status TTWB L LE   Functional Mobility   Sit to Stand Standby Assist   Bed, Chair, Wheelchair Transfer Standby Assist   Transfer Method Stand Step   Mobility with FWW   6 Clicks Assessment - How much HELP from from another person do you currently need... (If the patient hasn't done an activity recently, how much help from another person do you think he/she would need if he/she tried?)   Turning from your back to your side while in a flat bed without using bedrails? 3   Moving from lying on your back to sitting on the side of a flat bed without using bedrails? 3   Moving to and from a bed to a chair (including a wheelchair)? 3   Standing up from a chair using your arms (e.g., wheelchair, or bedside chair)? 3   Walking in hospital room? 3   Climbing 3-5 steps with a railing? 3   6 clicks Mobility Score 18   Activity Tolerance   Comments limited by pain, fatigue   Short Term Goals    Short Term Goal # 1 Pt will perform supine to/from sit with flat bed and no features supervised in 6 visits to progress bed mobility   Short Term Goal # 2 Pt will perform sit to/from stand with FWW supervised in 6 visits to progress transfers   Short Term Goal # 3 Pt will amb with FWW 100ft supervised with TTWB L LE in 6 visits to progress with functional household mobility.   Short Term Goal # 4 Pt will ascend/descend 6 steps with rails supervised with TTWB L LE in 6 visits to access bedroom/community.   Education Group   Education Provided Role of Physical  Therapist;Gait Training;Stair Training;Use of Assistive Device;Weight Bearing Precautions;Exercises - Supine   Role of Physical Therapist Patient Response Patient;Acceptance;Explanation;Verbal Demonstration   Gait Training Patient Response Patient;Acceptance;Explanation;Action Demonstration   Stair Training Patient Response Patient;Acceptance;Explanation;Action Demonstration  (pt did not feel the need to practice with crutch, provided demonstration to pt with crutch and rail)   Use of Assistive Device Patient Response Patient;Acceptance;Explanation;Action Demonstration   Exercises - Supine Patient Response Patient;Acceptance;Demonstration;Action Demonstration  (AP hourly)   Weight Bearing Precautions Patient Response Patient;Acceptance;Explanation;Action Demonstration;Reinforcement Needed  (pt able to state TTWB L LE without VCs, pt able to maintain with amb but required VCs with stair training)   Additional Comments Fall prevention measures- home modifications (remove floor rugs, night lights), wear safe non skid shoes, careful with positional changes.   Physical Therapy Initial Treatment Plan    Treatment Plan  Bed Mobility;Gait Training;Neuro Re-Education / Balance;Therapeutic Activities;Therapeutic Exercise;Stair Training   Treatment Frequency 4 Times per Week   Duration Until Therapy Goals Met   Problem List    Problems Pain;Impaired Bed Mobility;Impaired Transfers;Impaired Ambulation;Decreased Activity Tolerance;Functional Strength Deficit;Impaired Balance   Anticipated Discharge Equipment and Recommendations   DC Equipment Recommendations Crutches  (Crutches may be beneficial for stairs- order initiated)   Discharge Recommendations Anticipate that the patient will have no further physical therapy needs after discharge from the hospital   Interdisciplinary Plan of Care Collaboration   IDT Collaboration with  Nursing;Occupational Therapist  (OT stated that pt had a difficult time maintaining TTWB L LE in their  session)   Patient Position at End of Therapy In Bed;Bed Alarm On;Call Light within Reach;Tray Table within Reach;Phone within Reach   Collaboration Comments RN updated

## 2025-05-08 NOTE — CARE PLAN
The patient is Stable - Low risk of patient condition declining or worsening    Shift Goals  Clinical Goals: pain control, rest, safety  Patient Goals: comfort, rest  Family Goals: na      Problem: Fall Risk  Goal: Patient will remain free from falls  Outcome: Progressing   Educated patient on fall safety while in hospital and precautions that are used to prevent falls while recovering from surgery.   Patient verbalized understanding of education.   Interventions:   - using call light before getting up   - bed alarm in place    - bed in lowest and locked position    - using correct DME and putting away after use   - non skid socks and yellow arm band in place    - fall sign outside door   - white board updated during shift change and any patient     Problem: Pain - Standard  Goal: Alleviation of pain or a reduction in pain to the patient’s comfort goal  Outcome: Progressing    Working with patient to manage pain following surgery. Patient verbalized understanding of education and interventions.   Interventions:  - medication   - ambulation   - rest   - repositioning   - distraction   - ice packs

## 2025-05-08 NOTE — CARE PLAN
The patient is Stable - Low risk of patient condition declining or worsening    Shift Goals  Clinical Goals: pain control, safety/mobility, C4  Patient Goals: go home  Family Goals: not present    Progress made toward(s) clinical / shift goals:        Problem: Pain - Standard  Goal: Alleviation of pain or a reduction in pain to the patient’s comfort goal  Outcome: Progressing     Problem: Knowledge Deficit - Standard  Goal: Patient and family/care givers will demonstrate understanding of plan of care, disease process/condition, diagnostic tests and medications  Outcome: Progressing     Problem: Fall Risk  Goal: Patient will remain free from falls  Outcome: Progressing     Problem: Self Care  Goal: Patient will have the ability to perform ADLs independently or with assistance (bathe, groom, dress, toilet and feed)  Outcome: Progressing       Patient is not progressing towards the following goals:

## 2025-05-08 NOTE — PROGRESS NOTES
"      Orthopaedic Progress Note    Interval changes:  Patient doing well    LLE dressings changed incision without issues  Cleared for DC to home by ortho pending medicine clearance    ROS - Patient denies any new issues.  Pain well controlled.    /81   Pulse 75   Temp 36.2 °C (97.2 °F) (Temporal)   Resp 16   Ht 1.905 m (6' 3\")   Wt 118 kg (260 lb)   SpO2 94%     Patient seen and examined  No acute distress  Breathing non labored  RRR  LLE dressings changed incision without issues, DNVI, moves all toes, cap refill <2 sec.      Recent Labs     05/06/25  0416 05/07/25  0355 05/08/25  0124   WBC 5.9 8.1 6.7   RBC 4.96 4.91 5.02   HEMOGLOBIN 14.0 13.6* 13.8*   HEMATOCRIT 41.3* 41.1* 42.1   MCV 83.3 83.7 83.9   MCH 28.2 27.7 27.5   MCHC 33.9 33.1 32.8   RDW 49.0 48.9 48.8   PLATELETCT 215 231 233   MPV 9.6 9.5 9.5       Active Hospital Problems    Diagnosis     HOCM (hypertrophic obstructive cardiomyopathy) ([facility]) [I42.1]      Priority: High    Pacemaker [Z95.0]     Closed displaced fracture of left femoral neck ([facility]) [S72.002A]     Tobacco abuse [Z72.0]     ICD (implantable cardioverter-defibrillator) in place [Z95.810]     NSVT (nonsustained ventricular tachycardia) ([facility]) [I47.29]        Assessment/Plan:  Patient doing well    LLE dressings changed incision without issues  Cleared for DC to home by ortho pending medicine clearance  POD#2 S/P Left hip open reduction internal fixation femoral neck fracture  Wt bearing status - TTWB LLE  Wound care/Drains - Dressings to be changed every other day by nursing. Or PRN for saturation    Future Procedures - none planned   Lovenox: Start 5/6, Duration-until ambulatory > 150'  Sutures/Staples out- 14-21 days post operatively. Removal by ortho mid levels only.  PT/OT-initiated  Antibiotics: Perioperative completed  DVT Prophylaxis- TEDS/SCDs/Foot pumps  Calvin-not needed per ortho  Case Coordination for Discharge Planning - Disposition per therapy recs.    "

## 2025-05-08 NOTE — PROGRESS NOTES
Crutches fit to patient for post-discharge, home use.    Contact traction with any questions or concerns regarding the use of these DME.

## 2025-05-08 NOTE — PROGRESS NOTES
Pt being discharged to home with crutches and home health pending. Prescriptions filled/given via [id number]. IV dc'd. Discharge instructions discussed. All questions answered.  Patient agreeable to discharge plan. Patient has all belongings at time of dc. roommate to transport.

## 2025-05-08 NOTE — DISCHARGE PLANNING
Case Management Discharge Planning    Admission Date: 5/5/2025  GMLOS: 4.4  ALOS: 3    6-Clicks ADL Score: 18  6-Clicks Mobility Score: 18      Anticipated Discharge Dispo: Discharge Disposition: D/T to home under HHA care in anticipation of covered skilled care (06)    DME Needed: Yes    DME Ordered: Yes    Action(s) Taken: Pt was discussed during IDT rounds. Pt would benefit from HH and DME crutches. LMSW met with pt at bedside to obtain choice. Pt was agreeable with HH and made choice for 1) [facility], 2) [facility], 3) [facility], 4) [facility] , and 5) [facility]. Pt also made choice for DME, 1) [facility]. Choice forms were faxed to [facility]. Pt verbalized his understanding regarding  follow up after DC. Pt is medically cleared for DC. Anticipating no further CM needs at this time.    Escalations Completed: None    Medically Clear: Yes    Next Steps: LMSW to follow for any additional CM needs.    Barriers to Discharge: None    Is the patient up for discharge tomorrow: No    Care Transition Team Assessment  PCP: Pt states none   LMSW met with pt at bedside to complete assessment. Pt A&Ox4 and able to verify the information on the face sheet. Please see H&P for medical hx and presenting problem. Pt lives with adult relatives in a single-story house at [address] [place] [address] [place] [address] that has three steps to enter. Prior to this hospitalization, pt reports being independent at home with ADLs and IADLs. Pt denies any DME at baseline including O2. Pt reports his friend Mahad and his daughter as good support for him. Pt was previously working full-time doing [employer] work, currently on workers comp leave. Pt denies any substance use or mental health concerns. Pt has ACP docs on file, DPOA is Mahad Ayala [phone]. Pt confirmed that he is here under worker comp and has [employer] insurance through his employer. C-4 has been completed and scanned into media. Pt confirmed he will have transportation home upon  MICHELLE.    Information Source  Orientation Level: Oriented X4  Information Given By: Patient  Informant's Name: Trevor  Who is responsible for making decisions for patient? : Patient    Readmission Evaluation  Is this a readmission?: No    Elopement Risk  Legal Hold: No  Ambulatory or Self Mobile in Wheelchair: No-Not an Elopement Risk  Elopement Risk: Not at Risk for Elopement    Interdisciplinary Discharge Planning  Lives with - Patient's Self Care Capacity: Unrelated Adult, Adult Children  Patient or legal guardian wants to designate a caregiver: No  Support Systems: Friends / Neighbors  Housing / Facility: [address] [facility]  Prior Services: Home-Independent    Discharge Preparedness  What is your plan after discharge?: Home health care  What are your discharge supports?: Other (comment), Child (friend/roommate)  Prior Functional Level: Ambulatory, Drives Self, Independent with Activities of Daily Living, Independent with Medication Management  Difficulity with ADLs: None  Difficulity with IADLs: None    Functional Assesment  Prior Functional Level: Ambulatory, Drives Self, Independent with Activities of Daily Living, Independent with Medication Management    Finances  Financial Barriers to Discharge: No  Prescription Coverage: Yes    Vision / Hearing Impairment  Right Eye Vision: Impaired, Wears Glasses  Left Eye Vision: Impaired, Wears Glasses    Advance Directive  Advance Directive?: DPOA for Health Care  Durable Power of  Name and Contact : Mahad Ayala [phone]    Domestic Abuse  Have you ever been the victim of abuse or violence?: No  Possible Abuse/Neglect Reported to:: Not Applicable    Psychological Assessment  History of Substance Abuse: None  History of Psychiatric Problems: No  Non-compliant with Treatment: No  Newly Diagnosed Illness: No    Discharge Risks or Barriers  Discharge risks or barriers?: No PCP, Complex medical needs  Patient risk factors: Complex medical needs    Anticipated  Discharge Information  Discharge Disposition: D/T to home under A care in anticipation of covered skilled care (06)

## 2025-05-09 NOTE — DISCHARGE SUMMARY
Discharge Summary    CHIEF COMPLAINT ON ADMISSION  Chief Complaint   Patient presents with    Hip Pain     Left        Reason for Admission  Hip Pain     Admission Date  5/5/2025    CODE STATUS  Full Code    HPI & HOSPITAL COURSE    This is a 54-year-old male with past medical history of HOCM, NSVT with AICD in place, inguinal hernia s/p repair who presented to the ED on 5/5 after sustaining a injury at work.  He works as a .    Patient was struck by a pallet at work, he is unable to bear weight on his left lower extremity.  Patient noted to be hypertensive and hypoxic on admission, requiring oxygen.  X-ray imaging revealed left femoral neck fracture.  A nerve block was performed by ED physician.  Orthopedic surgery was consulted, patient admitted for surgical management.    Patient underwent left hip open reduction and internal fixation of the femoral neck fracture on 5/6/2025.  He is to be TTWB LLE.  He will be switched to aspirin twice daily for 4 weeks for outpatient DVT prophylaxis when able.  He is to follow-up with orthopedic surgery clinic in 2 weeks to check wounds and remove sutures/staples.  PT and OT evaluated the patient afterwards.  PT anticipates no further needs after discharge.  OT recommends home health for continued OT services as long as friends can assist 24/7 and he has BSC.  Otherwise they recommend postacute placement.  Patient stated he has great support at home and would prefer home health.      Therefore, he is discharged in fair and stable condition to home with organized home healthcare and close outpatient follow-up.    The patient met 2-midnight criteria for an inpatient stay at the time of discharge.    Discharge Date  5/8/25    FOLLOW UP ITEMS POST DISCHARGE  Follow up with orthopedic surgery   Establish follow up with PCP     DISCHARGE DIAGNOSES  Principal Problem (Resolved):    Closed displaced fracture of left femoral neck (H[facility]) (POA: Unknown)  Active Problems:    ICD  (implantable cardioverter-defibrillator) in place (POA: Yes)    HOCM (hypertrophic obstructive cardiomyopathy) ([facility]) (POA: Yes)    Tobacco abuse (POA: Yes)    Pacemaker (POA: Unknown)    S/P ORIF (open reduction internal fixation) fracture (POA: Unknown)  Resolved Problems:    NSVT (nonsustained ventricular tachycardia) ([facility]) (POA: Yes)      FOLLOW UP  No future appointments.  [facility]  [address] [place] [address]  [place] [address]  [phone]  Schedule an appointment as soon as possible for a visit on 5/20/2025        MEDICATIONS ON DISCHARGE     Medication List        START taking these medications        Instructions   acetaminophen 500 MG Tabs  Commonly known as: Tylenol   Take 1-2 Tablets by mouth 3 times a day as needed for Mild Pain.  Dose: 500-1,000 mg     Aspirin Low Dose 81 MG Chew chewable tablet  Generic drug: aspirin   Chew 1 Tablet 2 (two) times a day for 28 days.  Dose: 81 mg     gabapentin 100 MG Caps  Commonly known as: Neurontin   Take 1 Capsule by mouth 3 times a day as needed (neuropathic pain).  Dose: 100 mg     methocarbamol 500 MG Tabs  Commonly known as: Robaxin   Take 1 Tablet by mouth 4 times a day as needed (Muscle spasms).  Dose: 500 mg     oxyCODONE immediate release 10 MG immediate release tablet  Commonly known as: Roxicodone   Take 1 Tablet by mouth every 6 hours as needed for Severe Pain for up to 7 days.  Dose: 10 mg     polyethylene glycol/lytes 17 g Pack  Commonly known as: Miralax   Mix 1 Packet per package instructions and drink by mouth 2 times a day as needed (If no bowel movement for more than 2 days).  Dose: 17 g     Stimulant Laxative 8.6-50 MG Tabs  Generic drug: senna-docusate   Take 2 Tablets by mouth 2 times a day.  Dose: 2 Tablet            CONTINUE taking these medications        Instructions   bisoprolol 5 MG Tabs  Commonly known as: Zebeta   Take 1 Tablet by mouth every day.  Dose: 5 mg              Allergies  Allergies   Allergen Reactions    Tomato Hives  and Vomiting       DIET  Orders Placed This Encounter   Procedures    Diet Order Diet: Regular     Standing Status:   Standing     Number of Occurrences:   1     Diet::   Regular [1]    Discontinue Diet Tray     Standing Status:   Standing     Number of Occurrences:   1       ACTIVITY  As tolerated.  Touch-down weight bearing LEFT leg    CONSULTATIONS  Orthopedic surgery     PROCEDURES  open reduction and internal fixation of the femoral neck fracture on 5/6/2025.    LABORATORY  Lab Results   Component Value Date    SODIUM 136 05/07/2025    POTASSIUM 4.2 05/07/2025    CHLORIDE 102 05/07/2025    CO2 26 05/07/2025    GLUCOSE 120 (H) 05/07/2025    BUN 23 (H) 05/07/2025    CREATININE 1.24 05/07/2025        Lab Results   Component Value Date    WBC 6.7 05/08/2025    HEMOGLOBIN 13.8 (L) 05/08/2025    HEMATOCRIT 42.1 05/08/2025    PLATELETCT 233 05/08/2025        Total time of the discharge process exceeds 35 minutes.

## (undated) DEVICE — PROTECTOR ULNA NERVE - (36PR/CA)

## (undated) DEVICE — ELECTRODE DUAL RETURN W/ CORD - (50/PK)

## (undated) DEVICE — GLOVE SIZE 8.0 SURGEON ACCELERATOR FREE GREEN (50PR/BX)

## (undated) DEVICE — KIT ANESTHESIA W/CIRCUIT & 3/LT BAG W/FILTER (20EA/CA)

## (undated) DEVICE — SODIUM CHL IRRIGATION 0.9% 1000ML (12EA/CA)

## (undated) DEVICE — SENSOR SPO2 NEO LNCS ADHESIVE (20/BX) SEE USER NOTES

## (undated) DEVICE — SUTURE 2-0 VICRYL PLUS CT-1 - 8 X 18 INCH(12/BX)

## (undated) DEVICE — CORDS BIPOLAR COAGULATION - 12FT STERILE DISP. (10EA/BX)

## (undated) DEVICE — WRAP COBAN SELF-ADHERENT 6 IN X 5YDS STERILE TAN (12/CA)

## (undated) DEVICE — STOCKINETTE IMPERVIOUS 12X48 - STERILELF (10/CA)"

## (undated) DEVICE — CON SEDATION EA ADDL 15 MIN

## (undated) DEVICE — TRAY SRGPRP PVP IOD WT PRP - (20/CA)

## (undated) DEVICE — COVER LIGHT HANDLE ALC PLUS DISP (18EA/BX)

## (undated) DEVICE — BANDAGE ELASTIC LATEX STERILE VELCRO 4 X 5 YDS (25EA/CA)

## (undated) DEVICE — MASK ANESTHESIA ADULT  - (100/CA)

## (undated) DEVICE — LACTATED RINGERS INJ 1000 ML - (14EA/CA 60CA/PF)

## (undated) DEVICE — TUBE, CULTURE AEROBIC

## (undated) DEVICE — SENSOR OXIMETER ADULT SPO2 RD SET (20EA/BX)

## (undated) DEVICE — TUBING CLEARLINK DUO-VENT - C-FLO (48EA/CA)

## (undated) DEVICE — ELECTRODE 850 FOAM ADHESIVE - HYDROGEL RADIOTRNSPRNT (50/PK)

## (undated) DEVICE — PACK LOWER EXTREMITY - (2/CA)

## (undated) DEVICE — SLING ORTH UNV TIETX VLFM ARM

## (undated) DEVICE — CON SEDATION/>5 YR 1ST 15 MIN

## (undated) DEVICE — SET EXTENSION WITH 2 PORTS (48EA/CA) ***PART #2C8610 IS A SUBSTITUTE*****

## (undated) DEVICE — GOWN SURGEONS X-LARGE - DISP. (30/CA)

## (undated) DEVICE — PADDING CAST 4 IN STERILE - 4 X 4 YDS (24/CA)

## (undated) DEVICE — MASK, LARYNGEAL AIRWAY #4

## (undated) DEVICE — GLOVE BIOGEL PI INDICATOR SZ 8.0 SURGICAL PF LF -(50/BX 4BX/CA)

## (undated) DEVICE — SUTURE 3-0 PROLENE PS-1 (12PK/BX)

## (undated) DEVICE — DRAPE U ORTHOPEDIC - (10/BX)

## (undated) DEVICE — SUTURE NONABSORBABLE XBRAID #2 26MM (12EA/BX)

## (undated) DEVICE — SNARECAPTIVATOR II - 20MM ROUND STIFF (40/BX)

## (undated) DEVICE — BANDAGE ELASTIC 6 HONEYCOMB - 6X5YD LF (20/CA)"

## (undated) DEVICE — GOWN WARMING STANDARD FLEX - (30/CA)

## (undated) DEVICE — CANISTER SUCTION 3000ML MECHANICAL FILTER AUTO SHUTOFF MEDI-VAC NONSTERILE LF DISP (40EA/CA)

## (undated) DEVICE — GLOVE SZ 7.5 BIOGEL PI MICRO - PF LF (50PR/BX)

## (undated) DEVICE — CHLORAPREP 26 ML APPLICATOR - ORANGE TINT(25/CA)

## (undated) DEVICE — WATER IRRIGATION STERILE 1000ML (12EA/CA)

## (undated) DEVICE — SUCTION INSTRUMENT YANKAUER BULBOUS TIP W/O VENT (50EA/CA)

## (undated) DEVICE — DRAPE STRLE REG TOWEL 18X24 - (10/BX 4BX/CA)"

## (undated) DEVICE — BAG SPONGE COUNT 10.25 X 32 - BLUE (250/CA)

## (undated) DEVICE — GLOVE, BIOGEL ECLIPSE, SZ 7.0, PF LTX (50/BX)

## (undated) DEVICE — TOWEL STOP TIMEOUT SAFETY FLAG (40EA/CA)

## (undated) DEVICE — BLADE SURGICAL #10 - (50/BX)

## (undated) DEVICE — DRAPE LOWER EXTREMETY - (6/CA)

## (undated) DEVICE — NEPTUNE 4 PORT MANIFOLD - (20/PK)

## (undated) DEVICE — DRAPE LARGE 3 QUARTER - (20/CA)

## (undated) DEVICE — SET LEADWIRE 5 LEAD BEDSIDE DISPOSABLE ECG (1SET OF 5/EA)

## (undated) DEVICE — GLOVE BIOGEL SZ 8 SURGICAL PF LTX - (50PR/BX 4BX/CA)

## (undated) DEVICE — SUTURE 2-0 MONOCRYL SH&UR-6 27 - (12/BX)

## (undated) DEVICE — BOVIE NEEDLE TIP INSULATD NON-SAFETY 2CM (50/PK)

## (undated) DEVICE — GLOVE BIOGEL INDICATOR SZ 7.5 SURGICAL PF LTX - (50PR/BX 4BX/CA)

## (undated) DEVICE — DRAPE C ARMOR (12EA/CA)

## (undated) DEVICE — NEEDLE NON SAFETY HYPO 22 GA X 1 1/2 IN (100/BX)

## (undated) DEVICE — FILM CASSETTE ENDO

## (undated) DEVICE — SUTURE PRELOADED #2 ULTRABRAID COBRAID (10EA/BX)

## (undated) DEVICE — PADDING CAST 6 IN STERILE - 6 X 4 YDS (24/CA)

## (undated) DEVICE — SET IRRIGATION CYSTOSCOPY TUBE L80 IN (20EA/CA)

## (undated) DEVICE — SLEEVE, VASO, THIGH, MED

## (undated) DEVICE — STRIP PACKING STERILE 1/4 IN - 1/4 IN X 5 YDS (IODOFORM)

## (undated) DEVICE — PACK MAJOR ORTHO TRAUMA- (3EA/CA)

## (undated) DEVICE — DEVICE ULTRABUTTON ADJUSTABLE FIXATION: Type: IMPLANTABLE DEVICE | Site: ARM | Status: NON-FUNCTIONAL

## (undated) DEVICE — BANDAGE ELASTIC 2 IN X 5 YDS - (10EA/BX 5BX/CA)

## (undated) DEVICE — SUTURE GENERAL

## (undated) DEVICE — DRAPE U SPLIT IMP 54 X 76 - (24/CA)

## (undated) DEVICE — DRAPE 36X28IN RAD CARM BND BG - (25/CA)

## (undated) DEVICE — SLEEVE VASO DVT COMPRESSION CALF MED - (10PR/CA)

## (undated) DEVICE — BIT DRILL 4.3MM X 413MM (1TX4=4)

## (undated) DEVICE — SPONGE GAUZESTER 4 X 4 4PLY - (128PK/CA)

## (undated) DEVICE — CAPTIVATOR II-15MM ROUND STIFF (40/BX)

## (undated) DEVICE — KIT CUSTOM PROCEDURE SINGLE FOR ENDO  (15/CA)

## (undated) DEVICE — TUBE FEEDING 8 FR 40CM PURPLE ENFIT COMPLIANT(10/PK)

## (undated) DEVICE — SUTURE 1 STRATAFIX SYMMETRIC PDS CTX 45CM (12EA/BX)

## (undated) DEVICE — CLOSURE SKIN STRIP 1/2 X 4 IN - (STERI STRIP) (50/BX 4BX/CA)

## (undated) DEVICE — GLOVE BIOGEL SZ 6.5 SURGICAL PF LTX (50PR/BX 4BX/CA)

## (undated) DEVICE — IMMOBILIZER KNEE 20 INCH - (1/EA)

## (undated) DEVICE — CANISTER SUCTION 3000ML MECHANICAL FILTER AUTO SHUTOFF MEDI-VAC NONSTERILE LF DISP  (40EA/CA)

## (undated) DEVICE — TUBE CONNECTING SUCTION - CLEAR PLASTIC STERILE 72 IN (50EA/CA)

## (undated) DEVICE — SUTURE 3-0 ETHILON FS-1 - (36/BX) 30 INCH

## (undated) DEVICE — WIRE GUIDE 3.2MM 400MM (1TX10+1TX4+2TX3=20)

## (undated) DEVICE — DRESSING PETROLEUM GAUZE 5 X 9" (50EA/BX 4BX/CA)"

## (undated) DEVICE — CANISTER SUCTION RIGID RED 1500CC (40EA/CA)

## (undated) DEVICE — HEAD HOLDER JUNIOR/ADULT

## (undated) DEVICE — SYRINGE 10 ML CONTROL LL (25EA/BX 4BX/CA)

## (undated) DEVICE — SWAB ANAEROBIC SPEC.COLLECTOR - (25/PK 4PK/CA 100EA/CA)

## (undated) DEVICE — KIT ROOM DECONTAMINATION

## (undated) DEVICE — HUMID-VENT HEAT AND MOISTURE EXCHANGE- (50/BX)

## (undated) DEVICE — BITE BLOCK ADULT 60FR (100EA/CA)